# Patient Record
Sex: MALE | Race: OTHER | Employment: UNEMPLOYED | URBAN - METROPOLITAN AREA
[De-identification: names, ages, dates, MRNs, and addresses within clinical notes are randomized per-mention and may not be internally consistent; named-entity substitution may affect disease eponyms.]

---

## 2017-01-01 ENCOUNTER — HOME HEALTH ADMISSION (OUTPATIENT)
Dept: HOME HEALTH SERVICES | Facility: HOME HEALTH | Age: 42
End: 2017-01-01
Payer: COMMERCIAL

## 2017-01-01 ENCOUNTER — OFFICE VISIT (OUTPATIENT)
Dept: CARDIOLOGY CLINIC | Age: 42
End: 2017-01-01

## 2017-01-01 ENCOUNTER — HOME CARE VISIT (OUTPATIENT)
Dept: SCHEDULING | Facility: HOME HEALTH | Age: 42
End: 2017-01-01
Payer: COMMERCIAL

## 2017-01-01 ENCOUNTER — APPOINTMENT (OUTPATIENT)
Dept: GENERAL RADIOLOGY | Age: 42
End: 2017-01-01
Attending: EMERGENCY MEDICINE
Payer: COMMERCIAL

## 2017-01-01 ENCOUNTER — APPOINTMENT (OUTPATIENT)
Dept: GENERAL RADIOLOGY | Age: 42
End: 2017-01-01
Attending: INTERNAL MEDICINE
Payer: COMMERCIAL

## 2017-01-01 ENCOUNTER — TELEPHONE (OUTPATIENT)
Dept: INTERNAL MEDICINE CLINIC | Age: 42
End: 2017-01-01

## 2017-01-01 ENCOUNTER — APPOINTMENT (OUTPATIENT)
Dept: GENERAL RADIOLOGY | Age: 42
End: 2017-01-01
Attending: PHYSICIAN ASSISTANT
Payer: COMMERCIAL

## 2017-01-01 ENCOUNTER — HOSPITAL ENCOUNTER (EMERGENCY)
Age: 42
Discharge: HOME | End: 2017-10-29
Attending: EMERGENCY MEDICINE
Payer: COMMERCIAL

## 2017-01-01 ENCOUNTER — HOSPITAL ENCOUNTER (OUTPATIENT)
Dept: INFUSION THERAPY | Age: 42
Discharge: HOME | End: 2017-09-27

## 2017-01-01 ENCOUNTER — APPOINTMENT (OUTPATIENT)
Dept: NUCLEAR MEDICINE | Age: 42
DRG: 194 | End: 2017-01-01
Attending: INTERNAL MEDICINE
Payer: MEDICAID

## 2017-01-01 ENCOUNTER — HOME CARE VISIT (OUTPATIENT)
Dept: HOME HEALTH SERVICES | Facility: HOME HEALTH | Age: 42
End: 2017-01-01

## 2017-01-01 ENCOUNTER — HOSPITAL ENCOUNTER (OUTPATIENT)
Age: 42
Setting detail: OBSERVATION
Discharge: HOME | End: 2017-10-23
Attending: EMERGENCY MEDICINE | Admitting: INTERNAL MEDICINE
Payer: COMMERCIAL

## 2017-01-01 ENCOUNTER — HOSPITAL ENCOUNTER (OUTPATIENT)
Dept: INFUSION THERAPY | Age: 42
Discharge: HOME | End: 2017-10-11
Payer: COMMERCIAL

## 2017-01-01 ENCOUNTER — HOSPITAL ENCOUNTER (INPATIENT)
Age: 42
LOS: 2 days | Discharge: HOME | DRG: 194 | End: 2017-12-15
Attending: EMERGENCY MEDICINE | Admitting: INTERNAL MEDICINE
Payer: MEDICAID

## 2017-01-01 ENCOUNTER — APPOINTMENT (OUTPATIENT)
Dept: GENERAL RADIOLOGY | Age: 42
DRG: 194 | End: 2017-01-01
Attending: EMERGENCY MEDICINE
Payer: MEDICAID

## 2017-01-01 ENCOUNTER — HOSPITAL ENCOUNTER (INPATIENT)
Age: 42
LOS: 1 days | Discharge: HOME HEALTH | DRG: 292 | End: 2017-09-28
Attending: EMERGENCY MEDICINE | Admitting: FAMILY MEDICINE
Payer: COMMERCIAL

## 2017-01-01 ENCOUNTER — HOSPITAL ENCOUNTER (EMERGENCY)
Age: 42
Discharge: OTHER INSTITUTION | End: 2017-09-26
Attending: INTERNAL MEDICINE
Payer: COMMERCIAL

## 2017-01-01 ENCOUNTER — HOSPITAL ENCOUNTER (OUTPATIENT)
Dept: INFUSION THERAPY | Age: 42
Discharge: HOME | End: 2017-10-04
Payer: COMMERCIAL

## 2017-01-01 ENCOUNTER — HOSPITAL ENCOUNTER (EMERGENCY)
Age: 42
Discharge: HOME | End: 2017-09-13
Attending: EMERGENCY MEDICINE
Payer: COMMERCIAL

## 2017-01-01 ENCOUNTER — HOME CARE VISIT (OUTPATIENT)
Dept: HOME HEALTH SERVICES | Facility: HOME HEALTH | Age: 42
End: 2017-01-01
Payer: COMMERCIAL

## 2017-01-01 VITALS
BODY MASS INDEX: 40.43 KG/M2 | OXYGEN SATURATION: 90 % | SYSTOLIC BLOOD PRESSURE: 179 MMHG | RESPIRATION RATE: 16 BRPM | WEIGHT: 315 LBS | DIASTOLIC BLOOD PRESSURE: 76 MMHG | HEIGHT: 74 IN | TEMPERATURE: 98.5 F | HEART RATE: 85 BPM

## 2017-01-01 VITALS
OXYGEN SATURATION: 93 % | BODY MASS INDEX: 39.17 KG/M2 | WEIGHT: 315 LBS | SYSTOLIC BLOOD PRESSURE: 176 MMHG | HEART RATE: 79 BPM | DIASTOLIC BLOOD PRESSURE: 85 MMHG | HEIGHT: 75 IN

## 2017-01-01 VITALS
RESPIRATION RATE: 20 BRPM | SYSTOLIC BLOOD PRESSURE: 142 MMHG | HEART RATE: 77 BPM | OXYGEN SATURATION: 95 % | DIASTOLIC BLOOD PRESSURE: 77 MMHG | TEMPERATURE: 97 F

## 2017-01-01 VITALS
TEMPERATURE: 97.9 F | RESPIRATION RATE: 18 BRPM | DIASTOLIC BLOOD PRESSURE: 86 MMHG | HEART RATE: 66 BPM | OXYGEN SATURATION: 96 % | HEIGHT: 74 IN | BODY MASS INDEX: 40.37 KG/M2 | SYSTOLIC BLOOD PRESSURE: 170 MMHG | WEIGHT: 314.6 LBS

## 2017-01-01 VITALS
DIASTOLIC BLOOD PRESSURE: 90 MMHG | RESPIRATION RATE: 18 BRPM | SYSTOLIC BLOOD PRESSURE: 162 MMHG | HEART RATE: 68 BPM | OXYGEN SATURATION: 90 % | TEMPERATURE: 98.2 F

## 2017-01-01 VITALS
HEART RATE: 67 BPM | DIASTOLIC BLOOD PRESSURE: 79 MMHG | WEIGHT: 315 LBS | OXYGEN SATURATION: 98 % | TEMPERATURE: 98.1 F | RESPIRATION RATE: 24 BRPM | BODY MASS INDEX: 39.17 KG/M2 | HEIGHT: 75 IN | SYSTOLIC BLOOD PRESSURE: 166 MMHG

## 2017-01-01 VITALS
RESPIRATION RATE: 18 BRPM | BODY MASS INDEX: 41.98 KG/M2 | OXYGEN SATURATION: 98 % | HEART RATE: 69 BPM | SYSTOLIC BLOOD PRESSURE: 168 MMHG | WEIGHT: 315 LBS | TEMPERATURE: 97 F | DIASTOLIC BLOOD PRESSURE: 88 MMHG

## 2017-01-01 VITALS
RESPIRATION RATE: 18 BRPM | OXYGEN SATURATION: 98 % | HEART RATE: 78 BPM | DIASTOLIC BLOOD PRESSURE: 108 MMHG | SYSTOLIC BLOOD PRESSURE: 180 MMHG | TEMPERATURE: 99.6 F

## 2017-01-01 VITALS
RESPIRATION RATE: 18 BRPM | TEMPERATURE: 98.2 F | HEART RATE: 75 BPM | DIASTOLIC BLOOD PRESSURE: 90 MMHG | WEIGHT: 315 LBS | BODY MASS INDEX: 41.6 KG/M2 | OXYGEN SATURATION: 95 % | SYSTOLIC BLOOD PRESSURE: 162 MMHG

## 2017-01-01 VITALS
DIASTOLIC BLOOD PRESSURE: 82 MMHG | BODY MASS INDEX: 40.76 KG/M2 | RESPIRATION RATE: 18 BRPM | HEART RATE: 67 BPM | TEMPERATURE: 98.3 F | SYSTOLIC BLOOD PRESSURE: 157 MMHG | WEIGHT: 315 LBS | OXYGEN SATURATION: 93 %

## 2017-01-01 VITALS
RESPIRATION RATE: 18 BRPM | HEART RATE: 95 BPM | TEMPERATURE: 98.1 F | SYSTOLIC BLOOD PRESSURE: 144 MMHG | OXYGEN SATURATION: 88 % | DIASTOLIC BLOOD PRESSURE: 74 MMHG

## 2017-01-01 VITALS
HEART RATE: 72 BPM | SYSTOLIC BLOOD PRESSURE: 134 MMHG | HEIGHT: 74 IN | BODY MASS INDEX: 40.43 KG/M2 | DIASTOLIC BLOOD PRESSURE: 69 MMHG | WEIGHT: 315 LBS | OXYGEN SATURATION: 91 %

## 2017-01-01 VITALS
BODY MASS INDEX: 39.17 KG/M2 | HEIGHT: 75 IN | TEMPERATURE: 98.1 F | OXYGEN SATURATION: 97 % | SYSTOLIC BLOOD PRESSURE: 178 MMHG | HEART RATE: 81 BPM | RESPIRATION RATE: 18 BRPM | DIASTOLIC BLOOD PRESSURE: 79 MMHG | WEIGHT: 315 LBS

## 2017-01-01 VITALS
RESPIRATION RATE: 24 BRPM | DIASTOLIC BLOOD PRESSURE: 66 MMHG | SYSTOLIC BLOOD PRESSURE: 146 MMHG | TEMPERATURE: 98.2 F | HEART RATE: 77 BPM | OXYGEN SATURATION: 95 % | BODY MASS INDEX: 40.43 KG/M2 | WEIGHT: 315 LBS | HEIGHT: 74 IN

## 2017-01-01 DIAGNOSIS — I50.9 CONGESTIVE HEART FAILURE, UNSPECIFIED CONGESTIVE HEART FAILURE CHRONICITY, UNSPECIFIED CONGESTIVE HEART FAILURE TYPE: Primary | ICD-10-CM

## 2017-01-01 DIAGNOSIS — N18.9 ACUTE RENAL FAILURE SUPERIMPOSED ON CHRONIC KIDNEY DISEASE (HCC): Primary | ICD-10-CM

## 2017-01-01 DIAGNOSIS — J81.0 ACUTE PULMONARY EDEMA (HCC): ICD-10-CM

## 2017-01-01 DIAGNOSIS — I50.33 ACUTE ON CHRONIC DIASTOLIC CHF (CONGESTIVE HEART FAILURE) (HCC): ICD-10-CM

## 2017-01-01 DIAGNOSIS — J81.0 ACUTE PULMONARY EDEMA (HCC): Primary | ICD-10-CM

## 2017-01-01 DIAGNOSIS — N18.9 ANEMIA IN CHRONIC KIDNEY DISEASE, UNSPECIFIED CKD STAGE: ICD-10-CM

## 2017-01-01 DIAGNOSIS — D64.9 ANEMIA, UNSPECIFIED TYPE: ICD-10-CM

## 2017-01-01 DIAGNOSIS — N18.9 CHRONIC RENAL FAILURE, UNSPECIFIED STAGE: ICD-10-CM

## 2017-01-01 DIAGNOSIS — N18.4 CHRONIC RENAL FAILURE, STAGE 4 (SEVERE) (HCC): ICD-10-CM

## 2017-01-01 DIAGNOSIS — R06.00 DYSPNEA, UNSPECIFIED TYPE: ICD-10-CM

## 2017-01-01 DIAGNOSIS — I10 ESSENTIAL HYPERTENSION, BENIGN: ICD-10-CM

## 2017-01-01 DIAGNOSIS — I10 MALIGNANT HYPERTENSION: ICD-10-CM

## 2017-01-01 DIAGNOSIS — I50.31 ACUTE DIASTOLIC CHF (CONGESTIVE HEART FAILURE) (HCC): ICD-10-CM

## 2017-01-01 DIAGNOSIS — D64.9 ANEMIA, UNSPECIFIED TYPE: Primary | ICD-10-CM

## 2017-01-01 DIAGNOSIS — J45.41 MODERATE PERSISTENT ASTHMA WITH ACUTE EXACERBATION: ICD-10-CM

## 2017-01-01 DIAGNOSIS — D63.1 ANEMIA IN CHRONIC KIDNEY DISEASE, UNSPECIFIED CKD STAGE: ICD-10-CM

## 2017-01-01 DIAGNOSIS — N18.30 CKD (CHRONIC KIDNEY DISEASE) STAGE 3, GFR 30-59 ML/MIN (HCC): ICD-10-CM

## 2017-01-01 DIAGNOSIS — I48.91 ATRIAL FIBRILLATION AND FLUTTER (HCC): Primary | ICD-10-CM

## 2017-01-01 DIAGNOSIS — N17.9 ACUTE RENAL FAILURE SUPERIMPOSED ON CHRONIC KIDNEY DISEASE (HCC): Primary | ICD-10-CM

## 2017-01-01 DIAGNOSIS — J45.909 UNCOMPLICATED ASTHMA, UNSPECIFIED ASTHMA SEVERITY: ICD-10-CM

## 2017-01-01 DIAGNOSIS — N18.9 ANEMIA IN CHRONIC KIDNEY DISEASE, UNSPECIFIED CKD STAGE: Primary | ICD-10-CM

## 2017-01-01 DIAGNOSIS — I48.92 ATRIAL FIBRILLATION AND FLUTTER (HCC): Primary | ICD-10-CM

## 2017-01-01 DIAGNOSIS — D63.1 ANEMIA IN CHRONIC KIDNEY DISEASE, UNSPECIFIED CKD STAGE: Primary | ICD-10-CM

## 2017-01-01 DIAGNOSIS — Z89.519 HX OF BKA, UNSPECIFIED LATERALITY (HCC): ICD-10-CM

## 2017-01-01 DIAGNOSIS — N18.4 ANEMIA IN STAGE 4 CHRONIC KIDNEY DISEASE (HCC): ICD-10-CM

## 2017-01-01 DIAGNOSIS — D63.1 ANEMIA IN STAGE 4 CHRONIC KIDNEY DISEASE (HCC): ICD-10-CM

## 2017-01-01 DIAGNOSIS — I10 ESSENTIAL HYPERTENSION: Primary | ICD-10-CM

## 2017-01-01 DIAGNOSIS — J96.01 ACUTE RESPIRATORY FAILURE WITH HYPOXIA (HCC): ICD-10-CM

## 2017-01-01 LAB
ABO + RH BLD: NORMAL
ALBUMIN SERPL-MCNC: 3.2 G/DL (ref 3.5–5)
ALBUMIN SERPL-MCNC: 3.2 G/DL (ref 3.5–5)
ALBUMIN SERPL-MCNC: 3.3 G/DL (ref 3.5–5)
ALBUMIN SERPL-MCNC: 3.5 G/DL (ref 3.5–5)
ALBUMIN SERPL-MCNC: 3.6 G/DL (ref 3.5–5)
ALBUMIN SERPL-MCNC: 3.6 G/DL (ref 3.5–5)
ALBUMIN SERPL-MCNC: 3.7 G/DL (ref 3.5–5)
ALBUMIN SERPL-MCNC: 3.7 G/DL (ref 3.5–5)
ALBUMIN/GLOB SERPL: 0.9 {RATIO} (ref 1.1–2.2)
ALBUMIN/GLOB SERPL: 1 {RATIO} (ref 1.1–2.2)
ALP SERPL-CCNC: 100 U/L (ref 45–117)
ALP SERPL-CCNC: 102 U/L (ref 45–117)
ALP SERPL-CCNC: 108 U/L (ref 45–117)
ALP SERPL-CCNC: 111 U/L (ref 45–117)
ALP SERPL-CCNC: 97 U/L (ref 45–117)
ALP SERPL-CCNC: 99 U/L (ref 45–117)
ALT SERPL-CCNC: 16 U/L (ref 12–78)
ALT SERPL-CCNC: 17 U/L (ref 12–78)
ALT SERPL-CCNC: 17 U/L (ref 12–78)
ALT SERPL-CCNC: 18 U/L (ref 12–78)
ALT SERPL-CCNC: 21 U/L (ref 12–78)
ALT SERPL-CCNC: 24 U/L (ref 12–78)
AMPHET UR QL SCN: NEGATIVE
ANION GAP BLD CALC-SCNC: 16 MMOL/L (ref 5–15)
ANION GAP SERPL CALC-SCNC: 10 MMOL/L (ref 5–15)
ANION GAP SERPL CALC-SCNC: 10 MMOL/L (ref 5–15)
ANION GAP SERPL CALC-SCNC: 11 MMOL/L (ref 5–15)
ANION GAP SERPL CALC-SCNC: 13 MMOL/L (ref 5–15)
ANION GAP SERPL CALC-SCNC: 13 MMOL/L (ref 5–15)
ANION GAP SERPL CALC-SCNC: 7 MMOL/L (ref 5–15)
ANION GAP SERPL CALC-SCNC: 8 MMOL/L (ref 5–15)
ANION GAP SERPL CALC-SCNC: 9 MMOL/L (ref 5–15)
APPEARANCE UR: CLEAR
ARTERIAL PATENCY WRIST A: ABNORMAL
ARTERIAL PATENCY WRIST A: YES
AST SERPL-CCNC: 11 U/L (ref 15–37)
AST SERPL-CCNC: 12 U/L (ref 15–37)
AST SERPL-CCNC: 14 U/L (ref 15–37)
AST SERPL-CCNC: 16 U/L (ref 15–37)
AST SERPL-CCNC: 7 U/L (ref 15–37)
AST SERPL-CCNC: 9 U/L (ref 15–37)
ATRIAL RATE: 71 BPM
ATRIAL RATE: 75 BPM
ATRIAL RATE: 80 BPM
ATRIAL RATE: 82 BPM
ATRIAL RATE: 82 BPM
ATTENDING PHYSICIAN, CST07: NORMAL
BACTERIA URNS QL MICRO: ABNORMAL /HPF
BARBITURATES UR QL SCN: NEGATIVE
BASE DEFICIT BLDA-SCNC: 3.4 MMOL/L
BASE EXCESS BLDV CALC-SCNC: 0 MMOL/L
BASOPHILS # BLD: 0 K/UL (ref 0–0.1)
BASOPHILS NFR BLD: 0 % (ref 0–1)
BDY SITE: ABNORMAL
BDY SITE: ABNORMAL
BENZODIAZ UR QL: NEGATIVE
BILIRUB SERPL-MCNC: 0.5 MG/DL (ref 0.2–1)
BILIRUB SERPL-MCNC: 0.5 MG/DL (ref 0.2–1)
BILIRUB SERPL-MCNC: 0.7 MG/DL (ref 0.2–1)
BILIRUB SERPL-MCNC: 0.7 MG/DL (ref 0.2–1)
BILIRUB SERPL-MCNC: 0.8 MG/DL (ref 0.2–1)
BILIRUB SERPL-MCNC: 0.9 MG/DL (ref 0.2–1)
BILIRUB UR QL: NEGATIVE
BLD PROD TYP BPU: NORMAL
BLOOD GROUP ANTIBODIES SERPL: NORMAL
BNP SERPL-MCNC: 285 PG/ML (ref 0–125)
BNP SERPL-MCNC: 456 PG/ML (ref 0–125)
BNP SERPL-MCNC: 594 PG/ML (ref 0–125)
BNP SERPL-MCNC: 634 PG/ML (ref 0–125)
BNP SERPL-MCNC: 853 PG/ML (ref 0–125)
BPU ID: NORMAL
BUN BLD-MCNC: 56 MG/DL (ref 9–20)
BUN SERPL-MCNC: 52 MG/DL (ref 6–20)
BUN SERPL-MCNC: 59 MG/DL (ref 6–20)
BUN SERPL-MCNC: 60 MG/DL (ref 6–20)
BUN SERPL-MCNC: 61 MG/DL (ref 6–20)
BUN SERPL-MCNC: 62 MG/DL (ref 6–20)
BUN SERPL-MCNC: 65 MG/DL (ref 6–20)
BUN SERPL-MCNC: 65 MG/DL (ref 6–20)
BUN SERPL-MCNC: 67 MG/DL (ref 6–20)
BUN SERPL-MCNC: 67 MG/DL (ref 6–20)
BUN SERPL-MCNC: 72 MG/DL (ref 6–20)
BUN/CREAT SERPL: 14 (ref 12–20)
BUN/CREAT SERPL: 14 (ref 12–20)
BUN/CREAT SERPL: 15 (ref 12–20)
BUN/CREAT SERPL: 16 (ref 12–20)
CA-I BLD-MCNC: 1.14 MMOL/L (ref 1.12–1.32)
CALCIUM SERPL-MCNC: 8 MG/DL (ref 8.5–10.1)
CALCIUM SERPL-MCNC: 8.4 MG/DL (ref 8.5–10.1)
CALCIUM SERPL-MCNC: 8.4 MG/DL (ref 8.5–10.1)
CALCIUM SERPL-MCNC: 8.6 MG/DL (ref 8.5–10.1)
CALCIUM SERPL-MCNC: 8.6 MG/DL (ref 8.5–10.1)
CALCIUM SERPL-MCNC: 8.7 MG/DL (ref 8.5–10.1)
CALCIUM SERPL-MCNC: 8.8 MG/DL (ref 8.5–10.1)
CALCIUM SERPL-MCNC: 8.9 MG/DL (ref 8.5–10.1)
CALCIUM SERPL-MCNC: 9.3 MG/DL (ref 8.5–10.1)
CALCIUM SERPL-MCNC: 9.3 MG/DL (ref 8.5–10.1)
CALCULATED P AXIS, ECG09: 31 DEGREES
CALCULATED P AXIS, ECG09: 49 DEGREES
CALCULATED P AXIS, ECG09: 51 DEGREES
CALCULATED P AXIS, ECG09: 52 DEGREES
CALCULATED P AXIS, ECG09: 53 DEGREES
CALCULATED R AXIS, ECG10: 55 DEGREES
CALCULATED R AXIS, ECG10: 55 DEGREES
CALCULATED R AXIS, ECG10: 64 DEGREES
CALCULATED R AXIS, ECG10: 65 DEGREES
CALCULATED R AXIS, ECG10: 74 DEGREES
CALCULATED T AXIS, ECG11: 28 DEGREES
CALCULATED T AXIS, ECG11: 53 DEGREES
CALCULATED T AXIS, ECG11: 70 DEGREES
CALCULATED T AXIS, ECG11: 74 DEGREES
CALCULATED T AXIS, ECG11: 90 DEGREES
CANNABINOIDS UR QL SCN: NEGATIVE
CHLORIDE BLD-SCNC: 108 MMOL/L (ref 98–107)
CHLORIDE SERPL-SCNC: 105 MMOL/L (ref 97–108)
CHLORIDE SERPL-SCNC: 105 MMOL/L (ref 97–108)
CHLORIDE SERPL-SCNC: 106 MMOL/L (ref 97–108)
CHLORIDE SERPL-SCNC: 107 MMOL/L (ref 97–108)
CHLORIDE SERPL-SCNC: 108 MMOL/L (ref 97–108)
CHLORIDE SERPL-SCNC: 108 MMOL/L (ref 97–108)
CHLORIDE SERPL-SCNC: 109 MMOL/L (ref 97–108)
CHLORIDE SERPL-SCNC: 109 MMOL/L (ref 97–108)
CHLORIDE SERPL-SCNC: 111 MMOL/L (ref 97–108)
CHLORIDE SERPL-SCNC: 111 MMOL/L (ref 97–108)
CK MB CFR SERPL CALC: 0.5 % (ref 0–2.5)
CK MB CFR SERPL CALC: 0.6 % (ref 0–2.5)
CK MB CFR SERPL CALC: 0.6 % (ref 0–2.5)
CK MB SERPL-MCNC: 2 NG/ML (ref 5–25)
CK MB SERPL-MCNC: 2.3 NG/ML (ref 5–25)
CK MB SERPL-MCNC: 2.7 NG/ML (ref 5–25)
CK SERPL-CCNC: 378 U/L (ref 39–308)
CK SERPL-CCNC: 396 U/L (ref 39–308)
CK SERPL-CCNC: 427 U/L (ref 39–308)
CO2 BLD-SCNC: 24 MMOL/L (ref 21–32)
CO2 SERPL-SCNC: 20 MMOL/L (ref 21–32)
CO2 SERPL-SCNC: 21 MMOL/L (ref 21–32)
CO2 SERPL-SCNC: 23 MMOL/L (ref 21–32)
CO2 SERPL-SCNC: 24 MMOL/L (ref 21–32)
CO2 SERPL-SCNC: 25 MMOL/L (ref 21–32)
CO2 SERPL-SCNC: 26 MMOL/L (ref 21–32)
COCAINE UR QL SCN: NEGATIVE
COLOR UR: ABNORMAL
CREAT BLD-MCNC: 3.7 MG/DL (ref 0.6–1.3)
CREAT SERPL-MCNC: 3.74 MG/DL (ref 0.7–1.3)
CREAT SERPL-MCNC: 3.76 MG/DL (ref 0.7–1.3)
CREAT SERPL-MCNC: 3.96 MG/DL (ref 0.7–1.3)
CREAT SERPL-MCNC: 4.02 MG/DL (ref 0.7–1.3)
CREAT SERPL-MCNC: 4.07 MG/DL (ref 0.7–1.3)
CREAT SERPL-MCNC: 4.11 MG/DL (ref 0.7–1.3)
CREAT SERPL-MCNC: 4.15 MG/DL (ref 0.7–1.3)
CREAT SERPL-MCNC: 4.18 MG/DL (ref 0.7–1.3)
CREAT SERPL-MCNC: 4.2 MG/DL (ref 0.7–1.3)
CREAT SERPL-MCNC: 4.51 MG/DL (ref 0.7–1.3)
CREAT SERPL-MCNC: 4.52 MG/DL (ref 0.7–1.3)
CREAT SERPL-MCNC: 4.59 MG/DL (ref 0.7–1.3)
CROSSMATCH RESULT,%XM: NORMAL
D DIMER PPP FEU-MCNC: 1.35 MG/L FEU (ref 0–0.65)
DIAGNOSIS, 93000: NORMAL
DIFFERENTIAL METHOD BLD: ABNORMAL
DRUG SCRN COMMENT,DRGCM: NORMAL
DUKE TM SCORE RESULT, CST14: NORMAL
DUKE TREADMILL SCORE, CST13: NORMAL
ECG INTERP BEFORE EX, CST11: NORMAL
ECG INTERP DURING EX, CST12: NORMAL
EOSINOPHIL # BLD: 0.1 K/UL (ref 0–0.4)
EOSINOPHIL # BLD: 0.1 K/UL (ref 0–0.4)
EOSINOPHIL # BLD: 0.2 K/UL (ref 0–0.4)
EOSINOPHIL # BLD: 0.3 K/UL (ref 0–0.4)
EOSINOPHIL # BLD: 0.3 K/UL (ref 0–0.4)
EOSINOPHIL NFR BLD: 1 % (ref 0–7)
EOSINOPHIL NFR BLD: 1 % (ref 0–7)
EOSINOPHIL NFR BLD: 2 % (ref 0–7)
EOSINOPHIL NFR BLD: 3 % (ref 0–7)
EOSINOPHIL NFR BLD: 3 % (ref 0–7)
EPITH CASTS URNS QL MICRO: ABNORMAL /LPF
ERYTHROCYTE [DISTWIDTH] IN BLOOD BY AUTOMATED COUNT: 15.5 % (ref 11.5–14.5)
ERYTHROCYTE [DISTWIDTH] IN BLOOD BY AUTOMATED COUNT: 16.1 % (ref 11.5–14.5)
ERYTHROCYTE [DISTWIDTH] IN BLOOD BY AUTOMATED COUNT: 16.1 % (ref 11.5–14.5)
ERYTHROCYTE [DISTWIDTH] IN BLOOD BY AUTOMATED COUNT: 16.2 % (ref 11.5–14.5)
ERYTHROCYTE [DISTWIDTH] IN BLOOD BY AUTOMATED COUNT: 17.4 % (ref 11.5–14.5)
ERYTHROCYTE [DISTWIDTH] IN BLOOD BY AUTOMATED COUNT: 17.6 % (ref 11.5–14.5)
ERYTHROCYTE [DISTWIDTH] IN BLOOD BY AUTOMATED COUNT: 17.6 % (ref 11.5–14.5)
ERYTHROCYTE [DISTWIDTH] IN BLOOD BY AUTOMATED COUNT: 17.8 % (ref 11.5–14.5)
ERYTHROCYTE [DISTWIDTH] IN BLOOD BY AUTOMATED COUNT: 17.8 % (ref 11.5–14.5)
ERYTHROCYTE [DISTWIDTH] IN BLOOD BY AUTOMATED COUNT: 18.1 % (ref 11.5–14.5)
ERYTHROCYTE [DISTWIDTH] IN BLOOD BY AUTOMATED COUNT: 18.5 % (ref 11.5–14.5)
EST. AVERAGE GLUCOSE BLD GHB EST-MCNC: 166 MG/DL
EST. AVERAGE GLUCOSE BLD GHB EST-MCNC: 183 MG/DL
FUNCTIONAL CAPACITY, CST17: NORMAL
GAS FLOW.O2 O2 DELIVERY SYS: 2 L/MIN
GAS FLOW.O2 O2 DELIVERY SYS: ABNORMAL L/MIN
GAS FLOW.O2 SETTING OXYMISER: 2 L/M
GLOBULIN SER CALC-MCNC: 3.5 G/DL (ref 2–4)
GLOBULIN SER CALC-MCNC: 3.6 G/DL (ref 2–4)
GLOBULIN SER CALC-MCNC: 3.7 G/DL (ref 2–4)
GLOBULIN SER CALC-MCNC: 3.8 G/DL (ref 2–4)
GLOBULIN SER CALC-MCNC: 4 G/DL (ref 2–4)
GLOBULIN SER CALC-MCNC: 4 G/DL (ref 2–4)
GLUCOSE BLD STRIP.AUTO-MCNC: 133 MG/DL (ref 65–100)
GLUCOSE BLD STRIP.AUTO-MCNC: 149 MG/DL (ref 65–100)
GLUCOSE BLD STRIP.AUTO-MCNC: 172 MG/DL (ref 65–100)
GLUCOSE BLD STRIP.AUTO-MCNC: 175 MG/DL (ref 65–100)
GLUCOSE BLD STRIP.AUTO-MCNC: 176 MG/DL (ref 65–100)
GLUCOSE BLD STRIP.AUTO-MCNC: 180 MG/DL (ref 65–100)
GLUCOSE BLD STRIP.AUTO-MCNC: 184 MG/DL (ref 65–100)
GLUCOSE BLD STRIP.AUTO-MCNC: 208 MG/DL (ref 65–100)
GLUCOSE BLD STRIP.AUTO-MCNC: 208 MG/DL (ref 65–100)
GLUCOSE BLD STRIP.AUTO-MCNC: 219 MG/DL (ref 65–100)
GLUCOSE BLD STRIP.AUTO-MCNC: 228 MG/DL (ref 65–100)
GLUCOSE BLD STRIP.AUTO-MCNC: 231 MG/DL (ref 65–100)
GLUCOSE BLD STRIP.AUTO-MCNC: 271 MG/DL (ref 65–100)
GLUCOSE BLD STRIP.AUTO-MCNC: 279 MG/DL (ref 65–100)
GLUCOSE BLD STRIP.AUTO-MCNC: 279 MG/DL (ref 65–100)
GLUCOSE BLD STRIP.AUTO-MCNC: 280 MG/DL (ref 65–100)
GLUCOSE BLD STRIP.AUTO-MCNC: 299 MG/DL (ref 65–100)
GLUCOSE BLD STRIP.AUTO-MCNC: 313 MG/DL (ref 65–100)
GLUCOSE BLD STRIP.AUTO-MCNC: 346 MG/DL (ref 65–100)
GLUCOSE BLD STRIP.AUTO-MCNC: 97 MG/DL (ref 65–100)
GLUCOSE BLD STRIP.AUTO-MCNC: 98 MG/DL (ref 65–100)
GLUCOSE BLD-MCNC: 176 MG/DL (ref 65–100)
GLUCOSE SERPL-MCNC: 180 MG/DL (ref 65–100)
GLUCOSE SERPL-MCNC: 180 MG/DL (ref 65–100)
GLUCOSE SERPL-MCNC: 183 MG/DL (ref 65–100)
GLUCOSE SERPL-MCNC: 185 MG/DL (ref 65–100)
GLUCOSE SERPL-MCNC: 204 MG/DL (ref 65–100)
GLUCOSE SERPL-MCNC: 206 MG/DL (ref 65–100)
GLUCOSE SERPL-MCNC: 248 MG/DL (ref 65–100)
GLUCOSE SERPL-MCNC: 250 MG/DL (ref 65–100)
GLUCOSE SERPL-MCNC: 253 MG/DL (ref 65–100)
GLUCOSE SERPL-MCNC: 298 MG/DL (ref 65–100)
GLUCOSE SERPL-MCNC: 74 MG/DL (ref 65–100)
GLUCOSE SERPL-MCNC: 93 MG/DL (ref 65–100)
GLUCOSE UR STRIP.AUTO-MCNC: NEGATIVE MG/DL
HBA1C MFR BLD: 7.4 % (ref 4.2–6.3)
HBA1C MFR BLD: 8 % (ref 4.2–6.3)
HCO3 BLDA-SCNC: 22 MMOL/L (ref 22–26)
HCO3 BLDV-SCNC: 25.1 MMOL/L (ref 23–28)
HCT VFR BLD AUTO: 19.1 % (ref 36.6–50.3)
HCT VFR BLD AUTO: 22.6 % (ref 36.6–50.3)
HCT VFR BLD AUTO: 22.8 % (ref 36.6–50.3)
HCT VFR BLD AUTO: 23 % (ref 36.6–50.3)
HCT VFR BLD AUTO: 24.5 % (ref 36.6–50.3)
HCT VFR BLD AUTO: 25.8 % (ref 36.6–50.3)
HCT VFR BLD AUTO: 26.2 % (ref 36.6–50.3)
HCT VFR BLD AUTO: 26.2 % (ref 36.6–50.3)
HCT VFR BLD AUTO: 26.5 % (ref 36.6–50.3)
HCT VFR BLD AUTO: 27.3 % (ref 36.6–50.3)
HCT VFR BLD AUTO: 27.4 % (ref 36.6–50.3)
HCT VFR BLD CALC: 29 % (ref 36.6–50.3)
HGB BLD-MCNC: 5.8 G/DL (ref 12.1–17)
HGB BLD-MCNC: 6.8 G/DL (ref 12.1–17)
HGB BLD-MCNC: 6.8 G/DL (ref 12.1–17)
HGB BLD-MCNC: 7.3 G/DL (ref 12.1–17)
HGB BLD-MCNC: 7.5 G/DL (ref 12.1–17)
HGB BLD-MCNC: 7.8 G/DL (ref 12.1–17)
HGB BLD-MCNC: 8 G/DL (ref 12.1–17)
HGB BLD-MCNC: 8.1 G/DL (ref 12.1–17)
HGB BLD-MCNC: 8.2 G/DL (ref 12.1–17)
HGB BLD-MCNC: 8.3 G/DL (ref 12.1–17)
HGB BLD-MCNC: 8.5 G/DL (ref 12.1–17)
HGB BLD-MCNC: 9.9 GM/DL (ref 12.1–17)
HGB UR QL STRIP: NEGATIVE
IRON SATN MFR SERPL: 14 % (ref 20–50)
IRON SATN MFR SERPL: 22 % (ref 20–50)
IRON SATN MFR SERPL: 24 % (ref 20–50)
IRON SERPL-MCNC: 26 UG/DL (ref 35–150)
IRON SERPL-MCNC: 40 UG/DL (ref 35–150)
IRON SERPL-MCNC: 41 UG/DL (ref 35–150)
KETONES UR QL STRIP.AUTO: NEGATIVE MG/DL
KNOWN CARDIAC CONDITION, CST08: NORMAL
LEUKOCYTE ESTERASE UR QL STRIP.AUTO: NEGATIVE
LYMPHOCYTES # BLD: 0.4 K/UL (ref 0.8–3.5)
LYMPHOCYTES # BLD: 0.6 K/UL (ref 0.8–3.5)
LYMPHOCYTES # BLD: 0.8 K/UL (ref 0.8–3.5)
LYMPHOCYTES # BLD: 0.9 K/UL (ref 0.8–3.5)
LYMPHOCYTES # BLD: 0.9 K/UL (ref 0.8–3.5)
LYMPHOCYTES # BLD: 1 K/UL (ref 0.8–3.5)
LYMPHOCYTES # BLD: 1 K/UL (ref 0.8–3.5)
LYMPHOCYTES # BLD: 1.1 K/UL (ref 0.8–3.5)
LYMPHOCYTES # BLD: 1.2 K/UL (ref 0.8–3.5)
LYMPHOCYTES NFR BLD: 10 % (ref 12–49)
LYMPHOCYTES NFR BLD: 11 % (ref 12–49)
LYMPHOCYTES NFR BLD: 11 % (ref 12–49)
LYMPHOCYTES NFR BLD: 14 % (ref 12–49)
LYMPHOCYTES NFR BLD: 5 % (ref 12–49)
LYMPHOCYTES NFR BLD: 6 % (ref 12–49)
LYMPHOCYTES NFR BLD: 8 % (ref 12–49)
LYMPHOCYTES NFR BLD: 9 % (ref 12–49)
LYMPHOCYTES NFR BLD: 9 % (ref 12–49)
MAGNESIUM SERPL-MCNC: 1.6 MG/DL (ref 1.6–2.4)
MAGNESIUM SERPL-MCNC: 1.9 MG/DL (ref 1.6–2.4)
MAX. DIASTOLIC BP, CST04: 98 MMHG
MAX. HEART RATE, CST05: 109 BPM
MAX. SYSTOLIC BP, CST03: 225 MMHG
MCH RBC QN AUTO: 25.2 PG (ref 26–34)
MCH RBC QN AUTO: 25.2 PG (ref 26–34)
MCH RBC QN AUTO: 25.4 PG (ref 26–34)
MCH RBC QN AUTO: 25.8 PG (ref 26–34)
MCH RBC QN AUTO: 25.8 PG (ref 26–34)
MCH RBC QN AUTO: 26 PG (ref 26–34)
MCH RBC QN AUTO: 26 PG (ref 26–34)
MCH RBC QN AUTO: 26.1 PG (ref 26–34)
MCH RBC QN AUTO: 26.2 PG (ref 26–34)
MCH RBC QN AUTO: 26.3 PG (ref 26–34)
MCH RBC QN AUTO: 26.5 PG (ref 26–34)
MCHC RBC AUTO-ENTMCNC: 29.8 G/DL (ref 30–36.5)
MCHC RBC AUTO-ENTMCNC: 29.9 G/DL (ref 30–36.5)
MCHC RBC AUTO-ENTMCNC: 30.1 G/DL (ref 30–36.5)
MCHC RBC AUTO-ENTMCNC: 30.2 G/DL (ref 30–36.5)
MCHC RBC AUTO-ENTMCNC: 30.4 G/DL (ref 30–36.5)
MCHC RBC AUTO-ENTMCNC: 30.5 G/DL (ref 30–36.5)
MCHC RBC AUTO-ENTMCNC: 30.6 G/DL (ref 30–36.5)
MCHC RBC AUTO-ENTMCNC: 30.6 G/DL (ref 30–36.5)
MCHC RBC AUTO-ENTMCNC: 31.1 G/DL (ref 30–36.5)
MCHC RBC AUTO-ENTMCNC: 31.7 G/DL (ref 30–36.5)
MCHC RBC AUTO-ENTMCNC: 31.7 G/DL (ref 30–36.5)
MCV RBC AUTO: 81.4 FL (ref 80–99)
MCV RBC AUTO: 81.7 FL (ref 80–99)
MCV RBC AUTO: 82.6 FL (ref 80–99)
MCV RBC AUTO: 83.6 FL (ref 80–99)
MCV RBC AUTO: 83.7 FL (ref 80–99)
MCV RBC AUTO: 84.9 FL (ref 80–99)
MCV RBC AUTO: 85.3 FL (ref 80–99)
MCV RBC AUTO: 85.7 FL (ref 80–99)
MCV RBC AUTO: 86 FL (ref 80–99)
MCV RBC AUTO: 87 FL (ref 80–99)
MCV RBC AUTO: 87.8 FL (ref 80–99)
METHADONE UR QL: NEGATIVE
MONOCYTES # BLD: 0.4 K/UL (ref 0–1)
MONOCYTES # BLD: 0.4 K/UL (ref 0–1)
MONOCYTES # BLD: 0.5 K/UL (ref 0–1)
MONOCYTES # BLD: 0.6 K/UL (ref 0–1)
MONOCYTES # BLD: 0.7 K/UL (ref 0–1)
MONOCYTES NFR BLD: 4 % (ref 5–13)
MONOCYTES NFR BLD: 5 % (ref 5–13)
MONOCYTES NFR BLD: 6 % (ref 5–13)
MONOCYTES NFR BLD: 7 % (ref 5–13)
MONOCYTES NFR BLD: 8 % (ref 5–13)
NEUTS SEG # BLD: 5.6 K/UL (ref 1.8–8)
NEUTS SEG # BLD: 6.5 K/UL (ref 1.8–8)
NEUTS SEG # BLD: 7 K/UL (ref 1.8–8)
NEUTS SEG # BLD: 7.4 K/UL (ref 1.8–8)
NEUTS SEG # BLD: 7.5 K/UL (ref 1.8–8)
NEUTS SEG # BLD: 7.8 K/UL (ref 1.8–8)
NEUTS SEG # BLD: 8 K/UL (ref 1.8–8)
NEUTS SEG # BLD: 8.5 K/UL (ref 1.8–8)
NEUTS SEG # BLD: 8.7 K/UL (ref 1.8–8)
NEUTS SEG # BLD: 8.8 K/UL (ref 1.8–8)
NEUTS SEG # BLD: 9 K/UL (ref 1.8–8)
NEUTS SEG NFR BLD: 78 % (ref 32–75)
NEUTS SEG NFR BLD: 79 % (ref 32–75)
NEUTS SEG NFR BLD: 81 % (ref 32–75)
NEUTS SEG NFR BLD: 82 % (ref 32–75)
NEUTS SEG NFR BLD: 83 % (ref 32–75)
NEUTS SEG NFR BLD: 84 % (ref 32–75)
NEUTS SEG NFR BLD: 88 % (ref 32–75)
NEUTS SEG NFR BLD: 90 % (ref 32–75)
NITRITE UR QL STRIP.AUTO: NEGATIVE
OPIATES UR QL: NEGATIVE
OVERALL BP RESPONSE TO EXERCISE, CST16: NORMAL
OVERALL HR RESPONSE TO EXERCISE, CST15: NORMAL
P-R INTERVAL, ECG05: 144 MS
P-R INTERVAL, ECG05: 158 MS
P-R INTERVAL, ECG05: 162 MS
P-R INTERVAL, ECG05: 174 MS
P-R INTERVAL, ECG05: 182 MS
PATH REV BLD -IMP: NORMAL
PCO2 BLDA: 40 MMHG (ref 35–45)
PCO2 BLDV: 39.6 MMHG (ref 41–51)
PCP UR QL: NEGATIVE
PEAK EX METS, CST10: 1 METS
PH BLDA: 7.36 [PH] (ref 7.35–7.45)
PH BLDV: 7.41 [PH] (ref 7.32–7.42)
PH UR STRIP: 5.5 [PH] (ref 5–8)
PHOSPHATE SERPL-MCNC: 2.7 MG/DL (ref 2.6–4.7)
PHOSPHATE SERPL-MCNC: 2.8 MG/DL (ref 2.6–4.7)
PHOSPHATE SERPL-MCNC: 2.9 MG/DL (ref 2.6–4.7)
PHOSPHATE SERPL-MCNC: 2.9 MG/DL (ref 2.6–4.7)
PLATELET # BLD AUTO: 163 K/UL (ref 150–400)
PLATELET # BLD AUTO: 190 K/UL (ref 150–400)
PLATELET # BLD AUTO: 203 K/UL (ref 150–400)
PLATELET # BLD AUTO: 205 K/UL (ref 150–400)
PLATELET # BLD AUTO: 233 K/UL (ref 150–400)
PLATELET # BLD AUTO: 248 K/UL (ref 150–400)
PLATELET # BLD AUTO: 258 K/UL (ref 150–400)
PLATELET # BLD AUTO: 261 K/UL (ref 150–400)
PLATELET # BLD AUTO: 263 K/UL (ref 150–400)
PLATELET # BLD AUTO: 266 K/UL (ref 150–400)
PLATELET # BLD AUTO: 273 K/UL (ref 150–400)
PO2 BLDA: 56 MMHG (ref 80–100)
PO2 BLDV: 40 MMHG (ref 25–40)
POTASSIUM BLD-SCNC: 4.3 MMOL/L (ref 3.5–5.1)
POTASSIUM SERPL-SCNC: 4 MMOL/L (ref 3.5–5.1)
POTASSIUM SERPL-SCNC: 4 MMOL/L (ref 3.5–5.1)
POTASSIUM SERPL-SCNC: 4.1 MMOL/L (ref 3.5–5.1)
POTASSIUM SERPL-SCNC: 4.2 MMOL/L (ref 3.5–5.1)
POTASSIUM SERPL-SCNC: 4.3 MMOL/L (ref 3.5–5.1)
POTASSIUM SERPL-SCNC: 4.4 MMOL/L (ref 3.5–5.1)
PROT SERPL-MCNC: 6.9 G/DL (ref 6.4–8.2)
PROT SERPL-MCNC: 7.1 G/DL (ref 6.4–8.2)
PROT SERPL-MCNC: 7.3 G/DL (ref 6.4–8.2)
PROT SERPL-MCNC: 7.5 G/DL (ref 6.4–8.2)
PROT SERPL-MCNC: 7.5 G/DL (ref 6.4–8.2)
PROT SERPL-MCNC: 7.6 G/DL (ref 6.4–8.2)
PROT UR STRIP-MCNC: >300 MG/DL
PROTOCOL NAME, CST01: NORMAL
PTH-INTACT SERPL-MCNC: 260.8 PG/ML (ref 14–72)
PTH-INTACT SERPL-MCNC: 295.6 PG/ML (ref 14–72)
Q-T INTERVAL, ECG07: 378 MS
Q-T INTERVAL, ECG07: 382 MS
Q-T INTERVAL, ECG07: 390 MS
Q-T INTERVAL, ECG07: 392 MS
Q-T INTERVAL, ECG07: 394 MS
QRS DURATION, ECG06: 80 MS
QRS DURATION, ECG06: 90 MS
QRS DURATION, ECG06: 92 MS
QRS DURATION, ECG06: 92 MS
QRS DURATION, ECG06: 94 MS
QTC CALCULATION (BEZET), ECG08: 428 MS
QTC CALCULATION (BEZET), ECG08: 435 MS
QTC CALCULATION (BEZET), ECG08: 435 MS
QTC CALCULATION (BEZET), ECG08: 446 MS
QTC CALCULATION (BEZET), ECG08: 457 MS
RBC # BLD AUTO: 2.25 M/UL (ref 4.1–5.7)
RBC # BLD AUTO: 2.62 M/UL (ref 4.1–5.7)
RBC # BLD AUTO: 2.7 M/UL (ref 4.1–5.7)
RBC # BLD AUTO: 2.75 M/UL (ref 4.1–5.7)
RBC # BLD AUTO: 2.86 M/UL (ref 4.1–5.7)
RBC # BLD AUTO: 3 M/UL (ref 4.1–5.7)
RBC # BLD AUTO: 3.07 M/UL (ref 4.1–5.7)
RBC # BLD AUTO: 3.12 M/UL (ref 4.1–5.7)
RBC # BLD AUTO: 3.21 M/UL (ref 4.1–5.7)
RBC # BLD AUTO: 3.22 M/UL (ref 4.1–5.7)
RBC # BLD AUTO: 3.34 M/UL (ref 4.1–5.7)
RBC #/AREA URNS HPF: ABNORMAL /HPF (ref 0–5)
RBC MORPH BLD: ABNORMAL
SAO2 % BLD: 88 % (ref 92–97)
SAO2 % BLDV: 76 % (ref 65–88)
SAO2% DEVICE SAO2% SENSOR NAME: ABNORMAL
SERVICE CMNT-IMP: ABNORMAL
SERVICE CMNT-IMP: NORMAL
SERVICE CMNT-IMP: NORMAL
SODIUM BLD-SCNC: 143 MMOL/L (ref 136–145)
SODIUM SERPL-SCNC: 138 MMOL/L (ref 136–145)
SODIUM SERPL-SCNC: 139 MMOL/L (ref 136–145)
SODIUM SERPL-SCNC: 139 MMOL/L (ref 136–145)
SODIUM SERPL-SCNC: 140 MMOL/L (ref 136–145)
SODIUM SERPL-SCNC: 140 MMOL/L (ref 136–145)
SODIUM SERPL-SCNC: 141 MMOL/L (ref 136–145)
SODIUM SERPL-SCNC: 142 MMOL/L (ref 136–145)
SODIUM SERPL-SCNC: 142 MMOL/L (ref 136–145)
SODIUM SERPL-SCNC: 145 MMOL/L (ref 136–145)
SP GR UR REFRACTOMETRY: 1.01 (ref 1–1.03)
SPECIMEN EXP DATE BLD: NORMAL
SPECIMEN SITE: ABNORMAL
SPECIMEN TYPE: ABNORMAL
SPERM URNS QL MICRO: PRESENT
STATUS OF UNIT,%ST: NORMAL
TEST INDICATION, CST09: NORMAL
TIBC SERPL-MCNC: 172 UG/DL (ref 250–450)
TIBC SERPL-MCNC: 184 UG/DL (ref 250–450)
TIBC SERPL-MCNC: 192 UG/DL (ref 250–450)
TROPONIN I SERPL-MCNC: <0.04 NG/ML
TSH SERPL DL<=0.05 MIU/L-ACNC: 1.22 UIU/ML (ref 0.36–3.74)
UNIT DIVISION, %UDIV: 0
UROBILINOGEN UR QL STRIP.AUTO: 0.2 EU/DL (ref 0.2–1)
VENTRICULAR RATE, ECG03: 71 BPM
VENTRICULAR RATE, ECG03: 75 BPM
VENTRICULAR RATE, ECG03: 80 BPM
VENTRICULAR RATE, ECG03: 82 BPM
VENTRICULAR RATE, ECG03: 82 BPM
VIT B12 SERPL-MCNC: 507 PG/ML (ref 211–911)
WBC # BLD AUTO: 10 K/UL (ref 4.1–11.1)
WBC # BLD AUTO: 10.4 K/UL (ref 4.1–11.1)
WBC # BLD AUTO: 10.5 K/UL (ref 4.1–11.1)
WBC # BLD AUTO: 10.7 K/UL (ref 4.1–11.1)
WBC # BLD AUTO: 7.2 K/UL (ref 4.1–11.1)
WBC # BLD AUTO: 8.4 K/UL (ref 4.1–11.1)
WBC # BLD AUTO: 8.6 K/UL (ref 4.1–11.1)
WBC # BLD AUTO: 8.9 K/UL (ref 4.1–11.1)
WBC # BLD AUTO: 9.2 K/UL (ref 4.1–11.1)
WBC # BLD AUTO: 9.2 K/UL (ref 4.1–11.1)
WBC # BLD AUTO: 9.4 K/UL (ref 4.1–11.1)
WBC URNS QL MICRO: ABNORMAL /HPF (ref 0–4)

## 2017-01-01 PROCEDURE — 93005 ELECTROCARDIOGRAM TRACING: CPT

## 2017-01-01 PROCEDURE — 83735 ASSAY OF MAGNESIUM: CPT | Performed by: INTERNAL MEDICINE

## 2017-01-01 PROCEDURE — 74011000250 HC RX REV CODE- 250: Performed by: INTERNAL MEDICINE

## 2017-01-01 PROCEDURE — 65270000032 HC RM SEMIPRIVATE

## 2017-01-01 PROCEDURE — 77010033678 HC OXYGEN DAILY

## 2017-01-01 PROCEDURE — 80048 BASIC METABOLIC PNL TOTAL CA: CPT | Performed by: HOSPITALIST

## 2017-01-01 PROCEDURE — 99218 HC RM OBSERVATION: CPT

## 2017-01-01 PROCEDURE — 99285 EMERGENCY DEPT VISIT HI MDM: CPT

## 2017-01-01 PROCEDURE — 74011250636 HC RX REV CODE- 250/636: Performed by: INTERNAL MEDICINE

## 2017-01-01 PROCEDURE — 82962 GLUCOSE BLOOD TEST: CPT

## 2017-01-01 PROCEDURE — P9016 RBC LEUKOCYTES REDUCED: HCPCS | Performed by: EMERGENCY MEDICINE

## 2017-01-01 PROCEDURE — 74011250637 HC RX REV CODE- 250/637: Performed by: INTERNAL MEDICINE

## 2017-01-01 PROCEDURE — 80053 COMPREHEN METABOLIC PANEL: CPT | Performed by: INTERNAL MEDICINE

## 2017-01-01 PROCEDURE — 36415 COLL VENOUS BLD VENIPUNCTURE: CPT | Performed by: INTERNAL MEDICINE

## 2017-01-01 PROCEDURE — 83880 ASSAY OF NATRIURETIC PEPTIDE: CPT | Performed by: INTERNAL MEDICINE

## 2017-01-01 PROCEDURE — 86900 BLOOD TYPING SEROLOGIC ABO: CPT | Performed by: EMERGENCY MEDICINE

## 2017-01-01 PROCEDURE — 71020 XR CHEST PA LAT: CPT

## 2017-01-01 PROCEDURE — 74011250637 HC RX REV CODE- 250/637: Performed by: HOSPITALIST

## 2017-01-01 PROCEDURE — 74011250636 HC RX REV CODE- 250/636: Performed by: HOSPITALIST

## 2017-01-01 PROCEDURE — 74011636637 HC RX REV CODE- 636/637: Performed by: HOSPITALIST

## 2017-01-01 PROCEDURE — 85025 COMPLETE CBC W/AUTO DIFF WBC: CPT | Performed by: HOSPITALIST

## 2017-01-01 PROCEDURE — 86900 BLOOD TYPING SEROLOGIC ABO: CPT | Performed by: INTERNAL MEDICINE

## 2017-01-01 PROCEDURE — 74011636637 HC RX REV CODE- 636/637: Performed by: FAMILY MEDICINE

## 2017-01-01 PROCEDURE — 96374 THER/PROPH/DIAG INJ IV PUSH: CPT

## 2017-01-01 PROCEDURE — 85025 COMPLETE CBC W/AUTO DIFF WBC: CPT | Performed by: INTERNAL MEDICINE

## 2017-01-01 PROCEDURE — 84484 ASSAY OF TROPONIN QUANT: CPT | Performed by: EMERGENCY MEDICINE

## 2017-01-01 PROCEDURE — 86923 COMPATIBILITY TEST ELECTRIC: CPT | Performed by: EMERGENCY MEDICINE

## 2017-01-01 PROCEDURE — 93306 TTE W/DOPPLER COMPLETE: CPT

## 2017-01-01 PROCEDURE — 85025 COMPLETE CBC W/AUTO DIFF WBC: CPT | Performed by: FAMILY MEDICINE

## 2017-01-01 PROCEDURE — 74011250637 HC RX REV CODE- 250/637: Performed by: FAMILY MEDICINE

## 2017-01-01 PROCEDURE — 83880 ASSAY OF NATRIURETIC PEPTIDE: CPT | Performed by: EMERGENCY MEDICINE

## 2017-01-01 PROCEDURE — 74011636637 HC RX REV CODE- 636/637: Performed by: INTERNAL MEDICINE

## 2017-01-01 PROCEDURE — 80069 RENAL FUNCTION PANEL: CPT | Performed by: INTERNAL MEDICINE

## 2017-01-01 PROCEDURE — 80053 COMPREHEN METABOLIC PANEL: CPT | Performed by: EMERGENCY MEDICINE

## 2017-01-01 PROCEDURE — 86923 COMPATIBILITY TEST ELECTRIC: CPT | Performed by: INTERNAL MEDICINE

## 2017-01-01 PROCEDURE — 94761 N-INVAS EAR/PLS OXIMETRY MLT: CPT

## 2017-01-01 PROCEDURE — 83970 ASSAY OF PARATHORMONE: CPT | Performed by: INTERNAL MEDICINE

## 2017-01-01 PROCEDURE — 83036 HEMOGLOBIN GLYCOSYLATED A1C: CPT | Performed by: FAMILY MEDICINE

## 2017-01-01 PROCEDURE — 65660000000 HC RM CCU STEPDOWN

## 2017-01-01 PROCEDURE — 36430 TRANSFUSION BLD/BLD COMPNT: CPT

## 2017-01-01 PROCEDURE — 94762 N-INVAS EAR/PLS OXIMTRY CONT: CPT

## 2017-01-01 PROCEDURE — 36415 COLL VENOUS BLD VENIPUNCTURE: CPT | Performed by: HOSPITALIST

## 2017-01-01 PROCEDURE — 83540 ASSAY OF IRON: CPT | Performed by: INTERNAL MEDICINE

## 2017-01-01 PROCEDURE — 94640 AIRWAY INHALATION TREATMENT: CPT

## 2017-01-01 PROCEDURE — 84484 ASSAY OF TROPONIN QUANT: CPT | Performed by: INTERNAL MEDICINE

## 2017-01-01 PROCEDURE — 85379 FIBRIN DEGRADATION QUANT: CPT | Performed by: INTERNAL MEDICINE

## 2017-01-01 PROCEDURE — 74011250636 HC RX REV CODE- 250/636: Performed by: EMERGENCY MEDICINE

## 2017-01-01 PROCEDURE — 82553 CREATINE MB FRACTION: CPT | Performed by: INTERNAL MEDICINE

## 2017-01-01 PROCEDURE — 80307 DRUG TEST PRSMV CHEM ANLYZR: CPT | Performed by: INTERNAL MEDICINE

## 2017-01-01 PROCEDURE — 83036 HEMOGLOBIN GLYCOSYLATED A1C: CPT | Performed by: INTERNAL MEDICINE

## 2017-01-01 PROCEDURE — 36415 COLL VENOUS BLD VENIPUNCTURE: CPT | Performed by: EMERGENCY MEDICINE

## 2017-01-01 PROCEDURE — 77030013169 SET IV BLD ICUM -A

## 2017-01-01 PROCEDURE — 80048 BASIC METABOLIC PNL TOTAL CA: CPT | Performed by: INTERNAL MEDICINE

## 2017-01-01 PROCEDURE — 71010 XR CHEST SNGL V: CPT

## 2017-01-01 PROCEDURE — 84100 ASSAY OF PHOSPHORUS: CPT | Performed by: HOSPITALIST

## 2017-01-01 PROCEDURE — 77030029684 HC NEB SM VOL KT MONA -A

## 2017-01-01 PROCEDURE — 82550 ASSAY OF CK (CPK): CPT | Performed by: EMERGENCY MEDICINE

## 2017-01-01 PROCEDURE — 85025 COMPLETE CBC W/AUTO DIFF WBC: CPT | Performed by: PHYSICIAN ASSISTANT

## 2017-01-01 PROCEDURE — 30233N1 TRANSFUSION OF NONAUTOLOGOUS RED BLOOD CELLS INTO PERIPHERAL VEIN, PERCUTANEOUS APPROACH: ICD-10-PCS | Performed by: EMERGENCY MEDICINE

## 2017-01-01 PROCEDURE — 85025 COMPLETE CBC W/AUTO DIFF WBC: CPT | Performed by: EMERGENCY MEDICINE

## 2017-01-01 PROCEDURE — G0300 HHS/HOSPICE OF LPN EA 15 MIN: HCPCS

## 2017-01-01 PROCEDURE — G0299 HHS/HOSPICE OF RN EA 15 MIN: HCPCS

## 2017-01-01 PROCEDURE — 30233N1 TRANSFUSION OF NONAUTOLOGOUS RED BLOOD CELLS INTO PERIPHERAL VEIN, PERCUTANEOUS APPROACH: ICD-10-PCS | Performed by: FAMILY MEDICINE

## 2017-01-01 PROCEDURE — 74011000250 HC RX REV CODE- 250: Performed by: FAMILY MEDICINE

## 2017-01-01 PROCEDURE — G0155 HHCP-SVS OF CSW,EA 15 MIN: HCPCS

## 2017-01-01 PROCEDURE — 96376 TX/PRO/DX INJ SAME DRUG ADON: CPT

## 2017-01-01 PROCEDURE — 80053 COMPREHEN METABOLIC PANEL: CPT | Performed by: PHYSICIAN ASSISTANT

## 2017-01-01 PROCEDURE — 74011000250 HC RX REV CODE- 250: Performed by: PHYSICIAN ASSISTANT

## 2017-01-01 PROCEDURE — 82607 VITAMIN B-12: CPT | Performed by: INTERNAL MEDICINE

## 2017-01-01 PROCEDURE — 36415 COLL VENOUS BLD VENIPUNCTURE: CPT | Performed by: PHYSICIAN ASSISTANT

## 2017-01-01 PROCEDURE — 84484 ASSAY OF TROPONIN QUANT: CPT | Performed by: PHYSICIAN ASSISTANT

## 2017-01-01 PROCEDURE — 96361 HYDRATE IV INFUSION ADD-ON: CPT

## 2017-01-01 PROCEDURE — 83880 ASSAY OF NATRIURETIC PEPTIDE: CPT | Performed by: PHYSICIAN ASSISTANT

## 2017-01-01 PROCEDURE — 82550 ASSAY OF CK (CPK): CPT | Performed by: INTERNAL MEDICINE

## 2017-01-01 PROCEDURE — 80048 BASIC METABOLIC PNL TOTAL CA: CPT | Performed by: FAMILY MEDICINE

## 2017-01-01 PROCEDURE — 84100 ASSAY OF PHOSPHORUS: CPT | Performed by: INTERNAL MEDICINE

## 2017-01-01 PROCEDURE — 93017 CV STRESS TEST TRACING ONLY: CPT

## 2017-01-01 PROCEDURE — 82803 BLOOD GASES ANY COMBINATION: CPT

## 2017-01-01 PROCEDURE — 84443 ASSAY THYROID STIM HORMONE: CPT | Performed by: INTERNAL MEDICINE

## 2017-01-01 PROCEDURE — 71010 XR CHEST PORT: CPT

## 2017-01-01 PROCEDURE — 81001 URINALYSIS AUTO W/SCOPE: CPT | Performed by: INTERNAL MEDICINE

## 2017-01-01 PROCEDURE — 82803 BLOOD GASES ANY COMBINATION: CPT | Performed by: INTERNAL MEDICINE

## 2017-01-01 PROCEDURE — 36600 WITHDRAWAL OF ARTERIAL BLOOD: CPT | Performed by: INTERNAL MEDICINE

## 2017-01-01 PROCEDURE — 74011000250 HC RX REV CODE- 250

## 2017-01-01 PROCEDURE — 74011250636 HC RX REV CODE- 250/636: Performed by: PHYSICIAN ASSISTANT

## 2017-01-01 PROCEDURE — 80047 BASIC METABLC PNL IONIZED CA: CPT

## 2017-01-01 RX ORDER — OMEPRAZOLE 40 MG/1
40 CAPSULE, DELAYED RELEASE ORAL DAILY
Status: DISCONTINUED | OUTPATIENT
Start: 2017-01-01 | End: 2017-01-01 | Stop reason: CLARIF

## 2017-01-01 RX ORDER — ALBUTEROL SULFATE 0.83 MG/ML
2.5 SOLUTION RESPIRATORY (INHALATION)
Qty: 1 PACKAGE | Refills: 12 | Status: ON HOLD | OUTPATIENT
Start: 2017-01-01 | End: 2017-01-01

## 2017-01-01 RX ORDER — FLUTICASONE PROPIONATE AND SALMETEROL 250; 50 UG/1; UG/1
1 POWDER RESPIRATORY (INHALATION) DAILY
Status: ON HOLD | COMMUNITY
End: 2017-01-01

## 2017-01-01 RX ORDER — CLONIDINE HYDROCHLORIDE 0.1 MG/1
0.3 TABLET ORAL 2 TIMES DAILY
Status: DISCONTINUED | OUTPATIENT
Start: 2017-01-01 | End: 2017-01-01

## 2017-01-01 RX ORDER — ATORVASTATIN CALCIUM 40 MG/1
40 TABLET, FILM COATED ORAL
Status: ON HOLD | COMMUNITY
End: 2017-01-01

## 2017-01-01 RX ORDER — DOCUSATE SODIUM 100 MG/1
100 CAPSULE, LIQUID FILLED ORAL 2 TIMES DAILY
Status: DISCONTINUED | OUTPATIENT
Start: 2017-01-01 | End: 2017-01-01 | Stop reason: HOSPADM

## 2017-01-01 RX ORDER — IPRATROPIUM BROMIDE AND ALBUTEROL SULFATE 2.5; .5 MG/3ML; MG/3ML
3 SOLUTION RESPIRATORY (INHALATION)
Status: DISCONTINUED | OUTPATIENT
Start: 2017-01-01 | End: 2017-01-01 | Stop reason: HOSPADM

## 2017-01-01 RX ORDER — PANTOPRAZOLE SODIUM 40 MG/1
40 TABLET, DELAYED RELEASE ORAL
Status: DISCONTINUED | OUTPATIENT
Start: 2017-01-01 | End: 2017-01-01 | Stop reason: HOSPADM

## 2017-01-01 RX ORDER — ONDANSETRON 8 MG/1
8 TABLET, ORALLY DISINTEGRATING ORAL 3 TIMES DAILY
COMMUNITY
End: 2017-01-01 | Stop reason: SDUPTHER

## 2017-01-01 RX ORDER — ATORVASTATIN CALCIUM 40 MG/1
40 TABLET, FILM COATED ORAL
Status: DISCONTINUED | OUTPATIENT
Start: 2017-01-01 | End: 2017-01-01 | Stop reason: HOSPADM

## 2017-01-01 RX ORDER — AMITRIPTYLINE HYDROCHLORIDE 50 MG/1
50 TABLET, FILM COATED ORAL
Status: DISCONTINUED | OUTPATIENT
Start: 2017-01-01 | End: 2017-01-01 | Stop reason: HOSPADM

## 2017-01-01 RX ORDER — DOXERCALCIFEROL 1 UG/1
1 CAPSULE ORAL DAILY
Qty: 30 CAP | Refills: 0 | Status: ON HOLD | OUTPATIENT
Start: 2017-01-01 | End: 2018-01-01

## 2017-01-01 RX ORDER — DEXTROSE 50 % IN WATER (D50W) INTRAVENOUS SYRINGE
12.5-25 AS NEEDED
Status: DISCONTINUED | OUTPATIENT
Start: 2017-01-01 | End: 2017-01-01 | Stop reason: HOSPADM

## 2017-01-01 RX ORDER — FLUTICASONE PROPIONATE AND SALMETEROL 250; 50 UG/1; UG/1
1 POWDER RESPIRATORY (INHALATION) DAILY
Status: DISCONTINUED | OUTPATIENT
Start: 2017-01-01 | End: 2017-01-01 | Stop reason: CLARIF

## 2017-01-01 RX ORDER — AMLODIPINE BESYLATE 10 MG/1
10 TABLET ORAL
COMMUNITY
Start: 2017-04-07 | End: 2017-01-01 | Stop reason: SDUPTHER

## 2017-01-01 RX ORDER — INSULIN LISPRO 100 [IU]/ML
INJECTION, SOLUTION INTRAVENOUS; SUBCUTANEOUS
Status: DISCONTINUED | OUTPATIENT
Start: 2017-01-01 | End: 2017-01-01 | Stop reason: HOSPADM

## 2017-01-01 RX ORDER — ALBUTEROL SULFATE 0.83 MG/ML
2.5 SOLUTION RESPIRATORY (INHALATION)
Status: DISCONTINUED | OUTPATIENT
Start: 2017-01-01 | End: 2017-01-01 | Stop reason: HOSPADM

## 2017-01-01 RX ORDER — ALBUTEROL SULFATE 0.83 MG/ML
2.5 SOLUTION RESPIRATORY (INHALATION)
Qty: 120 PACKAGE | Refills: 12 | Status: ON HOLD | OUTPATIENT
Start: 2017-01-01 | End: 2018-01-01

## 2017-01-01 RX ORDER — DOXERCALCIFEROL 0.5 UG/1
1 CAPSULE ORAL DAILY
Status: DISCONTINUED | OUTPATIENT
Start: 2017-01-01 | End: 2017-01-01 | Stop reason: HOSPADM

## 2017-01-01 RX ORDER — SODIUM CHLORIDE 9 MG/ML
250 INJECTION, SOLUTION INTRAVENOUS AS NEEDED
Status: DISCONTINUED | OUTPATIENT
Start: 2017-01-01 | End: 2017-01-01 | Stop reason: HOSPADM

## 2017-01-01 RX ORDER — SODIUM BICARBONATE 650 MG/1
650 TABLET ORAL 2 TIMES DAILY
Status: DISCONTINUED | OUTPATIENT
Start: 2017-01-01 | End: 2017-01-01 | Stop reason: HOSPADM

## 2017-01-01 RX ORDER — BUDESONIDE 0.5 MG/2ML
500 INHALANT ORAL
Status: DISCONTINUED | OUTPATIENT
Start: 2017-01-01 | End: 2017-01-01 | Stop reason: HOSPADM

## 2017-01-01 RX ORDER — SODIUM CHLORIDE 0.9 % (FLUSH) 0.9 %
5-10 SYRINGE (ML) INJECTION AS NEEDED
Status: DISCONTINUED | OUTPATIENT
Start: 2017-01-01 | End: 2017-01-01 | Stop reason: HOSPADM

## 2017-01-01 RX ORDER — ARFORMOTEROL TARTRATE 15 UG/2ML
15 SOLUTION RESPIRATORY (INHALATION)
Status: DISCONTINUED | OUTPATIENT
Start: 2017-01-01 | End: 2017-01-01 | Stop reason: HOSPADM

## 2017-01-01 RX ORDER — LABETALOL 200 MG/1
TABLET, FILM COATED ORAL
Refills: 6 | COMMUNITY
Start: 2017-01-01 | End: 2017-01-01 | Stop reason: SDUPTHER

## 2017-01-01 RX ORDER — SODIUM BICARBONATE 650 MG/1
650 TABLET ORAL 2 TIMES DAILY
Status: ON HOLD | COMMUNITY
End: 2017-01-01

## 2017-01-01 RX ORDER — FUROSEMIDE 40 MG/1
TABLET ORAL
COMMUNITY
Start: 2016-08-14 | End: 2017-01-01 | Stop reason: SDUPTHER

## 2017-01-01 RX ORDER — PREGABALIN 75 MG/1
75 CAPSULE ORAL 3 TIMES DAILY
Status: DISCONTINUED | OUTPATIENT
Start: 2017-01-01 | End: 2017-01-01 | Stop reason: HOSPADM

## 2017-01-01 RX ORDER — IPRATROPIUM BROMIDE AND ALBUTEROL SULFATE 2.5; .5 MG/3ML; MG/3ML
3 SOLUTION RESPIRATORY (INHALATION)
Status: COMPLETED | OUTPATIENT
Start: 2017-01-01 | End: 2017-01-01

## 2017-01-01 RX ORDER — GUAIFENESIN 100 MG/5ML
81 LIQUID (ML) ORAL DAILY
Qty: 30 TAB | Refills: 0 | Status: SHIPPED | OUTPATIENT
Start: 2017-01-01

## 2017-01-01 RX ORDER — SODIUM CHLORIDE 0.9 % (FLUSH) 0.9 %
5-10 SYRINGE (ML) INJECTION EVERY 8 HOURS
Status: DISCONTINUED | OUTPATIENT
Start: 2017-01-01 | End: 2017-01-01 | Stop reason: HOSPADM

## 2017-01-01 RX ORDER — FUROSEMIDE 10 MG/ML
80 INJECTION INTRAMUSCULAR; INTRAVENOUS
Status: ACTIVE | OUTPATIENT
Start: 2017-01-01 | End: 2017-01-01

## 2017-01-01 RX ORDER — CLONIDINE HYDROCHLORIDE 0.3 MG/1
TABLET ORAL
COMMUNITY
Start: 2017-03-27 | End: 2017-01-01 | Stop reason: SDUPTHER

## 2017-01-01 RX ORDER — HYDRALAZINE HYDROCHLORIDE 50 MG/1
100 TABLET, FILM COATED ORAL EVERY 8 HOURS
Status: DISCONTINUED | OUTPATIENT
Start: 2017-01-01 | End: 2017-01-01 | Stop reason: HOSPADM

## 2017-01-01 RX ORDER — FLUTICASONE PROPIONATE AND SALMETEROL 250; 50 UG/1; UG/1
1 POWDER RESPIRATORY (INHALATION) 2 TIMES DAILY
Status: DISCONTINUED | OUTPATIENT
Start: 2017-01-01 | End: 2017-01-01 | Stop reason: CLARIF

## 2017-01-01 RX ORDER — IPRATROPIUM BROMIDE AND ALBUTEROL SULFATE 2.5; .5 MG/3ML; MG/3ML
SOLUTION RESPIRATORY (INHALATION)
Status: COMPLETED
Start: 2017-01-01 | End: 2017-01-01

## 2017-01-01 RX ORDER — GUAIFENESIN 100 MG/5ML
1 LIQUID (ML) ORAL
COMMUNITY
End: 2017-01-01 | Stop reason: SDUPTHER

## 2017-01-01 RX ORDER — FUROSEMIDE 10 MG/ML
60 INJECTION INTRAMUSCULAR; INTRAVENOUS EVERY 12 HOURS
Status: DISCONTINUED | OUTPATIENT
Start: 2017-01-01 | End: 2017-01-01

## 2017-01-01 RX ORDER — FUROSEMIDE 40 MG/1
40 TABLET ORAL 2 TIMES DAILY
Qty: 10 TAB | Refills: 0 | Status: SHIPPED | OUTPATIENT
Start: 2017-01-01 | End: 2017-01-01 | Stop reason: SDUPTHER

## 2017-01-01 RX ORDER — AMLODIPINE BESYLATE 5 MG/1
10 TABLET ORAL DAILY
Status: DISCONTINUED | OUTPATIENT
Start: 2017-01-01 | End: 2017-01-01 | Stop reason: HOSPADM

## 2017-01-01 RX ORDER — CLONIDINE HYDROCHLORIDE 0.3 MG/1
0.3 TABLET ORAL 3 TIMES DAILY
Qty: 90 TAB | Refills: 2 | Status: SHIPPED | OUTPATIENT
Start: 2017-01-01 | End: 2018-01-01 | Stop reason: SDUPTHER

## 2017-01-01 RX ORDER — OXYCODONE HYDROCHLORIDE 5 MG/1
15 TABLET ORAL
Status: DISCONTINUED | OUTPATIENT
Start: 2017-01-01 | End: 2017-01-01 | Stop reason: HOSPADM

## 2017-01-01 RX ORDER — SEVELAMER CARBONATE 800 MG/1
TABLET, FILM COATED ORAL
Qty: 90 TAB | Refills: 11 | Status: ON HOLD | OUTPATIENT
Start: 2017-01-01 | End: 2017-01-01

## 2017-01-01 RX ORDER — SEVELAMER CARBONATE 800 MG/1
800 TABLET, FILM COATED ORAL
Status: DISCONTINUED | OUTPATIENT
Start: 2017-01-01 | End: 2017-01-01 | Stop reason: HOSPADM

## 2017-01-01 RX ORDER — METHOCARBAMOL 750 MG/1
750 TABLET, FILM COATED ORAL 3 TIMES DAILY
Status: DISCONTINUED | OUTPATIENT
Start: 2017-01-01 | End: 2017-01-01 | Stop reason: HOSPADM

## 2017-01-01 RX ORDER — FLUTICASONE PROPIONATE AND SALMETEROL 250; 50 UG/1; UG/1
1 POWDER RESPIRATORY (INHALATION) DAILY
Qty: 1 EACH | Refills: 0 | Status: SHIPPED | OUTPATIENT
Start: 2017-01-01 | End: 2018-01-01 | Stop reason: SDUPTHER

## 2017-01-01 RX ORDER — CLONIDINE HYDROCHLORIDE 0.3 MG/1
0.3 TABLET ORAL 3 TIMES DAILY
Qty: 90 TAB | Refills: 2 | Status: ON HOLD | OUTPATIENT
Start: 2017-01-01 | End: 2017-01-01

## 2017-01-01 RX ORDER — ATORVASTATIN CALCIUM 40 MG/1
40 TABLET, FILM COATED ORAL
Qty: 30 TAB | Refills: 0 | Status: SHIPPED | OUTPATIENT
Start: 2017-01-01

## 2017-01-01 RX ORDER — FUROSEMIDE 10 MG/ML
40 INJECTION INTRAMUSCULAR; INTRAVENOUS EVERY 12 HOURS
Status: DISCONTINUED | OUTPATIENT
Start: 2017-01-01 | End: 2017-01-01

## 2017-01-01 RX ORDER — METHOCARBAMOL 750 MG/1
750 TABLET, FILM COATED ORAL 3 TIMES DAILY
COMMUNITY
Start: 2017-07-13 | End: 2018-01-01 | Stop reason: DRUGHIGH

## 2017-01-01 RX ORDER — SODIUM CHLORIDE 9 MG/ML
25 INJECTION, SOLUTION INTRAVENOUS CONTINUOUS
Status: DISPENSED | OUTPATIENT
Start: 2017-01-01 | End: 2017-01-01

## 2017-01-01 RX ORDER — FUROSEMIDE 10 MG/ML
40 INJECTION INTRAMUSCULAR; INTRAVENOUS
Status: COMPLETED | OUTPATIENT
Start: 2017-01-01 | End: 2017-01-01

## 2017-01-01 RX ORDER — FUROSEMIDE 10 MG/ML
60 INJECTION INTRAMUSCULAR; INTRAVENOUS ONCE
Status: COMPLETED | OUTPATIENT
Start: 2017-01-01 | End: 2017-01-01

## 2017-01-01 RX ORDER — SEVELAMER CARBONATE 800 MG/1
TABLET, FILM COATED ORAL
COMMUNITY
Start: 2016-08-14 | End: 2017-01-01 | Stop reason: SDUPTHER

## 2017-01-01 RX ORDER — LABETALOL 100 MG/1
400 TABLET, FILM COATED ORAL 3 TIMES DAILY
Status: DISCONTINUED | OUTPATIENT
Start: 2017-01-01 | End: 2017-01-01

## 2017-01-01 RX ORDER — FUROSEMIDE 40 MG/1
TABLET ORAL
Qty: 30 TAB | Refills: 11
Start: 2017-01-01 | End: 2017-01-01 | Stop reason: SDUPTHER

## 2017-01-01 RX ORDER — HYDRALAZINE HYDROCHLORIDE 100 MG/1
TABLET, FILM COATED ORAL
Qty: 90 TAB | Refills: 0 | Status: SHIPPED | OUTPATIENT
Start: 2017-01-01

## 2017-01-01 RX ORDER — SODIUM CHLORIDE 0.9 % (FLUSH) 0.9 %
20 SYRINGE (ML) INJECTION
Status: COMPLETED | OUTPATIENT
Start: 2017-01-01 | End: 2017-01-01

## 2017-01-01 RX ORDER — PREGABALIN 75 MG/1
75 CAPSULE ORAL 3 TIMES DAILY
COMMUNITY
End: 2017-01-01

## 2017-01-01 RX ORDER — ALBUTEROL SULFATE 90 UG/1
2 AEROSOL, METERED RESPIRATORY (INHALATION)
Status: DISCONTINUED | OUTPATIENT
Start: 2017-01-01 | End: 2017-01-01 | Stop reason: CLARIF

## 2017-01-01 RX ORDER — LABETALOL 100 MG/1
300 TABLET, FILM COATED ORAL 3 TIMES DAILY
Status: DISCONTINUED | OUTPATIENT
Start: 2017-01-01 | End: 2017-01-01

## 2017-01-01 RX ORDER — HYDRALAZINE HYDROCHLORIDE 100 MG/1
TABLET, FILM COATED ORAL
COMMUNITY
Start: 2016-08-14 | End: 2017-01-01 | Stop reason: SDUPTHER

## 2017-01-01 RX ORDER — ONDANSETRON 8 MG/1
8 TABLET, ORALLY DISINTEGRATING ORAL 3 TIMES DAILY
Qty: 15 TAB | Refills: 0 | Status: SHIPPED | OUTPATIENT
Start: 2017-01-01 | End: 2018-01-01 | Stop reason: SDUPTHER

## 2017-01-01 RX ORDER — HYDRALAZINE HYDROCHLORIDE 100 MG/1
TABLET, FILM COATED ORAL
Qty: 90 TAB | Refills: 11 | Status: ON HOLD | OUTPATIENT
Start: 2017-01-01 | End: 2017-01-01

## 2017-01-01 RX ORDER — FUROSEMIDE 10 MG/ML
80 INJECTION INTRAMUSCULAR; INTRAVENOUS
Status: COMPLETED | OUTPATIENT
Start: 2017-01-01 | End: 2017-01-01

## 2017-01-01 RX ORDER — FUROSEMIDE 40 MG/1
40 TABLET ORAL 2 TIMES DAILY
Status: DISCONTINUED | OUTPATIENT
Start: 2017-01-01 | End: 2017-01-01 | Stop reason: HOSPADM

## 2017-01-01 RX ORDER — AZITHROMYCIN 250 MG/1
TABLET, FILM COATED ORAL
Qty: 6 TAB | Refills: 0 | Status: SHIPPED | OUTPATIENT
Start: 2017-01-01 | End: 2017-01-06

## 2017-01-01 RX ORDER — GUAIFENESIN 100 MG/5ML
81 LIQUID (ML) ORAL DAILY
Status: DISCONTINUED | OUTPATIENT
Start: 2017-01-01 | End: 2017-01-01 | Stop reason: HOSPADM

## 2017-01-01 RX ORDER — FUROSEMIDE 40 MG/1
TABLET ORAL
Qty: 60 TAB | Refills: 11 | Status: ON HOLD | OUTPATIENT
Start: 2017-01-01 | End: 2017-01-01

## 2017-01-01 RX ORDER — AMITRIPTYLINE HYDROCHLORIDE 50 MG/1
TABLET, FILM COATED ORAL
Qty: 30 TAB | Refills: 0 | Status: SHIPPED | OUTPATIENT
Start: 2017-01-01

## 2017-01-01 RX ORDER — HEPARIN SODIUM 5000 [USP'U]/ML
5000 INJECTION, SOLUTION INTRAVENOUS; SUBCUTANEOUS EVERY 8 HOURS
Status: CANCELLED | OUTPATIENT
Start: 2017-01-01

## 2017-01-01 RX ORDER — ONDANSETRON 8 MG/1
8 TABLET, ORALLY DISINTEGRATING ORAL 3 TIMES DAILY
Qty: 30 TAB | Refills: 3 | Status: ON HOLD | OUTPATIENT
Start: 2017-01-01 | End: 2017-01-01

## 2017-01-01 RX ORDER — MAGNESIUM SULFATE 100 %
4 CRYSTALS MISCELLANEOUS AS NEEDED
Status: DISCONTINUED | OUTPATIENT
Start: 2017-01-01 | End: 2017-01-01 | Stop reason: HOSPADM

## 2017-01-01 RX ORDER — FUROSEMIDE 10 MG/ML
40 INJECTION INTRAMUSCULAR; INTRAVENOUS ONCE
Status: COMPLETED | OUTPATIENT
Start: 2017-01-01 | End: 2017-01-01

## 2017-01-01 RX ORDER — OXYCODONE HYDROCHLORIDE 15 MG/1
15 TABLET ORAL
COMMUNITY
End: 2017-01-01 | Stop reason: SDUPTHER

## 2017-01-01 RX ORDER — OMEPRAZOLE 40 MG/1
40 CAPSULE, DELAYED RELEASE ORAL
COMMUNITY
End: 2017-01-01 | Stop reason: SDUPTHER

## 2017-01-01 RX ORDER — SODIUM CHLORIDE 9 MG/ML
25 INJECTION, SOLUTION INTRAVENOUS AS NEEDED
Status: DISPENSED | OUTPATIENT
Start: 2017-01-01 | End: 2017-01-01

## 2017-01-01 RX ORDER — METHOCARBAMOL 500 MG/1
750 TABLET, FILM COATED ORAL
Status: DISCONTINUED | OUTPATIENT
Start: 2017-01-01 | End: 2017-01-01 | Stop reason: HOSPADM

## 2017-01-01 RX ORDER — ACETAMINOPHEN 325 MG/1
650 TABLET ORAL
Status: DISCONTINUED | OUTPATIENT
Start: 2017-01-01 | End: 2017-01-01 | Stop reason: HOSPADM

## 2017-01-01 RX ORDER — SODIUM CHLORIDE 0.9 % (FLUSH) 0.9 %
5-10 SYRINGE (ML) INJECTION AS NEEDED
Status: ACTIVE | OUTPATIENT
Start: 2017-01-01 | End: 2017-01-01

## 2017-01-01 RX ORDER — SODIUM BICARBONATE 650 MG/1
650 TABLET ORAL 2 TIMES DAILY
Qty: 60 TAB | Refills: 0 | Status: SHIPPED | OUTPATIENT
Start: 2017-01-01

## 2017-01-01 RX ORDER — PREGABALIN 75 MG/1
75 CAPSULE ORAL 3 TIMES DAILY
Status: DISCONTINUED | OUTPATIENT
Start: 2017-01-01 | End: 2017-01-01

## 2017-01-01 RX ORDER — LABETALOL 300 MG/1
300 TABLET, FILM COATED ORAL EVERY 8 HOURS
Qty: 90 TAB | Refills: 0 | Status: SHIPPED | OUTPATIENT
Start: 2017-01-01 | End: 2018-01-01 | Stop reason: SDUPTHER

## 2017-01-01 RX ORDER — OMEPRAZOLE 40 MG/1
40 CAPSULE, DELAYED RELEASE ORAL DAILY
Qty: 30 CAP | Refills: 3 | Status: SHIPPED | OUTPATIENT
Start: 2017-01-01 | End: 2017-01-01 | Stop reason: SDUPTHER

## 2017-01-01 RX ORDER — AMITRIPTYLINE HYDROCHLORIDE 50 MG/1
TABLET, FILM COATED ORAL
COMMUNITY
Start: 2016-06-24 | End: 2017-01-01 | Stop reason: SDUPTHER

## 2017-01-01 RX ORDER — NEBULIZER AND COMPRESSOR
1 EACH MISCELLANEOUS
Qty: 1 EACH | Refills: 0 | Status: SHIPPED | OUTPATIENT
Start: 2017-01-01 | End: 2017-01-01

## 2017-01-01 RX ORDER — ONDANSETRON 2 MG/ML
4 INJECTION INTRAMUSCULAR; INTRAVENOUS
Status: DISCONTINUED | OUTPATIENT
Start: 2017-01-01 | End: 2017-01-01 | Stop reason: HOSPADM

## 2017-01-01 RX ORDER — INSULIN GLARGINE 100 [IU]/ML
24 INJECTION, SOLUTION SUBCUTANEOUS
Status: DISCONTINUED | OUTPATIENT
Start: 2017-01-01 | End: 2017-01-01 | Stop reason: HOSPADM

## 2017-01-01 RX ORDER — ALBUTEROL SULFATE 90 UG/1
2 AEROSOL, METERED RESPIRATORY (INHALATION) DAILY
Status: ON HOLD | COMMUNITY
End: 2017-01-01

## 2017-01-01 RX ORDER — LABETALOL 300 MG/1
300 TABLET, FILM COATED ORAL EVERY 8 HOURS
Status: ON HOLD | COMMUNITY
End: 2017-01-01

## 2017-01-01 RX ORDER — FUROSEMIDE 40 MG/1
40 TABLET ORAL
Status: DISCONTINUED | OUTPATIENT
Start: 2017-01-01 | End: 2017-01-01 | Stop reason: HOSPADM

## 2017-01-01 RX ORDER — ALBUTEROL SULFATE 90 UG/1
2 AEROSOL, METERED RESPIRATORY (INHALATION) DAILY
Qty: 1 INHALER | Refills: 0 | Status: SHIPPED | OUTPATIENT
Start: 2017-01-01 | End: 2018-01-01 | Stop reason: SDUPTHER

## 2017-01-01 RX ORDER — SEVELAMER CARBONATE 800 MG/1
TABLET, FILM COATED ORAL
Qty: 90 TAB | Refills: 0 | Status: SHIPPED | OUTPATIENT
Start: 2017-01-01

## 2017-01-01 RX ORDER — OMEPRAZOLE 40 MG/1
40 CAPSULE, DELAYED RELEASE ORAL DAILY
Qty: 90 CAP | Refills: 3 | Status: SHIPPED | OUTPATIENT
Start: 2017-01-01

## 2017-01-01 RX ORDER — AMLODIPINE BESYLATE 10 MG/1
10 TABLET ORAL DAILY
Qty: 30 TAB | Refills: 0 | Status: SHIPPED | OUTPATIENT
Start: 2017-01-01 | End: 2018-01-01 | Stop reason: SDUPTHER

## 2017-01-01 RX ORDER — FUROSEMIDE 40 MG/1
60 TABLET ORAL 2 TIMES DAILY
Qty: 60 TAB | Refills: 0 | Status: SHIPPED | OUTPATIENT
Start: 2017-01-01 | End: 2018-01-01 | Stop reason: SDUPTHER

## 2017-01-01 RX ORDER — HEPARIN SODIUM 5000 [USP'U]/ML
5000 INJECTION, SOLUTION INTRAVENOUS; SUBCUTANEOUS EVERY 8 HOURS
Status: DISCONTINUED | OUTPATIENT
Start: 2017-01-01 | End: 2017-01-01 | Stop reason: HOSPADM

## 2017-01-01 RX ADMIN — CLONIDINE HYDROCHLORIDE 0.3 MG: 0.2 TABLET ORAL at 08:44

## 2017-01-01 RX ADMIN — BUDESONIDE 500 MCG: 0.5 INHALANT RESPIRATORY (INHALATION) at 08:55

## 2017-01-01 RX ADMIN — SEVELAMER CARBONATE 800 MG: 800 TABLET, FILM COATED ORAL at 17:14

## 2017-01-01 RX ADMIN — INSULIN LISPRO 3 UNITS: 100 INJECTION, SOLUTION INTRAVENOUS; SUBCUTANEOUS at 16:30

## 2017-01-01 RX ADMIN — HYDRALAZINE HYDROCHLORIDE 100 MG: 50 TABLET, FILM COATED ORAL at 16:23

## 2017-01-01 RX ADMIN — LABETALOL HYDROCHLORIDE 300 MG: 200 TABLET, FILM COATED ORAL at 16:23

## 2017-01-01 RX ADMIN — FUROSEMIDE 40 MG: 40 TABLET ORAL at 08:36

## 2017-01-01 RX ADMIN — PANTOPRAZOLE SODIUM 40 MG: 40 TABLET, DELAYED RELEASE ORAL at 06:35

## 2017-01-01 RX ADMIN — INSULIN LISPRO 3 UNITS: 100 INJECTION, SOLUTION INTRAVENOUS; SUBCUTANEOUS at 21:56

## 2017-01-01 RX ADMIN — HEPARIN SODIUM 5000 UNITS: 5000 INJECTION, SOLUTION INTRAVENOUS; SUBCUTANEOUS at 13:14

## 2017-01-01 RX ADMIN — INSULIN LISPRO 30 UNITS: 100 INJECTION, SUSPENSION SUBCUTANEOUS at 09:08

## 2017-01-01 RX ADMIN — MULTIPLE VITAMINS W/ MINERALS TAB 1 TABLET: TAB at 10:00

## 2017-01-01 RX ADMIN — CLONIDINE HYDROCHLORIDE 0.3 MG: 0.2 TABLET ORAL at 10:00

## 2017-01-01 RX ADMIN — HEPARIN SODIUM 5000 UNITS: 5000 INJECTION, SOLUTION INTRAVENOUS; SUBCUTANEOUS at 23:05

## 2017-01-01 RX ADMIN — HYDRALAZINE HYDROCHLORIDE 100 MG: 50 TABLET, FILM COATED ORAL at 05:28

## 2017-01-01 RX ADMIN — HYDRALAZINE HYDROCHLORIDE 100 MG: 50 TABLET ORAL at 14:07

## 2017-01-01 RX ADMIN — SODIUM CHLORIDE 25 ML/HR: 900 INJECTION, SOLUTION INTRAVENOUS at 13:08

## 2017-01-01 RX ADMIN — LABETALOL HYDROCHLORIDE 300 MG: 200 TABLET, FILM COATED ORAL at 12:05

## 2017-01-01 RX ADMIN — INSULIN LISPRO 2 UNITS: 100 INJECTION, SOLUTION INTRAVENOUS; SUBCUTANEOUS at 07:22

## 2017-01-01 RX ADMIN — BUDESONIDE 500 MCG: 0.5 INHALANT RESPIRATORY (INHALATION) at 11:03

## 2017-01-01 RX ADMIN — SEVELAMER CARBONATE 800 MG: 800 TABLET, FILM COATED ORAL at 17:54

## 2017-01-01 RX ADMIN — IPRATROPIUM BROMIDE AND ALBUTEROL SULFATE 3 ML: 2.5; .5 SOLUTION RESPIRATORY (INHALATION) at 12:43

## 2017-01-01 RX ADMIN — ATORVASTATIN CALCIUM 40 MG: 40 TABLET, FILM COATED ORAL at 01:24

## 2017-01-01 RX ADMIN — HEPARIN SODIUM 5000 UNITS: 5000 INJECTION, SOLUTION INTRAVENOUS; SUBCUTANEOUS at 07:31

## 2017-01-01 RX ADMIN — SEVELAMER CARBONATE 800 MG: 800 TABLET, FILM COATED ORAL at 11:00

## 2017-01-01 RX ADMIN — SEVELAMER CARBONATE 800 MG: 800 TABLET, FILM COATED ORAL at 18:08

## 2017-01-01 RX ADMIN — FUROSEMIDE 40 MG: 10 INJECTION, SOLUTION INTRAVENOUS at 04:41

## 2017-01-01 RX ADMIN — METHOCARBAMOL 750 MG: 750 TABLET ORAL at 16:22

## 2017-01-01 RX ADMIN — INSULIN LISPRO 10 UNITS: 100 INJECTION, SUSPENSION SUBCUTANEOUS at 16:22

## 2017-01-01 RX ADMIN — Medication 10 ML: at 01:23

## 2017-01-01 RX ADMIN — ATORVASTATIN CALCIUM 40 MG: 40 TABLET, FILM COATED ORAL at 21:57

## 2017-01-01 RX ADMIN — CLONIDINE HYDROCHLORIDE 0.3 MG: 0.2 TABLET ORAL at 23:11

## 2017-01-01 RX ADMIN — HYDRALAZINE HYDROCHLORIDE 100 MG: 50 TABLET ORAL at 13:39

## 2017-01-01 RX ADMIN — ASPIRIN 81 MG 81 MG: 81 TABLET ORAL at 09:56

## 2017-01-01 RX ADMIN — LABETALOL HYDROCHLORIDE 300 MG: 200 TABLET, FILM COATED ORAL at 07:16

## 2017-01-01 RX ADMIN — ASPIRIN 81 MG 81 MG: 81 TABLET ORAL at 09:17

## 2017-01-01 RX ADMIN — ASPIRIN 81 MG 81 MG: 81 TABLET ORAL at 08:45

## 2017-01-01 RX ADMIN — INSULIN LISPRO 5 UNITS: 100 INJECTION, SOLUTION INTRAVENOUS; SUBCUTANEOUS at 17:25

## 2017-01-01 RX ADMIN — HYDRALAZINE HYDROCHLORIDE 100 MG: 50 TABLET, FILM COATED ORAL at 03:37

## 2017-01-01 RX ADMIN — SODIUM BICARBONATE 650 MG: 650 TABLET ORAL at 08:45

## 2017-01-01 RX ADMIN — DOCUSATE SODIUM 100 MG: 100 CAPSULE, LIQUID FILLED ORAL at 09:57

## 2017-01-01 RX ADMIN — HYDRALAZINE HYDROCHLORIDE 100 MG: 50 TABLET ORAL at 21:57

## 2017-01-01 RX ADMIN — HYDRALAZINE HYDROCHLORIDE 100 MG: 50 TABLET, FILM COATED ORAL at 07:17

## 2017-01-01 RX ADMIN — AMLODIPINE BESYLATE 10 MG: 5 TABLET ORAL at 10:00

## 2017-01-01 RX ADMIN — ASPIRIN 81 MG 81 MG: 81 TABLET ORAL at 08:36

## 2017-01-01 RX ADMIN — CLONIDINE HYDROCHLORIDE 0.3 MG: 0.2 TABLET ORAL at 16:20

## 2017-01-01 RX ADMIN — AMITRIPTYLINE HYDROCHLORIDE 50 MG: 50 TABLET, FILM COATED ORAL at 22:55

## 2017-01-01 RX ADMIN — SODIUM BICARBONATE 650 MG: 650 TABLET ORAL at 09:19

## 2017-01-01 RX ADMIN — INSULIN LISPRO 4 UNITS: 100 INJECTION, SOLUTION INTRAVENOUS; SUBCUTANEOUS at 23:04

## 2017-01-01 RX ADMIN — HYDRALAZINE HYDROCHLORIDE 100 MG: 50 TABLET, FILM COATED ORAL at 03:24

## 2017-01-01 RX ADMIN — ASPIRIN 81 MG 81 MG: 81 TABLET ORAL at 09:26

## 2017-01-01 RX ADMIN — METHOCARBAMOL 750 MG: 750 TABLET ORAL at 22:01

## 2017-01-01 RX ADMIN — SODIUM BICARBONATE 650 MG: 650 TABLET ORAL at 09:56

## 2017-01-01 RX ADMIN — INSULIN LISPRO 5 UNITS: 100 INJECTION, SOLUTION INTRAVENOUS; SUBCUTANEOUS at 17:13

## 2017-01-01 RX ADMIN — LABETALOL HYDROCHLORIDE 400 MG: 100 TABLET, FILM COATED ORAL at 08:36

## 2017-01-01 RX ADMIN — HYDRALAZINE HYDROCHLORIDE 100 MG: 50 TABLET, FILM COATED ORAL at 23:05

## 2017-01-01 RX ADMIN — INSULIN LISPRO 10 UNITS: 100 INJECTION, SUSPENSION SUBCUTANEOUS at 22:00

## 2017-01-01 RX ADMIN — EPOETIN ALFA 20000 UNITS: 20000 SOLUTION INTRAVENOUS; SUBCUTANEOUS at 10:37

## 2017-01-01 RX ADMIN — SEVELAMER CARBONATE 800 MG: 800 TABLET, FILM COATED ORAL at 13:13

## 2017-01-01 RX ADMIN — HYDRALAZINE HYDROCHLORIDE 100 MG: 50 TABLET, FILM COATED ORAL at 12:04

## 2017-01-01 RX ADMIN — LABETALOL HYDROCHLORIDE 300 MG: 200 TABLET, FILM COATED ORAL at 14:07

## 2017-01-01 RX ADMIN — REGADENOSON 0.4 MG: 0.08 INJECTION, SOLUTION INTRAVENOUS at 11:13

## 2017-01-01 RX ADMIN — SEVELAMER CARBONATE 800 MG: 800 TABLET, FILM COATED ORAL at 09:37

## 2017-01-01 RX ADMIN — METHOCARBAMOL 750 MG: 750 TABLET ORAL at 15:31

## 2017-01-01 RX ADMIN — Medication 10 ML: at 14:00

## 2017-01-01 RX ADMIN — LABETALOL HYDROCHLORIDE 300 MG: 200 TABLET, FILM COATED ORAL at 21:57

## 2017-01-01 RX ADMIN — CLONIDINE HYDROCHLORIDE 0.3 MG: 0.2 TABLET ORAL at 16:23

## 2017-01-01 RX ADMIN — METHOCARBAMOL 750 MG: 750 TABLET ORAL at 09:26

## 2017-01-01 RX ADMIN — PANTOPRAZOLE SODIUM 40 MG: 40 TABLET, DELAYED RELEASE ORAL at 07:22

## 2017-01-01 RX ADMIN — MULTIPLE VITAMINS W/ MINERALS TAB 1 TABLET: TAB at 09:57

## 2017-01-01 RX ADMIN — CLONIDINE HYDROCHLORIDE 0.3 MG: 0.2 TABLET ORAL at 23:05

## 2017-01-01 RX ADMIN — SEVELAMER CARBONATE 800 MG: 800 TABLET, FILM COATED ORAL at 12:46

## 2017-01-01 RX ADMIN — SODIUM BICARBONATE 650 MG: 650 TABLET ORAL at 09:08

## 2017-01-01 RX ADMIN — FUROSEMIDE 60 MG: 40 TABLET ORAL at 07:29

## 2017-01-01 RX ADMIN — IPRATROPIUM BROMIDE AND ALBUTEROL SULFATE 3 ML: .5; 3 SOLUTION RESPIRATORY (INHALATION) at 16:33

## 2017-01-01 RX ADMIN — LABETALOL HYDROCHLORIDE 300 MG: 200 TABLET, FILM COATED ORAL at 07:29

## 2017-01-01 RX ADMIN — INSULIN LISPRO 3 UNITS: 100 INJECTION, SOLUTION INTRAVENOUS; SUBCUTANEOUS at 12:16

## 2017-01-01 RX ADMIN — INSULIN LISPRO 4 UNITS: 100 INJECTION, SOLUTION INTRAVENOUS; SUBCUTANEOUS at 09:08

## 2017-01-01 RX ADMIN — ARFORMOTEROL TARTRATE 15 MCG: 15 SOLUTION RESPIRATORY (INHALATION) at 08:55

## 2017-01-01 RX ADMIN — ATORVASTATIN CALCIUM 40 MG: 40 TABLET, FILM COATED ORAL at 22:00

## 2017-01-01 RX ADMIN — SODIUM BICARBONATE 650 MG: 650 TABLET ORAL at 17:54

## 2017-01-01 RX ADMIN — METHOCARBAMOL 750 MG: 750 TABLET ORAL at 09:56

## 2017-01-01 RX ADMIN — CLONIDINE HYDROCHLORIDE 0.3 MG: 0.2 TABLET ORAL at 09:57

## 2017-01-01 RX ADMIN — CLONIDINE HYDROCHLORIDE 0.3 MG: 0.2 TABLET ORAL at 21:57

## 2017-01-01 RX ADMIN — ATORVASTATIN CALCIUM 40 MG: 40 TABLET, FILM COATED ORAL at 05:27

## 2017-01-01 RX ADMIN — SODIUM BICARBONATE 650 MG: 650 TABLET ORAL at 17:14

## 2017-01-01 RX ADMIN — LABETALOL HYDROCHLORIDE 300 MG: 200 TABLET, FILM COATED ORAL at 03:23

## 2017-01-01 RX ADMIN — INSULIN LISPRO 7 UNITS: 100 INJECTION, SOLUTION INTRAVENOUS; SUBCUTANEOUS at 12:05

## 2017-01-01 RX ADMIN — INSULIN LISPRO 30 UNITS: 100 INJECTION, SUSPENSION SUBCUTANEOUS at 09:01

## 2017-01-01 RX ADMIN — HEPARIN SODIUM 5000 UNITS: 5000 INJECTION, SOLUTION INTRAVENOUS; SUBCUTANEOUS at 05:28

## 2017-01-01 RX ADMIN — AMITRIPTYLINE HYDROCHLORIDE 50 MG: 50 TABLET, FILM COATED ORAL at 23:05

## 2017-01-01 RX ADMIN — AMLODIPINE BESYLATE 10 MG: 5 TABLET ORAL at 09:17

## 2017-01-01 RX ADMIN — CLONIDINE HYDROCHLORIDE 0.3 MG: 0.2 TABLET ORAL at 15:49

## 2017-01-01 RX ADMIN — FUROSEMIDE 60 MG: 10 INJECTION, SOLUTION INTRAMUSCULAR; INTRAVENOUS at 23:05

## 2017-01-01 RX ADMIN — IPRATROPIUM BROMIDE AND ALBUTEROL SULFATE 3 ML: .5; 3 SOLUTION RESPIRATORY (INHALATION) at 12:43

## 2017-01-01 RX ADMIN — AMITRIPTYLINE HYDROCHLORIDE 50 MG: 50 TABLET, FILM COATED ORAL at 01:24

## 2017-01-01 RX ADMIN — CLONIDINE HYDROCHLORIDE 0.3 MG: 0.2 TABLET ORAL at 09:08

## 2017-01-01 RX ADMIN — FUROSEMIDE 80 MG: 10 INJECTION, SOLUTION INTRAMUSCULAR; INTRAVENOUS at 23:11

## 2017-01-01 RX ADMIN — METHOCARBAMOL 750 MG: 750 TABLET ORAL at 23:05

## 2017-01-01 RX ADMIN — ATORVASTATIN CALCIUM 40 MG: 40 TABLET, FILM COATED ORAL at 23:05

## 2017-01-01 RX ADMIN — FUROSEMIDE 80 MG: 10 INJECTION, SOLUTION INTRAVENOUS at 22:21

## 2017-01-01 RX ADMIN — LABETALOL HYDROCHLORIDE 300 MG: 200 TABLET, FILM COATED ORAL at 03:37

## 2017-01-01 RX ADMIN — AMITRIPTYLINE HYDROCHLORIDE 50 MG: 50 TABLET, FILM COATED ORAL at 22:00

## 2017-01-01 RX ADMIN — Medication 10 ML: at 06:36

## 2017-01-01 RX ADMIN — INSULIN LISPRO 3 UNITS: 100 INJECTION, SOLUTION INTRAVENOUS; SUBCUTANEOUS at 07:30

## 2017-01-01 RX ADMIN — FUROSEMIDE 60 MG: 40 TABLET ORAL at 17:15

## 2017-01-01 RX ADMIN — SEVELAMER CARBONATE 800 MG: 800 TABLET, FILM COATED ORAL at 08:30

## 2017-01-01 RX ADMIN — ARFORMOTEROL TARTRATE 15 MCG: 15 SOLUTION RESPIRATORY (INHALATION) at 11:03

## 2017-01-01 RX ADMIN — HYDRALAZINE HYDROCHLORIDE 100 MG: 50 TABLET, FILM COATED ORAL at 01:24

## 2017-01-01 RX ADMIN — Medication 10 ML: at 07:17

## 2017-01-01 RX ADMIN — AMLODIPINE BESYLATE 10 MG: 5 TABLET ORAL at 08:45

## 2017-01-01 RX ADMIN — FUROSEMIDE 40 MG: 40 TABLET ORAL at 15:49

## 2017-01-01 RX ADMIN — FUROSEMIDE 40 MG: 10 INJECTION, SOLUTION INTRAMUSCULAR; INTRAVENOUS at 13:37

## 2017-01-01 RX ADMIN — FUROSEMIDE 40 MG: 40 TABLET ORAL at 08:30

## 2017-01-01 RX ADMIN — FERRIC CARBOXYMALTOSE INJECTION 750 MG: 50 INJECTION, SOLUTION INTRAVENOUS at 13:42

## 2017-01-01 RX ADMIN — FUROSEMIDE 40 MG: 10 INJECTION, SOLUTION INTRAMUSCULAR; INTRAVENOUS at 10:58

## 2017-01-01 RX ADMIN — FUROSEMIDE 60 MG: 10 INJECTION, SOLUTION INTRAMUSCULAR; INTRAVENOUS at 09:25

## 2017-01-01 RX ADMIN — INSULIN LISPRO 30 UNITS: 100 INJECTION, SUSPENSION SUBCUTANEOUS at 08:30

## 2017-01-01 RX ADMIN — HYDRALAZINE HYDROCHLORIDE 100 MG: 50 TABLET ORAL at 06:35

## 2017-01-01 RX ADMIN — PREGABALIN 75 MG: 75 CAPSULE ORAL at 22:55

## 2017-01-01 RX ADMIN — INSULIN LISPRO 3 UNITS: 100 INJECTION, SOLUTION INTRAVENOUS; SUBCUTANEOUS at 07:29

## 2017-01-01 RX ADMIN — INSULIN GLARGINE 24 UNITS: 100 INJECTION, SOLUTION SUBCUTANEOUS at 21:56

## 2017-01-01 RX ADMIN — SEVELAMER CARBONATE 800 MG: 800 TABLET, FILM COATED ORAL at 11:49

## 2017-01-01 RX ADMIN — Medication 20 ML: at 11:13

## 2017-01-01 RX ADMIN — LABETALOL HYDROCHLORIDE 300 MG: 200 TABLET, FILM COATED ORAL at 16:22

## 2017-01-01 RX ADMIN — SEVELAMER CARBONATE 800 MG: 800 TABLET, FILM COATED ORAL at 07:16

## 2017-01-01 RX ADMIN — SODIUM BICARBONATE 650 MG: 650 TABLET ORAL at 09:26

## 2017-01-01 RX ADMIN — CLONIDINE HYDROCHLORIDE 0.3 MG: 0.2 TABLET ORAL at 15:31

## 2017-01-01 RX ADMIN — AMLODIPINE BESYLATE 10 MG: 5 TABLET ORAL at 09:56

## 2017-01-01 RX ADMIN — CLONIDINE HYDROCHLORIDE 0.3 MG: 0.1 TABLET ORAL at 08:36

## 2017-01-01 RX ADMIN — LABETALOL HYDROCHLORIDE 300 MG: 200 TABLET, FILM COATED ORAL at 22:01

## 2017-01-01 RX ADMIN — AMLODIPINE BESYLATE 10 MG: 5 TABLET ORAL at 08:36

## 2017-01-01 RX ADMIN — BUDESONIDE 500 MCG: 0.5 INHALANT RESPIRATORY (INHALATION) at 07:32

## 2017-01-01 RX ADMIN — LABETALOL HYDROCHLORIDE 300 MG: 200 TABLET, FILM COATED ORAL at 13:39

## 2017-01-01 RX ADMIN — DOXERCALCIFEROL 1 MCG: 0.5 CAPSULE, LIQUID FILLED ORAL at 10:10

## 2017-01-01 RX ADMIN — LABETALOL HYDROCHLORIDE 300 MG: 200 TABLET, FILM COATED ORAL at 01:23

## 2017-01-01 RX ADMIN — LABETALOL HYDROCHLORIDE 300 MG: 200 TABLET, FILM COATED ORAL at 14:41

## 2017-01-01 RX ADMIN — ARFORMOTEROL TARTRATE 15 MCG: 15 SOLUTION RESPIRATORY (INHALATION) at 07:32

## 2017-01-01 RX ADMIN — SODIUM BICARBONATE 650 MG: 650 TABLET ORAL at 18:08

## 2017-01-01 RX ADMIN — PANTOPRAZOLE SODIUM 40 MG: 40 TABLET, DELAYED RELEASE ORAL at 07:16

## 2017-01-01 RX ADMIN — FUROSEMIDE 60 MG: 10 INJECTION, SOLUTION INTRAVENOUS at 19:54

## 2017-01-01 RX ADMIN — ATORVASTATIN CALCIUM 40 MG: 40 TABLET, FILM COATED ORAL at 22:55

## 2017-01-01 RX ADMIN — LABETALOL HYDROCHLORIDE 300 MG: 200 TABLET, FILM COATED ORAL at 13:51

## 2017-01-01 RX ADMIN — SEVELAMER CARBONATE 800 MG: 800 TABLET, FILM COATED ORAL at 10:04

## 2017-01-01 RX ADMIN — HEPARIN SODIUM 5000 UNITS: 5000 INJECTION, SOLUTION INTRAVENOUS; SUBCUTANEOUS at 21:55

## 2017-01-01 RX ADMIN — ASPIRIN 81 MG 81 MG: 81 TABLET ORAL at 09:08

## 2017-01-01 RX ADMIN — ARFORMOTEROL TARTRATE 15 MCG: 15 SOLUTION RESPIRATORY (INHALATION) at 23:08

## 2017-01-01 RX ADMIN — ASPIRIN 81 MG 81 MG: 81 TABLET ORAL at 10:00

## 2017-01-01 RX ADMIN — HYDRALAZINE HYDROCHLORIDE 100 MG: 50 TABLET, FILM COATED ORAL at 14:41

## 2017-01-01 RX ADMIN — SEVELAMER CARBONATE 800 MG: 800 TABLET, FILM COATED ORAL at 16:22

## 2017-01-01 RX ADMIN — INSULIN LISPRO 4 UNITS: 100 INJECTION, SOLUTION INTRAVENOUS; SUBCUTANEOUS at 22:28

## 2017-01-01 RX ADMIN — Medication 10 ML: at 08:31

## 2017-01-01 RX ADMIN — FUROSEMIDE 40 MG: 40 TABLET ORAL at 06:35

## 2017-01-01 RX ADMIN — HYDRALAZINE HYDROCHLORIDE 100 MG: 50 TABLET, FILM COATED ORAL at 13:13

## 2017-01-01 RX ADMIN — SODIUM BICARBONATE 650 MG: 650 TABLET ORAL at 10:00

## 2017-01-01 RX ADMIN — FUROSEMIDE 40 MG: 10 INJECTION, SOLUTION INTRAMUSCULAR; INTRAVENOUS at 09:16

## 2017-01-01 RX ADMIN — SODIUM BICARBONATE 650 MG: 650 TABLET ORAL at 18:06

## 2017-01-01 RX ADMIN — SEVELAMER CARBONATE 800 MG: 800 TABLET, FILM COATED ORAL at 09:16

## 2017-01-01 RX ADMIN — CLONIDINE HYDROCHLORIDE 0.3 MG: 0.2 TABLET ORAL at 09:16

## 2017-01-01 RX ADMIN — SEVELAMER CARBONATE 800 MG: 800 TABLET, FILM COATED ORAL at 12:04

## 2017-01-01 RX ADMIN — FERRIC CARBOXYMALTOSE INJECTION 750 MG: 50 INJECTION, SOLUTION INTRAVENOUS at 13:19

## 2017-01-01 RX ADMIN — ARFORMOTEROL TARTRATE 15 MCG: 15 SOLUTION RESPIRATORY (INHALATION) at 07:58

## 2017-01-01 RX ADMIN — BUDESONIDE 500 MCG: 0.5 INHALANT RESPIRATORY (INHALATION) at 07:58

## 2017-01-01 RX ADMIN — LABETALOL HYDROCHLORIDE 300 MG: 200 TABLET, FILM COATED ORAL at 05:28

## 2017-01-01 RX ADMIN — BUDESONIDE 500 MCG: 0.5 INHALANT RESPIRATORY (INHALATION) at 20:26

## 2017-01-01 RX ADMIN — BUDESONIDE 500 MCG: 0.5 INHALANT RESPIRATORY (INHALATION) at 23:08

## 2017-01-01 RX ADMIN — PANTOPRAZOLE SODIUM 40 MG: 40 TABLET, DELAYED RELEASE ORAL at 07:29

## 2017-01-01 RX ADMIN — Medication 10 ML: at 12:57

## 2017-01-01 RX ADMIN — MULTIPLE VITAMINS W/ MINERALS TAB 1 TABLET: TAB at 08:45

## 2017-01-01 RX ADMIN — INSULIN LISPRO 3 UNITS: 100 INJECTION, SOLUTION INTRAVENOUS; SUBCUTANEOUS at 11:54

## 2017-01-01 RX ADMIN — SEVELAMER CARBONATE 800 MG: 800 TABLET, FILM COATED ORAL at 09:08

## 2017-01-01 RX ADMIN — METHOCARBAMOL 750 MG: 750 TABLET ORAL at 09:16

## 2017-01-01 RX ADMIN — CLONIDINE HYDROCHLORIDE 0.3 MG: 0.2 TABLET ORAL at 09:26

## 2017-01-01 RX ADMIN — Medication 10 ML: at 22:54

## 2017-01-01 RX ADMIN — PANTOPRAZOLE SODIUM 40 MG: 40 TABLET, DELAYED RELEASE ORAL at 07:32

## 2017-01-01 RX ADMIN — AMITRIPTYLINE HYDROCHLORIDE 50 MG: 50 TABLET, FILM COATED ORAL at 21:58

## 2017-01-01 RX ADMIN — FUROSEMIDE 40 MG: 40 TABLET ORAL at 07:16

## 2017-01-01 RX ADMIN — CLONIDINE HYDROCHLORIDE 0.3 MG: 0.2 TABLET ORAL at 22:00

## 2017-01-01 RX ADMIN — ARFORMOTEROL TARTRATE 15 MCG: 15 SOLUTION RESPIRATORY (INHALATION) at 20:26

## 2017-01-01 RX ADMIN — HYDRALAZINE HYDROCHLORIDE 100 MG: 50 TABLET, FILM COATED ORAL at 05:41

## 2017-01-01 RX ADMIN — FUROSEMIDE 40 MG: 40 TABLET ORAL at 01:24

## 2017-01-01 RX ADMIN — INSULIN LISPRO 5 UNITS: 100 INJECTION, SOLUTION INTRAVENOUS; SUBCUTANEOUS at 13:18

## 2017-01-01 RX ADMIN — PANTOPRAZOLE SODIUM 40 MG: 40 TABLET, DELAYED RELEASE ORAL at 08:30

## 2017-01-01 RX ADMIN — HEPARIN SODIUM 5000 UNITS: 5000 INJECTION, SOLUTION INTRAVENOUS; SUBCUTANEOUS at 14:41

## 2017-01-01 RX ADMIN — HYDRALAZINE HYDROCHLORIDE 100 MG: 50 TABLET, FILM COATED ORAL at 13:55

## 2017-01-01 RX ADMIN — LABETALOL HYDROCHLORIDE 300 MG: 200 TABLET, FILM COATED ORAL at 23:05

## 2017-01-01 RX ADMIN — AMLODIPINE BESYLATE 10 MG: 5 TABLET ORAL at 09:08

## 2017-01-01 RX ADMIN — FUROSEMIDE 40 MG: 40 TABLET ORAL at 23:11

## 2017-01-01 RX ADMIN — LABETALOL HYDROCHLORIDE 300 MG: 200 TABLET, FILM COATED ORAL at 06:35

## 2017-01-01 RX ADMIN — HEPARIN SODIUM 5000 UNITS: 5000 INJECTION, SOLUTION INTRAVENOUS; SUBCUTANEOUS at 05:41

## 2017-01-01 RX ADMIN — Medication 10 ML: at 21:56

## 2017-01-01 RX ADMIN — AMLODIPINE BESYLATE 10 MG: 5 TABLET ORAL at 09:26

## 2017-01-01 RX ADMIN — SEVELAMER CARBONATE 800 MG: 800 TABLET, FILM COATED ORAL at 12:55

## 2017-01-01 RX ADMIN — SODIUM CHLORIDE 25 ML/HR: 900 INJECTION, SOLUTION INTRAVENOUS at 12:57

## 2017-01-01 RX ADMIN — HYDRALAZINE HYDROCHLORIDE 100 MG: 50 TABLET, FILM COATED ORAL at 22:01

## 2017-01-01 RX ADMIN — FUROSEMIDE 80 MG: 10 INJECTION, SOLUTION INTRAVENOUS at 15:21

## 2017-01-06 ENCOUNTER — NURSE NAVIGATOR (OUTPATIENT)
Dept: INTERNAL MEDICINE CLINIC | Age: 42
End: 2017-01-06

## 2017-01-06 NOTE — PROGRESS NOTES
This writer has contacted pt as a follow up to previous conversation. Pt's name addr and  verified as pt identifiers. Mr. Cee Tamez tells this writer that he is doing well. His blood glucose is 108 and his daily weight is 324. Per pt he is diuresing well and eating healthier foods (baked chicken). Mr. Cee Tamez denies falls, chest pain, shortness of breath, cough, productive cough. He is still using [employer] for oxygen needs but reports not needing it as much. Reports saturation of 96% while walking and 100% will sitting. This writer ask Mr Cee Tamez about his fluid restriction as per Dr. Cassondra Brunner note on  the pt says [facility] is allowing him to have a 2200 ml daily limit vs the 1500 ml recommendation. This writer tells Mr. Cee Tamez that a call has been placed to Dr. Norberto Rodney office and they say his last visit was May 2016 (office notes have been requested and will be scanned into chart). Mr Cee Tamez then tells this writer he say Dr. Beryl Chávez on . A call by this writer to that office reveals they did not see pt's the last two weeks of Dec and Dr Beryl Chávez was on vacation that day. Mr. Cee Tamez confirms that his next appointment in that office is 17 @ 1 pm. This writer has asked that he request all visit notes be faxed to both Dr. Alexei Ruby and Dr. Cassondra Brunner attention; he verbalizes understanding. Mr. Cee Tamez will be in this office on  as he c/o swelling to his thigh with hardness. He denies decrease in sensation and change in color. This writer has asked Mr Cee Tamez on more than one occasion to please stay our of the hospital via doing as instructed by physicians; he verbalizes understanding.

## 2017-01-11 ENCOUNTER — OFFICE VISIT (OUTPATIENT)
Dept: INTERNAL MEDICINE CLINIC | Age: 42
End: 2017-01-11

## 2017-01-11 VITALS
WEIGHT: 315 LBS | HEIGHT: 74 IN | DIASTOLIC BLOOD PRESSURE: 93 MMHG | RESPIRATION RATE: 16 BRPM | SYSTOLIC BLOOD PRESSURE: 180 MMHG | HEART RATE: 96 BPM | OXYGEN SATURATION: 98 % | BODY MASS INDEX: 40.43 KG/M2 | TEMPERATURE: 98.2 F

## 2017-01-11 DIAGNOSIS — I89.0 LYMPHEDEMA OF RIGHT LOWER EXTREMITY: Primary | ICD-10-CM

## 2017-01-11 NOTE — PROGRESS NOTES
Edmar Weber is a 42 y.o. male and presents with Leg Swelling (Upper Thigh Swelling (R) Leg)  . Pt c/o swellingin R stump x several weeks. Its worse if he falls asleep in a chair. No pain or redness. Prosthetic leg does not bother it. Review of Systems  Constitutional: negative for fevers, chills, anorexia and weight loss  Eyes:   negative for visual disturbance and irritation  ENT:   negative for tinnitus,sore throat,nasal congestion,ear pains. hoarseness  Respiratory:  negative for cough, hemoptysis, dyspnea,wheezing  CV:   negative for chest pain, palpitations,  GI:   negative for nausea, vomiting, diarrhea, abdominal pain,melena  Endo:               negative for polyuria,polydipsia,polyphagia,heat intolerance  Genitourinary: negative for frequency, dysuria and hematuria  Integument:  negative for rash and pruritus  Hematologic:  negative for easy bruising and gum/nose bleeding  Musculoskel: negative for myalgias, arthralgias, back pain, muscle weakness, joint pain  Neurological:  negative for headaches, dizziness, vertigo, memory problems and gait   Behavl/Psych: negative for feelings of anxiety, depression, mood changes    Past Medical History   Diagnosis Date    Arrhythmia      afib    Asthma      3/2013 last attack    CKD (chronic kidney disease), stage III 2/12/2013     Nephrology: Lisa Pinto MD    Diabetes [facility])     Gout     Heart failure ([facility] [address]     Hyperlipidemia     Hypertension     Ill-defined condition     Positive PPD, treated 2001     treated 9 months     Past Surgical History   Procedure Laterality Date    Hx heent       tonsilectomy    Hx tonsillectomy      Hx hernia repair      Hx below knee amputation Left      due to osteomyelitis     Social History     Social History    Marital status:      Spouse name: N/A    Number of children: N/A    Years of education: N/A     Social History Main Topics    Smoking status: Never Smoker    Smokeless tobacco: Never Used  Alcohol use No    Drug use: No    Sexual activity: Not Currently     Partners: Female     Other Topics Concern    None     Social History Narrative    ** Merged History Encounter **         NOK Mother Adeola Dinero     Current Outpatient Prescriptions   Medication Sig Dispense Refill    labetalol (NORMODYNE) 300 mg tablet Take 1 Tab by mouth three (3) times daily. 270 Tab 3    INSULIN REGULAR, HUMAN (HUMULIN R INJECTION) by Injection route two (2) times a day. 22 UNITS BID      amLODIPine (NORVASC) 10 mg tablet       gabapentin (NEURONTIN) 100 mg capsule       HUMULIN N 100 unit/mL injection       omeprazole (PRILOSEC) 40 mg capsule       labetalol (NORMODYNE) 300 mg tablet Take 1 Tab by mouth two (2) times a day. 60 Tab 11    albuterol (PROAIR HFA) 90 mcg/actuation inhaler Take 2 Puffs by inhalation every four (4) hours as needed for Wheezing. 1 Inhaler 11    glucose blood VI test strips (TRUETEST TEST STRIPS) strip by Does Not Apply route See Admin Instructions. 30 Strip 0    ergocalciferol (ERGOCALCIFEROL) 50,000 unit capsule Take 50,000 Units by mouth every twenty-eight (28) days.  aspirin 81 mg chewable tablet Take 81 mg by mouth daily.       ondansetron (ZOFRAN ODT) 8 mg disintegrating tablet DISSOLVE ONE TABLET BY MOUTH EVERY 8 HOURS AS NEEDED FOR NAUSEA 24 Tab 2    insulin lispro protamine/insulin lispro (HUMALOG MIX 75-25) 100 unit/mL (75-25) injection Take 20U qam and 10U qpm 6 Vial 3    cloNIDine HCl (CATAPRES) 0.3 mg tablet TAKE ONE TABLET BY MOUTH TWICE DAILY 60 Tab 5    RENVELA 800 mg tab tab TAKE ONE TABLET BY MOUTH THREE TIMES DAILY WITH MEALS 90 Tab 11    furosemide (LASIX) 40 mg tablet TAKE ONE TABLET BY MOUTH TWICE DAILY FOR leg swelling 60 Tab 11    hydrALAZINE (APRESOLINE) 100 mg tablet TAKE ONE TABLET BY MOUTH THREE TIMES DAILY 90 Tab 11    oxyCODONE IR (OXY-IR) 30 mg immediate release tablet Take 30 mg by mouth two (2) times a day.  0    amitriptyline (ELAVIL) 50 mg tablet TAKE ONE TABLET BY MOUTH EVERY EVENING 30 Tab 11    calcitRIOL (ROCALTROL) 0.25 mcg capsule Take 1 Cap by mouth daily. 30 Cap 0     Allergies   Allergen Reactions    Ace Inhibitors Cough       Objective:  Visit Vitals    BP (!) 180/93 (BP 1 Location: Left arm, BP Patient Position: Sitting)    Pulse 96    Temp 98.2 °F (36.8 °C) (Oral)    Resp 16    Ht 6' 2\" (1.88 m)    Wt 332 lb (150.6 kg)    SpO2 98%    BMI 42.63 kg/m2     Physical Exam:   General appearance - alert, well appearing, and in no distress  Mental status - alert, oriented to person, place, and time  Chest - clear to auscultation, no wheezes, rales or rhonchi, symmetric air entry   Heart - normal rate, regular rhythm, normal S1, S2, no murmurs, rubs, clicks or gallops   Abdomen - soft, nontender, nondistended, no masses or organomegaly  Ext-on R stump is pendulous hard heavy mass underneath distal stump, no erythema or TTP  Skin-Warm and dry. no hyperpigmentation, vitiligo, or suspicious lesions  Neuro -alert, oriented, normal speech, no focal findings or movement disorder noted      Results for orders placed or performed in visit on 12/27/16   AMB POC GLUCOSE BLOOD, BY GLUCOSE MONITORING DEVICE   Result Value Ref Range    Glucose POC 91 mg/dL       Assessment/Plan:    ICD-10-CM ICD-9-CM    1.  Lymphedema of right lower extremity I89.0 457.1 REFERRAL TO LYMPHEDEMA CLINIC     Orders Placed This Encounter    REFERRAL TO LYMPHEDEMA CLINIC     Referral Priority:   Routine     Referral Type:   Consultation     Referral Reason:   Specialty Services Required     Lymphedema- referral to lymphedema clinic    Follow-up Disposition: Not on File

## 2017-01-11 NOTE — MR AVS SNAPSHOT
Visit Information Date & Time Provider Department Dept. Phone Encounter #  
 1/11/2017  1:15 PM Anila Ferguson, [address] [id number] Your Appointments 2/8/2017  1:00 PM  
ESTABLISHED PATIENT with Azam Palafox MD  
[facility]) Appt Note: 1 MO. F/U; 1 MO. F/U  
 [address]  
[phone] [address]  
  
    
 3/27/2017  2:30 PM  
ROUTINE CARE with Anila Ferguson MD  
[address] [facility]) Appt Note: 4 month follow up; 3 month follow up [place] [address] [address]  
[phone]  
  
   
 [place] [address] Upcoming Health Maintenance Date Due DTaP/Tdap/Td series (1 - Tdap) 2/20/1995 MICROALBUMIN Q1 8/28/2016 Pneumococcal 19-64 Highest Risk (2 of 3 - PCV13) 10/14/2016 EYE EXAM RETINAL OR DILATED Q1 4/13/2017 HEMOGLOBIN A1C Q6M 5/9/2017 LIPID PANEL Q1 11/9/2017 FOOT EXAM Q1 12/6/2017 Allergies as of 1/11/2017  Review Complete On: 1/11/2017 By: Genna Rossi LPN Severity Noted Reaction Type Reactions Ace Inhibitors  03/02/2016    Cough Current Immunizations  Reviewed on 12/19/2016 Name Date Influenza Vaccine 11/6/2014  4:23 PM  
 Influenza Vaccine (Quad) PF 12/19/2016 11:33 AM  
 Influenza Vaccine Intradermal PF 10/14/2015 Influenza Vaccine PF 2/17/2013  1:11 PM  
 Pneumococcal Polysaccharide (PPSV-23) 10/14/2015,  Deferred (Patient Refused) Not reviewed this visit You Were Diagnosed With   
  
 Codes Comments Lymphedema of right lower extremity    -  Primary ICD-10-CM: I89.0 ICD-9-CM: 631.7 Vitals BP Pulse Temp Resp Height(growth percentile) Weight(growth percentile)  (!) 180/93 (BP 1 Location: Left arm, BP Patient Position: Sitting) 96 98.2 °F (36.8 °C) (Oral) 16 6' 2\" (1.88 m) 332 lb (150.6 kg) SpO2 BMI Smoking Status 98% 42.63 kg/m2 Never Smoker Vitals History BMI and BSA Data Body Mass Index Body Surface Area  
 42.63 kg/m 2 2.8 m 2 Preferred Pharmacy Pharmacy Name Phone Lavelle Cota [address] [facility] [address] [facility] [phone] Your Updated Medication List  
  
   
This list is accurate as of: 1/11/17  2:09 PM.  Always use your most recent med list.  
  
  
  
  
 albuterol 90 mcg/actuation inhaler Commonly known as:  PROAIR HFA Take 2 Puffs by inhalation every four (4) hours as needed for Wheezing. amitriptyline 50 mg tablet Commonly known as:  ELAVIL TAKE ONE TABLET BY MOUTH EVERY EVENING  
  
 amLODIPine 10 mg tablet Commonly known as:  NORVASC  
  
 aspirin 81 mg chewable tablet Take 81 mg by mouth daily. calcitRIOL 0.25 mcg capsule Commonly known as:  ROCALTROL Take 1 Cap by mouth daily. cloNIDine HCl 0.3 mg tablet Commonly known as:  CATAPRES  
TAKE ONE TABLET BY MOUTH TWICE DAILY  
  
 ergocalciferol 50,000 unit capsule Commonly known as:  ERGOCALCIFEROL Take 50,000 Units by mouth every twenty-eight (28) days. furosemide 40 mg tablet Commonly known as:  LASIX TAKE ONE TABLET BY MOUTH TWICE DAILY FOR leg swelling  
  
 gabapentin 100 mg capsule Commonly known as:  NEURONTIN  
  
 glucose blood VI test strips strip Commonly known as:  TRUETEST TEST STRIPS  
by Does Not Apply route See Admin Instructions. HumuLIN N 100 unit/mL injection Generic drug:  insulin NPH HUMULIN R INJECTION  
by Injection route two (2) times a day. 22 UNITS BID  
  
 hydrALAZINE 100 mg tablet Commonly known as:  APRESOLINE  
TAKE ONE TABLET BY MOUTH THREE TIMES DAILY  
  
 insulin lispro protamine/insulin lispro 100 unit/mL (75-25) injection Commonly known as:  HumaLOG Mix 75-25 Take 20U qam and 10U qpm  
  
 * labetalol 300 mg tablet Commonly known as:  Danna Du Take 1 Tab by mouth two (2) times a day. * labetalol 300 mg tablet Commonly known as:  Danna Du Take 1 Tab by mouth three (3) times daily. omeprazole 40 mg capsule Commonly known as:  PRILOSEC  
  
 ondansetron 8 mg disintegrating tablet Commonly known as:  ZOFRAN ODT  
DISSOLVE ONE TABLET BY MOUTH EVERY 8 HOURS AS NEEDED FOR NAUSEA  
  
 oxyCODONE IR 30 mg immediate release tablet Commonly known as:  OXY-IR Take 30 mg by mouth two (2) times a day. RENVELA 800 mg Tab tab Generic drug:  sevelamer carbonate TAKE ONE TABLET BY MOUTH THREE TIMES DAILY WITH MEALS * Notice: This list has 2 medication(s) that are the same as other medications prescribed for you. Read the directions carefully, and ask your doctor or other care provider to review them with you. We Performed the Following REFERRAL TO LYMPHEDEMA CLINIC [OTY825 Custom] Comments:  
 Please evaluate patient for lymphedema Eveline Alvarado Referral Information Referral ID Referred By Referred To  
  
 [id number] Shannen Winslow Not Available Visits Status Start Date End Date 1 New Request 1/11/17 1/11/18 If your referral has a status of pending review or denied, additional information will be sent to support the outcome of this decision. Introducing [facility] & [facility] SERVICES! Michelle Conroy introduces [employer] patient portal. Now you can access parts of your medical record, email your doctor's office, and request medication refills online. 1. In your internet browser, go to [email or web address] 2. Click on the First Time User? Click Here link in the Sign In box. You will see the New Member Sign Up page. 3. Enter your [employer] Access Code exactly as it appears below. You will not need to use this code after youve completed the sign-up process. If you do not sign up before the expiration date, you must request a new code. · [employer] Access Code: [email or web address] Expires: 3/20/2017  4:06 PM 
 
4. Enter the last four digits of your Social Security Number (xxxx) and Date of Birth (mm/dd/yyyy) as indicated and click Submit. You will be taken to the next sign-up page. 5. Create a [employer] ID. This will be your [employer] login ID and cannot be changed, so think of one that is secure and easy to remember. 6. Create a [employer] password. You can change your password at any time. 7. Enter your Password Reset Question and Answer. This can be used at a later time if you forget your password. 8. Enter your e-mail address. You will receive e-mail notification when new information is available in [address] 9. Click Sign Up. You can now view and download portions of your medical record. 10. Click the Download Summary menu link to download a portable copy of your medical information. If you have questions, please visit the Frequently Asked Questions section of the [employer] website. Remember, [employer] is NOT to be used for urgent needs. For medical emergencies, dial 911. Now available from your iPhone and Android! Please provide this summary of care documentation to your next provider. Your primary care clinician is listed as Rin Hui. If you have any questions after today's visit, please call [phone].

## 2017-02-08 ENCOUNTER — OFFICE VISIT (OUTPATIENT)
Dept: CARDIOLOGY CLINIC | Age: 42
End: 2017-02-08

## 2017-02-08 DIAGNOSIS — I10 ESSENTIAL HYPERTENSION: ICD-10-CM

## 2017-02-08 DIAGNOSIS — I48.92 ATRIAL FIBRILLATION AND FLUTTER (HCC): Primary | ICD-10-CM

## 2017-02-08 DIAGNOSIS — R06.02 SOB (SHORTNESS OF BREATH): ICD-10-CM

## 2017-02-08 DIAGNOSIS — I48.91 ATRIAL FIBRILLATION AND FLUTTER (HCC): Primary | ICD-10-CM

## 2017-02-08 NOTE — MR AVS SNAPSHOT
Visit Information Date & Time Provider Department Dept. Phone Encounter #  
 2/8/2017  1:00 PM Lizabeth Cogan, MD [facility] [place] [address] [id number] Your Appointments 2/10/2017 10:20 AM  
ESTABLISHED PATIENT with Lizabeth Cogan, MD [facility]) Appt Note: 1 MO. F/U  
 [address]  
[phone] [address]  
  
    
 3/27/2017  2:30 PM  
ROUTINE CARE with Salma Vargas MD  
[address] [facility]) Appt Note: 4 month follow up; 3 month follow up [place] [address] [address]  
[phone]  
  
   
 [place] [address] Upcoming Health Maintenance Date Due DTaP/Tdap/Td series (1 - Tdap) 2/20/1995 MICROALBUMIN Q1 8/28/2016 Pneumococcal 19-64 Highest Risk (2 of 3 - PCV13) 10/14/2016 EYE EXAM RETINAL OR DILATED Q1 4/13/2017 HEMOGLOBIN A1C Q6M 5/9/2017 LIPID PANEL Q1 11/9/2017 FOOT EXAM Q1 12/6/2017 Allergies as of 2/8/2017  Review Complete On: 1/11/2017 By: Lita Avina LPN Severity Noted Reaction Type Reactions Ace Inhibitors  03/02/2016    Cough Current Immunizations  Reviewed on 12/19/2016 Name Date Influenza Vaccine 11/6/2014  4:23 PM  
 Influenza Vaccine (Quad) PF 12/19/2016 11:33 AM  
 Influenza Vaccine Intradermal PF 10/14/2015 Influenza Vaccine PF 2/17/2013  1:11 PM  
 Pneumococcal Polysaccharide (PPSV-23) 10/14/2015,  Deferred (Patient Refused) Not reviewed this visit You Were Diagnosed With   
  
 Codes Comments Atrial fibrillation and flutter ([facility] [address]    -  Primary ICD-10-CM: I48.91, I48.92 
ICD-9-CM: 427.31, 427.32 Essential hypertension     ICD-10-CM: I10 
ICD-9-CM: 401.9  SOB (shortness of breath)     ICD-10-CM: R06.02 
 ICD-9-CM: 786.05 Vitals Smoking Status Never Smoker Preferred Pharmacy Pharmacy Name Phone Estefani Soto [address] [facility] [address] [facility] [phone] Your Updated Medication List  
  
   
This list is accurate as of: 2/8/17 11:59 PM.  Always use your most recent med list.  
  
  
  
  
 albuterol 90 mcg/actuation inhaler Commonly known as:  PROAIR HFA Take 2 Puffs by inhalation every four (4) hours as needed for Wheezing. amitriptyline 50 mg tablet Commonly known as:  ELAVIL TAKE ONE TABLET BY MOUTH EVERY EVENING  
  
 amLODIPine 10 mg tablet Commonly known as:  NORVASC  
  
 aspirin 81 mg chewable tablet Take 81 mg by mouth daily. azithromycin 250 mg tablet Commonly known as:  ZITHROMAX  
  
 calcitRIOL 0.25 mcg capsule Commonly known as:  ROCALTROL Take 1 Cap by mouth daily. cloNIDine HCl 0.3 mg tablet Commonly known as:  CATAPRES  
TAKE ONE TABLET BY MOUTH TWICE DAILY  
  
 ELIQUIS 2.5 mg tablet Generic drug:  apixaban  
  
 ergocalciferol 50,000 unit capsule Commonly known as:  ERGOCALCIFEROL Take 50,000 Units by mouth every twenty-eight (28) days. furosemide 40 mg tablet Commonly known as:  LASIX TAKE ONE TABLET BY MOUTH TWICE DAILY FOR leg swelling  
  
 gabapentin 100 mg capsule Commonly known as:  NEURONTIN  
  
 glucose blood VI test strips strip Commonly known as:  TRUETEST TEST STRIPS  
by Does Not Apply route See Admin Instructions. HumuLIN N 100 unit/mL injection Generic drug:  insulin NPH HUMULIN R INJECTION  
by Injection route two (2) times a day. 22 UNITS BID  
  
 hydrALAZINE 100 mg tablet Commonly known as:  APRESOLINE  
TAKE ONE TABLET BY MOUTH THREE TIMES DAILY  
  
 insulin lispro protamine/insulin lispro 100 unit/mL (75-25) injection Commonly known as:  HumaLOG Mix 75-25 Take 20U qam and 10U qpm  
  
 labetalol 300 mg tablet Commonly known as:  Henry [place]ille Take 1 Tab by mouth three (3) times daily. oxyCODONE IR 30 mg immediate release tablet Commonly known as:  OXY-IR Take 30 mg by mouth two (2) times a day. pantoprazole 40 mg tablet Commonly known as:  PROTONIX  
  
 RENVELA 800 mg Tab tab Generic drug:  sevelamer carbonate TAKE ONE TABLET BY MOUTH THREE TIMES DAILY WITH MEALS Patient Instructions You need to let us know if you are not coming in for an office visit Introducing hospitals & [facility] SERVICES! Romayne Duster introduces [employer] patient portal. Now you can access parts of your medical record, email your doctor's office, and request medication refills online. 1. In your internet browser, go to [email or web address] 2. Click on the First Time User? Click Here link in the Sign In box. You will see the New Member Sign Up page. 3. Enter your [employer] Access Code exactly as it appears below. You will not need to use this code after youve completed the sign-up process. If you do not sign up before the expiration date, you must request a new code. · [employer] Access Code: [email or web address] Expires: 3/20/2017  4:06 PM 
 
4. Enter the last four digits of your Social Security Number (xxxx) and Date of Birth (mm/dd/yyyy) as indicated and click Submit. You will be taken to the next sign-up page. 5. Create a [employer] ID. This will be your [employer] login ID and cannot be changed, so think of one that is secure and easy to remember. 6. Create a [employer] password. You can change your password at any time. 7. Enter your Password Reset Question and Answer. This can be used at a later time if you forget your password. 8. Enter your e-mail address. You will receive e-mail notification when new information is available in [address] 9. Click Sign Up. You can now view and download portions of your medical record.  
10. Click the Download Summary menu link to download a portable copy of your medical information. If you have questions, please visit the Frequently Asked Questions section of the [email or web address] website. Remember, [email or web address] is NOT to be used for urgent needs. For medical emergencies, dial 911. Now available from your iPhone and Android! Please provide this summary of care documentation to your next provider. Your primary care clinician is listed as [address]. If you have any questions after today's visit, please call [phone].

## 2017-02-09 RX ORDER — LABETALOL 200 MG/1
TABLET, FILM COATED ORAL
COMMUNITY
Start: 2017-01-31 | End: 2017-02-09 | Stop reason: DRUGHIGH

## 2017-02-09 RX ORDER — PANTOPRAZOLE SODIUM 40 MG/1
TABLET, DELAYED RELEASE ORAL
COMMUNITY
Start: 2016-11-10 | End: 2017-02-10 | Stop reason: ALTCHOICE

## 2017-02-09 RX ORDER — CARVEDILOL 25 MG/1
TABLET ORAL
COMMUNITY
Start: 2017-02-01 | End: 2017-02-09 | Stop reason: DRUGHIGH

## 2017-02-09 RX ORDER — APIXABAN 2.5 MG/1
TABLET, FILM COATED ORAL
COMMUNITY
Start: 2016-11-16 | End: 2017-03-25

## 2017-02-09 RX ORDER — AZITHROMYCIN 250 MG/1
TABLET, FILM COATED ORAL
COMMUNITY
Start: 2017-01-02 | End: 2017-02-10 | Stop reason: ALTCHOICE

## 2017-02-10 ENCOUNTER — OFFICE VISIT (OUTPATIENT)
Dept: CARDIOLOGY CLINIC | Age: 42
End: 2017-02-10

## 2017-02-10 VITALS
HEIGHT: 74 IN | DIASTOLIC BLOOD PRESSURE: 75 MMHG | WEIGHT: 315 LBS | HEART RATE: 73 BPM | SYSTOLIC BLOOD PRESSURE: 151 MMHG | BODY MASS INDEX: 40.43 KG/M2 | OXYGEN SATURATION: 96 %

## 2017-02-10 DIAGNOSIS — I50.30 ACCELERATED HYPERTENSION WITH DIASTOLIC CONGESTIVE HEART FAILURE, NYHA CLASS 3 (HCC): Primary | ICD-10-CM

## 2017-02-10 DIAGNOSIS — N18.30 CKD (CHRONIC KIDNEY DISEASE) STAGE 3, GFR 30-59 ML/MIN (HCC): ICD-10-CM

## 2017-02-10 DIAGNOSIS — R06.02 SOB (SHORTNESS OF BREATH) ON EXERTION: ICD-10-CM

## 2017-02-10 DIAGNOSIS — I11.0 ACCELERATED HYPERTENSION WITH DIASTOLIC CONGESTIVE HEART FAILURE, NYHA CLASS 3 (HCC): Primary | ICD-10-CM

## 2017-02-10 DIAGNOSIS — Z87.01 H/O: PNEUMONIA: ICD-10-CM

## 2017-02-10 RX ORDER — OMEPRAZOLE 20 MG/1
20 CAPSULE, DELAYED RELEASE ORAL DAILY
Status: ON HOLD | COMMUNITY
End: 2017-03-26 | Stop reason: DRUGHIGH

## 2017-02-10 NOTE — PATIENT INSTRUCTIONS
-- Please get labs drawn, we will call you with any positive results  -- Please make 3 month follow up appointment  -- Continue your current medications  -- Call with any questions         Heart-Healthy Diet: Care Instructions  Your Care Instructions    A heart-healthy diet has lots of vegetables, fruits, nuts, beans, and whole grains, and is low in salt. It limits foods that are high in saturated fat, such as meats, cheeses, and fried foods. It may be hard to change your diet, but even small changes can lower your risk of heart attack and heart disease. Follow-up care is a key part of your treatment and safety. Be sure to make and go to all appointments, and call your doctor if you are having problems. It's also a good idea to know your test results and keep a list of the medicines you take. How can you care for yourself at home? Watch your portions  · Learn what a serving is. A \"serving\" and a \"portion\" are not always the same thing. Make sure that you are not eating larger portions than are recommended. For example, a serving of pasta is ½ cup. A serving size of meat is 2 to 3 ounces. A 3-ounce serving is about the size of a deck of cards. Measure serving sizes until you are good at Edwards" them. Keep in mind that restaurants often serve portions that are 2 or 3 times the size of one serving. · To keep your energy level up and keep you from feeling hungry, eat often but in smaller portions. · Eat only the number of calories you need to stay at a healthy weight. If you need to lose weight, eat fewer calories than your body burns (through exercise and other physical activity). Eat more fruits and vegetables  · Eat a variety of fruit and vegetables every day. Dark green, deep orange, red, or yellow fruits and vegetables are especially good for you. Examples include spinach, carrots, peaches, and berries. · Keep carrots, celery, and other veggies handy for snacks.  Buy fruit that is in season and store it where you can see it so that you will be tempted to eat it. · Cook dishes that have a lot of veggies in them, such as stir-fries and soups. Limit saturated and trans fat  · Read food labels, and try to avoid saturated and trans fats. They increase your risk of heart disease. Trans fat is found in many processed foods such as cookies and crackers. · Use olive or canola oil when you cook. Try cholesterol-lowering spreads, such as Benecol or Take Control. · Bake, broil, grill, or steam foods instead of frying them. · Choose lean meats instead of high-fat meats such as hot dogs and sausages. Cut off all visible fat when you prepare meat. · Eat fish, skinless poultry, and meat alternatives such as soy products instead of high-fat meats. Soy products, such as tofu, may be especially good for your heart. · Choose low-fat or fat-free milk and dairy products. Eat fish  · Eat at least two servings of fish a week. Certain fish, such as salmon and tuna, contain omega-3 fatty acids, which may help reduce your risk of heart attack. Eat foods high in fiber  · Eat a variety of grain products every day. Include whole-grain foods that have lots of fiber and nutrients. Examples of whole-grain foods include oats, whole wheat bread, and brown rice. · Buy whole-grain breads and cereals, instead of white bread or pastries. Limit salt and sodium  · Limit how much salt and sodium you eat to help lower your blood pressure. · Taste food before you salt it. Add only a little salt when you think you need it. With time, your taste buds will adjust to less salt. · Eat fewer snack items, fast foods, and other high-salt, processed foods. Check food labels for the amount of sodium in packaged foods. · Choose low-sodium versions of canned goods (such as soups, vegetables, and beans). Limit sugar  · Limit drinks and foods with added sugar. These include candy, desserts, and soda pop.   Limit alcohol  · Limit alcohol to no more than 2 drinks a day for men and 1 drink a day for women. Too much alcohol can cause health problems. When should you call for help? Watch closely for changes in your health, and be sure to contact your doctor if:  · You would like help planning heart-healthy meals. Where can you learn more? Go to http://amparo-ramon.info/. Enter V137 in the search box to learn more about \"Heart-Healthy Diet: Care Instructions. \"  Current as of: January 27, 2016  Content Version: 11.1  © 0365-7101 [employer]. Care instructions adapted under license by [employer] (which disclaims liability or warranty for this information). If you have questions about a medical condition or this instruction, always ask your healthcare professional. [address] any warranty or liability for your use of this information.

## 2017-02-10 NOTE — PROGRESS NOTES
[facility]   [address] [facility], [address] [place] [address]                                          NEW PATIENT HPI/FOLLOW-UP      NAME:  Edmar Weber   :   1975   MRN:   [id number]   PCP:  Anila Ferguson MD           Subjective: The patient is a 42y.o. year old male  who returns for a routine follow-up. Since the last visit, patient reports no new symptoms. Reports he is doing very well. States his PCP told him to return \"as needed: and remarks happily that no doctor has ever told him that. Denies change in exercise tolerance, chest pain, edema, medication intolerance, palpitations, shortness of breath, PND/orthopnea, wheezing, sputum, syncope, dizziness or light headedness. Doing satisfactorily. Review of Systems  General: Pt denies excessive weight gain or loss. Pt is able to conduct ADL's. Respiratory: Denies shortness of breath, TAMEZ, wheezing or stridor.   Cardiovascular: Denies precordial pain, palpitations, edema or PND  Gastrointestinal: Denies poor appetite, indigestion, abdominal pain or blood in stool  Peripheral vascular: Denies claudication, leg cramps  Neuropsychiatric: Denies paresthesias,tingling,numbness,anxiety,depression,fatigue  Musculoskeletal: Denies pain,tenderness, soreness,swelling      Past Medical History   Diagnosis Date    Arrhythmia      afib    Asthma      3/2013 last attack    CKD (chronic kidney disease), stage III 2013     Nephrology: Lisa Pinto MD    Diabetes [facility])     Gout     Heart failure ([facility]     Hyperlipidemia     Hypertension     Ill-defined condition     Positive PPD, treated 2001     treated 9 months     Patient Active Problem List    Diagnosis Date Noted    Long term current use of anticoagulant 2016    CKD (chronic kidney disease) stage 3, GFR 30-59 ml/min 2016    Accelerated hypertension with diastolic congestive heart failure, NYHA class 3 ([facility] [facility] 2016    Pulmonary edema 11/07/2016    Heart failure ([facility] [address] 11/07/2016    Diabetic foot ulcer ([address] 03/15/2016    Septic arthritis of foot ([address] 03/05/2016    Osteomyelitis ([facility] [address] [facility] 03/04/2016    Acute diastolic CHF (congestive heart failure) ([facility] [address] [facility] 03/02/2016    Pneumonia 03/02/2016    Cellulitis and abscess of leg 03/02/2016    Atrial fibrillation ([address] 03/02/2016    HTN (hypertension) 03/02/2016    Diabetic foot ulcer with osteomyelitis ([address] 02/20/2016    Symptomatic anemia 01/09/2016    Elevated serum alkaline phosphatase level 11/08/2015    Hyponatremia 11/08/2015    Acute renal failure (ARF) ([address] 11/08/2015    Type 2 diabetes mellitus with right diabetic foot ulcer ([address] 11/08/2015    Iron deficiency anemia 11/08/2015    Diabetic foot ulcer ([facility] [address] [facility] 07/19/2015    Acute diastolic heart failure ([facility] [address] [facility] 06/25/2015    Atrial fibrillation and flutter ([address] 06/24/2015    Hx of BKA ([facility] [address] [facility] 10/23/2014    Streptococcal infection(041.00) 10/22/2014    Proteus infection 10/22/2014    SIRS with acute organ dysfunction due to infectious process ([facility] [address] 10/21/2014     Class: Present on Admission    Open wound of toe 10/21/2014     Class: Present on Admission    Aortic regurgitation 10/21/2014     Class: Chronic    Renal failure, acute ([facility] [address] 10/21/2014     Class: Present on Admission    Gram-positive cocci bacteremia 10/21/2014    Cellulitis and abscess of leg 10/21/2014    History of positive PPD 02/17/2013    Acute hypoxemic respiratory failure ([address] 02/16/2013    Malignant hypertension 02/16/2013    Uncontrolled type II diabetes mellitus ([address] 02/16/2013    HTN (hypertension) 01/19/2012    Morbid obesity with BMI of 40.0-44.9, adult ([address] 01/19/2012     Class: Present on Admission      Past Surgical History   Procedure Laterality Date    Hx heent       tonsilectomy    Hx tonsillectomy      Hx hernia repair      Hx below knee amputation Left      due to osteomyelitis     Allergies   Allergen Reactions    Ace Inhibitors Cough      Family History   Problem Relation Age of Onset    Diabetes Mother     Hypertension Mother     Hypertension Brother     Diabetes Brother     Diabetes Maternal Grandmother     Hypertension Maternal Grandmother     Diabetes Other     Hypertension Other     Heart Disease Other       Social History     Social History    Marital status:      Spouse name: N/A    Number of children: N/A    Years of education: N/A     Occupational History    Not on file. Social History Main Topics    Smoking status: Never Smoker    Smokeless tobacco: Never Used    Alcohol use No    Drug use: No    Sexual activity: Not Currently     Partners: Female     Other Topics Concern    Not on file     Social History Narrative    ** Merged History Encounter **         NOK Mother Adeola Witt      Current Outpatient Prescriptions   Medication Sig    omeprazole (PRILOSEC) 20 mg capsule Take 20 mg by mouth daily.  ELIQUIS 2.5 mg tablet     labetalol (NORMODYNE) 300 mg tablet Take 1 Tab by mouth three (3) times daily. (Patient taking differently: Take 400 mg by mouth two (2) times a day.)    amLODIPine (NORVASC) 10 mg tablet     HUMULIN N 100 unit/mL injection     albuterol (PROAIR HFA) 90 mcg/actuation inhaler Take 2 Puffs by inhalation every four (4) hours as needed for Wheezing.  glucose blood VI test strips (TRUETEST TEST STRIPS) strip by Does Not Apply route See Admin Instructions.  ergocalciferol (ERGOCALCIFEROL) 50,000 unit capsule Take 50,000 Units by mouth every twenty-eight (28) days.  aspirin 81 mg chewable tablet Take 81 mg by mouth daily.     insulin lispro protamine/insulin lispro (HUMALOG MIX 75-25) 100 unit/mL (75-25) injection Take 20U qam and 10U qpm    cloNIDine HCl (CATAPRES) 0.3 mg tablet TAKE ONE TABLET BY MOUTH TWICE DAILY    RENVELA 800 mg tab tab TAKE ONE TABLET BY MOUTH THREE TIMES DAILY WITH MEALS    furosemide (LASIX) 40 mg tablet TAKE ONE TABLET BY MOUTH TWICE DAILY FOR leg swelling    hydrALAZINE (APRESOLINE) 100 mg tablet TAKE ONE TABLET BY MOUTH THREE TIMES DAILY    oxyCODONE IR (OXY-IR) 30 mg immediate release tablet Take 30 mg by mouth two (2) times a day.  amitriptyline (ELAVIL) 50 mg tablet TAKE ONE TABLET BY MOUTH EVERY EVENING    calcitRIOL (ROCALTROL) 0.25 mcg capsule Take 1 Cap by mouth daily. No current facility-administered medications for this visit. I have reviewed the MAs notes, vitals, problem list, allergy list, medical history, family medical, social history and medications. Objective:     Physical Exam:     Vitals:    02/10/17 1107   BP: 151/75   Pulse: 73   SpO2: 96%   Weight: 337 lb (152.9 kg)   Height: 6' 2\" (1.88 m)    Body mass index is 43.27 kg/(m^2). General: Well developed, in no acute distress. HEENT: No carotid bruits, no JVD, trach is midline. Heart:  Normal S1/S2 negative S3 or S4. Regular, no murmur, gallop or rub.   Respiratory: Clear bilaterally, no wheezing or rales  Abdomen:   Soft, non-tender, bowel sounds are active.   Extremities:  No edema, normal cap refill, no cyanosis. Neuro: A&Ox3, speech clear, gait stable. Skin: Skin color is normal. No rashes or lesions. No diaphoresis.   Vascular: 2+ pulses symmetric in all extremities        Data Review:       Cardiology Labs:    Results for orders placed or performed during the hospital encounter of 12/18/16   EKG, 12 LEAD, INITIAL   Result Value Ref Range    Ventricular Rate 88 BPM    Atrial Rate 88 BPM    P-R Interval 154 ms    QRS Duration 94 ms    Q-T Interval 374 ms    QTC Calculation (Bezet) 452 ms    Calculated P Axis 53 degrees    Calculated R Axis 80 degrees    Calculated T Axis 49 degrees    Diagnosis       Normal sinus rhythm  Left atrial enlargement  RSR' or QR pattern in V1 suggests right ventricular conduction delay  When compared with ECG of 29-NOV-2016 21:09,  No significant change was found  Confirmed by Mio Bradley ([id number]) on 12/19/2016 1:30:15 PM         Lab Results   Component Value Date/Time    Cholesterol, total 218 11/09/2016 04:48 AM    HDL Cholesterol 39 11/09/2016 04:48 AM    LDL, calculated 150.4 11/09/2016 04:48 AM    Triglyceride 143 11/09/2016 04:48 AM    CHOL/HDL Ratio 5.6 11/09/2016 04:48 AM       Lab Results   Component Value Date/Time    Sodium 139 12/20/2016 03:35 AM    Potassium 3.9 12/20/2016 03:35 AM    Chloride 105 12/20/2016 03:35 AM    CO2 26 12/20/2016 03:35 AM    Anion gap 8 12/20/2016 03:35 AM    Glucose 280 12/20/2016 03:35 AM    BUN 42 12/20/2016 03:35 AM    Creatinine 3.11 12/20/2016 03:35 AM    BUN/Creatinine ratio 14 12/20/2016 03:35 AM    GFR est AA 27 12/20/2016 03:35 AM    GFR est non-AA 22 12/20/2016 03:35 AM    Calcium 8.6 12/20/2016 03:35 AM    Bilirubin, total 0.3 12/18/2016 09:15 PM    AST (SGOT) 20 12/18/2016 09:15 PM    Alk. phosphatase 100 12/18/2016 09:15 PM    Protein, total 7.2 12/18/2016 09:15 PM    Albumin 3.1 12/18/2016 09:15 PM    Globulin 4.1 12/18/2016 09:15 PM    A-G Ratio 0.8 12/18/2016 09:15 PM    ALT (SGPT) 16 12/18/2016 09:15 PM          Assessment:       ICD-10-CM ICD-9-CM    1. Accelerated hypertension with diastolic congestive heart failure, NYHA class 3 (HCC) I11.0 402.01 MYCOPLASMA AB, IGG/IGM    I50.30 428.30 COXSACKIE A AB      COXSACKIE GROUP B VIRUS AB   2. H/O: pneumonia Z87.01 V12.61 MYCOPLASMA AB, IGG/IGM      COXSACKIE A AB      COXSACKIE GROUP B VIRUS AB   3. SOB (shortness of breath) on exertion R06.02 786.05 MYCOPLASMA AB, IGG/IGM      COXSACKIE A AB      COXSACKIE GROUP B VIRUS AB   4. CKD (chronic kidney disease) stage 3, GFR 30-59 ml/min N18.3 585.3 MYCOPLASMA AB, IGG/IGM      COXSACKIE A AB      COXSACKIE GROUP B VIRUS AB         Discussion: Patient presents at this time stable from a cardiac perspective. Compliant on medications. Pleased with present status. Plan: 1. Continue same meds. 2.Ordered coxsackie A and coxsackie B IGG/IGM   -- as well as M. Pneumonia IGG/IGM titres    3. Follow up: 3 months    I have discussed the diagnosis with the patient and the intended plan as seen in the above orders. The patient has received an after-visit summary and questions were answered concerning future plans. I have discussed any concerning medication side effects and warnings with the patient as well.     Anyi Gotti PA-C  2/10/2017

## 2017-02-10 NOTE — MR AVS SNAPSHOT
Visit Information Date & Time Provider Department Dept. Phone Encounter #  
 2/10/2017 10:20 AM Lizabeth Cogan, MD [facility] [phone] [id number] Your Appointments 3/27/2017  2:30 PM  
ROUTINE CARE with Salma Vargas MD  
[address]) Appt Note: 4 month follow up; 3 month follow up [place] [address] [place] [id number]  
[phone]  
  
   
 [place] [address] Upcoming Health Maintenance Date Due DTaP/Tdap/Td series (1 - Tdap) 2/20/1995 MICROALBUMIN Q1 8/28/2016 Pneumococcal 19-64 Highest Risk (2 of 3 - PCV13) 10/14/2016 EYE EXAM RETINAL OR DILATED Q1 4/13/2017 HEMOGLOBIN A1C Q6M 5/9/2017 LIPID PANEL Q1 11/9/2017 FOOT EXAM Q1 12/6/2017 Allergies as of 2/10/2017  Review Complete On: 2/10/2017 By: Justo Adkins PA-C Severity Noted Reaction Type Reactions Ace Inhibitors  03/02/2016    Cough Current Immunizations  Reviewed on 12/19/2016 Name Date Influenza Vaccine 11/6/2014  4:23 PM  
 Influenza Vaccine (Quad) PF 12/19/2016 11:33 AM  
 Influenza Vaccine Intradermal PF 10/14/2015 Influenza Vaccine PF 2/17/2013  1:11 PM  
 Pneumococcal Polysaccharide (PPSV-23) 10/14/2015,  Deferred (Patient Refused) Not reviewed this visit You Were Diagnosed With   
  
 Codes Comments Accelerated hypertension with diastolic congestive heart failure, NYHA class 3 ([facility] [place] [address]    -  Primary ICD-10-CM: I11.0, I50.30 ICD-9-CM: 402.01, 428.30 H/O: pneumonia     ICD-10-CM: Z87.01 
ICD-9-CM: V12.61   
 SOB (shortness of breath) on exertion     ICD-10-CM: R06.02 
ICD-9-CM: 786.05   
 CKD (chronic kidney disease) stage 3, GFR 30-59 ml/min     ICD-10-CM: N18.3 ICD-9-CM: 849. 3 Vitals BP Pulse Height(growth percentile) Weight(growth percentile) SpO2 BMI 151/75 73 6' 2\" (1.88 m) 337 lb (152.9 kg) 96% 43.27 kg/m2 Smoking Status Never Smoker Vitals History BMI and BSA Data Body Mass Index Body Surface Area  
 43.27 kg/m 2 2.83 m 2 Preferred Pharmacy Pharmacy Name Phone [id number] [address] [place] [address] [phone] Your Updated Medication List  
  
   
This list is accurate as of: 2/10/17 12:42 PM.  Always use your most recent med list.  
  
  
  
  
 albuterol 90 mcg/actuation inhaler Commonly known as:  PROAIR HFA Take 2 Puffs by inhalation every four (4) hours as needed for Wheezing. amitriptyline 50 mg tablet Commonly known as:  ELAVIL TAKE ONE TABLET BY MOUTH EVERY EVENING  
  
 amLODIPine 10 mg tablet Commonly known as:  NORVASC  
  
 aspirin 81 mg chewable tablet Take 81 mg by mouth daily. calcitRIOL 0.25 mcg capsule Commonly known as:  ROCALTROL Take 1 Cap by mouth daily. cloNIDine HCl 0.3 mg tablet Commonly known as:  CATAPRES  
TAKE ONE TABLET BY MOUTH TWICE DAILY  
  
 ELIQUIS 2.5 mg tablet Generic drug:  apixaban  
  
 ergocalciferol 50,000 unit capsule Commonly known as:  ERGOCALCIFEROL Take 50,000 Units by mouth every twenty-eight (28) days. furosemide 40 mg tablet Commonly known as:  LASIX TAKE ONE TABLET BY MOUTH TWICE DAILY FOR leg swelling  
  
 glucose blood VI test strips strip Commonly known as:  TRUETEST TEST STRIPS  
by Does Not Apply route See Admin Instructions. HumuLIN N 100 unit/mL injection Generic drug:  insulin NPH  
  
 hydrALAZINE 100 mg tablet Commonly known as:  APRESOLINE  
TAKE ONE TABLET BY MOUTH THREE TIMES DAILY  
  
 insulin lispro protamine/insulin lispro 100 unit/mL (75-25) injection Commonly known as:  HumaLOG Mix 75-25 Take 20U qam and 10U qpm  
  
 labetalol 300 mg tablet Commonly known as:  Chyrel Pressley Take 1 Tab by mouth three (3) times daily. oxyCODONE IR 30 mg immediate release tablet Commonly known as:  OXY-IR Take 30 mg by mouth two (2) times a day. PriLOSEC 20 mg capsule Generic drug:  omeprazole Take 20 mg by mouth daily. RENVELA 800 mg Tab tab Generic drug:  sevelamer carbonate TAKE ONE TABLET BY MOUTH THREE TIMES DAILY WITH MEALS We Performed the Following [facility] [id number] CPT(R)] Comments: We would like both IGG and IGG, I believe Rey Dominguez test #[id number] [facility] VIRUS AB [[id number] Custom] Comments: We would like IGG and IGM, [facility] test #[id number] MYCOPLASMA AB, IGG/IGM [id number] CPT(R)] Patient Instructions -- Please get labs drawn, we will call you with any positive results -- Please make 3 month follow up appointment 
-- Continue your current medications -- Call with any questions Heart-Healthy Diet: Care Instructions Your Care Instructions A heart-healthy diet has lots of vegetables, fruits, nuts, beans, and whole grains, and is low in salt. It limits foods that are high in saturated fat, such as meats, cheeses, and fried foods. It may be hard to change your diet, but even small changes can lower your risk of heart attack and heart disease. Follow-up care is a key part of your treatment and safety. Be sure to make and go to all appointments, and call your doctor if you are having problems. It's also a good idea to know your test results and keep a list of the medicines you take. How can you care for yourself at home? Watch your portions · Learn what a serving is. A \"serving\" and a \"portion\" are not always the same thing. Make sure that you are not eating larger portions than are recommended. For example, a serving of pasta is ½ cup. A serving size of meat is 2 to 3 ounces. A 3-ounce serving is about the size of a deck of cards. Measure serving sizes until you are good at [place]" them.  Keep in mind that restaurants often serve portions that are 2 or 3 times the size of one serving. · To keep your energy level up and keep you from feeling hungry, eat often but in smaller portions. · Eat only the number of calories you need to stay at a healthy weight. If you need to lose weight, eat fewer calories than your body burns (through exercise and other physical activity). Eat more fruits and vegetables · Eat a variety of fruit and vegetables every day. Dark green, deep orange, red, or yellow fruits and vegetables are especially good for you. Examples include spinach, carrots, peaches, and berries. · Keep carrots, celery, and other veggies handy for snacks. Buy fruit that is in season and store it where you can see it so that you will be tempted to eat it. · Cook dishes that have a lot of veggies in them, such as stir-fries and soups. Limit saturated and trans fat · Read food labels, and try to avoid saturated and trans fats. They increase your risk of heart disease. Trans fat is found in many processed foods such as cookies and crackers. · Use olive or canola oil when you cook. Try cholesterol-lowering spreads, such as Benecol or Take Control. · Bake, broil, grill, or steam foods instead of frying them. · Choose lean meats instead of high-fat meats such as hot dogs and sausages. Cut off all visible fat when you prepare meat. · Eat fish, skinless poultry, and meat alternatives such as soy products instead of high-fat meats. Soy products, such as tofu, may be especially good for your heart. · Choose low-fat or fat-free milk and dairy products. Eat fish · Eat at least two servings of fish a week. Certain fish, such as salmon and tuna, contain omega-3 fatty acids, which may help reduce your risk of heart attack. Eat foods high in fiber · Eat a variety of grain products every day. Include whole-grain foods that have lots of fiber and nutrients.  Examples of whole-grain foods include oats, whole wheat bread, and brown rice. · Buy whole-grain breads and cereals, instead of white bread or pastries. Limit salt and sodium · Limit how much salt and sodium you eat to help lower your blood pressure. · Taste food before you salt it. Add only a little salt when you think you need it. With time, your taste buds will adjust to less salt. · Eat fewer snack items, fast foods, and other high-salt, processed foods. Check food labels for the amount of sodium in packaged foods. · Choose low-sodium versions of canned goods (such as soups, vegetables, and beans). Limit sugar · Limit drinks and foods with added sugar. These include candy, desserts, and soda pop. Limit alcohol · Limit alcohol to no more than 2 drinks a day for men and 1 drink a day for women. Too much alcohol can cause health problems. When should you call for help? Watch closely for changes in your health, and be sure to contact your doctor if: 
· You would like help planning heart-healthy meals. Where can you learn more? Go to http://amparo-ramon.info/. Enter V137 in the search box to learn more about \"Heart-Healthy Diet: Care Instructions. \" Current as of: January 27, 2016 Content Version: 11.1 © 6617-8132 [employer]. Care instructions adapted under license by [employer] (which disclaims liability or warranty for this information). If you have questions about a medical condition or this instruction, always ask your healthcare professional. [place] [address] any warranty or liability for your use of this information. Introducing [facility]! Elizabeth Cruz introduces [facility] patient portal. Now you can access parts of your medical record, email your doctor's office, and request medication refills online. 1. In your internet browser, go to [email or web address] 2. Click on the First Time User? Click Here link in the Sign In box.  You will see the New Member Sign Up page. 3. Enter your [employer] Access Code exactly as it appears below. You will not need to use this code after youve completed the sign-up process. If you do not sign up before the expiration date, you must request a new code. · [employer] Access Code: [email or web address] Expires: 3/20/2017  4:06 PM 
 
4. Enter the last four digits of your Social Security Number (xxxx) and Date of Birth (mm/dd/yyyy) as indicated and click Submit. You will be taken to the next sign-up page. 5. Create a [employer] ID. This will be your [employer] login ID and cannot be changed, so think of one that is secure and easy to remember. 6. Create a [employer] password. You can change your password at any time. 7. Enter your Password Reset Question and Answer. This can be used at a later time if you forget your password. 8. Enter your e-mail address. You will receive e-mail notification when new information is available in [address] 9. Click Sign Up. You can now view and download portions of your medical record. 10. Click the Download Summary menu link to download a portable copy of your medical information. If you have questions, please visit the Frequently Asked Questions section of the [employer] website. Remember, [employer] is NOT to be used for urgent needs. For medical emergencies, dial 911. Now available from your iPhone and Android! Please provide this summary of care documentation to your next provider. Your primary care clinician is listed as Fina Parker. If you have any questions after today's visit, please call [phone].

## 2017-02-23 ENCOUNTER — TELEPHONE (OUTPATIENT)
Dept: INTERNAL MEDICINE CLINIC | Age: 42
End: 2017-02-23

## 2017-02-23 NOTE — TELEPHONE ENCOUNTER
Pt states  The lymphedema clinic @ [facility] needs an order so he can go there. He did not give any more information and stated you have the fax #.  Pt # [phone]

## 2017-02-27 NOTE — TELEPHONE ENCOUNTER
[facility] with confirmation of receipt.

## 2017-02-28 ENCOUNTER — NURSE NAVIGATOR (OUTPATIENT)
Dept: INTERNAL MEDICINE CLINIC | Age: 42
End: 2017-02-28
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
## 2017-03-17 ENCOUNTER — TELEPHONE (OUTPATIENT)
Dept: INTERNAL MEDICINE CLINIC | Age: 42
End: 2017-03-17

## 2017-03-17 NOTE — TELEPHONE ENCOUNTER
[facility] called stating pt needs a rx sent to them for  Humalog. They do not have one on file.  Pharmacy # [id number]

## 2017-03-23 DIAGNOSIS — I10 ESSENTIAL HYPERTENSION, BENIGN: ICD-10-CM

## 2017-03-25 ENCOUNTER — HOSPITAL ENCOUNTER (OUTPATIENT)
Age: 42
Setting detail: OBSERVATION
Discharge: HOME | DRG: 304 | End: 2017-03-27
Attending: EMERGENCY MEDICINE | Admitting: STUDENT IN AN ORGANIZED HEALTH CARE EDUCATION/TRAINING PROGRAM
Payer: COMMERCIAL

## 2017-03-25 ENCOUNTER — APPOINTMENT (OUTPATIENT)
Dept: GENERAL RADIOLOGY | Age: 42
DRG: 304 | End: 2017-03-25
Attending: EMERGENCY MEDICINE
Payer: COMMERCIAL

## 2017-03-25 DIAGNOSIS — N28.9 RENAL INSUFFICIENCY: ICD-10-CM

## 2017-03-25 DIAGNOSIS — I50.20 SYSTOLIC CONGESTIVE HEART FAILURE, UNSPECIFIED CONGESTIVE HEART FAILURE CHRONICITY: Primary | ICD-10-CM

## 2017-03-25 PROCEDURE — 84484 ASSAY OF TROPONIN QUANT: CPT | Performed by: EMERGENCY MEDICINE

## 2017-03-25 PROCEDURE — 83880 ASSAY OF NATRIURETIC PEPTIDE: CPT | Performed by: EMERGENCY MEDICINE

## 2017-03-25 PROCEDURE — 93005 ELECTROCARDIOGRAM TRACING: CPT

## 2017-03-25 PROCEDURE — 99285 EMERGENCY DEPT VISIT HI MDM: CPT

## 2017-03-25 PROCEDURE — 71020 XR CHEST PA LAT: CPT

## 2017-03-25 PROCEDURE — 82550 ASSAY OF CK (CPK): CPT | Performed by: EMERGENCY MEDICINE

## 2017-03-25 PROCEDURE — 80048 BASIC METABOLIC PNL TOTAL CA: CPT | Performed by: EMERGENCY MEDICINE

## 2017-03-25 PROCEDURE — 85025 COMPLETE CBC W/AUTO DIFF WBC: CPT | Performed by: EMERGENCY MEDICINE

## 2017-03-25 NOTE — IP AVS SNAPSHOT
Summary of Care Report The Summary of Care report has been created to help improve care coordination. Users with access to [employer] or [address] (Web-based application) may access additional patient information including the Discharge Summary. If you are not currently a [address] user and need more information, please call the number listed below in the [place] [address] section and ask to be connected with Medical Records. Facility Information Name Address Phone [facility] [address] [place] [phone] Patient Information Patient Name Sex  Francesca Ivy ([id number]) Male 1975 Discharge Information Admitting Provider Service Area Unit Jocy Stock MD / [address] / [phone] Discharge Provider Discharge Date/Time Discharge Disposition Destination (none) 3/27/2017 (Pending) AHR (none) Patient Language Language ENGLISH [13] Hospital Problems as of 3/27/2017  Reviewed: 2016 11:15 AM by Sae Bailey PA-C Class Noted - Resolved Last Modified POA Active Problems Uncontrolled type II diabetes mellitus ([address]  2013 - Present 3/26/2017 by Jocy Stock MD Yes Entered by Nikhil Choi MD  
  Acute diastolic CHF (congestive heart failure) ([address]  3/2/2016 - Present 3/26/2017 by Jocy Stock MD Yes Entered by Juan Manuel Espana MD  
  Atrial fibrillation ([address]  3/2/2016 - Present 3/26/2017 by Jocy Stock MD Yes Entered by Juan Manuel Espana MD  
  Accelerated hypertension with diastolic congestive heart failure, NYHA class 3 ([address] (Chronic)  2016 - Present 3/26/2017 by Jocy Stock MD Yes Entered by Annita Roe MD  
  * (Principal)Hypertensive urgency  3/26/2017 - Present 3/26/2017 by Jocy Stock MD Unknown Entered by Thayne Rinne, MD  
  
Non-Hospital Problems as of 3/27/2017  Reviewed: 12/30/2016 11:15 AM by FRANCE Gunderson-JAMES Class Noted - Resolved Last Modified Active Problems HTN (hypertension)  1/19/2012 - Present 12/19/2016 by Concepcion Leung MD  
  Entered by Renetta Morales MD  
  Morbid obesity with BMI of 40.0-44.9, adult [facility]) Present on Admission 1/19/2012 - Present 12/19/2016 by Jimenez Pratt MD  
  Entered by Renetta Morales MD  
  Overview Addendum 10/21/2014 11:29 AM by Aisha Douglas MD  
   Body mass index is 40.04 kg/(m^2). Acute hypoxemic respiratory failure ([address]  2/16/2013 - Present 12/19/2016 by Concepcion Leung MD  
  Entered by Yazan Hoyt MD  
  Malignant hypertension  2/16/2013 - Present 11/29/2016 by Nish Vargas MD  
  Entered by Yazan Hoyt MD  
  History of positive PPD  2/17/2013 - Present 7/19/2015 by Jimenez Pratt MD  
  Entered by Aura Ziegler MD  
  SIRS with acute organ dysfunction due to infectious process [facility]) Present on Admission 10/21/2014 - Present 10/23/2014 Entered by Aisha Douglas MD  
  Open wound of toe Present on Admission 10/21/2014 - Present 7/19/2015 by Jimenez Pratt MD  
  Entered by Aisha Douglas MD  
  Aortic regurgitation Chronic 10/21/2014 - Present 7/19/2015 by Jimenez Pratt MD  
  Entered by Aisha Douglas MD  
  Overview Signed 10/21/2014 11:30 AM by Aisha Douglas MD  
   Echo 2013 Renal failure, acute [facility]) Present on Admission 10/21/2014 - Present 7/19/2015 by Jimenez Pratt MD  
  Entered by Aisha Douglas MD  
  Gram-positive cocci bacteremia  10/21/2014 - Present 10/23/2014 Entered by Lauren Ames Cellulitis and abscess of leg  10/21/2014 - Present 7/19/2015 by Jimenez Pratt MD  
  Entered by Lauren Ames Streptococcal infection(041.00)  10/22/2014 - Present 10/23/2014 Entered by Lauren Ames Proteus infection  10/22/2014 - Present 10/23/2014 Entered by Natalya Ch Hx of BKA ([facility]  10/23/2014 - Present 12/19/2016 by Kendra Mahmood MD  
  Entered by Reese Adan MD  
  Atrial fibrillation and flutter ([facility]  6/24/2015 - Present 12/19/2016 by Kendra Mahmood MD  
  Entered by Vicki Rapp MD  
  Acute diastolic heart failure ([facility]  6/25/2015 - Present 12/19/2016 by Kendra Mahmood MD  
  Entered by Manjit Bentley RN  
  Diabetic foot ulcer ([facility]  7/19/2015 - Present 7/19/2015 by Alden Taveras MD  
  Entered by Alden Taveras MD  
  Elevated serum alkaline phosphatase level  11/8/2015 - Present 11/8/2015 by Billnash Erps, NP Entered by [facility] Erps, NP Hyponatremia  11/8/2015 - Present 11/8/2015 by [facility] Erps, NP Entered by [facility] Erps, NP Acute renal failure (ARF) ([facility]  11/8/2015 - Present 11/8/2015 by Bill Erps, NP Entered by [facility] Erps, NP Type 2 diabetes mellitus with right diabetic foot ulcer ([facility]  11/8/2015 - Present 11/18/2015 by Dino Schmitz, NP Entered by Billnash Erps, NP Iron deficiency anemia  11/8/2015 - Present 11/18/2015 by Dino Schmitz, NP Entered by Billnash Erps, NP Symptomatic anemia  1/9/2016 - Present 1/9/2016 by Elizabeth Glass MD  
  Entered by Elizabeth Glass MD  
  Diabetic foot ulcer with osteomyelitis ([facility]  2/20/2016 - Present 2/26/2016 by Dino Schmitz, NP   Entered by Matthew Holcomb DPM  
  Pneumonia  3/2/2016 - Present 3/3/2016 by Alden Taveras MD  
  Entered by Alden Taveras MD  
  Cellulitis and abscess of leg  3/2/2016 - Present 3/2/2016 by Alden Taveras MD  
  Entered by Alden Taveras MD  
  HTN (hypertension)  3/2/2016 - Present 3/2/2016 by Alden Taveras MD  
  Entered by Alden Taveras MD  
  Osteomyelitis [facility])  3/4/2016 - Present 3/4/2016 by Henry Sarah MD  
  Entered by Henry Sarah MD  
 Septic arthritis of foot ([facility]  3/5/2016 - Present 3/16/2016 by Jake Riley MD  
  Entered by Jake Riley MD  
  Diabetic foot ulcer ([facility]  3/15/2016 - Present 3/15/2016 by Henry Sarah MD  
  Entered by Henry Sarah MD  
  Pulmonary edema  11/7/2016 - Present 11/29/2016 by Evelin Lockhart MD  
  Entered by Darian Paula MD  
  Heart failure ([facility]  11/7/2016 - Present 12/19/2016 by Kendra Mahmood MD  
  Entered by Darian Paula MD  
  CKD (chronic kidney disease) stage 3, GFR 30-59 ml/min  12/18/2016 - Present 12/19/2016 by Kendra Mahmood MD  
  Entered by Alden Taveras MD  
  Long term current use of anticoagulant  12/19/2016 - Present 12/19/2016 by Kendra Mahmood MD  
  Entered by Kendra Mahmood MD  
  CHF (congestive heart failure) ([facility]  3/26/2017 - Present 3/26/2017 by Henry Sarah MD  
  Entered by Darian Paula MD  
  
You are allergic to the following Allergen Reactions Ace Inhibitors Cough Current Discharge Medication List  
  
CONTINUE these medications which have CHANGED Dose & Instructions Dispensing Information Comments  
 insulin lispro protamine/insulin lispro 100 unit/mL (75-25) injection Commonly known as:  HumaLOG Mix 75-25 What changed:  additional instructions Take 20U qam and 10U qpm  
 Quantity:  6 Vial  
Refills:  3  
   
 labetalol 200 mg tablet Commonly known as:  Lacy Paddy What changed:  Another medication with the same name was removed. Continue taking this medication, and follow the directions you see here. Dose:  400 mg Take 400 mg by mouth three (3) times daily. Refills:  0  
   
 omeprazole 40 mg capsule Commonly known as:  PRILOSEC What changed:  Another medication with the same name was removed. Continue taking this medication, and follow the directions you see here. Dose:  40 mg Take 40 mg by mouth daily. Refills:  0 CONTINUE these medications which have NOT CHANGED Dose & Instructions Dispensing Information Comments  
 albuterol 90 mcg/actuation inhaler Commonly known as:  PROAIR HFA Dose:  2 Puff Take 2 Puffs by inhalation every four (4) hours as needed for Wheezing. Quantity:  1 Inhaler Refills:  11  
   
 amitriptyline 50 mg tablet Commonly known as:  ELAVIL TAKE ONE TABLET BY MOUTH EVERY EVENING Quantity:  30 Tab Refills:  11 amLODIPine 10 mg tablet Commonly known as:  Benjiman Floro Dose:  10 mg Take 10 mg by mouth daily. Refills:  0  
   
 aspirin 81 mg chewable tablet Dose:  81 mg Take 81 mg by mouth daily. Refills:  0  
   
 cloNIDine HCl 0.3 mg tablet Commonly known as:  CATAPRES  
 TAKE ONE TABLET BY MOUTH TWICE DAILY Quantity:  60 Tab Refills:  5  
   
 furosemide 40 mg tablet Commonly known as:  LASIX TAKE ONE TABLET BY MOUTH TWICE DAILY FOR leg swelling Quantity:  60 Tab Refills:  11  
   
 glucose blood VI test strips strip Commonly known as:  TRUETEST TEST STRIPS  
 by Does Not Apply route See Admin Instructions. Quantity:  30 Strip Refills:  0 HumuLIN N 100 unit/mL injection Generic drug:  insulin NPH  
 22 units ac breakfast and dinner Refills:  0  
   
 hydrALAZINE 100 mg tablet Commonly known as:  APRESOLINE  
 TAKE ONE TABLET BY MOUTH THREE TIMES DAILY Quantity:  90 Tab Refills:  11  
   
 oxyCODONE IR 30 mg immediate release tablet Commonly known as:  OXY-IR Dose:  30 mg Take 30 mg by mouth two (2) times a day. Refills:  0  
   
 RENVELA 800 mg Tab tab Generic drug:  sevelamer carbonate TAKE ONE TABLET BY MOUTH THREE TIMES DAILY WITH MEALS Quantity:  90 Tab Refills:  11  
   
  
  
  
Current Immunizations Name Date Influenza Vaccine 11/6/2014 Influenza Vaccine (Quad) PF 12/19/2016 Influenza Vaccine Intradermal PF 10/14/2015 Influenza Vaccine PF 2/17/2013 Pneumococcal Polysaccharide (PPSV-23) 10/14/2015,  Deferred (Patient Refused) Follow-up Information Follow up With Details Comments Contact Info Sadie Lyons MD Go on 4/7/2017 Your appointment is scheduled for 4/7/17 at 9:30am. [facility] [address] [facility] [address] 
[phone] Dmoenico Beasley MD   [address] [place] [address] 
[phone] Alexi Soriano MD  Please follow-up as needed. [address] [facility] [address] [place] [address] 
[phone] [employer]  Please contact regarding your home oxygen as needed. [address] [facility] [address] [facility] [address] 
[phone] Discharge Instructions Heart Failure: Care Instructions Your Care Instructions Heart failure occurs when your heart does not pump as much blood as the body needs. Failure does not mean that the heart has stopped pumping but rather that it is not pumping as well as it should. Over time, this causes fluid buildup in your lungs and other parts of your body. Fluid buildup can cause shortness of breath, fatigue, swollen ankles, and other problems. By taking medicines regularly, reducing sodium (salt) in your diet, checking your weight every day, and making lifestyle changes, you can feel better and live longer. Follow-up care is a key part of your treatment and safety. Be sure to make and go to all appointments, and call your doctor if you are having problems. It's also a good idea to know your test results and keep a list of the medicines you take. How can you care for yourself at home? Medicines · Be safe with medicines. Take your medicines exactly as prescribed. Call your doctor if you think you are having a problem with your medicine. · Do not take any vitamins, over-the-counter medicine, or herbal products without talking to your doctor first. Radha Ramk not take ibuprofen (Advil or Motrin) and naproxen (Aleve) without talking to your doctor first. They could make your heart failure worse. · You may be taking some of the following medicine. ¨ Beta-blockers can slow heart rate, decrease blood pressure, and improve your condition. Taking a beta-blocker may lower your chance of needing to be hospitalized. ¨ Angiotensin-converting enzyme inhibitors (ACEIs) reduce the heart's workload, lower blood pressure, and reduce swelling. Taking an ACEI may lower your chance of needing to be hospitalized again. ¨ Angiotensin II receptor blockers (ARBs) work like ACEIs. Your doctor may prescribe them instead of ACEIs. ¨ Diuretics, also called water pills, reduce swelling. ¨ Potassium supplements replace this important mineral, which is sometimes lost with diuretics. ¨ Aspirin and other blood thinners prevent blood clots, which can cause a stroke or heart attack. You will get more details on the specific medicines your doctor prescribes. Diet · Your doctor may suggest that you limit sodium to 2,000 milligrams (mg) a day or less. That is less than 1 teaspoon of salt a day, including all the salt you eat in cooking or in packaged foods. People get most of their sodium from processed foods. Fast food and restaurant meals also tend to be very high in sodium. · Ask your doctor how much liquid you can drink each day. You may have to limit liquids. Weight · Weigh yourself without clothing at the same time each day. Record your weight. Call your doctor if you gain more than 3 pounds in 2 to 3 days. A sudden weight gain may mean that your heart failure is getting worse. Activity level · Start light exercise (if your doctor says it is okay). Even if you can only do a small amount, exercise will help you get stronger, have more energy, and manage your weight and your stress. Walking is an easy way to get exercise. Start out by walking a little more than you did before. Bit by bit, increase the amount you walk. · When you exercise, watch for signs that your heart is working too hard. You are pushing yourself too hard if you cannot talk while you are exercising. If you become short of breath or dizzy or have chest pain, stop, sit down, and rest. 
· If you feel \"wiped out\" the day after you exercise, walk slower or for a shorter distance until you can work up to a better pace. · Get enough rest at night. Sleeping with 1 or 2 pillows under your upper body and head may help you breathe easier. Lifestyle changes · Do not smoke. Smoking can make a heart condition worse. If you need help quitting, talk to your doctor about stop-smoking programs and medicines. These can increase your chances of quitting for good. Quitting smoking may be the most important step you can take to protect your heart. · Limit alcohol to 2 drinks a day for men and 1 drink a day for women. Too much alcohol can cause health problems. · Avoid getting sick from colds and the flu. Get a pneumococcal vaccine shot. If you have had one before, ask your doctor whether you need another dose. Get a flu shot each year. If you must be around people with colds or the flu, wash your hands often. When should you call for help? Call 911 if you have symptoms of sudden heart failure such as: 
· You have severe trouble breathing. · You cough up pink, foamy mucus. · You have a new irregular or rapid heartbeat. Call your doctor now or seek immediate medical care if: 
· You have new or increased shortness of breath. · You are dizzy or lightheaded, or you feel like you may faint. · You have sudden weight gain, such as 3 pounds or more in 2 to 3 days. · You have increased swelling in your legs, ankles, or feet. · You are suddenly so tired or weak that you cannot do your usual activities. Watch closely for changes in your health, and be sure to contact your doctor if: 
· You develop new symptoms. Where can you learn more? Go to http://amparo-ramon.info/. Enter A951 in the search box to learn more about \"Heart Failure: Care Instructions. \" Current as of: January 27, 2016 Content Version: 11.1 © 8379-8791 [employer]. Care instructions adapted under license by [employer] (which disclaims liability or warranty for this information). If you have questions about a medical condition or this instruction, always ask your healthcare professional. [address] any warranty or liability for your use of this information. DISCHARGE SUMMARY from Nurse The following personal items are in your possession at time of discharge: 
 
Dental Appliances: At bedside Visual Aid: Glasses Home Medications: None Jewelry: None Clothing: Pants Other Valuables: None PATIENT INSTRUCTIONS: 
 
 
F-face looks uneven A-arms unable to move or move unevenly S-speech slurred or non-existent T-time-call 911 as soon as signs and symptoms begin-DO NOT go Back to bed or wait to see if you get better-TIME IS BRAIN. Warning Signs of HEART ATTACK Call 911 if you have these symptoms: 
? Chest discomfort. Most heart attacks involve discomfort in the center of the chest that lasts more than a few minutes, or that goes away and comes back. It can feel like uncomfortable pressure, squeezing, fullness, or pain. ? Discomfort in other areas of the upper body. Symptoms can include pain or discomfort in one or both arms, the back, neck, jaw, or stomach. ? Shortness of breath with or without chest discomfort. ? Other signs may include breaking out in a cold sweat, nausea, or lightheadedness. Don't wait more than five minutes to call [address]! Fast action can save your life.  Calling 911 is almost always the fastest way to get lifesaving treatment. Emergency Medical Services staff can begin treatment when they arrive  up to an hour sooner than if someone gets to the hospital by car. The discharge information has been reviewed with the patient. The patient verbalized understanding. Discharge medications reviewed with the patient and appropriate educational materials and side effects teaching were provided. Chart Review Routing History Recipient Method Report Sent By Shila Lyons MD  
Phone: [phone] In Basket IP Auto Routed Notes Cherelle Shaw MD [[id number]] 3/3/2016 12:44 AM 03/03/2016 Sadie Lyons MD  
Phone: [phone] In Basket IP Auto Routed Notes Trini Cedeno MD [[id number]] 3/4/2016 12:05 AM 03/04/2016 Sadie Lyons MD  
Phone: [phone] In Basket IP Auto Routed Notes Trini Cedeno MD [[id number]] 3/4/2016 11:02 AM 03/04/2016 Sadie Lyons MD  
Phone: [phone] In Basket IP Auto Routed Notes Trini Cedeno MD [[id number]] 3/5/2016  9:37 AM 03/05/2016 Sadie Lyons MD  
Phone: [phone] In Basket IP Auto Routed Notes Trini Cedeno MD [[id number]] 3/5/2016  9:45 AM 03/05/2016 Sadie Lyons MD  
Phone: [phone] In Basket IP Auto Routed Notes Trini Cedeno MD [[id number]] 3/15/2016  8:39 PM 03/15/2016 Sadie Lyons MD  
Phone: [phone] In Basket IP Auto Routed Notes Trini Cedeno MD [[id number]] 3/21/2016  1:16 PM 03/21/2016 Sadie Lyons MD  
Phone: [phone] In [employer] Routed [employer] [[id number]] 8/11/2013  9:07 PM 08/11/2013 Sadie Lyons MD  
Phone: [phone] In Basket IP Auto Routed Notes Chirag Jiménez MD [[id number]] 10/23/2014 11:58 PM 10/23/2014 Sadie Lyons MD  
Phone: [phone] In [employer] Routed Garth Lockett MD [[id number]] 10/24/2014 12:46 PM 10/24/2014 Sadie Lyons MD  
Phone: [phone]  In Basket IP Auto Routed Notes Leonel Batista MD [[id number]] 10/27/2014 8:59 AM 10/27/2014 Deni Culver MD  
Phone: [phone] In [employer] IP Auto Routed Bob Weaver MD [[id number]] 10/30/2014  5:39 AM 10/30/2014 Maria Isabel Rush MD  
Fax: [phone] Phone: [phone] Fax IP Auto Routed Jef Sung MD [[id number]] 10/30/2014  5:39 AM 10/30/2014 Waylon Gaffney MD  
Phone: [phone] In [employer] IP Auto Routed Bob Weaver MD [[id number]] 10/30/2014  5:39 AM 10/30/2014 Sebas Boykin MD  
Phone: [phone] In [employer] IP Auto Routed Bob Weaver MD [[id number]] 10/30/2014  5:39 AM 10/30/2014 Sy Becerra MD  
Phone: [phone] In Basket IP Auto Routed Notes Lucila Armijo [[id number]] 11/6/2014 12:16 PM 11/06/2014 Sy Becerra MD  
Phone: [phone] In Basket IP Auto Routed Notes Carlitos Diamond MD [[id number]] 6/24/2015  8:25 PM 06/24/2015 Sy Becerra MD  
Phone: [phone] In [employer] IP Auto Routed Suzi Jackson MD [[id number]] 6/25/2015  9:36 PM 06/25/2015 Sy Becerra MD  
Phone: [phone] In [employer] Routed Notes Jesus Alberto Benton [[id number]] 6/26/2015 12:43 PM 06/26/2015 Sy Becerra MD  
Phone: [phone] In Basket IP Auto Routed Notes Emiliano Mooney MD [[id number]] 7/19/2015  5:37 AM 07/19/2015 Sy Becerra MD  
Phone: [phone] In Basket IP Auto Routed Notes Louis Hernandez MD [[id number]] 7/19/2015  9:43 PM 07/19/2015 Sy Becerra MD  
Phone: [phone] In Basket IP Auto Routed Notes Louis Hernandez MD [[id number]] 7/20/2015  4:30 PM 07/20/2015 Sy Becerra MD  
Phone: [phone] In Basket IP Auto Routed Notes Kodak Glover MD [[id number]] 11/5/2015  2:49 AM 11/05/2015 Sy Becerra MD  
Phone: [phone] In Basket IP Auto Routed Notes Kodak Glover MD [[id number]] 11/5/2015  2:54 AM 11/05/2015 Sy Becerra MD  
Phone: [phone]  In Basket IP Auto Routed Notes Geraldine Alvarado MD [[id number]] 11/17/2015  8:13 AM 11/17/2015 Conchita Howell MD  
Phone: [phone] In [employer] IP Auto Routed Gale Henderson MD [[id number]] 11/19/2015  7:21 AM 11/19/2015 Meaghan Nicholson [place]  
Phone: [phone] In [employer] IP Auto Routed Gale Henderson MD [[id number]] 11/19/2015  7:21 AM 11/19/2015 Germania Perkins MD  
Phone: [phone] In [employer] IP Auto Routed Gale Henderson MD [[id number]] 11/19/2015  7:21 AM 11/19/2015 Yamini Lyn MD  
Fax: [phone] Phone: [phone] Fax IP Auto Routed Randi Mohr MD [[id number]] 11/19/2015  7:21 AM 11/19/2015 Vesta Fang DPM  
Fax: [phone] Phone: [phone] Fax IP Auto Routed Randi Mohr MD [[id number]] 11/19/2015  7:21 AM 11/19/2015 Conchita Howell MD  
Phone: [phone] In Basket IP Auto Routed Notes See Rivera MD [facility] 11/20/2015  7:13 AM 11/20/2015 Conchita Howell MD  
Phone: [phone] In Basket IP Auto Routed Notes Evelia Randhawa MD [[id number]] 1/9/2016  1:53 AM 01/09/2016 Conchita Howell MD  
Phone: [phone] In [employer] IP Auto Routed Leatha Zamora MD [[id number]] 1/9/2016  9:03 PM 01/09/2016 Evelia Randhawa MD  
Phone: [phone] In [employer] IP Auto Routed Leatha Zamora MD [[id number]] 1/9/2016  9:03 PM 01/09/2016 Conchita Howell MD  
Phone: [phone] In [employer] IP Auto Routed Leatha Zamora MD [[id number]] 1/9/2016  9:04 PM 01/09/2016 Evelia Randhawa MD  
Phone: [phone] In [employer] IP Auto Routed Leatha Zamora MD [[id number]] 1/9/2016  9:04 PM 01/09/2016 Conchita Howell MD  
Phone: [phone] In [employer] Routed Notes Breanne Carmen [place] [[id number]] 1/10/2016  6:22 PM 01/10/2016 Conchita Howell MD  
Phone: [phone] In Basket IP Auto Routed Notes Evelia Randhawa MD [[id number]] 2/14/2016  3:42 AM 02/14/2016 Conchita Howell MD  
Phone: [phone] In Basket IP Auto Routed Notes Gayla Alanis MD [[id number]] 2/14/2016  7:25 AM 02/14/2016 Ruchi Benites MD  
Phone: [phone] In [employer] IP Auto Routed Mei Haile MD [[id number]] 2/14/2016  4:44 PM 02/14/2016 Gayla Alanis MD  
Phone: [phone] In [employer] IP Auto Routed Mei Haile MD [[id number]] 2/14/2016  4:44 PM 02/14/2016 Oliverio Navarro MD  
Phone: [phone] In Basket IP Auto Routed Trans MD Neha Givens 2/16/2016 12:57 PM 02/16/2016 Gayla Alanis MD  
Phone: [phone] In Basket IP Auto Routed Marlene Ramirez MD [[id number]] 2/16/2016 10:23 PM 02/16/2016 Mary Ellen Barboza DPM  
Fax: [phone] Phone: [phone] Fax IP Auto Routed Marlene Ramirez MD [[id number]] 2/16/2016 10:23 PM 02/16/2016 Ruchi Benites MD  
Phone: [phone] In Basket IP Auto Routed Notes Winston Daniels MD [[id number]] 2/26/2016  4:01 PM 02/26/2016 Ruchi Benites MD  
Phone: [phone] In Basket IP Auto Routed Notes Winston Daniels MD [[id number]] 3/5/2016  4:09 PM 03/05/2016 Ruchi Benites MD  
Phone: [phone] In Basket IP Auto Routed Polly Cervantes MD [[id number]] 3/6/2016  8:15 AM 03/06/2016 Winston Daniels MD  
Phone: [phone] In Basket IP Auto Routed Polly Cervantes MD [[id number]] 3/6/2016  8:15 AM 03/06/2016 Mary Ellen Barboza DPM  
Fax: [phone] Phone: [phone] Fax IP Auto Routed Polly Cervantes MD [[id number]] 3/6/2016  8:15 AM 03/06/2016 Marla Emerson MD  
Phone: [phone] In Basket IP Auto Routed Polly Cervantes MD [[id number]] 3/6/2016  8:15 AM 03/06/2016 Marisa Jeong MD  
Phone: [phone] In [employer] IP Auto Routed Polly Cervantes MD [[id number]] 3/7/2016  7:26 AM 03/07/2016 Ruchi Benites MD  
Phone: [phone]  In [employer] IP Auto Routed Polly Cervantes MD [[id number]] 3/7/2016  7:26 AM 03/07/2016 NanoVERONICA CardenasANGEL  
Fax: [phone] Phone: [phone] Fax IP Auto Routed Salena Dang MD [[id number]] 3/7/2016  7:26 AM 03/07/2016 Asif Persaud MD  
Phone: [phone] In [place] IP Auto Routed Salena Dang MD [[id number]] 3/14/2016  7:12 AM 03/14/2016 Elijah Charles DO Fax: [phone] Phone: [phone] Fax IP Auto Routed Salena Dang MD [[id number]] 3/14/2016  7:12 AM 03/14/2016 Staci Phan MD  
Phone: [phone] In Basket IP Auto Routed Trans Eliecer Jordan MD [[id number]] 3/16/2016 10:09 AM 03/16/2016 Vilma Voss MD  
Phone: [phone] In Basket IP Auto Routed Marlene Ramirez MD [[id number]] 3/17/2016  1:48 PM 03/17/2016 Asif Persaud MD  
Phone: [phone] In Basket IP Auto Routed Notes Vilma Voss MD [[id number]] 3/17/2016  3:29 PM 03/17/2016 Asif Persaud MD  
Phone: [phone] In Basket IP Auto Routed Notes Ashley Kramer MD [[id number]] 4/1/2016 11:53 AM 04/01/2016 Asif Persaud MD  
Phone: [phone] In Basket IP Auto Routed Notes Suzi Howard MD [[id number]] 11/8/2016 12:40 AM 11/08/2016 Asif Persaud MD  
Phone: [phone] In Basket IP Auto Routed Notes Leslie Love MD [[id number]] 11/8/2016  9:59 PM 11/08/2016 Leslie Love MD  
Fax: [phone] Phone: [phone] Fax IP Auto Routed Trans Staci Phan MD [[id number]] 11/11/2016  6:49 AM 11/11/2016 Asif Persaud MD  
Phone: [phone] In Basket IP Auto Routed Notes Aniya Rasmussen MD [[id number]] 11/13/2016  5:21 PM 11/13/2016 Asif Persaud MD  
Phone: [phone] In Basket IP Auto Routed Notes Ted Cox MD [[id number]] 11/30/2016  2:57 AM 11/30/2016 Asif Persaud MD  
Phone: [phone] In [employer] Routed Notes Nevaeh Zamudio MD [[id number]] 11/30/2016  9:11 AM 11/30/2016 Asif Persaud MD  
Phone: [phone] In [employer] Routed Notes Shelley Mathews MD [[id number]] 11/30/2016  9:12 AM 11/30/2016 Ej Almeida MD  
Phone: [phone] In [employer] Routed Notes Shelley Mathews MD [[id number]] 12/5/2016  9:32 AM 12/05/2016 Ej Almeida MD  
Phone: [phone] In [employer] Routed Notes Shelley Mathews MD [[id number]] 12/5/2016  9:32 AM 12/05/2016 Ej Almeida MD  
Phone: [phone] In Basket IP Auto Routed Notes Surjit Mark MD [[id number]] 12/19/2016  2:22 AM 12/19/2016 Ej Almeida MD  
Phone: [phone] In Basket IP Auto Routed Notes Toshia Oliver MD [[id number]] 12/19/2016 11:50 AM 12/19/2016 Ej Almeida MD  
Phone: [phone] In Basket IP Auto Routed Notes Toshia Oliver MD [[id number]] 12/19/2016 11:52 AM 12/19/2016 Ej Almeida MD  
Phone: [phone] In Basket IP Auto Routed Notes Toshia Oliver MD [[id number]] 12/20/2016  4:31 PM 12/20/2016 Ej Almeida MD  
Phone: [phone] In Basket IP Auto Routed Notes Jorge L Zafar MD [[id number]] 3/26/2017  3:34 AM 03/26/2017 Ej Almeida MD  
Phone: [phone] In Basket IP Auto Routed Notes Rik Vang MD [[id number]] 3/26/2017 11:22 AM 03/26/2017 Ej Almeida MD  
Phone: [phone] In Basket IP Auto Routed Notes Rik Vang MD [[id number]] 3/27/2017 11:10 AM 03/27/2017 Ej Almeida MD  
Phone: [phone] In Basket IP Auto Routed Notes Rik Vang MD [[id number]] 3/27/2017 11:11 AM 03/27/2017

## 2017-03-25 NOTE — IP AVS SNAPSHOT
Jody Adenike 
 
 
 [address] Patient: Rohan Dsouza MRN: [id number] CND:1/86/8516 You are allergic to the following Allergen Reactions Ace Inhibitors Cough Recent Documentation Height Weight BMI Smoking Status 1.321 m 151.3 kg 86.73 kg/m2 Never Smoker Emergency Contacts Name Discharge Info Relation Home Work Mobile Sanna Cuba CAREGIVER [3] Other Relative [6] [phone] About your hospitalization You were admitted on:  March 26, 2017 You last received care in the:  [address] You were discharged on:  March 27, 2017 Unit phone number:  [phone] Why you were hospitalized Your primary diagnosis was:  Hypertensive Urgency Your diagnoses also included:  Accelerated Hypertension With Diastolic Congestive Heart Failure, Nyha Class 3 (Hcc), Acute Diastolic Chf (Congestive Heart Failure) (Hcc), Atrial Fibrillation (Hcc), Uncontrolled Type Ii Diabetes Mellitus (Hcc) Providers Seen During Your Hospitalizations Provider Role Specialty Primary office phone Rosanne Umana MD Attending Provider Emergency Medicine [phone] Norm Milner MD Attending Provider Hospitalist [phone] Gertrudis Leal MD Attending Provider Internal Medicine [phone] Your Primary Care Physician (PCP) Primary Care Physician Office Phone Office Fax Tj Garcia [phone] Follow-up Information Follow up With Details Comments Contact Info Jass Costello MD Go on 4/7/2017 Your appointment is scheduled for 4/7/17 at 9:30am. [facility] [address] [employer] [id number] [phone] Talha Haines MD   [address] [place] [address] 
[phone] Colin Zelaya MD  Please follow-up as needed. [address] [facility] [address] [place] [address] [address] [phone] [employer]  Please contact regarding your home oxygen as needed. [address] [facility] [facility] [address] [place] [address] 
[phone] Your Appointments Monday March 27, 2017  2:30 PM EDT ROUTINE CARE with Leila Berrios MD  
[address] [facility] [address] [place] [address] [address]  
[phone] Wednesday May 10, 2017  1:00 PM EDT  
ESTABLISHED PATIENT with Claudette Saucer, MD  
[facility]) [address] [place] [address]  
[phone] Current Discharge Medication List  
  
CONTINUE these medications which have CHANGED Dose & Instructions Dispensing Information Comments Morning Noon Evening Bedtime  
 insulin lispro protamine/insulin lispro 100 unit/mL (75-25) injection Commonly known as:  HumaLOG Mix 75-25 What changed:  additional instructions Your last dose was: Your next dose is: Take 20U qam and 10U qpm  
 Quantity:  6 Vial  
Refills:  3  
     
   
   
   
  
 labetalol 200 mg tablet Commonly known as:  Grants Glow What changed:  Another medication with the same name was removed. Continue taking this medication, and follow the directions you see here. Your last dose was: Your next dose is:    
   
   
 Dose:  400 mg Take 400 mg by mouth three (3) times daily. Refills:  0  
     
   
   
   
  
 omeprazole 40 mg capsule Commonly known as:  PRILOSEC What changed:  Another medication with the same name was removed. Continue taking this medication, and follow the directions you see here. Your last dose was: Your next dose is:    
   
   
 Dose:  40 mg Take 40 mg by mouth daily. Refills:  0 CONTINUE these medications which have NOT CHANGED Dose & Instructions Dispensing Information Comments Morning Noon Evening Bedtime  
 albuterol 90 mcg/actuation inhaler Commonly known as:  PROAIR HFA Your last dose was: Your next dose is:    
   
   
 Dose:  2 Puff Take 2 Puffs by inhalation every four (4) hours as needed for Wheezing. Quantity:  1 Inhaler Refills:  11  
     
   
   
   
  
 amitriptyline 50 mg tablet Commonly known as:  ELAVIL Your last dose was: Your next dose is: TAKE ONE TABLET BY MOUTH EVERY EVENING Quantity:  30 Tab Refills:  11 amLODIPine 10 mg tablet Commonly known as:  Nishant Buttse Your last dose was: Your next dose is:    
   
   
 Dose:  10 mg Take 10 mg by mouth daily. Refills:  0  
     
   
   
   
  
 aspirin 81 mg chewable tablet Your last dose was: Your next dose is:    
   
   
 Dose:  81 mg Take 81 mg by mouth daily. Refills:  0  
     
   
   
   
  
 cloNIDine HCl 0.3 mg tablet Commonly known as:  CATAPRES Your last dose was: Your next dose is: TAKE ONE TABLET BY MOUTH TWICE DAILY Quantity:  60 Tab Refills:  5  
     
   
   
   
  
 furosemide 40 mg tablet Commonly known as:  LASIX Your last dose was: Your next dose is: TAKE ONE TABLET BY MOUTH TWICE DAILY FOR leg swelling Quantity:  60 Tab Refills:  11  
     
   
   
   
  
 glucose blood VI test strips strip Commonly known as:  TRUETEST TEST STRIPS Your last dose was: Your next dose is:    
   
   
 by Does Not Apply route See Admin Instructions. Quantity:  30 Strip Refills:  0 HumuLIN N 100 unit/mL injection Generic drug:  insulin NPH Your last dose was: Your next dose is:    
   
   
 22 units ac breakfast and dinner Refills:  0  
     
   
   
   
  
 hydrALAZINE 100 mg tablet Commonly known as:  APRESOLINE Your last dose was: Your next dose is: TAKE ONE TABLET BY MOUTH THREE TIMES DAILY Quantity:  90 Tab Refills:  11  
     
   
   
   
  
 oxyCODONE IR 30 mg immediate release tablet Commonly known as:  OXY-IR Your last dose was: Your next dose is:    
   
   
 Dose:  30 mg Take 30 mg by mouth two (2) times a day. Refills:  0  
     
   
   
   
  
 RENVELA 800 mg Tab tab Generic drug:  sevelamer carbonate Your last dose was: Your next dose is: TAKE ONE TABLET BY MOUTH THREE TIMES DAILY WITH MEALS Quantity:  90 Tab Refills:  11 Discharge Instructions Heart Failure: Care Instructions Your Care Instructions Heart failure occurs when your heart does not pump as much blood as the body needs. Failure does not mean that the heart has stopped pumping but rather that it is not pumping as well as it should. Over time, this causes fluid buildup in your lungs and other parts of your body. Fluid buildup can cause shortness of breath, fatigue, swollen ankles, and other problems. By taking medicines regularly, reducing sodium (salt) in your diet, checking your weight every day, and making lifestyle changes, you can feel better and live longer. Follow-up care is a key part of your treatment and safety. Be sure to make and go to all appointments, and call your doctor if you are having problems. It's also a good idea to know your test results and keep a list of the medicines you take. How can you care for yourself at home? Medicines · Be safe with medicines. Take your medicines exactly as prescribed. Call your doctor if you think you are having a problem with your medicine. · Do not take any vitamins, over-the-counter medicine, or herbal products without talking to your doctor first. Fozia Goldy not take ibuprofen (Advil or Motrin) and naproxen (Aleve) without talking to your doctor first. They could make your heart failure worse. · You may be taking some of the following medicine. ¨ Beta-blockers can slow heart rate, decrease blood pressure, and improve your condition. Taking a beta-blocker may lower your chance of needing to be hospitalized. ¨ Angiotensin-converting enzyme inhibitors (ACEIs) reduce the heart's workload, lower blood pressure, and reduce swelling. Taking an ACEI may lower your chance of needing to be hospitalized again. ¨ Angiotensin II receptor blockers (ARBs) work like ACEIs. Your doctor may prescribe them instead of ACEIs. ¨ Diuretics, also called water pills, reduce swelling. ¨ Potassium supplements replace this important mineral, which is sometimes lost with diuretics. ¨ Aspirin and other blood thinners prevent blood clots, which can cause a stroke or heart attack. You will get more details on the specific medicines your doctor prescribes. Diet · Your doctor may suggest that you limit sodium to 2,000 milligrams (mg) a day or less. That is less than 1 teaspoon of salt a day, including all the salt you eat in cooking or in packaged foods. People get most of their sodium from processed foods. Fast food and restaurant meals also tend to be very high in sodium. · Ask your doctor how much liquid you can drink each day. You may have to limit liquids. Weight · Weigh yourself without clothing at the same time each day. Record your weight. Call your doctor if you gain more than 3 pounds in 2 to 3 days. A sudden weight gain may mean that your heart failure is getting worse. Activity level · Start light exercise (if your doctor says it is okay). Even if you can only do a small amount, exercise will help you get stronger, have more energy, and manage your weight and your stress. Walking is an easy way to get exercise. Start out by walking a little more than you did before. Bit by bit, increase the amount you walk. · When you exercise, watch for signs that your heart is working too hard. You are pushing yourself too hard if you cannot talk while you are exercising. If you become short of breath or dizzy or have chest pain, stop, sit down, and rest. 
· If you feel \"wiped out\" the day after you exercise, walk slower or for a shorter distance until you can work up to a better pace. · Get enough rest at night. Sleeping with 1 or 2 pillows under your upper body and head may help you breathe easier. Lifestyle changes · Do not smoke. Smoking can make a heart condition worse. If you need help quitting, talk to your doctor about stop-smoking programs and medicines. These can increase your chances of quitting for good. Quitting smoking may be the most important step you can take to protect your heart. · Limit alcohol to 2 drinks a day for men and 1 drink a day for women. Too much alcohol can cause health problems. · Avoid getting sick from colds and the flu. Get a pneumococcal vaccine shot. If you have had one before, ask your doctor whether you need another dose. Get a flu shot each year. If you must be around people with colds or the flu, wash your hands often. When should you call for help? Call 911 if you have symptoms of sudden heart failure such as: 
· You have severe trouble breathing. · You cough up pink, foamy mucus. · You have a new irregular or rapid heartbeat. Call your doctor now or seek immediate medical care if: 
· You have new or increased shortness of breath. · You are dizzy or lightheaded, or you feel like you may faint. · You have sudden weight gain, such as 3 pounds or more in 2 to 3 days. · You have increased swelling in your legs, ankles, or feet. · You are suddenly so tired or weak that you cannot do your usual activities. Watch closely for changes in your health, and be sure to contact your doctor if: 
· You develop new symptoms. Where can you learn more? Go to http://amparo-ramon.info/. Enter B492 in the search box to learn more about \"Heart Failure: Care Instructions. \" Current as of: January 27, 2016 Content Version: 11.1 © 3686-5766 [employer]. Care instructions adapted under license by [employer] (which disclaims liability or warranty for this information). If you have questions about a medical condition or this instruction, always ask your healthcare professional. [address] any warranty or liability for your use of this information. DISCHARGE SUMMARY from Nurse The following personal items are in your possession at time of discharge: 
 
Dental Appliances: At bedside Visual Aid: Glasses Home Medications: None Jewelry: None Clothing: Pants Other Valuables: None PATIENT INSTRUCTIONS: 
 
 
F-face looks uneven A-arms unable to move or move unevenly S-speech slurred or non-existent T-time-call 911 as soon as signs and symptoms begin-DO NOT go Back to bed or wait to see if you get better-TIME IS BRAIN. Warning Signs of HEART ATTACK Call 911 if you have these symptoms: 
? Chest discomfort. Most heart attacks involve discomfort in the center of the chest that lasts more than a few minutes, or that goes away and comes back. It can feel like uncomfortable pressure, squeezing, fullness, or pain. ? Discomfort in other areas of the upper body. Symptoms can include pain or discomfort in one or both arms, the back, neck, jaw, or stomach. ? Shortness of breath with or without chest discomfort. ? Other signs may include breaking out in a cold sweat, nausea, or lightheadedness. Don't wait more than five minutes to call [address]! Fast action can save your life.  Calling 911 is almost always the fastest way to get lifesaving treatment. Emergency Medical Services staff can begin treatment when they arrive  up to an hour sooner than if someone gets to the hospital by car. The discharge information has been reviewed with the patient. The patient verbalized understanding. Discharge medications reviewed with the patient and appropriate educational materials and side effects teaching were provided. Discharge Instructions Attachments/References HEART FAILURE (ENGLISH) Discharge Orders None [employer] Announcement We are excited to announce that we are making your provider's discharge notes available to you in [employer]. You will see these notes when they are completed and signed by the physician that discharged you from your recent hospital stay. If you have any questions or concerns about any information you see in [employer], please call the Health Information Department where you were seen or reach out to your Primary Care Provider for more information about your plan of care. Introducing [facility] SERVICES! Pankaj Part introduces [employer] patient portal. Now you can access parts of your medical record, email your doctor's office, and request medication refills online. 1. In your internet browser, go to [email or web address] 2. Click on the First Time User? Click Here link in the Sign In box. You will see the New Member Sign Up page. 3. Enter your [employer] Access Code exactly as it appears below. You will not need to use this code after youve completed the sign-up process. If you do not sign up before the expiration date, you must request a new code. · [employer] Access Code: [email or web address] Expires: 6/23/2017 10:52 PM 
 
4. Enter the last four digits of your Social Security Number (xxxx) and Date of Birth (mm/dd/yyyy) as indicated and click Submit. You will be taken to the next sign-up page. 5. Create a [employer] ID. This will be your [employer] login ID and cannot be changed, so think of one that is secure and easy to remember. 6. Create a [employer] password. You can change your password at any time. 7. Enter your Password Reset Question and Answer. This can be used at a later time if you forget your password. 8. Enter your e-mail address. You will receive e-mail notification when new information is available in [address] 9. Click Sign Up. You can now view and download portions of your medical record. 10. Click the Download Summary menu link to download a portable copy of your medical information. If you have questions, please visit the Frequently Asked Questions section of the [employer] website. Remember, [employer] is NOT to be used for urgent needs. For medical emergencies, dial 911. Now available from your iPhone and Android! General Information Please provide this summary of care documentation to your next provider. Patient Signature:  ____________________________________________________________ Date:  ____________________________________________________________  
  
Bj Macedo Provider Signature:  ____________________________________________________________ Date:  ____________________________________________________________ More Information Heart Failure: Care Instructions Your Care Instructions Heart failure occurs when your heart does not pump as much blood as the body needs. Failure does not mean that the heart has stopped pumping but rather that it is not pumping as well as it should. Over time, this causes fluid buildup in your lungs and other parts of your body. Fluid buildup can cause shortness of breath, fatigue, swollen ankles, and other problems. By taking medicines regularly, reducing sodium (salt) in your diet, checking your weight every day, and making lifestyle changes, you can feel better and live longer. Follow-up care is a key part of your treatment and safety. Be sure to make and go to all appointments, and call your doctor if you are having problems. It's also a good idea to know your test results and keep a list of the medicines you take. How can you care for yourself at home? Medicines · Be safe with medicines. Take your medicines exactly as prescribed. Call your doctor if you think you are having a problem with your medicine. · Do not take any vitamins, over-the-counter medicine, or herbal products without talking to your doctor first. [place] not take ibuprofen (Advil or Motrin) and naproxen (Aleve) without talking to your doctor first. They could make your heart failure worse. · You may be taking some of the following medicine. ¨ Beta-blockers can slow heart rate, decrease blood pressure, and improve your condition. Taking a beta-blocker may lower your chance of needing to be hospitalized. ¨ Angiotensin-converting enzyme inhibitors (ACEIs) reduce the heart's workload, lower blood pressure, and reduce swelling. Taking an ACEI may lower your chance of needing to be hospitalized again. ¨ Angiotensin II receptor blockers (ARBs) work like ACEIs. Your doctor may prescribe them instead of ACEIs. ¨ Diuretics, also called water pills, reduce swelling. ¨ Potassium supplements replace this important mineral, which is sometimes lost with diuretics. ¨ Aspirin and other blood thinners prevent blood clots, which can cause a stroke or heart attack. You will get more details on the specific medicines your doctor prescribes. Diet · Your doctor may suggest that you limit sodium to 2,000 milligrams (mg) a day or less. That is less than 1 teaspoon of salt a day, including all the salt you eat in cooking or in packaged foods. People get most of their sodium from processed foods. Fast food and restaurant meals also tend to be very high in sodium. · Ask your doctor how much liquid you can drink each day.  You may have to limit liquids. Weight · Weigh yourself without clothing at the same time each day. Record your weight. Call your doctor if you gain more than 3 pounds in 2 to 3 days. A sudden weight gain may mean that your heart failure is getting worse. Activity level · Start light exercise (if your doctor says it is okay). Even if you can only do a small amount, exercise will help you get stronger, have more energy, and manage your weight and your stress. Walking is an easy way to get exercise. Start out by walking a little more than you did before. Bit by bit, increase the amount you walk. · When you exercise, watch for signs that your heart is working too hard. You are pushing yourself too hard if you cannot talk while you are exercising. If you become short of breath or dizzy or have chest pain, stop, sit down, and rest. 
· If you feel \"wiped out\" the day after you exercise, walk slower or for a shorter distance until you can work up to a better pace. · Get enough rest at night. Sleeping with 1 or 2 pillows under your upper body and head may help you breathe easier. Lifestyle changes · Do not smoke. Smoking can make a heart condition worse. If you need help quitting, talk to your doctor about stop-smoking programs and medicines. These can increase your chances of quitting for good. Quitting smoking may be the most important step you can take to protect your heart. · Limit alcohol to 2 drinks a day for men and 1 drink a day for women. Too much alcohol can cause health problems. · Avoid getting sick from colds and the flu. Get a pneumococcal vaccine shot. If you have had one before, ask your doctor whether you need another dose. Get a flu shot each year. If you must be around people with colds or the flu, wash your hands often. When should you call for help? Call 911 if you have symptoms of sudden heart failure such as: 
· You have severe trouble breathing. · You cough up pink, foamy mucus. · You have a new irregular or rapid heartbeat. Call your doctor now or seek immediate medical care if: 
· You have new or increased shortness of breath. · You are dizzy or lightheaded, or you feel like you may faint. · You have sudden weight gain, such as 3 pounds or more in 2 to 3 days. · You have increased swelling in your legs, ankles, or feet. · You are suddenly so tired or weak that you cannot do your usual activities. Watch closely for changes in your health, and be sure to contact your doctor if: 
· You develop new symptoms. Where can you learn more? Go to http://amparo-ramon.info/. Enter S775 in the search box to learn more about \"Heart Failure: Care Instructions. \" Current as of: January 27, 2016 Content Version: 11.1 © 3828-5430 [employer]. Care instructions adapted under license by [employer] (which disclaims liability or warranty for this information). If you have questions about a medical condition or this instruction, always ask your healthcare professional. [address] any warranty or liability for your use of this information.

## 2017-03-26 PROBLEM — I16.0 HYPERTENSIVE URGENCY: Status: ACTIVE | Noted: 2017-03-26

## 2017-03-26 PROBLEM — I50.9 CHF (CONGESTIVE HEART FAILURE) (HCC): Status: ACTIVE | Noted: 2017-03-26

## 2017-03-26 LAB
ANION GAP BLD CALC-SCNC: 10 MMOL/L (ref 5–15)
ANION GAP BLD CALC-SCNC: 9 MMOL/L (ref 5–15)
BASOPHILS # BLD AUTO: 0 K/UL (ref 0–0.1)
BASOPHILS # BLD: 0 % (ref 0–1)
BNP SERPL-MCNC: 341 PG/ML (ref 0–125)
BUN SERPL-MCNC: 41 MG/DL (ref 6–20)
BUN SERPL-MCNC: 42 MG/DL (ref 6–20)
BUN/CREAT SERPL: 14 (ref 12–20)
BUN/CREAT SERPL: 14 (ref 12–20)
CALCIUM SERPL-MCNC: 7.9 MG/DL (ref 8.5–10.1)
CALCIUM SERPL-MCNC: 8.3 MG/DL (ref 8.5–10.1)
CHLORIDE SERPL-SCNC: 107 MMOL/L (ref 97–108)
CHLORIDE SERPL-SCNC: 109 MMOL/L (ref 97–108)
CK MB CFR SERPL CALC: 0.3 % (ref 0–2.5)
CK MB CFR SERPL CALC: 0.4 % (ref 0–2.5)
CK MB SERPL-MCNC: 2.4 NG/ML (ref 5–25)
CK MB SERPL-MCNC: 3.8 NG/ML (ref 5–25)
CK SERPL-CCNC: 753 U/L (ref 39–308)
CK SERPL-CCNC: 900 U/L (ref 39–308)
CO2 SERPL-SCNC: 26 MMOL/L (ref 21–32)
CO2 SERPL-SCNC: 27 MMOL/L (ref 21–32)
CREAT SERPL-MCNC: 2.85 MG/DL (ref 0.7–1.3)
CREAT SERPL-MCNC: 3 MG/DL (ref 0.7–1.3)
DIFFERENTIAL METHOD BLD: ABNORMAL
EOSINOPHIL # BLD: 0.3 K/UL (ref 0–0.4)
EOSINOPHIL NFR BLD: 4 % (ref 0–7)
ERYTHROCYTE [DISTWIDTH] IN BLOOD BY AUTOMATED COUNT: 16.1 % (ref 11.5–14.5)
ERYTHROCYTE [DISTWIDTH] IN BLOOD BY AUTOMATED COUNT: 16.2 % (ref 11.5–14.5)
EST. AVERAGE GLUCOSE BLD GHB EST-MCNC: 309 MG/DL
GLUCOSE BLD STRIP.AUTO-MCNC: 111 MG/DL (ref 65–100)
GLUCOSE BLD STRIP.AUTO-MCNC: 153 MG/DL (ref 65–100)
GLUCOSE BLD STRIP.AUTO-MCNC: 280 MG/DL (ref 65–100)
GLUCOSE BLD STRIP.AUTO-MCNC: 319 MG/DL (ref 65–100)
GLUCOSE BLD STRIP.AUTO-MCNC: 376 MG/DL (ref 65–100)
GLUCOSE SERPL-MCNC: 241 MG/DL (ref 65–100)
GLUCOSE SERPL-MCNC: 327 MG/DL (ref 65–100)
HBA1C MFR BLD: 12.4 % (ref 4.2–6.3)
HCT VFR BLD AUTO: 31.7 % (ref 36.6–50.3)
HCT VFR BLD AUTO: 31.8 % (ref 36.6–50.3)
HGB BLD-MCNC: 10 G/DL (ref 12.1–17)
HGB BLD-MCNC: 9.9 G/DL (ref 12.1–17)
LYMPHOCYTES # BLD AUTO: 19 % (ref 12–49)
LYMPHOCYTES # BLD: 1.5 K/UL (ref 0.8–3.5)
MAGNESIUM SERPL-MCNC: 1.4 MG/DL (ref 1.6–2.4)
MCH RBC QN AUTO: 25.7 PG (ref 26–34)
MCH RBC QN AUTO: 26 PG (ref 26–34)
MCHC RBC AUTO-ENTMCNC: 31.1 G/DL (ref 30–36.5)
MCHC RBC AUTO-ENTMCNC: 31.5 G/DL (ref 30–36.5)
MCV RBC AUTO: 82.3 FL (ref 80–99)
MCV RBC AUTO: 82.6 FL (ref 80–99)
MONOCYTES # BLD: 0.5 K/UL (ref 0–1)
MONOCYTES NFR BLD AUTO: 7 % (ref 5–13)
NEUTS SEG # BLD: 5.5 K/UL (ref 1.8–8)
NEUTS SEG NFR BLD AUTO: 70 % (ref 32–75)
PLATELET # BLD AUTO: 197 K/UL (ref 150–400)
PLATELET # BLD AUTO: 205 K/UL (ref 150–400)
POTASSIUM SERPL-SCNC: 3.7 MMOL/L (ref 3.5–5.1)
POTASSIUM SERPL-SCNC: 4 MMOL/L (ref 3.5–5.1)
RBC # BLD AUTO: 3.85 M/UL (ref 4.1–5.7)
RBC # BLD AUTO: 3.85 M/UL (ref 4.1–5.7)
RBC MORPH BLD: ABNORMAL
SERVICE CMNT-IMP: ABNORMAL
SODIUM SERPL-SCNC: 143 MMOL/L (ref 136–145)
SODIUM SERPL-SCNC: 145 MMOL/L (ref 136–145)
TROPONIN I SERPL-MCNC: <0.04 NG/ML
TROPONIN I SERPL-MCNC: <0.04 NG/ML
WBC # BLD AUTO: 7.8 K/UL (ref 4.1–11.1)
WBC # BLD AUTO: 9.2 K/UL (ref 4.1–11.1)

## 2017-03-26 PROCEDURE — 83036 HEMOGLOBIN GLYCOSYLATED A1C: CPT | Performed by: STUDENT IN AN ORGANIZED HEALTH CARE EDUCATION/TRAINING PROGRAM

## 2017-03-26 PROCEDURE — 99218 HC RM OBSERVATION: CPT

## 2017-03-26 PROCEDURE — 74011000258 HC RX REV CODE- 258: Performed by: HOSPITALIST

## 2017-03-26 PROCEDURE — 74011000250 HC RX REV CODE- 250: Performed by: STUDENT IN AN ORGANIZED HEALTH CARE EDUCATION/TRAINING PROGRAM

## 2017-03-26 PROCEDURE — 74011250637 HC RX REV CODE- 250/637: Performed by: HOSPITALIST

## 2017-03-26 PROCEDURE — 74011250636 HC RX REV CODE- 250/636: Performed by: EMERGENCY MEDICINE

## 2017-03-26 PROCEDURE — 74011250636 HC RX REV CODE- 250/636: Performed by: HOSPITALIST

## 2017-03-26 PROCEDURE — 94640 AIRWAY INHALATION TREATMENT: CPT

## 2017-03-26 PROCEDURE — 82962 GLUCOSE BLOOD TEST: CPT

## 2017-03-26 PROCEDURE — 85027 COMPLETE CBC AUTOMATED: CPT | Performed by: STUDENT IN AN ORGANIZED HEALTH CARE EDUCATION/TRAINING PROGRAM

## 2017-03-26 PROCEDURE — 74011000250 HC RX REV CODE- 250: Performed by: HOSPITALIST

## 2017-03-26 PROCEDURE — 36415 COLL VENOUS BLD VENIPUNCTURE: CPT | Performed by: STUDENT IN AN ORGANIZED HEALTH CARE EDUCATION/TRAINING PROGRAM

## 2017-03-26 PROCEDURE — 80048 BASIC METABOLIC PNL TOTAL CA: CPT | Performed by: STUDENT IN AN ORGANIZED HEALTH CARE EDUCATION/TRAINING PROGRAM

## 2017-03-26 PROCEDURE — 96372 THER/PROPH/DIAG INJ SC/IM: CPT

## 2017-03-26 PROCEDURE — 96375 TX/PRO/DX INJ NEW DRUG ADDON: CPT

## 2017-03-26 PROCEDURE — 96376 TX/PRO/DX INJ SAME DRUG ADON: CPT

## 2017-03-26 PROCEDURE — 74011000258 HC RX REV CODE- 258: Performed by: STUDENT IN AN ORGANIZED HEALTH CARE EDUCATION/TRAINING PROGRAM

## 2017-03-26 PROCEDURE — 96366 THER/PROPH/DIAG IV INF ADDON: CPT

## 2017-03-26 PROCEDURE — 74011250637 HC RX REV CODE- 250/637: Performed by: STUDENT IN AN ORGANIZED HEALTH CARE EDUCATION/TRAINING PROGRAM

## 2017-03-26 PROCEDURE — 83735 ASSAY OF MAGNESIUM: CPT | Performed by: STUDENT IN AN ORGANIZED HEALTH CARE EDUCATION/TRAINING PROGRAM

## 2017-03-26 PROCEDURE — 96365 THER/PROPH/DIAG IV INF INIT: CPT

## 2017-03-26 PROCEDURE — 65660000001 HC RM ICU INTERMED STEPDOWN

## 2017-03-26 PROCEDURE — 74011250636 HC RX REV CODE- 250/636: Performed by: STUDENT IN AN ORGANIZED HEALTH CARE EDUCATION/TRAINING PROGRAM

## 2017-03-26 RX ORDER — FUROSEMIDE 10 MG/ML
80 INJECTION INTRAMUSCULAR; INTRAVENOUS
Status: COMPLETED | OUTPATIENT
Start: 2017-03-26 | End: 2017-03-26

## 2017-03-26 RX ORDER — GUAIFENESIN 100 MG/5ML
81 LIQUID (ML) ORAL DAILY
Status: DISCONTINUED | OUTPATIENT
Start: 2017-03-26 | End: 2017-03-27 | Stop reason: HOSPADM

## 2017-03-26 RX ORDER — ACETAMINOPHEN 325 MG/1
650 TABLET ORAL
Status: DISCONTINUED | OUTPATIENT
Start: 2017-03-26 | End: 2017-03-27 | Stop reason: HOSPADM

## 2017-03-26 RX ORDER — FUROSEMIDE 10 MG/ML
40 INJECTION INTRAMUSCULAR; INTRAVENOUS 2 TIMES DAILY
Status: DISCONTINUED | OUTPATIENT
Start: 2017-03-26 | End: 2017-03-27 | Stop reason: HOSPADM

## 2017-03-26 RX ORDER — HYDRALAZINE HYDROCHLORIDE 50 MG/1
100 TABLET, FILM COATED ORAL EVERY 8 HOURS
Status: DISCONTINUED | OUTPATIENT
Start: 2017-03-26 | End: 2017-03-27 | Stop reason: HOSPADM

## 2017-03-26 RX ORDER — DEXTROSE 50 % IN WATER (D50W) INTRAVENOUS SYRINGE
12.5-25 AS NEEDED
Status: DISCONTINUED | OUTPATIENT
Start: 2017-03-26 | End: 2017-03-27 | Stop reason: HOSPADM

## 2017-03-26 RX ORDER — INSULIN GLARGINE 100 [IU]/ML
15 INJECTION, SOLUTION SUBCUTANEOUS DAILY
Status: DISCONTINUED | OUTPATIENT
Start: 2017-03-26 | End: 2017-03-27 | Stop reason: HOSPADM

## 2017-03-26 RX ORDER — IPRATROPIUM BROMIDE AND ALBUTEROL SULFATE 2.5; .5 MG/3ML; MG/3ML
3 SOLUTION RESPIRATORY (INHALATION)
Status: DISCONTINUED | OUTPATIENT
Start: 2017-03-26 | End: 2017-03-27 | Stop reason: HOSPADM

## 2017-03-26 RX ORDER — OXYCODONE HYDROCHLORIDE 5 MG/1
30 TABLET ORAL 2 TIMES DAILY
Status: DISCONTINUED | OUTPATIENT
Start: 2017-03-26 | End: 2017-03-27 | Stop reason: HOSPADM

## 2017-03-26 RX ORDER — CLONIDINE HYDROCHLORIDE 0.1 MG/1
0.3 TABLET ORAL 2 TIMES DAILY
Status: DISCONTINUED | OUTPATIENT
Start: 2017-03-26 | End: 2017-03-27 | Stop reason: HOSPADM

## 2017-03-26 RX ORDER — HEPARIN SODIUM 5000 [USP'U]/ML
5000 INJECTION, SOLUTION INTRAVENOUS; SUBCUTANEOUS EVERY 8 HOURS
Status: DISCONTINUED | OUTPATIENT
Start: 2017-03-26 | End: 2017-03-27 | Stop reason: HOSPADM

## 2017-03-26 RX ORDER — SODIUM CHLORIDE 0.9 % (FLUSH) 0.9 %
5 SYRINGE (ML) INJECTION EVERY 8 HOURS
Status: DISCONTINUED | OUTPATIENT
Start: 2017-03-26 | End: 2017-03-27 | Stop reason: HOSPADM

## 2017-03-26 RX ORDER — LABETALOL 200 MG/1
400 TABLET, FILM COATED ORAL 3 TIMES DAILY
COMMUNITY
End: 2017-01-01

## 2017-03-26 RX ORDER — SODIUM CHLORIDE 0.9 % (FLUSH) 0.9 %
5 SYRINGE (ML) INJECTION AS NEEDED
Status: DISCONTINUED | OUTPATIENT
Start: 2017-03-26 | End: 2017-03-27 | Stop reason: HOSPADM

## 2017-03-26 RX ORDER — AMLODIPINE BESYLATE 5 MG/1
10 TABLET ORAL DAILY
Status: DISCONTINUED | OUTPATIENT
Start: 2017-03-26 | End: 2017-03-26 | Stop reason: ALTCHOICE

## 2017-03-26 RX ORDER — MAGNESIUM SULFATE 100 %
4 CRYSTALS MISCELLANEOUS AS NEEDED
Status: DISCONTINUED | OUTPATIENT
Start: 2017-03-26 | End: 2017-03-27 | Stop reason: HOSPADM

## 2017-03-26 RX ORDER — OMEPRAZOLE 40 MG/1
40 CAPSULE, DELAYED RELEASE ORAL DAILY
COMMUNITY
End: 2017-01-01 | Stop reason: SDUPTHER

## 2017-03-26 RX ADMIN — ASPIRIN 81 MG 81 MG: 81 TABLET ORAL at 08:06

## 2017-03-26 RX ADMIN — HYDRALAZINE HYDROCHLORIDE 100 MG: 50 TABLET ORAL at 22:39

## 2017-03-26 RX ADMIN — HYDRALAZINE HYDROCHLORIDE 100 MG: 50 TABLET ORAL at 14:51

## 2017-03-26 RX ADMIN — Medication 5 ML: at 22:40

## 2017-03-26 RX ADMIN — AMLODIPINE BESYLATE 10 MG: 5 TABLET ORAL at 08:06

## 2017-03-26 RX ADMIN — FUROSEMIDE 80 MG: 10 INJECTION, SOLUTION INTRAVENOUS at 01:52

## 2017-03-26 RX ADMIN — IPRATROPIUM BROMIDE AND ALBUTEROL SULFATE 3 ML: .5; 3 SOLUTION RESPIRATORY (INHALATION) at 07:58

## 2017-03-26 RX ADMIN — INSULIN HUMAN 15 UNITS: 100 INJECTION, SOLUTION PARENTERAL at 17:11

## 2017-03-26 RX ADMIN — HYDRALAZINE HYDROCHLORIDE 100 MG: 50 TABLET ORAL at 06:00

## 2017-03-26 RX ADMIN — IPRATROPIUM BROMIDE AND ALBUTEROL SULFATE 3 ML: .5; 3 SOLUTION RESPIRATORY (INHALATION) at 14:39

## 2017-03-26 RX ADMIN — LABETALOL HYDROCHLORIDE 300 MG: 200 TABLET, FILM COATED ORAL at 21:15

## 2017-03-26 RX ADMIN — INSULIN GLARGINE 15 UNITS: 100 INJECTION, SOLUTION SUBCUTANEOUS at 10:16

## 2017-03-26 RX ADMIN — IPRATROPIUM BROMIDE AND ALBUTEROL SULFATE 3 ML: .5; 3 SOLUTION RESPIRATORY (INHALATION) at 20:19

## 2017-03-26 RX ADMIN — LABETALOL HYDROCHLORIDE 300 MG: 200 TABLET, FILM COATED ORAL at 08:06

## 2017-03-26 RX ADMIN — HEPARIN SODIUM 5000 UNITS: 5000 INJECTION INTRAVENOUS; SUBCUTANEOUS at 12:43

## 2017-03-26 RX ADMIN — OXYCODONE HYDROCHLORIDE 30 MG: 5 TABLET ORAL at 21:16

## 2017-03-26 RX ADMIN — SODIUM CHLORIDE 1 MG/HR: 900 INJECTION, SOLUTION INTRAVENOUS at 13:02

## 2017-03-26 RX ADMIN — LABETALOL HYDROCHLORIDE 300 MG: 200 TABLET, FILM COATED ORAL at 12:43

## 2017-03-26 RX ADMIN — PROMETHAZINE HYDROCHLORIDE 25 MG: 25 INJECTION INTRAMUSCULAR; INTRAVENOUS at 23:59

## 2017-03-26 RX ADMIN — CLONIDINE HYDROCHLORIDE 0.3 MG: 0.1 TABLET ORAL at 08:06

## 2017-03-26 RX ADMIN — HUMAN INSULIN 5 UNITS: 100 INJECTION, SOLUTION SUBCUTANEOUS at 08:06

## 2017-03-26 RX ADMIN — FUROSEMIDE 40 MG: 10 INJECTION, SOLUTION INTRAVENOUS at 17:18

## 2017-03-26 RX ADMIN — HEPARIN SODIUM 5000 UNITS: 5000 INJECTION INTRAVENOUS; SUBCUTANEOUS at 19:48

## 2017-03-26 RX ADMIN — CLONIDINE HYDROCHLORIDE 0.3 MG: 0.1 TABLET ORAL at 18:13

## 2017-03-26 RX ADMIN — FUROSEMIDE 40 MG: 10 INJECTION, SOLUTION INTRAVENOUS at 08:07

## 2017-03-26 RX ADMIN — HUMAN INSULIN 7 UNITS: 100 INJECTION, SOLUTION SUBCUTANEOUS at 12:44

## 2017-03-26 NOTE — ED TRIAGE NOTES
Emergency Department Nursing Plan of Care       The Nursing Plan of Care is developed from the Nursing assessment and Emergency Department Attending provider initial evaluation. The plan of care may be reviewed in the ED Provider note.     The Plan of Care was developed with the following considerations:   Patient / Family readiness to learn indicated by:verbalized understanding  Persons(s) to be included in education: patient  Barriers to Learning/Limitations:No    Signed     Kaylene Longoria RN    3/25/2017   10:58 PM

## 2017-03-26 NOTE — BH NOTES
Stacie Whitney TRANSFER - OUT REPORT:    Verbal report given to Ned RN(name) on Kimberlee Montoya  being transferred to PCU (unit) for change in patient condition(elevated BP)       Report consisted of patients Situation, Background, Assessment and   Recommendations(SBAR). Information from the following report(s) SBAR was reviewed with the receiving nurse. Lines:   Peripheral IV 03/26/17 Left Forearm (Active)   Site Assessment Clean, dry, & intact 3/26/2017  9:00 AM   Phlebitis Assessment 0 3/26/2017  9:00 AM   Infiltration Assessment 0 3/26/2017  9:00 AM   Dressing Status Clean, dry, & intact 3/26/2017  9:00 AM   Dressing Type Transparent 3/26/2017  9:00 AM   Hub Color/Line Status Capped;Flushed 3/26/2017  9:00 AM   Alcohol Cap Used Yes 3/26/2017  9:00 AM        Opportunity for questions and clarification was provided.       Patient transported with:   Registered Nurse

## 2017-03-26 NOTE — PROGRESS NOTES
TRANSFER - IN REPORT:    Verbal report received from [facility] on [address]  being received from [facility] for routine progression of care      Report consisted of patients Situation, Background, Assessment and   Recommendations(SBAR). Information from the following report(s) SBAR, Kardex, Procedure Summary, Intake/Output, MAR, Med Rec Status and Cardiac Rhythm Sinus tach was reviewed with the receiving nurse. Opportunity for questions and clarification was provided. Assessment completed upon patients arrival to unit and care assumed. Pt remains on the contact isolation. Pt received awake alert lying in the bed. Pt breaths well on 2L nc. abd obese but soft. See doc flow sheet for detail assements. 1200 /78, spoke with , Nicardipine on hold for now, will keep checking his pressure q1 hrs and SBP > 160 will be reported to .     1302  New / 78 reported to , nicardipine  drip started low dose, since pt is on po meds for his bp control. 1430 pt awake, denies any discomfort. 1500  at the bed side. He wanted pt to be off of nicardipine if possible. He cancelled the echocardiogram.    1600 pt awake alert denies discomfort. bp 137/98, Nicardipine off at this time. 1630 Bs of 376 reported to , order for 15 units IVP carried on the chart and recheck bs after 2 hours. 1835 Pt requesting pain meds at this time. Spoke with Maria Victoria, [facility] confirmed home dose of pain meds. This was communicated to , he will review chart and enter orders. 1836 , pt awake and alert, Denies any discomfort. 1900 Bedside and Verbal shift change report given to Gianna (oncoming nurse) by flaco (offgoing nurse). Report given with SBAR, Kardex, ED Summary, OR Summary, Procedure Summary, Intake/Output, MAR, Accordion and Recent Results.

## 2017-03-26 NOTE — PROGRESS NOTES
Problem: Nutrition Deficit  Goal: *Adequate nutrition  Initial Nutrition Assessment:      INTERVENTIONS/RECOMMENDATIONS:   ·  2200 kcal consistent carbohydrate diet  ·  Glucerna on dinner trays  · Supplements: Commercial supplement  ·  **Nurses measured pt's ht without protheses: 5'2\"      ASSESSMENT:   Pt known to me from previous admissions. Admitted again for SOB after not taking meds, seeing sad movie and being upset about the death of his mother. A little more pleasant today, still states \"Honey, I know what to do, really,\"  When assistance is offered with diet and glycemic control. a1c this admission is 12.4, was 8.1 last admission. Was moved to PCU bed 2' elevated BP but has not required labetalol drip since transfer. Kitchen has been advised to only send foods allowed on his diet order, and to page me should problems arise. Diet Order: Consistent carb  % Eaten:  No data found. Pertinent Medications: [X]Reviewed [ ]Other  Pertinent Labs: [X]Reviewed [ Krystin Rodríguez Allergies: [X]None [ ]Other   Last BM:           [X]Active     [ ]Hyperactive  [ ]Hypoactive       [ ] Absent BS  Skin:    [X] Intact          [ ] Incision        [ ] Breakdown             [ ] Other:     Anthropometrics:   Height: 6' 2\" (188 cm) (with prostheses, pt is bilat BKA)      Weight: 151.5 kg (334 lb)        IBW (%IBW): 64.4 kg (142 lb) (body wt for bilat BKA) (235.21 %)   UBW (%UBW):   (  %)   Last Weight Metrics:  Weight Loss Metrics 3/26/2017 2/10/2017 1/11/2017 12/30/2016 12/27/2016 12/20/2016 12/14/2016   Today's Wt 334 lb 337 lb 332 lb 325 lb 322 lb 8 oz 343 lb 4.8 oz 332 lb 14.4 oz   BMI 42.88 kg/m2 43.27 kg/m2 42.63 kg/m2 41.73 kg/m2 41.41 kg/m2 44.08 kg/m2 42.74 kg/m2        BMI: Body mass index is 42.88 kg/(m^2).         This BMI is indicative of:   [ Brittany Lunger    [ ]Normal    [ Aurora Piggs    [ ] Obesity   [X] Extreme Obesity (BMI>40)      Estimated Nutrition Needs (Based on):   2409 Kcals/day (2320 + 1.1 AF) , 90.3 g (. 6/kg) Protein  Carbohydrate: At Least 130 g/day  Fluids: 1680 mL/day (1ml/kcal)     Pt expected to meet estimated nutrient needs: [X]Yes [ ]No      NUTRITION DIAGNOSES:   Problem:  Limited adherence to nutrition-related recommendations      Etiology: related to poor diet, poor compliance, poor glycemic control     Signs/Symptoms: as evidenced by BMI, A1c, bilat BKAs,        NUTRITION INTERVENTIONS:      Supplements: Commercial supplement              GOAL:   po intake > 75%, optimize glycemic control      LEARNING NEEDS (Diet, Food/Nutrient-Drug Interaction):    [ ] None Identified   [ ] Identified and Education Provided/Documented   [X] Identified and Pt declined/was not appropriate     Cultureal, Zoroastrian, OR Ethnic Dietary Needs:    [X] None Identified   [ ] Identified and Addressed      [X] Interdisciplinary Care Plan Reviewed/Documented    [X] Discharge Planning:         MONITORING /EVALUATION:   Behavioral-Environmental Outcomes: Behavior, Readiness to change  Food/Nutrient Intake Outcomes:  Total energy intake, Carbohydrate intake  Physical Signs/Symptoms Outcomes: Lean body mass, fat free mass, Electrolyte and renal profile, Glucose profile, Acid-base balance, CV-pulmonary      NUTRITION RISK:    [ ] High              [X] Moderate           [ ]  Low             [ ]  Minimal/Uncompromised      PT SEEN FOR:    [ ]  MD Consult:          [ Inocente Koch                                       [ ]Diabetic Diet Education                                                                     [ ]Diet Education                                      [ ]Electrolyte Management                                      [X]General Nutrition Management and Supplements                                      [ ]Management of Tube Feeding                                      [ ]TPN Recommendations    [ ]  RN Referral:          [ ]MST score >=2                                      [ ]Enteral/Parenteral Nutrition PTA [ ]Pregnant: Gestational DM or Multigestation                                      [ ]Pressure Ulcer/Wound Care needs                                         [ ]  Low BMI  [ ]  DTR Referral         Debbie Berkowitz, PhD, [address], [address] [place] [address]   Pager [phone]

## 2017-03-26 NOTE — PROGRESS NOTES
Pharmacy Medication Reconciliation for Prior to Admission Med List    Recommendations/Findings:   1) With this patient's history of medication non-compliance, PTA list below should be regarded with caution as to when and how often doses were actually taken. 2) Med list below includes all medications filled with [facility] all in the last month. 3) ADJUSTMENTS: Patient stated that although his prescription for one of his insulins, HUMALOG MIX 75/25, instructs to inject 20 units every morning and 10 units every evening, he injects 30 units every morning and 10 units every evening.    -Clarified PTA med list with RX Query, patient interview. PTA medication list was corrected to the following:     Prior to Admission Medications   Prescriptions Last Dose Last filled  Taking? HUMULIN N 100 unit/mL injection 3/24/2017 at Unknown time 3/17/17  Yes   Si units ac breakfast and dinner   RENVELA 800 mg tab tab 3/24/2017 at Unknown time 3/17/17  Yes   Sig: TAKE ONE TABLET BY MOUTH THREE TIMES DAILY WITH MEALS   albuterol (PROAIR HFA) 90 mcg/actuation inhaler 3/24/2017 at Unknown time 3/17/17  Yes   Sig: Take 2 Puffs by inhalation every four (4) hours as needed for Wheezing. amLODIPine (NORVASC) 10 mg tablet 3/24/2017 at Unknown time 3/3/17  Yes   Sig: Take 10 mg by mouth daily. amitriptyline (ELAVIL) 50 mg tablet 3/24/2017 at Unknown time 3/17/17  Yes   Sig: TAKE ONE TABLET BY MOUTH EVERY EVENING   aspirin 81 mg chewable tablet 3/24/2017 at Unknown time 3/3/17  Yes   Sig: Take 81 mg by mouth daily.              cloNIDine HCl (CATAPRES) 0.3 mg tablet 3/24/2017 at Unknown time 3/17/17  Yes   Sig: TAKE ONE TABLET BY MOUTH TWICE DAILY             furosemide (LASIX) 40 mg tablet 3/24/2017 at Unknown time 3/22/17  Yes   Sig: TAKE ONE TABLET BY MOUTH TWICE DAILY FOR leg swelling   glucose blood VI test strips (TRUETEST TEST STRIPS) strip 3/25/2017 at Unknown time   Yes   Sig: by Does Not Apply route See Admin Instructions. hydrALAZINE (APRESOLINE) 100 mg tablet 3/24/2017 at Unknown time 3/17/17  Yes   Sig: TAKE ONE TABLET BY MOUTH THREE TIMES DAILY   insulin lispro protamine/insulin lispro (HUMALOG MIX 75-25) 100 unit/mL (75-25) injection 3/25/2017 at Unknown time 3/25/17  Yes   Sig: Take 20U qam and 10U qpm  Patient takes differently: 30 units qam and 10 units qpm   labetalol (NORMODYNE) 200 mg tablet 3/24/17 at Unknown time 3/17/17  Yes   Sig: Take 400 mg by mouth three (3) times daily. omeprazole (PRILOSEC) 40 mg capsule 3/24/17 at Unknown time 3/17/17  Yes   Sig: Take 40 mg by mouth daily. oxyCODONE IR (OXY-IR) 30 mg immediate release tablet 3/24/2017 at Unknown time 3/23/17  Yes   Sig: Take 30 mg by mouth two (2) times a day.            Thank you,  Wong Vilchis, [facility]

## 2017-03-26 NOTE — PROGRESS NOTES
Verbal Bedside shift change report given to me (oncoming nurse) by Ramses Robbins RN (offgoing nurse). Report included the following information SBAR, Kardex, ED Summary, Intake/Output, MAR, Recent Results, Med Rec Status and Cardiac Rhythm sinus rhythm. pt received dangle in bed working on his lab top no respiratory distress noted. He state that he is feeling better. Continue to monitor closely. 2338 pt vomited about 150 ml of clear liquid Dr Joanna Fields notified via tiger text new order received see MAR.  0700 Verbal  Bedside shift change report given to [address] (oncoming nurse) by me (offgoing nurse). Report included the following information SBAR, Kardex, ED Summary, Intake/Output, MAR, Recent Results, Med Rec Status and Cardiac Rhythm sinus rhythm. no further episode of N/V.    Visit Vitals    /77    Pulse 65    Temp 98.2 °F (36.8 °C)    Resp 13    Ht 4' 4\" (1.321 m)  Comment: head to right leg amputation    Wt 151.3 kg (333 lb 8.9 oz)    SpO2 96%    BMI 86.73 kg/m2

## 2017-03-26 NOTE — PROGRESS NOTES
Care manager reviewed chart. RRAT: 13.    The patient is a 36year old male who presented to ED with SOB on exertion. History of CHF, HTN, DM, chronic kidney failure. He is on home oxygen (provider unknown). He has [employer] and is seen by Dr. Juan Laboy for primary care. Per chart review the patient is active with his PCP and is also connected to nurse navigator. Palliative consult has been placed by hospitalist. Care management will follow for discharge planning. A candidate for [facility] depending on discharge disposition. Care Management Interventions  PCP Verified by CM:  Yes ([facility])  Transition of Care Consult (CM Consult): Discharge [facility]  [phone]

## 2017-03-26 NOTE — CONSULTS
Cardiology consultation:    I was asked by Dr. Heidi Cruz and then by Dr. Hayder Saavedra to assess this 36year old male who is well known to our service from prior admissions. He is a diabetic with early onset PAD and prior bilateral leg amputations. He has significant renal insufficiency with a tendency toward fluid/salt overload. He is also very inconsistent about compliance with his multidrug hypertensive regimen. He came to the ED with unaccustomed exertional dyspnea and was found to have hypertensive urgency. BP is partially controlled at present on IV nicardipine with restart of his oral regimen. He is anxious to get out of the hospital for his mother's  which is tomorrow. He states he feels much better than when he was admitted last night. He denies chest pain and he is comfortable supine at present. On exam, he is sleeping supine with no JVD. Both prostheses are in place and he is mostly dressed in bed. BP is 171/69. Lungs are clear with no audible wheeze. Cardiac auscultation is unremarkable. Labs show elevated CK without MB, NT pro BNP is marginal at 341, Hgb A1C is 12. 4! Troponins are normal. Hgb is 9.9 with moderate hypochromia. WBC is normal. Last urine analysis was in 2016, showing >300 mg% protein with positive urine myoglobin. At that time CK was around 600 every reading, suggesting chronic rhabdomyolysis. 12 lead EKG is entirely normal. His last echo showed concentric LVH beyond what is indicated on EKG. He has LVH with increased myocardial mass and endocardial fibrosis, doubtless from his numerous hypertensive urgencies in the past. He has significant diastolic impairment which probably leads to fixed cardiac output syndrome  When he is fluid overloaded.      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

## 2017-03-26 NOTE — ED NOTES
Pt presents ambulatory to ED complaining of SOB with extertion. Pt reports walking at the movie theater and feeling short of breath. Pt reports he did not have his portable oxygen because he left it in the car. Pt reports on arrival that he feel better, pt placed on NC 2L. Pt is alert and oriented x 4, RR even and unlabored, skin is warm and dry. Assesment completed and pt updated on plan of care.

## 2017-03-26 NOTE — PROGRESS NOTES
Problem: Falls - Risk of  Goal: *Absence of falls  In Progress. Goal: *Knowledge of fall prevention  In Progress. Problem: Patient Education: Go to Patient Education Activity  Goal: Patient/Family Education  In Progress. Problem: Diabetes Self-Management  Goal: *Disease process and treatment process  Define diabetes and identify own type of diabetes; list 3 options for treating diabetes. In Progress. Goal: *Incorporating nutritional management into lifestyle  Describe effect of type, amount and timing of food on blood glucose; list 3 methods for planning meals. In Progress. Goal: *Incorporating physical activity into lifestyle  In Progress. Goal: *Developing strategies to promote health/change behavior  In Progress. Goal: *Using medications safely  State effect of diabetes medications on diabetes; name diabetes medication taking, action and side effects. In Progress. Goal: *Monitoring blood glucose, interpreting and using results  Identify recommended blood glucose targets and personal targets. In Progress. Goal: *Prevention, detection, treatment of acute complications  List symptoms of hyper- and hypoglycemia; describe how to treat low blood sugar and actions for lowering high blood glucose level. In Progress. Goal: *Prevention, detection and treatment of chronic complications  Define the natural course of diabetes and describe the relationship of blood glucose levels to long term complications of diabetes. In Progress. Goal: *Developing strategies to address psychosocial issues  Describe feelings about living with diabetes; identify support needed and support network   In Progress. Goal: *Insulin pump training  In Progress. Goal: *Patient Specific Goal (EDIT GOAL, INSERT TEXT)  In Progress. Problem: Patient Education: Go to Patient Education Activity  Goal: Patient/Family Education  In Progress.     Problem: Patient Education: Go to Patient Education Activity  Goal: Patient/Family Education  In Progress. Problem: Heart Failure: Day 1  Goal: Activity/Safety  In Progress. Goal: Consults, if ordered  In Progress. Goal: Diagnostic Test/Procedures  In Progress. Goal: Nutrition/Diet  In Progress. Goal: Discharge Planning  In Progress. Goal: Medications  In Progress. Goal: Respiratory  In Progress. Goal: Treatments/Interventions/Procedures  In Progress. Goal: Psychosocial  In Progress. Goal: *Oxygen saturation within defined limits  In Progress. Goal: *Hemodynamically stable  Pt on nicardipine drip  Goal: *Optimal pain control at patients stated goal  In Progress. Goal: *Anxiety reduced or absent  In Progress.

## 2017-03-26 NOTE — ED NOTES
TRANSFER - OUT REPORT:    Verbal report given to May Mendez RN(name) on Brian Organ  being transferred to Telemetry(unit) for routine progression of care       Report consisted of patients Situation, Background, Assessment and   Recommendations(SBAR). Information from the following report(s) SBAR, ED Summary, Intake/Output, MAR, Recent Results and Cardiac Rhythm NSR was reviewed with the receiving nurse. Lines:   Peripheral IV 03/26/17 Left Forearm (Active)   Site Assessment Clean, dry, & intact 3/26/2017  1:48 AM   Phlebitis Assessment 0 3/26/2017  1:48 AM   Infiltration Assessment 0 3/26/2017  1:48 AM   Dressing Status Clean, dry, & intact 3/26/2017  1:48 AM   Dressing Type Transparent 3/26/2017  1:48 AM   Hub Color/Line Status Flushed 3/26/2017  1:48 AM        Opportunity for questions and clarification was provided.       Patient transported with:   Monitor

## 2017-03-26 NOTE — H&P
Pt Name  Cole Loja   Date of Birth 1975   Medical Record Number  [id number]      Age  36 y.o. PCP Kiran Hernandez MD   Admit date:  3/25/2017    Room Number  PCU3/01  @ [facility]   Date of Service  3/27/2017    Chart reviewed   Pt seen and examined       Admission Diagnoses:  Hypertensive urgency     We are admitting Cole Loja 36 y.o. male with a principle diagnosis of Hypertensive urgency; this patient also suffers from other comorbidities listed below. I have a high level of concern for  leading to life threatening complications      Assessment and plan:    HTN  Urgency  -presented with SOB, SBP above 200, no improvement with oral meds  -required transfer to the PCU for initiation of labetalol drip, to keep - 160    Acute on Chronic Diastolic CHF sec to   -Consult Cardiology/ ECHO/ Lasix diuresis/ Strict I/O/ Daily weights  -BB, (no ace- i/arb b/c CKD)    PAF: Prev on Elequis, will defer this to his PCP  Prev On Home O2: seems no longer needed  [facility] DM Type 2: SSI/accuchecks/ Aic/ required Lantus for persistently elevated BG  S/p Bilat BKA  Morbid Obesity  Psychosocial: Mother just passed away, Hx of Oppositional Behaviour , Non compliance      ED: [facility] 8/ Hb 10/   / / Cr 3.0  CXR Poss Pulm edema    Principal Problem:    Hypertensive urgency (3/26/2017)    Active Problems:    Uncontrolled type II diabetes mellitus ([address] (2/16/2013)      Acute diastolic CHF (congestive heart failure) ([facility] [address] (3/2/2016)      Atrial fibrillation ([facility] [address] (3/2/2016)      Accelerated hypertension with diastolic congestive heart failure, NYHA class 3 ([facility] [address] (11/29/2016)        Body mass index is 86.73 kg/(m^2). Functional Status  poor   CODE STATUS  Full   Surrogate decision maker: Leora Jackson.     Prophylaxis  Hep SQ   Discharge Plan: Home w/Family,    Contact Payor: BLUE CROSS / Plan: Sara Mathews / Product Type: HMO /    Social issues  Date Comment           Prognosis PATIENT ADMITTED OVERNIGHT BY THE TELEHOSPITALIST    PC:\"The same thing I had trouble breathing\"    History of Present Illness :  Pt is a known diabetic hypertensive, diastolic CHF . Mr. Yaniv Rubalcava said he has been under a lot of psychological stress as his mother  this week. Yesterday  he'd been fine but one hour after seeing a sad moviehe developed SOB lasting approximately 10 minutes and decided to come to hospital because of this. Denies CP, minimal frothy sputum. He was placed on home oxygen in November of last year but by December he had been saturated 95 to 97% on room air at his PCPs office and HOT was stopped. No respiratory issues since. He does claim medication and diet compliance and BP was 140/70 just Friday at his PCPs office. Has been on Eliquis on and off. This was stopped at [facility] doctors at one point, resumed and was then stopped in 2016 by is PCP because of anemia. He does see Dr Charla Barthel, and in the past has gotten EPO shots, but it seems that it has been some time since he last received it. In the ED, he was noted to have SBPs 200 and was admitted for further management, because this was still not controlled on the sanchez, he was moved to the PCU and started on Nitroprusside drip    He would like his new Medical  power of  to be his Brother Michael Atkins.       Past Medical History:   Diagnosis Date    Arrhythmia     afib    Asthma     3/2013 last attack    CKD (chronic kidney disease), stage III 2013    Nephrology: Laurence Elias MD    Diabetes [facility])     Gout     Heart failure [facility] [address]     Hyperlipidemia     Hypertension     Ill-defined condition     Positive PPD, treated     treated 9 months       Past Surgical History:   Procedure Laterality Date    HX BELOW KNEE AMPUTATION Left     due to osteomyelitis    HX HEENT      tonsilectomy    HX HERNIA REPAIR      HX TONSILLECTOMY        Allergies   Allergen Reactions    Ace Inhibitors Cough Social History     Social History    Marital status:      Spouse name: N/A    Number of children: N/A    Years of education: N/A     Occupational History    Not on file. Social History Main Topics    Smoking status: Never Smoker    Smokeless tobacco: Never Used    Alcohol use No    Drug use: No    Sexual activity: Not Currently     Partners: Female     Other Topics Concern    Not on file     Social History Narrative         Per conversation 03/26/17    He would like his new Medical  power of  to be his Brother Michael Atkins. (Previously was his Mother Luz Maria Fuller)        Never smoker     Social History   Substance Use Topics    Smoking status: Never Smoker    Smokeless tobacco: Never Used    Alcohol use No       Family History   Problem Relation Age of Onset    Diabetes Mother     Hypertension Mother     Hypertension Brother     Diabetes Brother     Diabetes Maternal Grandmother     Hypertension Maternal Grandmother     Diabetes Other     Hypertension Other     Heart Disease Other        Review of Systems:  (negative unless bold)    Gen:  Weight gain, weight loss, fever, chills, fatigue  Eyes:  Visual changes, pain, conjunctivitis  ENT:  Sore throat, rhinorrhea, decreased hearing  CVS:  Palpitations, chest pain, dizziness, syncope, edema, PND  Pulm:  Per HPI  GI:  Abdominal pain, nausea, emesis, diarrhea, constipation, GERD, melena  :  Hematuria, incontinence, nocturia, dysuria, discharge  MS:  Pain, weakness, swelling, arthritis  Skin:  Rash, erythema, abscess, wound, moles  Psych:   Insomnia, depression, anxiety, crying, suicidal ideation  Endo:  Heat intolerance, cold intolerance, weight gain, polyuria  Hem:  Enlarged nodes, bruising, bleeding, night sweats  Renal:  Edema, change in urine, flank pain  Neuro:  Numbness, tingling, weakness, seizure, headache, tremors          Physical Exam:    Gen:Morbidly Obese, on O2, sad disposition  HEENT:  Pink conjunctivae, PERRL, hearing intact to voice, moist mucous membranes  Neck:  Supple, without masses, thyroid non-tender  Resp:  No accessory muscle use, clear breath sounds without wheezes rales or rhonchi  Card:  No murmurs, normal S1, S2 without thrills, bruits or peripheral edema  Abd:  Soft, non-tender, non-distended, normoactive bowel sounds are present, no palpable organomegaly  Lymph:  No cervical adenopathy  Musc:  No cyanosis or clubbing  Skin:  No rashes or ulcers, skin turgor is good  Neuro:  Cranial nerves 3-12 are grossly intact,  strength is 5/5 bilaterally, dorsi / plantarflexion strength is 5/5 bilaterally, follows commands appropriately  Psych:  Alert with good insight. Oriented to person, place, and time      Home Meds     Prior to Admission medications    Medication Sig Start Date End Date Taking? Authorizing Provider   omeprazole (PRILOSEC) 40 mg capsule Take 40 mg by mouth daily. Yes Historical Provider   labetalol (NORMODYNE) 200 mg tablet Take 400 mg by mouth three (3) times daily. Yes Historical Provider   insulin lispro protamine/insulin lispro (HUMALOG MIX 75-25) 100 unit/mL (75-25) injection Take 20U qam and 10U qpm  Patient taking differently: Take 30 units qam and 10 units qpm 3/17/17  Yes Radha Rincon MD   amLODIPine (NORVASC) 10 mg tablet Take 10 mg by mouth daily. 12/26/16  Yes Historical Provider   HUMULIN N 100 unit/mL injection 22 units ac breakfast and dinner 12/26/16  Yes Historical Provider   albuterol (PROAIR HFA) 90 mcg/actuation inhaler Take 2 Puffs by inhalation every four (4) hours as needed for Wheezing. 12/21/16  Yes Radha Rincon MD   glucose blood VI test strips (TRUETEST TEST STRIPS) strip by Does Not Apply route See Admin Instructions. 12/20/16  Yes Brea Saravia MD   aspirin 81 mg chewable tablet Take 81 mg by mouth daily.    Yes Historical Provider   cloNIDine HCl (CATAPRES) 0.3 mg tablet TAKE ONE TABLET BY MOUTH TWICE DAILY 9/9/16  Yes MD RICHARDSON Iyer mg tab tab TAKE ONE TABLET BY MOUTH THREE TIMES DAILY WITH MEALS 8/14/16  Yes Simran Trujillo MD   furosemide (LASIX) 40 mg tablet TAKE ONE TABLET BY MOUTH TWICE DAILY FOR leg swelling 8/14/16  Yes Simran Trujillo MD   hydrALAZINE (APRESOLINE) 100 mg tablet TAKE ONE TABLET BY MOUTH THREE TIMES DAILY 8/14/16  Yes Simran Trujillo MD   oxyCODONE IR (OXY-IR) 30 mg immediate release tablet Take 30 mg by mouth two (2) times a day. 6/3/16  Yes Historical Provider   amitriptyline (ELAVIL) 50 mg tablet TAKE ONE TABLET BY MOUTH EVERY EVENING 6/24/16  Yes Simran Trujillo MD       Relevant other informations: Other medical conditions listed in this following active hospital problem list section; all of these and other pertinent data were taken into consideration when treatment plan is developed and customized to this patient's unique overall circumstances and needs.    Patient Active Problem List    Diagnosis Date Noted    CHF (congestive heart failure) ([facility] [address] 03/26/2017    Hypertensive urgency 03/26/2017    Long term current use of anticoagulant 12/19/2016    CKD (chronic kidney disease) stage 3, GFR 30-59 ml/min 12/18/2016    Accelerated hypertension with diastolic congestive heart failure, NYHA class 3 ([address] 11/29/2016    Pulmonary edema 11/07/2016    Heart failure ([address] 11/07/2016    Diabetic foot ulcer ([address] 03/15/2016    Septic arthritis of foot ([address] 03/05/2016    Osteomyelitis ([address] 03/04/2016    Acute diastolic CHF (congestive heart failure) ([address] 03/02/2016    Pneumonia 03/02/2016    Cellulitis and abscess of leg 03/02/2016    Atrial fibrillation ([address] 03/02/2016    HTN (hypertension) 03/02/2016    Diabetic foot ulcer with osteomyelitis ([address] 02/20/2016    Symptomatic anemia 01/09/2016    Elevated serum alkaline phosphatase level 11/08/2015    Hyponatremia 11/08/2015    Acute renal failure (ARF) ([address] 11/08/2015    Type 2 diabetes mellitus with right diabetic foot ulcer ([address] 11/08/2015    Iron deficiency anemia 11/08/2015    Diabetic foot ulcer ([address] 07/19/2015    Acute diastolic heart failure ([address] 06/25/2015    Atrial fibrillation and flutter ([address] 06/24/2015    Hx of BKA (HCC) 10/23/2014    Streptococcal infection(041.00) 10/22/2014    Proteus infection 10/22/2014    SIRS with acute organ dysfunction due to infectious process ([address] 10/21/2014     Class: Present on Admission    Open wound of toe 10/21/2014     Class: Present on Admission    Aortic regurgitation 10/21/2014     Class: Chronic    Renal failure, acute ([address] 10/21/2014     Class: Present on Admission    Gram-positive cocci bacteremia 10/21/2014    Cellulitis and abscess of leg 10/21/2014    History of positive PPD 02/17/2013    Acute hypoxemic respiratory failure ([address] 02/16/2013    Malignant hypertension 02/16/2013    Uncontrolled type II diabetes mellitus ([address] 02/16/2013    HTN (hypertension) 01/19/2012    Morbid obesity with BMI of 40.0-44.9, adult ([address] 01/19/2012     Class: Present on Admission        Data Review:   Recent Days:  Recent Labs      03/26/17   1023  03/25/17   2351   WBC  9.2  7.8   HGB  9.9*  10.0*   HCT  31.8*  31.7*   PLT  197  205     Recent Labs      03/27/17   0435  03/26/17   1023  03/25/17   2351   NA  142  145  143   K  3.8  4.0  3.7   CL  106  109*  107   CO2  27  26  27   GLU  229*  327*  241*   BUN  42*  41*  42*   CREA  2.88*  2.85*  3.00*   CA  8.6  7.9*  8.3*   MG   --   1.4*   --      Lab Results   Component Value Date/Time    TSH 1.82 03/08/2016 02:43 AM     ______________________________________________________________________________________________________    Medications reviewed     Current Facility-Administered Medications   Medication Dose Route Frequency    magnesium sulfate 2 g/50 ml IVPB (premix or compounded)  2 g IntraVENous ONCE    acetaminophen (TYLENOL) tablet 650 mg  650 mg Oral Q4H PRN    furosemide (LASIX) injection 40 mg  40 mg IntraVENous BID    aspirin chewable tablet 81 mg  81 mg Oral DAILY    cloNIDine HCl (CATAPRES) tablet 0.3 mg  0.3 mg Oral BID    hydrALAZINE (APRESOLINE) tablet 100 mg  100 mg Oral Q8H    insulin regular (NOVOLIN R, HUMULIN R) injection   SubCUTAneous AC&HS    glucose chewable tablet 16 g  4 Tab Oral PRN    dextrose (D50W) injection syrg 12.5-25 g  12.5-25 g IntraVENous PRN    glucagon (GLUCAGEN) injection 1 mg  1 mg IntraMUSCular PRN    albuterol-ipratropium (DUO-NEB) 2.5 MG-0.5 MG/3 ML  3 mL Nebulization Q6H RT    insulin glargine (LANTUS) injection 15 Units  15 Units SubCUTAneous DAILY    labetalol (NORMODYNE) tablet 300 mg  300 mg Oral Q12H    heparin (porcine) injection 5,000 Units  5,000 Units SubCUTAneous Q8H    niCARdipine (CARDENE) 25 mg in 0.9% sodium chloride 250 mL infusion  0-15 mg/hr IntraVENous TITRATE    oxyCODONE IR (ROXICODONE) tablet 30 mg  30 mg Oral BID    SALINE PERIPHERAL FLUSH Q8H soln 5 mL  5 mL InterCATHeter Q8H    saline peripheral flush soln 5 mL  5 mL InterCATHeter PRN    promethazine (PHENERGAN) 25 mg in 0.9% sodium chloride 50 mL IVPB  25 mg IntraVENous Q6H PRN       _________________________________________________________________________________  Care Plan discussed with: Patient/Family  ______________________________________________________________________________________________________    High complexity decision making was performed for this patient who is at high risk for decompensation with multiple organ involvement. Today total floor/unit time was 65 minutes while caring for this patient and greater than 50% of that time was spent with patient (and/or family) discussing patients clinical issues.     ________________________________________________________________________  Cristian Shankar MD                            3/27/2017 8:07 AM   ________________________________________________________________________

## 2017-03-26 NOTE — ED PROVIDER NOTES
HPI Comments: Kimberlee Montoya, 36 y.o. Male with PMHx of HTN, DM, CKD, asthma and CHF presents ambulatory to [facility] ED with cc of SOB PTA. He also reports cough productive of clear phlegm and left stump pain. Pt was walking to his car without his portable O2 when he became very SOB. SOB is exacerbated with exertion and lessened with sitting. He takes Lasix BID but does not know the dose. He has not used tobacco in the past. He has not taken his HTN medication today. He denies any fevers, chills, nausea, vomiting, diarrhea, or CP. PCP: Shonda Fernandes MD    Social history significant for: - Tobacco, - EtOH, - Illicit Drug Use  PSHx: tonsillectomy, L BKA    There are no other complaints, changes, or physical findings at this time. Written by Ysabel Peters ED Scribdacia, as dictated by Radha Dalal MD.      The history is provided by the patient. No  was used. Past Medical History:   Diagnosis Date    Arrhythmia     afib    Asthma     3/2013 last attack    CKD (chronic kidney disease), stage III 2/12/2013    Nephrology: Pierre Cleveland MD    Diabetes [facility])     Gout     Heart failure ([facility] [address]     Hyperlipidemia     Hypertension     Ill-defined condition     Positive PPD, treated 2001    treated 9 months       Past Surgical History:   Procedure Laterality Date    HX BELOW KNEE AMPUTATION Left     due to osteomyelitis    HX HEENT      tonsilectomy    HX HERNIA REPAIR      HX TONSILLECTOMY           Family History:   Problem Relation Age of Onset    Diabetes Mother     Hypertension Mother     Hypertension Brother     Diabetes Brother     Diabetes Maternal Grandmother     Hypertension Maternal Grandmother     Diabetes Other     Hypertension Other     Heart Disease Other        Social History     Social History    Marital status:      Spouse name: N/A    Number of children: N/A    Years of education: N/A     Occupational History    Not on file.      Social History Main Topics    Smoking status: Never Smoker    Smokeless tobacco: Never Used    Alcohol use No    Drug use: No    Sexual activity: Not Currently     Partners: Female     Other Topics Concern    Not on file     Social History Narrative    ** Merged History Encounter **         NOK Mother Adeola Galeas         ALLERGIES: Ace inhibitors    Review of Systems   Constitutional: Negative for appetite change, chills, diaphoresis, fatigue and fever. HENT: Negative for sore throat and trouble swallowing. Respiratory: Positive for cough and shortness of breath. Cardiovascular: Negative for chest pain. Gastrointestinal: Negative for abdominal pain, diarrhea, nausea and vomiting. Genitourinary: Negative for difficulty urinating, dysuria and frequency. Musculoskeletal: Positive for myalgias (LLE). Negative for arthralgias and back pain. Skin: Negative for color change and rash. Neurological: Negative for weakness and numbness. Hematological: Does not bruise/bleed easily. All other systems reviewed and are negative. Patient Vitals for the past 12 hrs:   Temp Pulse Resp BP SpO2   03/26/17 0045 - - - 187/81 96 %   03/26/17 0030 - - - 178/77 98 %   03/26/17 0015 - - - (!) 202/94 98 %   03/25/17 2254 98.3 °F (36.8 °C) (!) 103 18 (!) 238/107 96 %       Physical Exam   Constitutional: He is oriented to person, place, and time. He appears well-developed and well-nourished. No distress. HENT:   Head: Normocephalic and atraumatic. Mouth/Throat: Oropharynx is clear and moist. No oropharyngeal exudate or posterior oropharyngeal erythema. Neck: Normal range of motion and full passive range of motion without pain. Neck supple. Cardiovascular: Normal rate, regular rhythm, normal heart sounds, intact distal pulses and normal pulses. Exam reveals no gallop and no friction rub. No murmur heard. Pulmonary/Chest: Effort normal. No accessory muscle usage. He has no decreased breath sounds.  He has no rhonchi. Mild respiratory difficulty  End expiratory wheezes  Bibasilar rales   Abdominal: Soft. Bowel sounds are normal. He exhibits no distension. There is no tenderness. There is no rebound, no guarding and no CVA tenderness. Musculoskeletal: Normal range of motion. He exhibits no edema or tenderness. Thoracic back: He exhibits no tenderness and no bony tenderness. Lumbar back: He exhibits no tenderness and no bony tenderness. Lymphadenopathy:     He has no cervical adenopathy. Neurological: He is alert and oriented to person, place, and time. He has normal strength. He is not disoriented. No cranial nerve deficit or sensory deficit. No focal deficits; 5/5 muscle strength in all extremities   Skin: Skin is warm. No lesion and no rash noted. Rash is not nodular. He is not diaphoretic. Nursing note and vitals reviewed. MDM  Number of Diagnoses or Management Options  Diagnosis management comments: DDx: CHF, COPD, viral illness, viral bronchitis        Amount and/or Complexity of Data Reviewed  Clinical lab tests: ordered and reviewed  Tests in the radiology section of CPT®: ordered and reviewed  Tests in the medicine section of CPT®: ordered and reviewed  Review and summarize past medical records: yes  Discuss the patient with other providers: yes (Cardiology, hospitalist)  Independent visualization of images, tracings, or specimens: yes    Patient Progress  Patient progress: stable    ED Course       Procedures    EKG interpretation: (Preliminary) 23:11  Rhythm: normal sinus rhythm; and regular . Rate (approx.): 94; Axis: normal; TX interval: normal; QRS interval: normal ; ST/T wave: non-specific T wave abnormlity; Other findings: abnormal ekg.   Written by Ysabel Peters ED Scribe, as dictated by Radha Dalal MD.      CONSULT NOTE:   1:07 AM  Radha Dalal MD spoke with Dr. Georgiana Torres,   Specialty: cardiology  Discussed pt's hx, disposition, and available diagnostic and imaging results. Reviewed care plans. Consultant agrees with plans as outlined. Dr. Adolph Goodell recommended hospitalist admission. Written by LIU Mares, as dictated by Kesha Marroquin MD.    CONSULT NOTE:   1:20 AM  Kesha Marroquin MD spoke with Dr. Donavon Spatz,   Specialty: Hospitalist  Discussed pt's hx, disposition, and available diagnostic and imaging results. Reviewed care plans. Consultant will evaluate pt for admission. Written by LIU Mares, as dictated by Kesha Marroquin MD.    LABORATORY TESTS:  Recent Results (from the past 12 hour(s))   EKG, 12 LEAD, INITIAL    Collection Time: 03/25/17 11:11 PM   Result Value Ref Range    Ventricular Rate 94 BPM    Atrial Rate 94 BPM    P-R Interval 180 ms    QRS Duration 68 ms    Q-T Interval 338 ms    QTC Calculation (Bezet) 422 ms    Calculated P Axis 55 degrees    Calculated R Axis 65 degrees    Calculated T Axis 70 degrees    Diagnosis       Normal sinus rhythm  Nonspecific T wave abnormality  Abnormal ECG  When compared with ECG of 18-DEC-2016 21:25,  No significant change was found     CBC WITH AUTOMATED DIFF    Collection Time: 03/25/17 11:51 PM   Result Value Ref Range    WBC 7.8 4.1 - 11.1 K/uL    RBC 3.85 (L) 4.10 - 5.70 M/uL    HGB 10.0 (L) 12.1 - 17.0 g/dL    HCT 31.7 (L) 36.6 - 50.3 %    MCV 82.3 80.0 - 99.0 FL    MCH 26.0 26.0 - 34.0 PG    MCHC 31.5 30.0 - 36.5 g/dL    RDW 16.2 (H) 11.5 - 14.5 %    PLATELET 377 869 - 164 K/uL    NEUTROPHILS 70 32 - 75 %    LYMPHOCYTES 19 12 - 49 %    MONOCYTES 7 5 - 13 %    EOSINOPHILS 4 0 - 7 %    BASOPHILS 0 0 - 1 %    ABS. NEUTROPHILS 5.5 1.8 - 8.0 K/UL    ABS. LYMPHOCYTES 1.5 0.8 - 3.5 K/UL    ABS. MONOCYTES 0.5 0.0 - 1.0 K/UL    ABS. EOSINOPHILS 0.3 0.0 - 0.4 K/UL    ABS.  BASOPHILS 0.0 0.0 - 0.1 K/UL    DF SMEAR SCANNED      RBC COMMENTS NORMOCYTIC, NORMOCHROMIC     METABOLIC PANEL, BASIC    Collection Time: 03/25/17 11:51 PM   Result Value Ref Range    Sodium 143 136 - 145 mmol/L    Potassium 3.7 3.5 - 5.1 mmol/L    Chloride 107 97 - 108 mmol/L    CO2 27 21 - 32 mmol/L    Anion gap 9 5 - 15 mmol/L    Glucose 241 (H) 65 - 100 mg/dL    BUN 42 (H) 6 - 20 MG/DL    Creatinine 3.00 (H) 0.70 - 1.30 MG/DL    BUN/Creatinine ratio 14 12 - 20      GFR est AA 28 (L) >60 ml/min/1.73m2    GFR est non-AA 23 (L) >60 ml/min/1.73m2    Calcium 8.3 (L) 8.5 - 10.1 MG/DL   PRO-BNP    Collection Time: 03/25/17 11:51 PM   Result Value Ref Range    NT pro- (H) 0 - 125 PG/ML   TROPONIN I    Collection Time: 03/25/17 11:51 PM   Result Value Ref Range    Troponin-I, Qt. <0.04 <0.05 ng/mL   CK W/ CKMB & INDEX    Collection Time: 03/25/17 11:51 PM   Result Value Ref Range     (H) 39 - 308 U/L    CK - MB 3.8 (H) <3.6 NG/ML    CK-MB Index 0.4 0.0 - 2.5         IMAGING RESULTS:  XR CHEST PA LAT   Final Result   History: Chest pain.     Frontal and lateral views of the chest show clear lungs. There is increased  markings in the lungs bilaterally. There are no effusions The bony thorax is  unremarkable.     IMPRESSION  IMPRESSION:  Mild edema.          MEDICATIONS GIVEN:  Medications   furosemide (LASIX) injection 80 mg (not administered)   acetaminophen (TYLENOL) tablet 650 mg (not administered)   furosemide (LASIX) injection 40 mg (not administered)       IMPRESSION:  1. Systolic congestive heart failure, unspecified congestive heart failure chronicity ([facility] [address]    2. Renal insufficiency        PLAN:  1. Admit    Admit Note:  1:39 AM  Pt is being admitted by Dr. Carl Connell. The results of their tests and reason(s) for their admission have been discussed with pt and/or available family. They convey agreement and understanding for the need to be admitted and for admission diagnosis. This note is prepared by Hiro Andrade, acting as a Scribe for Mary Estrella MD.    Mary Estrella MD: The scribe's documentation has been prepared under my direction and personally reviewed by me in its entirety.  I confirm that the notes above accurately reflects all work, treatment, procedures, and medical decision making performed by me.

## 2017-03-26 NOTE — H&P
Hospitalist Admission Note    NAME: Lana Solorio   :  1975   MRN:  [id number]     Date/Time:  3/26/2017 3:29 AM    Patient PCP: Oswaldo Guzman MD  ________________________________________________________________________   Assessment :    Plan:  1. Congestive Heart Failure- Combined Systolic and Diastolic likely secondary to #2  2. Accelerated HTN  3. Indeterminant Troponin  4. Medical Non compliance secondary to social issues    Active Problems:    CHF (congestive heart failure) ([facility]) (3/26/2017)     -inpt, med/tele, complicated case, prior admissions prolonged  -IV Lasix BID  -trend troponins  -echo  -cards consult  -restart home amlodopine, hydralazine, clonidine, labetelol                       Subjective:   CHIEF COMPLAINT:   Chief Complaint   Patient presents with    Shortness of Breath     after walking without his O2       HISTORY OF PRESENT ILLNESS:     This is a 45yo male with a pertinent history of HTN, DM, CKD, Asthma with oxygen dependence and \"CHF\" who presents to the ED with SOB. The patient was exerting himself, and was not wearing his home oxygen and felt sob. He has a slight non productive cough, no fevers or chills, no exposures to sick contacts. No chest pain, no TAMEZ, no peripheral edema, orthopnea or PND. In the ED he was evaluated with a slightly elevated ckmb and bnp. Dr. Angelica Melgar was consulted in ED and recommendations for IV diuresis and inpatient stay.        Past Medical History:   Diagnosis Date    Arrhythmia     afib    Asthma     3/2013 last attack    CKD (chronic kidney disease), stage III 2013    Nephrology: Merced Orona MD    Diabetes [facility])     Gout     Heart failure ([facility] [address]     Hyperlipidemia     Hypertension     Ill-defined condition     Positive PPD, treated     treated 9 months      Past Surgical History:   Procedure Laterality Date    HX BELOW KNEE AMPUTATION Left     due to osteomyelitis    HX HEENT tonsilectomy    HX HERNIA REPAIR      HX TONSILLECTOMY       Social History   Substance Use Topics    Smoking status: Never Smoker    Smokeless tobacco: Never Used    Alcohol use No      Family History   Problem Relation Age of Onset    Diabetes Mother     Hypertension Mother     Hypertension Brother     Diabetes Brother     Diabetes Maternal Grandmother     Hypertension Maternal Grandmother     Diabetes Other     Hypertension Other     Heart Disease Other       Allergies   Allergen Reactions    Ace Inhibitors Cough          Prior to Admission medications    Medication Sig Start Date End Date Taking? Authorizing Provider   insulin lispro protamine/insulin lispro (HUMALOG MIX 75-25) 100 unit/mL (75-25) injection Take 20U qam and 10U qpm 3/17/17  Yes Ciaran Ritchie MD   omeprazole (PRILOSEC) 20 mg capsule Take 20 mg by mouth daily. Yes Historical Provider   labetalol (NORMODYNE) 300 mg tablet Take 1 Tab by mouth three (3) times daily. Patient taking differently: Take 400 mg by mouth two (2) times a day. 1/7/17  Yes Ciaran Ritchie MD   amLODIPine (NORVASC) 10 mg tablet  12/26/16  Yes Historical Provider   HUMULIN N 100 unit/mL injection  12/26/16  Yes Historical Provider   albuterol (PROAIR HFA) 90 mcg/actuation inhaler Take 2 Puffs by inhalation every four (4) hours as needed for Wheezing. 12/21/16  Yes Ciaran Ritchie MD   glucose blood VI test strips (TRUETEST TEST STRIPS) strip by Does Not Apply route See Admin Instructions. 12/20/16  Yes Abi Phillips MD   aspirin 81 mg chewable tablet Take 81 mg by mouth daily.    Yes Historical Provider   cloNIDine HCl (CATAPRES) 0.3 mg tablet TAKE ONE TABLET BY MOUTH TWICE DAILY 9/9/16  Yes Ciaran Ritchie MD   RENVELA 800 mg tab tab TAKE ONE TABLET BY MOUTH THREE TIMES DAILY WITH MEALS 8/14/16  Yes Ciaran Ritchie MD   furosemide (LASIX) 40 mg tablet TAKE ONE TABLET BY MOUTH TWICE DAILY FOR leg swelling 8/14/16  Yes Ciaran Ritchie MD   hydrALAZINE (APRESOLINE) 100 mg tablet TAKE ONE TABLET BY MOUTH THREE TIMES DAILY 8/14/16  Yes Mirta Moraes MD   oxyCODONE IR (OXY-IR) 30 mg immediate release tablet Take 30 mg by mouth two (2) times a day. 6/3/16  Yes Historical Provider   amitriptyline (ELAVIL) 50 mg tablet TAKE ONE TABLET BY MOUTH EVERY EVENING 6/24/16  Yes Mirta Moraes MD   ergocalciferol (ERGOCALCIFEROL) 50,000 unit capsule Take 50,000 Units by mouth every twenty-eight (28) days. Historical Provider   calcitRIOL (ROCALTROL) 0.25 mcg capsule Take 1 Cap by mouth daily.  2/26/16   Pati Paris NP       Review of Systems:     General: negative for fever, chills, sweats, weakness  Respiratory:  See HPI  Cardiology:  negative for chest pain, palpitations, orthopnea, PND, edema, syncope   Gastrointestinal: negative for abdominal pain, nausea, vomiting, diarrhea, constipation, hematemesis, melena or hematochezia  Genitourinary: negative for frequency, urgency, dysuria, hematuria   Neurological: negative for focal weakness, paresthesia      Objective:   VITALS:    Visit Vitals    BP (!) 206/110 (BP 1 Location: Right arm, BP Patient Position: At rest)    Pulse 89    Temp 98.4 °F (36.9 °C)    Resp 18    Ht 6' 2\" (1.88 m)    Wt 154.4 kg (340 lb 8 oz)    SpO2 98%    BMI 43.72 kg/m2         Physical Exam:     Gen:  Well-developed, well-nourished, in no acute distress, lying semi-supine in hospital stretcher  HEENT:  Anicteric sclera, PER, hearing intact to voice  Resp:  No accessory muscle use, breath sounds clear; no wheezes no rales no rhonchi  Card:  No murmur, normal S1, S2   Abd:  Soft per RN exam, no TTP, non-distended, normoactive bowel sounds are present  Musc:  Normal strength and movement of the major muscle groups without obvious deformity, BBKA  Psych:  Good insight, oriented to person, place and time, not anxious, not agitated      LAB DATA REVIEWED:    Recent Results (from the past 24 hour(s))   EKG, 12 LEAD, INITIAL    Collection Time: 03/25/17 11:11 PM   Result Value Ref Range    Ventricular Rate 94 BPM    Atrial Rate 94 BPM    P-R Interval 180 ms    QRS Duration 68 ms    Q-T Interval 338 ms    QTC Calculation (Bezet) 422 ms    Calculated P Axis 55 degrees    Calculated R Axis 65 degrees    Calculated T Axis 70 degrees    Diagnosis       Normal sinus rhythm  Nonspecific T wave abnormality  Abnormal ECG  When compared with ECG of 18-DEC-2016 21:25,  No significant change was found     CBC WITH AUTOMATED DIFF    Collection Time: 03/25/17 11:51 PM   Result Value Ref Range    WBC 7.8 4.1 - 11.1 K/uL    RBC 3.85 (L) 4.10 - 5.70 M/uL    HGB 10.0 (L) 12.1 - 17.0 g/dL    HCT 31.7 (L) 36.6 - 50.3 %    MCV 82.3 80.0 - 99.0 FL    MCH 26.0 26.0 - 34.0 PG    MCHC 31.5 30.0 - 36.5 g/dL    RDW 16.2 (H) 11.5 - 14.5 %    PLATELET 743 282 - 552 K/uL    NEUTROPHILS 70 32 - 75 %    LYMPHOCYTES 19 12 - 49 %    MONOCYTES 7 5 - 13 %    EOSINOPHILS 4 0 - 7 %    BASOPHILS 0 0 - 1 %    ABS. NEUTROPHILS 5.5 1.8 - 8.0 K/UL    ABS. LYMPHOCYTES 1.5 0.8 - 3.5 K/UL    ABS. MONOCYTES 0.5 0.0 - 1.0 K/UL    ABS. EOSINOPHILS 0.3 0.0 - 0.4 K/UL    ABS.  BASOPHILS 0.0 0.0 - 0.1 K/UL    DF SMEAR SCANNED      RBC COMMENTS NORMOCYTIC, NORMOCHROMIC     METABOLIC PANEL, BASIC    Collection Time: 03/25/17 11:51 PM   Result Value Ref Range    Sodium 143 136 - 145 mmol/L    Potassium 3.7 3.5 - 5.1 mmol/L    Chloride 107 97 - 108 mmol/L    CO2 27 21 - 32 mmol/L    Anion gap 9 5 - 15 mmol/L    Glucose 241 (H) 65 - 100 mg/dL    BUN 42 (H) 6 - 20 MG/DL    Creatinine 3.00 (H) 0.70 - 1.30 MG/DL    BUN/Creatinine ratio 14 12 - 20      GFR est AA 28 (L) >60 ml/min/1.73m2    GFR est non-AA 23 (L) >60 ml/min/1.73m2    Calcium 8.3 (L) 8.5 - 10.1 MG/DL   PRO-BNP    Collection Time: 03/25/17 11:51 PM   Result Value Ref Range    NT pro- (H) 0 - 125 PG/ML   TROPONIN I    Collection Time: 03/25/17 11:51 PM   Result Value Ref Range    Troponin-I, Qt. <0.04 <0.05 ng/mL   CK W/ CKMB & INDEX Collection Time: 03/25/17 11:51 PM   Result Value Ref Range     (H) 39 - 308 U/L    CK - MB 3.8 (H) <3.6 NG/ML    CK-MB Index 0.4 0.0 - 2.5         Vickie Cboos MD

## 2017-03-27 VITALS
BODY MASS INDEX: 61.84 KG/M2 | HEART RATE: 55 BPM | WEIGHT: 315 LBS | OXYGEN SATURATION: 95 % | HEIGHT: 60 IN | DIASTOLIC BLOOD PRESSURE: 77 MMHG | TEMPERATURE: 97.6 F | SYSTOLIC BLOOD PRESSURE: 155 MMHG | RESPIRATION RATE: 14 BRPM

## 2017-03-27 LAB
ANION GAP BLD CALC-SCNC: 9 MMOL/L (ref 5–15)
BUN SERPL-MCNC: 42 MG/DL (ref 6–20)
BUN/CREAT SERPL: 15 (ref 12–20)
CALCIUM SERPL-MCNC: 8.6 MG/DL (ref 8.5–10.1)
CHLORIDE SERPL-SCNC: 106 MMOL/L (ref 97–108)
CO2 SERPL-SCNC: 27 MMOL/L (ref 21–32)
CREAT SERPL-MCNC: 2.88 MG/DL (ref 0.7–1.3)
GLUCOSE BLD STRIP.AUTO-MCNC: 262 MG/DL (ref 65–100)
GLUCOSE BLD STRIP.AUTO-MCNC: 331 MG/DL (ref 65–100)
GLUCOSE SERPL-MCNC: 229 MG/DL (ref 65–100)
POTASSIUM SERPL-SCNC: 3.8 MMOL/L (ref 3.5–5.1)
SERVICE CMNT-IMP: ABNORMAL
SERVICE CMNT-IMP: ABNORMAL
SODIUM SERPL-SCNC: 142 MMOL/L (ref 136–145)

## 2017-03-27 PROCEDURE — 74011000250 HC RX REV CODE- 250: Performed by: HOSPITALIST

## 2017-03-27 PROCEDURE — 74011250636 HC RX REV CODE- 250/636: Performed by: HOSPITALIST

## 2017-03-27 PROCEDURE — 36415 COLL VENOUS BLD VENIPUNCTURE: CPT | Performed by: STUDENT IN AN ORGANIZED HEALTH CARE EDUCATION/TRAINING PROGRAM

## 2017-03-27 PROCEDURE — 96372 THER/PROPH/DIAG INJ SC/IM: CPT

## 2017-03-27 PROCEDURE — 77010033678 HC OXYGEN DAILY

## 2017-03-27 PROCEDURE — 74011250636 HC RX REV CODE- 250/636: Performed by: STUDENT IN AN ORGANIZED HEALTH CARE EDUCATION/TRAINING PROGRAM

## 2017-03-27 PROCEDURE — 80048 BASIC METABOLIC PNL TOTAL CA: CPT | Performed by: STUDENT IN AN ORGANIZED HEALTH CARE EDUCATION/TRAINING PROGRAM

## 2017-03-27 PROCEDURE — 99218 HC RM OBSERVATION: CPT

## 2017-03-27 PROCEDURE — 74011250637 HC RX REV CODE- 250/637: Performed by: STUDENT IN AN ORGANIZED HEALTH CARE EDUCATION/TRAINING PROGRAM

## 2017-03-27 PROCEDURE — 96376 TX/PRO/DX INJ SAME DRUG ADON: CPT

## 2017-03-27 PROCEDURE — 94761 N-INVAS EAR/PLS OXIMETRY MLT: CPT

## 2017-03-27 PROCEDURE — 76450000000

## 2017-03-27 PROCEDURE — 82962 GLUCOSE BLOOD TEST: CPT

## 2017-03-27 PROCEDURE — 74011250637 HC RX REV CODE- 250/637: Performed by: HOSPITALIST

## 2017-03-27 PROCEDURE — 94640 AIRWAY INHALATION TREATMENT: CPT

## 2017-03-27 PROCEDURE — 96367 TX/PROPH/DG ADDL SEQ IV INF: CPT

## 2017-03-27 RX ORDER — CLONIDINE HYDROCHLORIDE 0.3 MG/1
TABLET ORAL
Qty: 60 TAB | Refills: 5 | Status: ON HOLD | OUTPATIENT
Start: 2017-03-27 | End: 2017-01-01

## 2017-03-27 RX ORDER — MAGNESIUM SULFATE HEPTAHYDRATE 40 MG/ML
2 INJECTION, SOLUTION INTRAVENOUS ONCE
Status: COMPLETED | OUTPATIENT
Start: 2017-03-27 | End: 2017-03-27

## 2017-03-27 RX ADMIN — OXYCODONE HYDROCHLORIDE 30 MG: 5 TABLET ORAL at 08:37

## 2017-03-27 RX ADMIN — HUMAN INSULIN 7 UNITS: 100 INJECTION, SOLUTION SUBCUTANEOUS at 11:30

## 2017-03-27 RX ADMIN — HYDRALAZINE HYDROCHLORIDE 100 MG: 50 TABLET ORAL at 06:16

## 2017-03-27 RX ADMIN — HUMAN INSULIN 5 UNITS: 100 INJECTION, SOLUTION SUBCUTANEOUS at 08:37

## 2017-03-27 RX ADMIN — IPRATROPIUM BROMIDE AND ALBUTEROL SULFATE 3 ML: .5; 3 SOLUTION RESPIRATORY (INHALATION) at 02:29

## 2017-03-27 RX ADMIN — Medication 5 ML: at 06:17

## 2017-03-27 RX ADMIN — IPRATROPIUM BROMIDE AND ALBUTEROL SULFATE 3 ML: .5; 3 SOLUTION RESPIRATORY (INHALATION) at 07:29

## 2017-03-27 RX ADMIN — INSULIN GLARGINE 15 UNITS: 100 INJECTION, SOLUTION SUBCUTANEOUS at 08:37

## 2017-03-27 RX ADMIN — HEPARIN SODIUM 5000 UNITS: 5000 INJECTION INTRAVENOUS; SUBCUTANEOUS at 04:22

## 2017-03-27 RX ADMIN — LABETALOL HYDROCHLORIDE 300 MG: 200 TABLET, FILM COATED ORAL at 09:37

## 2017-03-27 RX ADMIN — MAGNESIUM SULFATE HEPTAHYDRATE 2 G: 40 INJECTION, SOLUTION INTRAVENOUS at 10:43

## 2017-03-27 RX ADMIN — FUROSEMIDE 40 MG: 10 INJECTION, SOLUTION INTRAVENOUS at 08:37

## 2017-03-27 RX ADMIN — ASPIRIN 81 MG 81 MG: 81 TABLET ORAL at 08:37

## 2017-03-27 RX ADMIN — CLONIDINE HYDROCHLORIDE 0.3 MG: 0.1 TABLET ORAL at 08:37

## 2017-03-27 NOTE — DISCHARGE SUMMARY
Physician Discharge Summary     Pt Name  Katy Alcantar date:  3/25/2017   Discharge date and time:   3/27/2017   Room Number  [address] Record Number  [id number] @ [facility]   Age  36 y.o. Date of Birth 1975   PCP Jimy Tucker MD     Admission Diagnoses:                        Hypertensive urgency     Hospital Problems  Date Reviewed: 2016          Codes Class Noted POA    * (Principal)Hypertensive urgency ICD-10-CM: I16.0  ICD-9-CM: 401.9  3/26/2017 Unknown        Accelerated hypertension with diastolic congestive heart failure, NYHA class 3 (HCC) (Chronic) ICD-10-CM: I11.0, I50.30  ICD-9-CM: 402.01, 428.30  2016 Yes        Acute diastolic CHF (congestive heart failure) (HCC) ICD-10-CM: I50.31  ICD-9-CM: 428.31, 428.0  3/2/2016 Yes        Atrial fibrillation ([facility] ICD-10-CM: I48.91  ICD-9-CM: 427.31  3/2/2016 Yes        Uncontrolled type II diabetes mellitus ([facility] ICD-10-CM: E11.65  ICD-9-CM: 250.02  2013 Yes               Allergies   Allergen Reactions    Ace Inhibitors Cough        Excerpt from HPI : \"The same thing I had trouble breathing\"    Pt is a known diabetic hypertensive, diastolic CHF . Mr. Bibi Ivory said he has been under a lot of psychological stress as his mother  this week. Yesterday he'd been fine but one hour after seeing a sad moviehe developed SOB lasting approximately 10 minutes and decided to come to hospital because of this. Denies CP, minimal frothy sputum.     He was placed on home oxygen in November of last year but by December he had been saturated 95 to 97% on room air at his PCPs office and HOT was stopped. No respiratory issues since.     He does claim medication and diet compliance and BP was 140/70 just Friday at his PCPs office.     Has been on Eliquis on and off.   This was stopped at [facility] doctors at one point, resumed and was then stopped in 2016 by is PCP because of anemia.     He does see  Helen Harris, and in the past has gotten EPO shots, but it seems that it has been some time since he last received it. In the ED, he was noted to have SBPs 200 and was admitted for further management, because this was still not controlled on the sanchez, he was moved to the PCU and started on Nitroprusside drip     He would like his new Medical  power of  to be his Brother Michael Atkins. Hospital Course: This pt was admitted with  Hypertensive urgency on 3/25/2017. HTN Urgency  -presented with SOB, SBP above 200, slow improvement with oral meds, so he was started on Nicardipine drip, which was used for a few hours, and along with an increased dose of labetalol po, his BP settled and he was discharged the next day  -suspect that this episode was triggered by a combination of Psychological stress, and there could likely be medication non compliance also  -will continue home medications on discharge     Acute on Chronic Diastolic CHF sec to   -Consult Cardiology/ ECHO/ Lasix diuresis/ Strict I/O/ Daily weights  -BB, (no ace- i/arb b/c CKD)     [facility] DM Type 2: SSI/accuchecks/ Aic 12, from 8 in Nov. Likely represents non compliance  PAF: Prev on Elequis, will defer this to his PCP  Prev On Home O2: seems no longer needed  S/p Bilat BKA, Foul smell: Pt adamantly refuses to have his prosthesis to have stump examined  Morbid Obesity  Chronically elevated CK levels: no further w/u  Psychosocial: Mother just passed away, can be allowed home today to attend   Hx of Oppositional Behaviour , Non compliance, his approach to his care is an obstacle to care        ED: WCC 8/ Hb 10/   / / Cr 3.0  CXR Poss Pulm edema     Condition at the time of discharge improved and stable .      Query:   Treatment team[de-identified] Treatment Team: Attending Provider: Ilana Phan MD; Consulting Provider: Olga Hearn MD; Consulting Provider: Alyssia Daniels MD       Other Pertinent data:   TODAY's CLINICAL FINDINGS:   Visit Vitals    /68    Pulse 81    Temp 97.6 °F (36.4 °C)    Resp 17    Ht 4' 4\" (1.321 m)    Wt 151.3 kg (333 lb 8.9 oz)    SpO2 98%    BMI 86.73 kg/m2      Wt Readings from Last 10 Encounters:   17 151.3 kg (333 lb 8.9 oz)   02/10/17 152.9 kg (337 lb)   17 150.6 kg (332 lb)   16 147.4 kg (325 lb)   16 146.3 kg (322 lb 8 oz)   16 155.7 kg (343 lb 4.8 oz)   16 151 kg (332 lb 14.4 oz)   16 144.9 kg (319 lb 7.1 oz)   16 154.7 kg (341 lb)   16 151.5 kg (334 lb)       Physical Exam:    Gen:  Morbidly Obese, in better spirits than yesterday, planning on attending  later  Unpleasant smell  HEENT:  Pink conjunctivae, PERRL, hearing intact to voice, moist mucous membranes  Neck:  Supple, without masses, thyroid non-tender  Resp:  No accessory muscle use, clear breath sounds without wheezes rales or rhonchi  Card:  No murmurs, normal S1, S2 without thrills, bruits or peripheral edema  Abd:  Soft, non-tender, non-distended, normoactive bowel sounds are present, no palpable organomegaly  Lymph:  No cervical adenopathy  Musc:  Bilateral BKA  Skin:  No rashes or ulcers, skin turgor is good  Neuro:  Cranial nerves 3-12 are grossly intact,  strength is 5/5 bilaterally, dorsi / plantarflexion strength is 5/5 bilaterally, follows commands appropriately  Psych:  Alert with good insight. Oriented to person, place, and time       Disposition:Home. Diet: Cardiac Diet and Diabetic Diet   Care Plan discussed with: Patient/Family   Follow up   Follow-up Information     Follow up With Details Comments [address]  [address] [facility],[address] [place]      Christopher Holloway MD   [address]      Judy Peacock MD  Please follow-up as needed. [facility]      [employer]  Please contact regarding your home oxygen as needed. [facility]  [phone]         Current Discharge Medication List      CONTINUE these medications which have NOT CHANGED    Details   omeprazole (PRILOSEC) 40 mg capsule Take 40 mg by mouth daily. labetalol (NORMODYNE) 200 mg tablet Take 400 mg by mouth three (3) times daily. insulin lispro protamine/insulin lispro (HUMALOG MIX 75-25) 100 unit/mL (75-25) injection Take 20U qam and 10U qpm  Qty: 6 Vial, Refills: 3      amLODIPine (NORVASC) 10 mg tablet Take 10 mg by mouth daily. HUMULIN N 100 unit/mL injection 22 units ac breakfast and dinner      albuterol (PROAIR HFA) 90 mcg/actuation inhaler Take 2 Puffs by inhalation every four (4) hours as needed for Wheezing. Qty: 1 Inhaler, Refills: 11      glucose blood VI test strips (TRUETEST TEST STRIPS) strip by Does Not Apply route See Admin Instructions. Qty: 30 Strip, Refills: 0      aspirin 81 mg chewable tablet Take 81 mg by mouth daily. cloNIDine HCl (CATAPRES) 0.3 mg tablet TAKE ONE TABLET BY MOUTH TWICE DAILY  Qty: 60 Tab, Refills: 5    Associated Diagnoses: Essential hypertension, benign      RENVELA 800 mg tab tab TAKE ONE TABLET BY MOUTH THREE TIMES DAILY WITH MEALS  Qty: 90 Tab, Refills: 11      furosemide (LASIX) 40 mg tablet TAKE ONE TABLET BY MOUTH TWICE DAILY FOR leg swelling  Qty: 60 Tab, Refills: 11      hydrALAZINE (APRESOLINE) 100 mg tablet TAKE ONE TABLET BY MOUTH THREE TIMES DAILY  Qty: 90 Tab, Refills: 11      oxyCODONE IR (OXY-IR) 30 mg immediate release tablet Take 30 mg by mouth two (2) times a day.   Refills: 0      amitriptyline (ELAVIL) 50 mg tablet TAKE ONE TABLET BY MOUTH EVERY EVENING  Qty: 30 Tab, Refills: 11                Significant Diagnostic Studies:   Recent Labs      03/26/17   1023  03/25/17   2351   WBC  9.2  7.8   HGB  9.9*  10.0*   HCT  31.8*  31.7*   PLT  197  205     Recent Labs      03/27/17   0435  03/26/17   1023  03/25/17   2351   NA  142  145  143   K  3.8  4.0 3.7   CL  106  109*  107   CO2  27  26  27   BUN  42*  41*  42*   CREA  2.88*  2.85*  3.00*   GLU  229*  327*  241*   CA  8.6  7.9*  8.3*   MG   --   1.4*   --      No results for input(s): SGOT, GPT, ALT, AP, TBIL, TBILI, TP, ALB, GLOB, GGT, AML, LPSE in the last 72 hours. No lab exists for component: AMYP, HLPSE  No results for input(s): INR, PTP, APTT in the last 72 hours. No lab exists for component: INREXT, INREXT   No results for input(s): FE, TIBC, PSAT, FERR in the last 72 hours. No results for input(s): PH, PCO2, PO2 in the last 72 hours. Recent Labs      03/26/17   1023  03/25/17   2351   CPK  753*  900*   CKMB  2.4  3.8*     Lab Results   Component Value Date/Time    Glucose (POC) 262 03/27/2017 07:31 AM    Glucose (POC) 153 03/26/2017 09:07 PM    Glucose (POC) 111 03/26/2017 06:33 PM    Glucose (POC) 376 03/26/2017 04:10 PM    Glucose (POC) 319 03/26/2017 12:04 PM           ________________________________________________________________________    Valentine Olszewski Time for face to face meeting with the pt and examination including care coordination with staff and chart review (>50% of total time listed here )  approx.  40 min]    ________________________________________________________________________    Leslie Love MD  3/27/2017

## 2017-03-27 NOTE — PROGRESS NOTES
RRAT Score: 13  Initial Assessment: CM reviewed chart and met with patient for discharge planning. CM verified patients address and contact number as correct on the facesheet. Pt presented to ED with CHF and hypertensive urgency. Patient is currently living alone. He reported that he would benefit from having an aid come into his home due to him needed assistance with completing his ADLs. CM will review patient's insurance to determine if he is eligible. Patient is unemployed. Patient consented for CM to make appointment arrangements. Patients PCP is Dr. Chris Francois. Patient's appointment is scheduled for 17 at 9:30am. He was previously scheduled to see his PCP today; however, CM rescheduled. Patient will not need assistance with obtaining medications. Patient voiced that he does not have medications at home. Medications refills will likely be needed on discharge. Patient will discharge this morning so that he is able to attend his mother's  this afternoon. Patient does not have a portable tank with him in preparation for discharge. Patient reported that he lives \"one minute\" away from the hospital. He reported that he does not have time to wait for a [employer] company to bring him a portable tank and reported that there isn't anyone that could bring him a portable tank to the hospital. CM educated patient on the importance of compliance with his oxygen. Patient demonstrated understanding but reported that he would be able to make it home without the oxygen. Emergency Contact:   Quinton Rivas (brother) [phone]  Pertinent Medical Hx: see H&P     Transition Plan: Home with outpatient services. Involve patient/caregiver in assessment, planning, education and implement of intervention. Yes. CM will continue to follow case for discharge planning. NAINA daily patient care huddles/interdisciplinary rounds. Rounded with IDT. CM will handoff to [address] [place] [address] or PCP practice.   NAINA evaluated for [facility] or [address] care coordination of resources. CM will further assess if needed. Care Management Interventions  PCP Verified by CM: Yes  Palliative Care Consult (Criteria: CHF and RRAT>21): No  Mode of Transport at Discharge:  Other (see comment)  Transition of Care Consult (CM Consult): Discharge Planning  Discharge Durable Medical Equipment: No  Physical Therapy Consult: No  Occupational Therapy Consult: No  Speech Therapy Consult: No  Current Support Network: Lives Alone  Confirm Follow Up Transport: Self  Plan discussed with Pt/Family/Caregiver: Yes  Discharge Location  Discharge Placement: Home with outpatient services    [address] ([place])

## 2017-03-27 NOTE — DIABETES MGMT
DTC Progress Note    Recommendations/ Comments:  Patient states that he does take both the NPH insulin and 75/25 insulin bid daily. He claims he has been on 40 units of the 75/25 bid and 22 of NPH bid but had low BG at lunchtime and doctor decreased the 75/25 insulin to 30 and 10 and then 20 and 10. Review of his chart shows the current insulin doses note by Dr. Gloria Najera on 1/11/2017. Concerns with foul smelling stump could be attributing to the A1c elevation. He states that he does need to watch his food intake more carefully. \"I know what to do. \"      Patient's BG controlled now on the Lantus 15 units and the correction lisopro. Chart reviewed on [address]. Patient is a 36 y.o. male with history Type 2 Diabetes on insulin injections: NPH: 22 units B andD, Humalog 75/25 20 units ac breakfast and 10 units ac dinner at home. A1c:   Lab Results   Component Value Date/Time    Hemoglobin A1c 12.4 03/26/2017 10:23 AM    Hemoglobin A1c 8.1 11/09/2016 04:48 AM       Recent Glucose Results: Lab Results   Component Value Date/Time     (H) 03/27/2017 04:35 AM    GLUCPOC 262 (H) 03/27/2017 07:31 AM    GLUCPOC 153 (H) 03/26/2017 09:07 PM    GLUCPOC 111 (H) 03/26/2017 06:33 PM        Lab Results   Component Value Date/Time    Creatinine 2.88 03/27/2017 04:35 AM       Active Orders   Diet    DIET CARDIAC Regular; Consistent Carb 2200kcal        PO intake: Patient Vitals for the past 72 hrs:   % Diet Eaten   03/27/17 0830 60 %   03/26/17 1230 80 %       Current hospital DM medication: 15 untis Lantus and Lispro Correctional insulin with normal sensitivity    Will continue to follow as needed.     Thank you  Esteban Dela Cruz RD,CDE   [address]

## 2017-03-27 NOTE — DISCHARGE INSTRUCTIONS
Heart Failure: Care Instructions  Your Care Instructions    Heart failure occurs when your heart does not pump as much blood as the body needs. Failure does not mean that the heart has stopped pumping but rather that it is not pumping as well as it should. Over time, this causes fluid buildup in your lungs and other parts of your body. Fluid buildup can cause shortness of breath, fatigue, swollen ankles, and other problems. By taking medicines regularly, reducing sodium (salt) in your diet, checking your weight every day, and making lifestyle changes, you can feel better and live longer. Follow-up care is a key part of your treatment and safety. Be sure to make and go to all appointments, and call your doctor if you are having problems. It's also a good idea to know your test results and keep a list of the medicines you take. How can you care for yourself at home? Medicines  · Be safe with medicines. Take your medicines exactly as prescribed. Call your doctor if you think you are having a problem with your medicine. · Do not take any vitamins, over-the-counter medicine, or herbal products without talking to your doctor first. Verlon Snuffer not take ibuprofen (Advil or Motrin) and naproxen (Aleve) without talking to your doctor first. They could make your heart failure worse. · You may be taking some of the following medicine. ¨ Beta-blockers can slow heart rate, decrease blood pressure, and improve your condition. Taking a beta-blocker may lower your chance of needing to be hospitalized. ¨ Angiotensin-converting enzyme inhibitors (ACEIs) reduce the heart's workload, lower blood pressure, and reduce swelling. Taking an ACEI may lower your chance of needing to be hospitalized again. ¨ Angiotensin II receptor blockers (ARBs) work like ACEIs. Your doctor may prescribe them instead of ACEIs. ¨ Diuretics, also called water pills, reduce swelling.   ¨ Potassium supplements replace this important mineral, which is sometimes lost with diuretics. ¨ Aspirin and other blood thinners prevent blood clots, which can cause a stroke or heart attack. You will get more details on the specific medicines your doctor prescribes. Diet  · Your doctor may suggest that you limit sodium to 2,000 milligrams (mg) a day or less. That is less than 1 teaspoon of salt a day, including all the salt you eat in cooking or in packaged foods. People get most of their sodium from processed foods. Fast food and restaurant meals also tend to be very high in sodium. · Ask your doctor how much liquid you can drink each day. You may have to limit liquids. Weight  · Weigh yourself without clothing at the same time each day. Record your weight. Call your doctor if you gain more than 3 pounds in 2 to 3 days. A sudden weight gain may mean that your heart failure is getting worse. Activity level  · Start light exercise (if your doctor says it is okay). Even if you can only do a small amount, exercise will help you get stronger, have more energy, and manage your weight and your stress. Walking is an easy way to get exercise. Start out by walking a little more than you did before. Bit by bit, increase the amount you walk. · When you exercise, watch for signs that your heart is working too hard. You are pushing yourself too hard if you cannot talk while you are exercising. If you become short of breath or dizzy or have chest pain, stop, sit down, and rest.  · If you feel \"wiped out\" the day after you exercise, walk slower or for a shorter distance until you can work up to a better pace. · Get enough rest at night. Sleeping with 1 or 2 pillows under your upper body and head may help you breathe easier. Lifestyle changes  · Do not smoke. Smoking can make a heart condition worse. If you need help quitting, talk to your doctor about stop-smoking programs and medicines. These can increase your chances of quitting for good.  Quitting smoking may be the most important step you can take to protect your heart. · Limit alcohol to 2 drinks a day for men and 1 drink a day for women. Too much alcohol can cause health problems. · Avoid getting sick from colds and the flu. Get a pneumococcal vaccine shot. If you have had one before, ask your doctor whether you need another dose. Get a flu shot each year. If you must be around people with colds or the flu, wash your hands often. When should you call for help? Call 911 if you have symptoms of sudden heart failure such as:  · You have severe trouble breathing. · You cough up pink, foamy mucus. · You have a new irregular or rapid heartbeat. Call your doctor now or seek immediate medical care if:  · You have new or increased shortness of breath. · You are dizzy or lightheaded, or you feel like you may faint. · You have sudden weight gain, such as 3 pounds or more in 2 to 3 days. · You have increased swelling in your legs, ankles, or feet. · You are suddenly so tired or weak that you cannot do your usual activities. Watch closely for changes in your health, and be sure to contact your doctor if:  · You develop new symptoms. Where can you learn more? Go to http://amparo-ramon.info/. Enter F902 in the search box to learn more about \"Heart Failure: Care Instructions. \"  Current as of: January 27, 2016  Content Version: 11.1  © 8344-9599 [employer]. Care instructions adapted under license by [employer] (which disclaims liability or warranty for this information). If you have questions about a medical condition or this instruction, always ask your healthcare professional. [place] [address] any warranty or liability for your use of this information. DISCHARGE SUMMARY from Nurse    The following personal items are in your possession at time of discharge:    Dental Appliances:  At bedside  Visual Aid: Glasses     Home Medications: None  Jewelry: None  Clothing: Pants  Other Valuables: None             PATIENT INSTRUCTIONS:    After general anesthesia or intravenous sedation, for 24 hours or while taking prescription Narcotics:  · Limit your activities  · Do not drive and operate hazardous machinery  · Do not make important personal or business decisions  · Do  not drink alcoholic beverages  · If you have not urinated within 8 hours after discharge, please contact your surgeon on call. Report the following to your surgeon:  · Excessive pain, swelling, redness or odor of or around the surgical area  · Temperature over 100.5  · Nausea and vomiting lasting longer than 4 hours or if unable to take medications  · Any signs of decreased circulation or nerve impairment to extremity: change in color, persistent  numbness, tingling, coldness or increase pain  · Any questions        What to do at Home:  Recommended activity: Activity as tolerated. *  Please give a list of your current medications to your Primary Care Provider. *  Please update this list whenever your medications are discontinued, doses are      changed, or new medications (including over-the-counter products) are added. *  Please carry medication information at all times in case of emergency situations. These are general instructions for a healthy lifestyle:    No smoking/ No tobacco products/ Avoid exposure to second hand smoke    Surgeon General's Warning:  Quitting smoking now greatly reduces serious risk to your health. Obesity, smoking, and sedentary lifestyle greatly increases your risk for illness    A healthy diet, regular physical exercise & weight monitoring are important for maintaining a healthy lifestyle    You may be retaining fluid if you have a history of heart failure or if you experience any of the following symptoms:  Weight gain of 3 pounds or more overnight or 5 pounds in a week, increased swelling in our hands or feet or shortness of breath while lying flat in bed.   Please call your doctor as soon as you notice any of these symptoms; do not wait until your next office visit. Recognize signs and symptoms of STROKE:    F-face looks uneven    A-arms unable to move or move unevenly    S-speech slurred or non-existent    T-time-call 911 as soon as signs and symptoms begin-DO NOT go       Back to bed or wait to see if you get better-TIME IS BRAIN. Warning Signs of HEART ATTACK     Call 911 if you have these symptoms:   Chest discomfort. Most heart attacks involve discomfort in the center of the chest that lasts more than a few minutes, or that goes away and comes back. It can feel like uncomfortable pressure, squeezing, fullness, or pain.  Discomfort in other areas of the upper body. Symptoms can include pain or discomfort in one or both arms, the back, neck, jaw, or stomach.  Shortness of breath with or without chest discomfort.  Other signs may include breaking out in a cold sweat, nausea, or lightheadedness. Don't wait more than five minutes to call 911 - MINUTES MATTER! Fast action can save your life. Calling 911 is almost always the fastest way to get lifesaving treatment. Emergency Medical Services staff can begin treatment when they arrive -- up to an hour sooner than if someone gets to the hospital by car. The discharge information has been reviewed with the patient. The patient verbalized understanding. Discharge medications reviewed with the patient and appropriate educational materials and side effects teaching were provided.

## 2017-03-27 NOTE — PROGRESS NOTES
Bedside and Verbal shift change report received from Bella Sifuentes. Report received  with SBAR, Kardex, ED Summary, OR Summary, Procedure Summary, Intake/Output, MAR, Accordion and Recent Results. Pt received awake alert lying in the bed. Pt breaths well on room air. abd obese but soft. Pt refused to remove his prosthesis and assess stump for nursing. He refused to take his pants off to assess his skin. LFA 20G patent. See doc flow sheet for detail assessments. pt stated that today is his mother's  and he would like to go home. 0830 pt having his breakfast at this time. 0900  at the bed side. 1030 Participated in the IDRs. 1115 pt had discharge instruction, verbalized understanding of it. Pt had all his belongings with him, including his multiple electronic equipments but not limited to his lap top, cell phone and tablets. 1130 pt would like to eat his lunch before discharge. 1230 pt having his lunch. Pt's ride at the bed side. 1330 pt d/c to the d/c lobby. Pt had all his belongings with him. Female visitor at the bed side.

## 2017-03-27 NOTE — PROGRESS NOTES
Cardiology:    Blood pressure is well controlled on present medications. He should do well as long as he complies consistently. This is unfortunately unpredictable, and in his way of thinking, nothing that goes wrong is ever his fault or responsibility, including running out of medications in the presence of insurance coverage and available refills. He is upset because he didn't expect his mother to die, despite being informed that she had stage IV hepatic cancer. He will want to be discharged today to attend her . There does not seem to be any barrier to this. On exam, his lungs are clear. He is in a better mood than usual, but he has been hurling accusations at the nurses about aspects of diet and timing of meals. Readmission due to poor treatment and dietary compliance seems inevitable based on his complex behavioral problems. He is secretive about treatment for this, including even treatment venue. If he is in treatment at all, it is outside the [facility] system.  Prognosis is poor based on comorbid behavioral disorder    Arben Akers MD

## 2017-03-27 NOTE — PROGRESS NOTES
Palliative aware of referral made for pt to be seen today.   Unable to see pt before he was discharged for his mother's .    Teddy Velazquez, GEORGEW, LSW, CCM I    [facility] [employer] ® ACP Facilitator   Palliative Medicine I [facility]  [phone]

## 2017-03-27 NOTE — PROGRESS NOTES
Problem: Falls - Risk of  Goal: *Absence of falls  In progress. Goal: *Knowledge of fall prevention  In progress. Problem: Patient Education: Go to Patient Education Activity  Goal: Patient/Family Education  In progress. Problem: Diabetes Self-Management  Goal: *Disease process and treatment process  Define diabetes and identify own type of diabetes; list 3 options for treating diabetes. In progress. Goal: *Incorporating nutritional management into lifestyle  Describe effect of type, amount and timing of food on blood glucose; list 3 methods for planning meals. In progress. Goal: *Incorporating physical activity into lifestyle  In progress. Goal: *Developing strategies to promote health/change behavior  In progress. Goal: *Using medications safely  State effect of diabetes medications on diabetes; name diabetes medication taking, action and side effects. In progress. Goal: *Monitoring blood glucose, interpreting and using results  Identify recommended blood glucose targets and personal targets. In progress. Goal: *Prevention, detection, treatment of acute complications  List symptoms of hyper- and hypoglycemia; describe how to treat low blood sugar and actions for lowering high blood glucose level. In progress. Goal: *Prevention, detection and treatment of chronic complications  Define the natural course of diabetes and describe the relationship of blood glucose levels to long term complications of diabetes. In progress. Goal: *Developing strategies to address psychosocial issues  Describe feelings about living with diabetes; identify support needed and support network   In progress. Goal: *Insulin pump training  n/a  Goal: *Patient Specific Goal (EDIT GOAL, INSERT TEXT)  In progress. Problem: Patient Education: Go to Patient Education Activity  Goal: Patient/Family Education  In progress. Problem: Nutrition Deficit  Goal: *Adequate nutrition  In progress.     Problem: Heart Failure: Day 2  Goal: Activity/Safety  In progress. Goal: Consults, if ordered  In progress. Goal: Diagnostic Test/Procedures  In progress. Goal: Nutrition/Diet  In progress. Goal: Discharge Planning  In progress. Goal: Medications  In progress. Goal: Respiratory  In progress. Goal: Treatments/Interventions/Procedures  In progress. Goal: Psychosocial  In progress. Goal: *Oxygen saturation within defined limits  In progress. Goal: *Hemodynamically stable  In progress. Goal: *Optimal pain control at patients stated goal  In progress. Goal: *Anxiety reduced or absent  In progress. Goal: *Demonstrates progressive activity  In progress. Problem: Breathing Pattern - Ineffective  Goal: *Absence of hypoxia  In progress. Goal: *Use of effective breathing techniques  In progress. Goal: *PALLIATIVE CARE: Alleviation of Dyspnea  In progress.

## 2017-03-28 NOTE — PROGRESS NOTES
Care Management Interventions  PCP Verified by CM: Yes  Palliative Care Consult (Criteria: CHF and RRAT>21): No  Mode of Transport at Discharge: Other (see comment)  Transition of Care Consult (CM Consult): Discharge Planning  Discharge Durable Medical Equipment: No  Physical Therapy Consult: No  Occupational Therapy Consult: No  Speech Therapy Consult: No  Current Support Network: Lives Alone  Confirm Follow Up Transport: Self  Plan discussed with Pt/Family/Caregiver: Yes  Discharge Location  Discharge Placement: Home with outpatient services    Patient will follow-up with Dr. Riccardo Vaca on 4/7/17 at 9:30am. Patient receives home oxygen from [facility].     Jill ([place]), [address]

## 2017-04-03 LAB
ATRIAL RATE: 94 BPM
CALCULATED P AXIS, ECG09: 55 DEGREES
CALCULATED R AXIS, ECG10: 65 DEGREES
CALCULATED T AXIS, ECG11: 70 DEGREES
DIAGNOSIS, 93000: NORMAL
P-R INTERVAL, ECG05: 180 MS
Q-T INTERVAL, ECG07: 338 MS
QRS DURATION, ECG06: 68 MS
QTC CALCULATION (BEZET), ECG08: 422 MS
VENTRICULAR RATE, ECG03: 94 BPM

## 2017-04-07 ENCOUNTER — OFFICE VISIT (OUTPATIENT)
Dept: INTERNAL MEDICINE CLINIC | Age: 42
End: 2017-04-07

## 2017-04-07 VITALS
BODY MASS INDEX: 40.43 KG/M2 | SYSTOLIC BLOOD PRESSURE: 175 MMHG | TEMPERATURE: 98.2 F | HEIGHT: 74 IN | RESPIRATION RATE: 18 BRPM | HEART RATE: 70 BPM | DIASTOLIC BLOOD PRESSURE: 93 MMHG | WEIGHT: 315 LBS | OXYGEN SATURATION: 98 %

## 2017-04-07 DIAGNOSIS — I89.0 LYMPHEDEMA: ICD-10-CM

## 2017-04-07 DIAGNOSIS — I50.30 ACCELERATED HYPERTENSION WITH DIASTOLIC CONGESTIVE HEART FAILURE, NYHA CLASS 3 (HCC): Chronic | ICD-10-CM

## 2017-04-07 DIAGNOSIS — E66.01 MORBID OBESITY WITH BMI OF 40.0-44.9, ADULT (HCC): ICD-10-CM

## 2017-04-07 DIAGNOSIS — I11.0 ACCELERATED HYPERTENSION WITH DIASTOLIC CONGESTIVE HEART FAILURE, NYHA CLASS 3 (HCC): Chronic | ICD-10-CM

## 2017-04-07 DIAGNOSIS — E11.00 UNCONTROLLED TYPE 2 DIABETES MELLITUS WITH HYPEROSMOLARITY WITHOUT COMA, WITH LONG-TERM CURRENT USE OF INSULIN (HCC): Primary | ICD-10-CM

## 2017-04-07 DIAGNOSIS — I50.31 ACUTE DIASTOLIC HEART FAILURE (HCC): ICD-10-CM

## 2017-04-07 DIAGNOSIS — Z79.4 UNCONTROLLED TYPE 2 DIABETES MELLITUS WITH HYPEROSMOLARITY WITHOUT COMA, WITH LONG-TERM CURRENT USE OF INSULIN (HCC): Primary | ICD-10-CM

## 2017-04-07 DIAGNOSIS — I48.91 ATRIAL FIBRILLATION AND FLUTTER (HCC): ICD-10-CM

## 2017-04-07 DIAGNOSIS — I48.92 ATRIAL FIBRILLATION AND FLUTTER (HCC): ICD-10-CM

## 2017-04-07 LAB — GLUCOSE POC: 75 MG/DL

## 2017-04-07 RX ORDER — AMLODIPINE BESYLATE 10 MG/1
10 TABLET ORAL DAILY
Qty: 30 TAB | Refills: 11 | Status: ON HOLD | OUTPATIENT
Start: 2017-04-07 | End: 2017-01-01

## 2017-04-07 NOTE — MR AVS SNAPSHOT
Visit Information Date & Time Provider Department Dept. Phone Encounter #  
 4/7/2017  9:30 AM Ruchi Benites, [address] [id number] Follow-up Instructions Return in about 3 months (around 7/7/2017) for HTN, CHF, DM. Your Appointments 5/10/2017  1:00 PM  
ESTABLISHED PATIENT with Alfreda Leung MD  
[facility]) Appt Note: 3 MO. F/U  
 [address]  
[phone] [address] Upcoming Health Maintenance Date Due DTaP/Tdap/Td series (1 - Tdap) 2/20/1995 MICROALBUMIN Q1 8/28/2016 Pneumococcal 19-64 Highest Risk (2 of 3 - PCV13) 10/14/2016 EYE EXAM RETINAL OR DILATED Q1 4/13/2017 HEMOGLOBIN A1C Q6M 9/26/2017 LIPID PANEL Q1 11/9/2017 FOOT EXAM Q1 12/6/2017 Allergies as of 4/7/2017  Review Complete On: 4/7/2017 By: Ruchi Benites MD  
  
 Severity Noted Reaction Type Reactions Ace Inhibitors  03/02/2016    Cough Current Immunizations  Reviewed on 12/19/2016 Name Date Influenza Vaccine 11/6/2014  4:23 PM  
 Influenza Vaccine (Quad) PF 12/19/2016 11:33 AM  
 Influenza Vaccine Intradermal PF 10/14/2015 Influenza Vaccine PF 2/17/2013  1:11 PM  
 Pneumococcal Polysaccharide (PPSV-23) 10/14/2015,  Deferred (Patient Refused) Not reviewed this visit You Were Diagnosed With   
  
 Codes Comments Uncontrolled type 2 diabetes mellitus with hyperosmolarity without coma, with long-term current use of insulin ([facility] [[email or web address]] 75.)    -  Primary ICD-10-CM: E11.00, Z79.4 ICD-9-CM: 250.22, V58.67 Lymphedema     ICD-10-CM: I89.0 ICD-9-CM: 430.6 Accelerated hypertension with diastolic congestive heart failure, NYHA class 3 (HCC)     ICD-10-CM: I11.0, I50.30 ICD-9-CM: 402.01, 428.30  Morbid obesity with BMI of 40.0-44.9, adult ([facility]     ICD-10-CM: E66.01, Z68.41 
 ICD-9-CM: 278.01, V85.41 Atrial fibrillation and flutter (HCC)     ICD-10-CM: I48.91, I48.92 
ICD-9-CM: 427.31, 427.32 Acute diastolic heart failure (HCC)     ICD-10-CM: I50.31 ICD-9-CM: 428.31 Vitals BP Pulse Temp Resp Height(growth percentile) Weight(growth percentile) (!) 175/93 (BP 1 Location: Left arm, BP Patient Position: Sitting) 70 98.2 °F (36.8 °C) (Oral) 18 6' 2\" (1.88 m) 334 lb (151.5 kg) SpO2 BMI Smoking Status 98% 42.88 kg/m2 Never Smoker Vitals History BMI and BSA Data Body Mass Index Body Surface Area  
 42.88 kg/m 2 2.81 m 2 Preferred Pharmacy Pharmacy Name Phone [facility], [address] [place] [address] [address]. [phone] Your Updated Medication List  
  
   
This list is accurate as of: 4/7/17 10:29 AM.  Always use your most recent med list.  
  
  
  
  
 albuterol 90 mcg/actuation inhaler Commonly known as:  PROAIR HFA Take 2 Puffs by inhalation every four (4) hours as needed for Wheezing. amitriptyline 50 mg tablet Commonly known as:  ELAVIL TAKE ONE TABLET BY MOUTH EVERY EVENING  
  
 amLODIPine 10 mg tablet Commonly known as:  Ever Montrell Take 1 Tab by mouth daily. aspirin 81 mg chewable tablet Take 81 mg by mouth daily. cloNIDine HCl 0.3 mg tablet Commonly known as:  CATAPRES  
TAKE ONE TABLET BY MOUTH TWICE DAILY  
  
 furosemide 40 mg tablet Commonly known as:  LASIX TAKE ONE TABLET BY MOUTH TWICE DAILY FOR leg swelling  
  
 glucose blood VI test strips strip Commonly known as:  TRUETEST TEST STRIPS Use to check sugars 3 times/day HumuLIN N 100 unit/mL injection Generic drug:  insulin NPH  
22 units ac breakfast and dinner  
  
 hydrALAZINE 100 mg tablet Commonly known as:  APRESOLINE  
TAKE ONE TABLET BY MOUTH THREE TIMES DAILY  
  
 insulin lispro protamine/insulin lispro 100 unit/mL (75-25) injection Commonly known as:  HumaLOG Mix 75-25 Take 20U qam and 10U qpm  
  
 labetalol 200 mg tablet Commonly known as:  Nima Dimmer Take 400 mg by mouth three (3) times daily. omeprazole 40 mg capsule Commonly known as:  PRILOSEC Take 40 mg by mouth daily. oxyCODONE IR 30 mg immediate release tablet Commonly known as:  OXY-IR Take 30 mg by mouth two (2) times a day. RENVELA 800 mg Tab tab Generic drug:  sevelamer carbonate TAKE ONE TABLET BY MOUTH THREE TIMES DAILY WITH MEALS Prescriptions Sent to Pharmacy Refills  
 glucose blood VI test strips (TRUETEST TEST STRIPS) strip 11 Sig: Use to check sugars 3 times/day Class: Normal  
 Pharmacy: 1 [place], [place] - [address]. Ph #: [phone]  
 amLODIPine (NORVASC) 10 mg tablet 11 Sig: Take 1 Tab by mouth daily. Class: Normal  
 Pharmacy: 1 [place], [place] - [address]. Ph #: [phone] Route: Oral  
  
We Performed the Following AMB POC GLUCOSE BLOOD, BY GLUCOSE MONITORING DEVICE [88391 CPT(R)] REFERRAL TO LYMPHEDEMA CLINIC [YNY711 Custom] Comments:  
 Please evaluate patient forlymphedema Follow-up Instructions Return in about 3 months (around 7/7/2017) for HTN, CHF, DM. Referral Information Referral ID Referred By Referred To  
  
 [id number] Nikki Toledo Not Available Visits Status Start Date End Date 1 New Request 4/7/17 4/7/18 If your referral has a status of pending review or denied, additional information will be sent to support the outcome of this decision. Introducing \[facility] [place] [facility]\"" & HEALTH SERVICES! [employer] introduces Panelfly patient portal. Now you can access parts of your medical record, email your doctor's office, and request medication refills online. 1. In your internet browser, go to [email or web address]. [email or web address] renal 2. Click on the First Time User? Click Here link in the Sign In box. You will see the New Member Sign Up page. 3. Enter your Hydrobee Access Code exactly as it appears below. You will not need to use this code after youve completed the sign-up process. If you do not sign up before the expiration date, you must request a new code. · [employer]bee Access Code: [email or web address] Expires: 6/23/2017 10:52 PM 
 
4. Enter the last four digits of your Social Security Number (xxxx) and Date of Birth (mm/dd/yyyy) as indicated and click Submit. You will be taken to the next sign-up page. 5. Create a Hydrobee ID. This will be your Hydrobee login ID and cannot be changed, so think of one that is secure and easy to remember. 6. Create a Hydrobee password. You can change your password at any time. 7. Enter your Password Reset Question and Answer. This can be used at a later time if you forget your password. 8. Enter your e-mail address. You will receive e-mail notification when new information is available in [address]. 9. Click Sign Up. You can now view and download portions of your medical record. 10. Click the Download Summary menu link to download a portable copy of your medical information. If you have questions, please visit the Frequently Asked Questions section of the Hydrobee website. Remember, Hydrobee is NOT to be used for urgent needs. For medical emergencies, dial 911. Now available from your iPhone and Android! Please provide this summary of care documentation to your next provider. Your primary care clinician is listed as [address]. If you have any questions after today's visit, please call [phone].

## 2017-04-07 NOTE — PROGRESS NOTES
Nixon Wade is a 36 y.o. male and presents with Hospital Follow Up (3/25/17 [facility])  . His mom  2 wks ago from liver CA. P is grieving heavily and is struggling to maintain his diet and medication regimens. Pt went into hospital a day or so before before his mom's . He went in for elevated BP & CHF exacerb. He has tried to be compliant with diet and meds. No CP, SOB, or edema. The hopsptal was concerned about foul odors coming from his stumps. Pt denies any issues with the stumps and says he was just sweaty. Saw Nila Vargas (ortho) at amputee clinic yesterday and says his stumps are fine. Not interested in having me look at them. Saw [place] (Dr. Prem Garcia) last week. Lymphedema in R thigh is unchanged. BS have been running high but today it looks good. Sees psych (Marja Meigs) twice/month with last OV being 4 days ago. Dr Karley Jackson (Cardiology)- f/u unkn to pt, reminded him it was 5/10/17  Dr Violeta Chavez (renal)- has lab f/u  [place]      Review of Systems  Constitutional: negative for fevers, chills, anorexia and weight loss  Eyes:   negative for visual disturbance and irritation  ENT:   negative for tinnitus,sore throat,nasal congestion,ear pains. hoarseness  Respiratory:  negative for cough, hemoptysis, dyspnea,wheezing  CV:   negative for chest pain, palpitations  GI:   negative for nausea, vomiting, diarrhea, abdominal pain,melena  Endo:               negative for polyuria,polydipsia,polyphagia,heat intolerance  Genitourinary: negative for frequency, dysuria and hematuria  Integument:  negative for rash and pruritus  Hematologic:  negative for easy bruising and gum/nose bleeding  Musculoskel: negative for myalgias, arthralgias, back pain, muscle weakness, joint pain  Neurological:  negative for headaches, dizziness, vertigo, memory problems and gait   Behavl/Psych: positive for feelings of anxiety, depression, mood changes    Past Medical History: Diagnosis Date    Arrhythmia     afib    Asthma     3/2013 last attack    CKD (chronic kidney disease), stage III 2/12/2013    Nephrology: Merced Orona MD    Diabetes [facility])     Gout     Heart failure ([facility]     Hyperlipidemia     Hypertension     Ill-defined condition     Positive PPD, treated 2001    treated 9 months     Past Surgical History:   Procedure Laterality Date    HX BELOW KNEE AMPUTATION Left     due to osteomyelitis    HX HEENT      tonsilectomy    HX HERNIA REPAIR      HX TONSILLECTOMY       Social History     Social History    Marital status:      Spouse name: N/A    Number of children: N/A    Years of education: N/A     Social History Main Topics    Smoking status: Never Smoker    Smokeless tobacco: Never Used    Alcohol use No    Drug use: No    Sexual activity: Not Currently     Partners: Female     Other Topics Concern    None     Social History Narrative         Per conversation 03/26/17    He would like his new Medical  power of  to be his Brother Michael Atkins. (Previously was his Mother Anita Soliz)        Never smoker     Current Outpatient Prescriptions   Medication Sig Dispense Refill    glucose blood VI test strips (TRUETEST TEST STRIPS) strip Use to check sugars 3 times/day 100 Strip 11    amLODIPine (NORVASC) 10 mg tablet Take 1 Tab by mouth daily. 30 Tab 11    cloNIDine HCl (CATAPRES) 0.3 mg tablet TAKE ONE TABLET BY MOUTH TWICE DAILY 60 Tab 5    omeprazole (PRILOSEC) 40 mg capsule Take 40 mg by mouth daily.  labetalol (NORMODYNE) 200 mg tablet Take 400 mg by mouth three (3) times daily.       insulin lispro protamine/insulin lispro (HUMALOG MIX 75-25) 100 unit/mL (75-25) injection Take 20U qam and 10U qpm (Patient taking differently: Take 30 units qam and 10 units qpm) 6 Vial 3    HUMULIN N 100 unit/mL injection 22 units ac breakfast and dinner      albuterol (PROAIR HFA) 90 mcg/actuation inhaler Take 2 Puffs by inhalation every four (4) hours as needed for Wheezing. 1 Inhaler 11    aspirin 81 mg chewable tablet Take 81 mg by mouth daily.  RENVELA 800 mg tab tab TAKE ONE TABLET BY MOUTH THREE TIMES DAILY WITH MEALS 90 Tab 11    furosemide (LASIX) 40 mg tablet TAKE ONE TABLET BY MOUTH TWICE DAILY FOR leg swelling 60 Tab 11    hydrALAZINE (APRESOLINE) 100 mg tablet TAKE ONE TABLET BY MOUTH THREE TIMES DAILY 90 Tab 11    oxyCODONE IR (OXY-IR) 30 mg immediate release tablet Take 30 mg by mouth two (2) times a day.  0    amitriptyline (ELAVIL) 50 mg tablet TAKE ONE TABLET BY MOUTH EVERY EVENING 30 Tab 11     Allergies   Allergen Reactions    Ace Inhibitors Cough       Objective:  Visit Vitals    BP (!) 175/93 (BP 1 Location: Left arm, BP Patient Position: Sitting)    Pulse 70    Temp 98.2 °F (36.8 °C) (Oral)    Resp 18    Ht 6' 2\" (1.88 m)    Wt 334 lb (151.5 kg)    SpO2 98%    BMI 42.88 kg/m2     Physical Exam:   General appearance - alert, well appearing, and in no distress  Mental status - alert, oriented to person, place, and time  Chest - clear to auscultation, no wheezes, rales or rhonchi, symmetric air entry   Heart - normal rate, regular rhythm, normal S1, S2, no murmurs, rubs, clicks or gallops   Abdomen - soft, nontender, nondistended, no masses or organomegaly  Lymph- no adenopathy palpable  Ext-peripheral pulses normal, no pedal edema, no clubbing or cyanosis  Skin-Warm and dry. no hyperpigmentation, vitiligo, or suspicious lesions  Neuro -alert, oriented, normal speech, no focal findings or movement disorder noted      Results for orders placed or performed in visit on 04/07/17   AMB POC GLUCOSE BLOOD, BY GLUCOSE MONITORING DEVICE   Result Value Ref Range    Glucose POC 75 mg/dL       Assessment/Plan:    ICD-10-CM ICD-9-CM    1.  Uncontrolled type 2 diabetes mellitus with hyperosmolarity without coma, with long-term current use of insulin ([facility]) E11.00 250.22 AMB POC GLUCOSE BLOOD, BY GLUCOSE MONITORING DEVICE    Z79.4 V58.67    2. Lymphedema I89.0 457.1 REFERRAL TO LYMPHEDEMA CLINIC   3. Accelerated hypertension with diastolic congestive heart failure, NYHA class 3 (HCC) I11.0 402.01     I50.30 428.30    4. Morbid obesity with BMI of 40.0-44.9, adult ([facility] E66.01 278.01     Z68.41 V85.41    5. Atrial fibrillation and flutter (HCC) I48.91 427.31     I48.92 427.32    6. Acute diastolic heart failure (HCC) I50.31 428.31      Orders Placed This Encounter    REFERRAL TO LYMPHEDEMA CLINIC     Referral Priority:   Routine     Referral Type:   Consultation     Referral Reason:   Specialty Services Required    AMB POC GLUCOSE BLOOD, BY GLUCOSE MONITORING DEVICE    glucose blood VI test strips (TRUETEST TEST STRIPS) strip     Sig: Use to check sugars 3 times/day     Dispense:  100 Strip     Refill:  11    amLODIPine (NORVASC) 10 mg tablet     Sig: Take 1 Tab by mouth daily. Dispense:  30 Tab     Refill:  11     S/p HTN urgency & CHF exacerbation- Reviewed and summarized hosp records. Sx (SOB) resolved. Compliance is questionable. Active grief and personality issues which make compliance unlikely for pt  DM- BS today looks good but A1C is elevated  A. Fibrillation- pt should remain off Eliquis due to past problems with bleeding. BKA- pt refuses to show me stumps today and says one of his other docs checked them yesterday. CKD- Dr. Myranda Skinner is following      Follow-up Disposition:  Return in about 3 months (around 7/7/2017) for HTN, CHF, DM.

## 2017-04-07 NOTE — PROGRESS NOTES
1. Have you been to the ER, urgent care clinic since your last visit? Hospitalized since your last visit? Yes When: 3/25/17 [facility] hypertensive/CHF    2. Have you seen or consulted any other health care providers outside of the [address] [place] [address] since your last visit? Include any pap smears or colon screening. No.  Blood glucose verbal order DR. SAUCEOD/TIARA Matamoros. Had pain med last night.

## 2017-04-21 ENCOUNTER — DOCUMENTATION ONLY (OUTPATIENT)
Dept: CARDIOLOGY CLINIC | Age: 42
End: 2017-04-21

## 2017-04-21 NOTE — PROGRESS NOTES
Medical records request from [address] [place] [address], was faxed to [facility].    See request in

## 2017-05-02 ENCOUNTER — TELEPHONE (OUTPATIENT)
Dept: CARDIOLOGY CLINIC | Age: 42
End: 2017-05-02

## 2017-05-02 NOTE — TELEPHONE ENCOUNTER
Received a call from patient regarding medical records request from [address] [place] [address]. Patient stated \"he received a letter from [facility] stating Gwen Newellccasin was non longer a patient\". Patient stated Gwen Krueger meant to request that records come from 's office instead. \" Patient was informed that the first request from  was faxed to [facility] on 4/21/17 and will be faxed again to [facility] requesting that they fax cardiac records only from Dr. Ashok Gibson. Patient voiced understanding.

## 2017-07-03 ENCOUNTER — HOSPITAL ENCOUNTER (OUTPATIENT)
Age: 42
Setting detail: OBSERVATION
Discharge: HOME | End: 2017-07-05
Attending: EMERGENCY MEDICINE | Admitting: EMERGENCY MEDICINE
Payer: COMMERCIAL

## 2017-07-03 ENCOUNTER — APPOINTMENT (OUTPATIENT)
Dept: GENERAL RADIOLOGY | Age: 42
End: 2017-07-03
Attending: EMERGENCY MEDICINE
Payer: COMMERCIAL

## 2017-07-03 DIAGNOSIS — I50.20 SYSTOLIC CONGESTIVE HEART FAILURE, UNSPECIFIED CONGESTIVE HEART FAILURE CHRONICITY: Primary | ICD-10-CM

## 2017-07-03 LAB
ERYTHROCYTE [DISTWIDTH] IN BLOOD BY AUTOMATED COUNT: 15.8 % (ref 11.5–14.5)
HCT VFR BLD AUTO: 30.1 % (ref 36.6–50.3)
HGB BLD-MCNC: 9.2 G/DL (ref 12.1–17)
MCH RBC QN AUTO: 25.9 PG (ref 26–34)
MCHC RBC AUTO-ENTMCNC: 30.6 G/DL (ref 30–36.5)
MCV RBC AUTO: 84.8 FL (ref 80–99)
PLATELET # BLD AUTO: 171 K/UL (ref 150–400)
RBC # BLD AUTO: 3.55 M/UL (ref 4.1–5.7)
WBC # BLD AUTO: 8.8 K/UL (ref 4.1–11.1)

## 2017-07-03 PROCEDURE — 87040 BLOOD CULTURE FOR BACTERIA: CPT | Performed by: EMERGENCY MEDICINE

## 2017-07-03 PROCEDURE — 82550 ASSAY OF CK (CPK): CPT | Performed by: EMERGENCY MEDICINE

## 2017-07-03 PROCEDURE — 85027 COMPLETE CBC AUTOMATED: CPT | Performed by: EMERGENCY MEDICINE

## 2017-07-03 PROCEDURE — 71020 XR CHEST PA LAT: CPT

## 2017-07-03 PROCEDURE — 99285 EMERGENCY DEPT VISIT HI MDM: CPT

## 2017-07-03 PROCEDURE — 83880 ASSAY OF NATRIURETIC PEPTIDE: CPT | Performed by: EMERGENCY MEDICINE

## 2017-07-03 PROCEDURE — 93005 ELECTROCARDIOGRAM TRACING: CPT

## 2017-07-03 PROCEDURE — 36415 COLL VENOUS BLD VENIPUNCTURE: CPT | Performed by: EMERGENCY MEDICINE

## 2017-07-03 PROCEDURE — 80048 BASIC METABOLIC PNL TOTAL CA: CPT | Performed by: EMERGENCY MEDICINE

## 2017-07-03 PROCEDURE — 84484 ASSAY OF TROPONIN QUANT: CPT | Performed by: EMERGENCY MEDICINE

## 2017-07-03 PROCEDURE — 83036 HEMOGLOBIN GLYCOSYLATED A1C: CPT | Performed by: HOSPITALIST

## 2017-07-03 NOTE — IP AVS SNAPSHOT
[address] [facility] 
 
 
 [address] Patient: Zechariah Garza MRN: [id number] ECC:5/25/[birth year]015 You are allergic to the following Allergen Reactions Ace Inhibitors Cough Recent Documentation Height Weight BMI Smoking Status 1.88 m 150.7 kg 42.66 kg/m2 Never Smoker Unresulted Labs Order Current Status CULTURE, BLOOD Preliminary result CULTURE, BLOOD Preliminary result Emergency Contacts Name Discharge Info Relation Home Work Mobile BereketJigarbessie GREEN CAREGIVER [3] Other Relative [6] [phone] About your hospitalization You were admitted on:  July 4, 2017 You last received care in the:  [address] CARE You were discharged on:  July 5, 2017 Unit phone number:  [phone] Why you were hospitalized Your primary diagnosis was:  Acute Hypoxemic Respiratory Failure (Hcc) Your diagnoses also included:  Chf (Congestive Heart Failure) (Hcc), Chronic Renal Failure, Stage 4 (Severe) (Hcc) Providers Seen During Your Hospitalizations Provider Role Specialty Primary office phone Michelle Polanco MD Attending Provider Emergency Medicine [phone] Karolyn Hernandez MD Attending Provider Emergency Medicine [phone] Violetta Villagomez MD Attending Provider Internal Medicine [phone] Your Primary Care Physician (PCP) Primary Care Physician Office Phone Office Fax [address] [phone] Follow-up Information Follow up With Details Comments Contact Info Blanca Trent MD Go on 7/7/2017 Your appointment is scheduled for 7/7/17 at 10:30am. [address] [facility] [address] [facility] [address] 
[phone] Ragini Gomez MD Go on 7/5/2017 Your appointment is scheduled for 7/5/17 at 2:45pm. [address] [facility] [facility] [address] [place] [address] Avenue 
[phone] Clive Covarrubias MD Go on 8/1/2017 Your appointment is scheduled for 8/1/17 at [address] [facility] [address] 
[phone] Your Appointments Wednesday July 05, 2017  2:40 PM EDT  
ESTABLISHED PATIENT with Tho Yepez MD  
[facility]) [address]  
[phone] Friday July 07, 2017 10:30 AM EDT ROUTINE CARE with Kyrie Gibson MD  
[facility] [address] [facility] ([facility]) [address] [facility] [place] [id number]  
[phone] Current Discharge Medication List  
  
CONTINUE these medications which have CHANGED Dose & Instructions Dispensing Information Comments Morning Noon Evening Bedtime  
 insulin lispro protamine/insulin lispro 100 unit/mL (75-25) injection Commonly known as:  HumaLOG Mix 75-25 What changed:  additional instructions Your last dose was: Your next dose is: Take 20U qam and 10U qpm  
 Quantity:  6 Vial  
Refills:  3  
     
   
   
   
  
 oxyCODONE IR 15 mg immediate release tablet Commonly known as:  OXY-IR What changed:  Another medication with the same name was removed. Continue taking this medication, and follow the directions you see here. Your last dose was: Your next dose is:    
   
   
 Dose:  15 mg Take 15 mg by mouth every eight (8) hours as needed. Refills:  0 CONTINUE these medications which have NOT CHANGED Dose & Instructions Dispensing Information Comments Morning Noon Evening Bedtime  
 albuterol 90 mcg/actuation inhaler Commonly known as:  PROAIR HFA Your last dose was: Your next dose is:    
   
   
 Dose:  2 Puff Take 2 Puffs by inhalation every four (4) hours as needed for Wheezing. Quantity:  1 Inhaler Refills:  11  
     
   
   
   
  
 amitriptyline 50 mg tablet Commonly known as:  ELAVIL Your last dose was: Your next dose is: TAKE ONE TABLET BY MOUTH EVERY EVENING Quantity:  30 Tab Refills:  11 amLODIPine 10 mg tablet Commonly known as:  Kenji Whiting Your last dose was: Your next dose is:    
   
   
 Dose:  10 mg Take 1 Tab by mouth daily. Quantity:  30 Tab Refills:  11  
     
   
   
   
  
 aspirin 81 mg chewable tablet Your last dose was: Your next dose is:    
   
   
 Dose:  81 mg Take 81 mg by mouth daily. Refills:  0  
     
   
   
   
  
 cloNIDine HCl 0.3 mg tablet Commonly known as:  CATAPRES Your last dose was: Your next dose is: TAKE ONE TABLET BY MOUTH TWICE DAILY Quantity:  60 Tab Refills:  5  
     
   
   
   
  
 furosemide 40 mg tablet Commonly known as:  LASIX Your last dose was: Your next dose is: TAKE ONE TABLET BY MOUTH TWICE DAILY FOR leg swelling Quantity:  60 Tab Refills:  11  
     
   
   
   
  
 glucose blood VI test strips strip Commonly known as:  TRUETEST TEST STRIPS Your last dose was: Your next dose is:    
   
   
 Use to check sugars 3 times/day Quantity:  100 Strip Refills:  11  
     
   
   
   
  
 hydrALAZINE 100 mg tablet Commonly known as:  APRESOLINE Your last dose was: Your next dose is: TAKE ONE TABLET BY MOUTH THREE TIMES DAILY Quantity:  90 Tab Refills:  11  
     
   
   
   
  
 labetalol 200 mg tablet Commonly known as:  Stefany Landry Your last dose was: Your next dose is:    
   
   
 Dose:  400 mg Take 400 mg by mouth three (3) times daily. Refills:  0  
     
   
   
   
  
 omeprazole 40 mg capsule Commonly known as:  PRILOSEC Your last dose was: Your next dose is:    
   
   
 Dose:  40 mg Take 40 mg by mouth daily. Refills:  0 RENVELA 800 mg Tab tab Generic drug:  sevelamer carbonate Your last dose was: Your next dose is: TAKE ONE TABLET BY MOUTH THREE TIMES DAILY WITH MEALS Quantity:  90 Tab Refills:  11 Discharge Instructions None Discharge Instructions Attachments/References HEART FAILURE (ENGLISH) Discharge Orders None [employer] [employer] [place] Announcement We are excited to announce that we are making your provider's discharge notes available to you in InsideMaps. You will see these notes when they are completed and signed by the physician that discharged you from your recent hospital stay. If you have any questions or concerns about any information you see in InsideMaps, please call the Health Information Department where you were seen or reach out to your Primary Care Provider for more information about your plan of care. Introducing [facility]! [employer] introduces InsideMaps patient portal. Now you can access parts of your medical record, email your doctor's office, and request medication refills online. 1. In your internet browser, go to [email or web address] 2. Click on the First Time User? Click Here link in the Sign In box. You will see the New Member Sign Up page. 3. Enter your InsideMaps Access Code exactly as it appears below. You will not need to use this code after youve completed the sign-up process. If you do not sign up before the expiration date, you must request a new code. · InsideMaps Access Code: [email or web address] Expires: 10/3/2017  5:55 AM 
 
4. Enter the last four digits of your Social Security Number (xxxx) and Date of Birth (mm/dd/yyyy) as indicated and click Submit. You will be taken to the next sign-up page. 5. Create a InsideMaps ID. This will be your InsideMaps login ID and cannot be changed, so think of one that is secure and easy to remember. 6. Create a Orderlord password. You can change your password at any time. 7. Enter your Password Reset Question and Answer. This can be used at a later time if you forget your password. 8. Enter your e-mail address. You will receive e-mail notification when new information is available in [address] 9. Click Sign Up. You can now view and download portions of your medical record. 10. Click the Download Summary menu link to download a portable copy of your medical information. If you have questions, please visit the Frequently Asked Questions section of the Orderlord website. Remember, Orderlord is NOT to be used for urgent needs. For medical emergencies, dial 911. Now available from your iPhone and Android! General Information Please provide this summary of care documentation to your next provider. Patient Signature:  ____________________________________________________________ Date:  ____________________________________________________________  
  
Alejandro Hammond Provider Signature:  ____________________________________________________________ Date:  ____________________________________________________________ More Information Heart Failure: Care Instructions Your Care Instructions Heart failure occurs when your heart does not pump as much blood as the body needs. Failure does not mean that the heart has stopped pumping but rather that it is not pumping as well as it should. Over time, this causes fluid buildup in your lungs and other parts of your body. Fluid buildup can cause shortness of breath, fatigue, swollen ankles, and other problems. By taking medicines regularly, reducing sodium (salt) in your diet, checking your weight every day, and making lifestyle changes, you can feel better and live longer. Follow-up care is a key part of your treatment and safety.  Be sure to make and go to all appointments, and call your doctor if you are having problems. It's also a good idea to know your test results and keep a list of the medicines you take. How can you care for yourself at home? Medicines · Be safe with medicines. Take your medicines exactly as prescribed. Call your doctor if you think you are having a problem with your medicine. · Do not take any vitamins, over-the-counter medicine, or herbal products without talking to your doctor first. Adline Rim not take ibuprofen (Advil or Motrin) and naproxen (Aleve) without talking to your doctor first. They could make your heart failure worse. · You may be taking some of the following medicine. ¨ Beta-blockers can slow heart rate, decrease blood pressure, and improve your condition. Taking a beta-blocker may lower your chance of needing to be hospitalized. ¨ Angiotensin-converting enzyme inhibitors (ACEIs) reduce the heart's workload, lower blood pressure, and reduce swelling. Taking an ACEI may lower your chance of needing to be hospitalized again. ¨ Angiotensin II receptor blockers (ARBs) work like ACEIs. Your doctor may prescribe them instead of ACEIs. ¨ Diuretics, also called water pills, reduce swelling. ¨ Potassium supplements replace this important mineral, which is sometimes lost with diuretics. ¨ Aspirin and other blood thinners prevent blood clots, which can cause a stroke or heart attack. You will get more details on the specific medicines your doctor prescribes. Diet · Your doctor may suggest that you limit sodium to 2,000 milligrams (mg) a day or less. That is less than 1 teaspoon of salt a day, including all the salt you eat in cooking or in packaged foods. People get most of their sodium from processed foods. Fast food and restaurant meals also tend to be very high in sodium. · Ask your doctor how much liquid you can drink each day. You may have to limit liquids. Weight · Weigh yourself without clothing at the same time each day. Record your weight. Call your doctor if you have a sudden weight gain, such as more than 2 to 3 pounds in a day or 5 pounds in a week. (Your doctor may suggest a different range of weight gain.) A sudden weight gain may mean that your heart failure is getting worse. Activity level · Start light exercise (if your doctor says it is okay). Even if you can only do a small amount, exercise will help you get stronger, have more energy, and manage your weight and your stress. Walking is an easy way to get exercise. Start out by walking a little more than you did before. Bit by bit, increase the amount you walk. · When you exercise, watch for signs that your heart is working too hard. You are pushing yourself too hard if you cannot talk while you are exercising. If you become short of breath or dizzy or have chest pain, stop, sit down, and rest. 
· If you feel \"wiped out\" the day after you exercise, walk slower or for a shorter distance until you can work up to a better pace. · Get enough rest at night. Sleeping with 1 or 2 pillows under your upper body and head may help you breathe easier. Lifestyle changes · Do not smoke. Smoking can make a heart condition worse. If you need help quitting, talk to your doctor about stop-smoking programs and medicines. These can increase your chances of quitting for good. Quitting smoking may be the most important step you can take to protect your heart. · Limit alcohol to 2 drinks a day for men and 1 drink a day for women. Too much alcohol can cause health problems. · Avoid getting sick from colds and the flu. Get a pneumococcal vaccine shot. If you have had one before, ask your doctor whether you need another dose. Get a flu shot each year. If you must be around people with colds or the flu, wash your hands often. When should you call for help? Call 911 if you have symptoms of sudden heart failure such as: · You have severe trouble breathing. · You cough up pink, foamy mucus. · You have a new irregular or rapid heartbeat. Call your doctor now or seek immediate medical care if: 
· You have new or increased shortness of breath. · You are dizzy or lightheaded, or you feel like you may faint. · You have sudden weight gain, such as more than 2 to 3 pounds in a day or 5 pounds in a week. (Your doctor may suggest a different range of weight gain.) · You have increased swelling in your legs, ankles, or feet. · You are suddenly so tired or weak that you cannot do your usual activities. Watch closely for changes in your health, and be sure to contact your doctor if: 
· You develop new symptoms. Where can you learn more? Go to http://amparo-ramon.info/. Enter V704 in the search box to learn more about \"Heart Failure: Care Instructions. \" Current as of: April 3, 2017 Content Version: 11.3 © 7484-6956 [employer]. Care instructions adapted under license by [employer] (which disclaims liability or warranty for this information). If you have questions about a medical condition or this instruction, always ask your healthcare professional. [place] [address] any warranty or liability for your use of this information.

## 2017-07-04 PROBLEM — N18.4 CHRONIC RENAL FAILURE, STAGE 4 (SEVERE) (HCC): Status: ACTIVE | Noted: 2017-07-04

## 2017-07-04 LAB
ANION GAP BLD CALC-SCNC: 10 MMOL/L (ref 5–15)
BNP SERPL-MCNC: 283 PG/ML (ref 0–125)
BUN SERPL-MCNC: 62 MG/DL (ref 6–20)
BUN/CREAT SERPL: 16 (ref 12–20)
CALCIUM SERPL-MCNC: 8.2 MG/DL (ref 8.5–10.1)
CHLORIDE SERPL-SCNC: 108 MMOL/L (ref 97–108)
CK MB CFR SERPL CALC: 0.4 % (ref 0–2.5)
CK MB SERPL-MCNC: 3.6 NG/ML (ref 5–25)
CK SERPL-CCNC: 805 U/L (ref 39–308)
CO2 SERPL-SCNC: 25 MMOL/L (ref 21–32)
CREAT SERPL-MCNC: 3.9 MG/DL (ref 0.7–1.3)
EST. AVERAGE GLUCOSE BLD GHB EST-MCNC: 243 MG/DL
GLUCOSE BLD STRIP.AUTO-MCNC: 137 MG/DL (ref 65–100)
GLUCOSE BLD STRIP.AUTO-MCNC: 178 MG/DL (ref 65–100)
GLUCOSE BLD STRIP.AUTO-MCNC: 178 MG/DL (ref 65–100)
GLUCOSE BLD STRIP.AUTO-MCNC: 255 MG/DL (ref 65–100)
GLUCOSE SERPL-MCNC: 173 MG/DL (ref 65–100)
HBA1C MFR BLD: 10.1 % (ref 4.2–6.3)
POTASSIUM SERPL-SCNC: 4.1 MMOL/L (ref 3.5–5.1)
SERVICE CMNT-IMP: ABNORMAL
SODIUM SERPL-SCNC: 143 MMOL/L (ref 136–145)
TROPONIN I SERPL-MCNC: <0.04 NG/ML

## 2017-07-04 PROCEDURE — 74011636637 HC RX REV CODE- 636/637: Performed by: EMERGENCY MEDICINE

## 2017-07-04 PROCEDURE — 74011000250 HC RX REV CODE- 250: Performed by: EMERGENCY MEDICINE

## 2017-07-04 PROCEDURE — 74011250636 HC RX REV CODE- 250/636: Performed by: EMERGENCY MEDICINE

## 2017-07-04 PROCEDURE — 99218 HC RM OBSERVATION: CPT

## 2017-07-04 PROCEDURE — 96372 THER/PROPH/DIAG INJ SC/IM: CPT

## 2017-07-04 PROCEDURE — 96374 THER/PROPH/DIAG INJ IV PUSH: CPT

## 2017-07-04 PROCEDURE — 74011250637 HC RX REV CODE- 250/637: Performed by: EMERGENCY MEDICINE

## 2017-07-04 PROCEDURE — 82962 GLUCOSE BLOOD TEST: CPT

## 2017-07-04 RX ORDER — OXYCODONE HYDROCHLORIDE 5 MG/1
15 TABLET ORAL
Status: DISCONTINUED | OUTPATIENT
Start: 2017-07-04 | End: 2017-07-05 | Stop reason: HOSPADM

## 2017-07-04 RX ORDER — FUROSEMIDE 40 MG/1
40 TABLET ORAL DAILY
Status: DISCONTINUED | OUTPATIENT
Start: 2017-07-04 | End: 2017-07-04

## 2017-07-04 RX ORDER — HEPARIN SODIUM 5000 [USP'U]/ML
5000 INJECTION, SOLUTION INTRAVENOUS; SUBCUTANEOUS EVERY 12 HOURS
Status: DISCONTINUED | OUTPATIENT
Start: 2017-07-04 | End: 2017-07-05 | Stop reason: HOSPADM

## 2017-07-04 RX ORDER — SODIUM CHLORIDE 0.9 % (FLUSH) 0.9 %
5-10 SYRINGE (ML) INJECTION AS NEEDED
Status: DISCONTINUED | OUTPATIENT
Start: 2017-07-04 | End: 2017-07-05 | Stop reason: HOSPADM

## 2017-07-04 RX ORDER — OXYCODONE HYDROCHLORIDE 5 MG/1
30 TABLET ORAL
Status: DISCONTINUED | OUTPATIENT
Start: 2017-07-04 | End: 2017-07-04

## 2017-07-04 RX ORDER — ALBUTEROL SULFATE 90 UG/1
2 AEROSOL, METERED RESPIRATORY (INHALATION)
Status: DISCONTINUED | OUTPATIENT
Start: 2017-07-04 | End: 2017-07-05 | Stop reason: HOSPADM

## 2017-07-04 RX ORDER — OXYCODONE HYDROCHLORIDE 15 MG/1
15 TABLET ORAL
COMMUNITY
End: 2017-01-01

## 2017-07-04 RX ORDER — ACETAMINOPHEN 325 MG/1
650 TABLET ORAL
Status: DISCONTINUED | OUTPATIENT
Start: 2017-07-04 | End: 2017-07-05 | Stop reason: HOSPADM

## 2017-07-04 RX ORDER — CLONIDINE HYDROCHLORIDE 0.1 MG/1
0.3 TABLET ORAL 2 TIMES DAILY
Status: DISCONTINUED | OUTPATIENT
Start: 2017-07-04 | End: 2017-07-05 | Stop reason: HOSPADM

## 2017-07-04 RX ORDER — FUROSEMIDE 40 MG/1
40 TABLET ORAL 2 TIMES DAILY
Status: DISCONTINUED | OUTPATIENT
Start: 2017-07-04 | End: 2017-07-05 | Stop reason: HOSPADM

## 2017-07-04 RX ORDER — IPRATROPIUM BROMIDE AND ALBUTEROL SULFATE 2.5; .5 MG/3ML; MG/3ML
3 SOLUTION RESPIRATORY (INHALATION)
Status: DISCONTINUED | OUTPATIENT
Start: 2017-07-04 | End: 2017-07-05 | Stop reason: HOSPADM

## 2017-07-04 RX ORDER — OXYCODONE HYDROCHLORIDE 5 MG/1
30 TABLET ORAL 2 TIMES DAILY
Status: DISCONTINUED | OUTPATIENT
Start: 2017-07-04 | End: 2017-07-04

## 2017-07-04 RX ORDER — AMLODIPINE BESYLATE 5 MG/1
10 TABLET ORAL DAILY
Status: DISCONTINUED | OUTPATIENT
Start: 2017-07-04 | End: 2017-07-05 | Stop reason: HOSPADM

## 2017-07-04 RX ORDER — PANTOPRAZOLE SODIUM 40 MG/1
40 TABLET, DELAYED RELEASE ORAL DAILY
Status: DISCONTINUED | OUTPATIENT
Start: 2017-07-04 | End: 2017-07-05 | Stop reason: HOSPADM

## 2017-07-04 RX ORDER — LABETALOL HYDROCHLORIDE 5 MG/ML
20 INJECTION, SOLUTION INTRAVENOUS
Status: DISCONTINUED | OUTPATIENT
Start: 2017-07-04 | End: 2017-07-05 | Stop reason: HOSPADM

## 2017-07-04 RX ORDER — LABETALOL 200 MG/1
400 TABLET, FILM COATED ORAL 3 TIMES DAILY
Status: DISCONTINUED | OUTPATIENT
Start: 2017-07-04 | End: 2017-07-05 | Stop reason: HOSPADM

## 2017-07-04 RX ORDER — AMITRIPTYLINE HYDROCHLORIDE 50 MG/1
50 TABLET, FILM COATED ORAL
Status: DISCONTINUED | OUTPATIENT
Start: 2017-07-04 | End: 2017-07-05 | Stop reason: HOSPADM

## 2017-07-04 RX ORDER — ONDANSETRON 2 MG/ML
4 INJECTION INTRAMUSCULAR; INTRAVENOUS
Status: DISCONTINUED | OUTPATIENT
Start: 2017-07-04 | End: 2017-07-05 | Stop reason: HOSPADM

## 2017-07-04 RX ORDER — HYDRALAZINE HYDROCHLORIDE 50 MG/1
100 TABLET, FILM COATED ORAL EVERY 8 HOURS
Status: DISCONTINUED | OUTPATIENT
Start: 2017-07-04 | End: 2017-07-05 | Stop reason: HOSPADM

## 2017-07-04 RX ORDER — SEVELAMER HYDROCHLORIDE 400 MG/1
800 TABLET, FILM COATED ORAL 3 TIMES DAILY
Status: DISCONTINUED | OUTPATIENT
Start: 2017-07-04 | End: 2017-07-05 | Stop reason: HOSPADM

## 2017-07-04 RX ORDER — GUAIFENESIN 100 MG/5ML
81 LIQUID (ML) ORAL DAILY
Status: DISCONTINUED | OUTPATIENT
Start: 2017-07-04 | End: 2017-07-05 | Stop reason: HOSPADM

## 2017-07-04 RX ORDER — SODIUM CHLORIDE 0.9 % (FLUSH) 0.9 %
5-10 SYRINGE (ML) INJECTION EVERY 8 HOURS
Status: DISCONTINUED | OUTPATIENT
Start: 2017-07-04 | End: 2017-07-05 | Stop reason: HOSPADM

## 2017-07-04 RX ADMIN — LABETALOL HCL 400 MG: 200 TABLET, FILM COATED ORAL at 21:36

## 2017-07-04 RX ADMIN — Medication 10 ML: at 02:40

## 2017-07-04 RX ADMIN — HYDRALAZINE HYDROCHLORIDE 100 MG: 50 TABLET, FILM COATED ORAL at 05:28

## 2017-07-04 RX ADMIN — INSULIN LISPRO 10 UNITS: 100 INJECTION, SUSPENSION SUBCUTANEOUS at 18:08

## 2017-07-04 RX ADMIN — AMLODIPINE BESYLATE 10 MG: 5 TABLET ORAL at 09:01

## 2017-07-04 RX ADMIN — HEPARIN SODIUM 5000 UNITS: 5000 INJECTION, SOLUTION INTRAVENOUS; SUBCUTANEOUS at 02:39

## 2017-07-04 RX ADMIN — FUROSEMIDE 40 MG: 40 TABLET ORAL at 18:08

## 2017-07-04 RX ADMIN — HYDRALAZINE HYDROCHLORIDE 100 MG: 50 TABLET, FILM COATED ORAL at 02:39

## 2017-07-04 RX ADMIN — RENAGEL 800 MG: 400 TABLET ORAL at 09:03

## 2017-07-04 RX ADMIN — HYDRALAZINE HYDROCHLORIDE 100 MG: 50 TABLET, FILM COATED ORAL at 21:36

## 2017-07-04 RX ADMIN — LABETALOL HCL 400 MG: 200 TABLET, FILM COATED ORAL at 09:01

## 2017-07-04 RX ADMIN — INSULIN LISPRO 30 UNITS: 100 INJECTION, SUSPENSION SUBCUTANEOUS at 09:00

## 2017-07-04 RX ADMIN — FUROSEMIDE 40 MG: 40 TABLET ORAL at 09:00

## 2017-07-04 RX ADMIN — ASPIRIN 81 MG: 81 TABLET, CHEWABLE ORAL at 09:01

## 2017-07-04 RX ADMIN — AMITRIPTYLINE HYDROCHLORIDE 50 MG: 50 TABLET, FILM COATED ORAL at 21:36

## 2017-07-04 RX ADMIN — LABETALOL HCL 400 MG: 200 TABLET, FILM COATED ORAL at 15:43

## 2017-07-04 RX ADMIN — RENAGEL 800 MG: 400 TABLET ORAL at 21:36

## 2017-07-04 RX ADMIN — CLONIDINE HYDROCHLORIDE 0.3 MG: 0.1 TABLET ORAL at 09:00

## 2017-07-04 RX ADMIN — Medication 10 ML: at 15:44

## 2017-07-04 RX ADMIN — CLONIDINE HYDROCHLORIDE 0.3 MG: 0.1 TABLET ORAL at 22:45

## 2017-07-04 RX ADMIN — HEPARIN SODIUM 5000 UNITS: 5000 INJECTION, SOLUTION INTRAVENOUS; SUBCUTANEOUS at 15:43

## 2017-07-04 RX ADMIN — LABETALOL HYDROCHLORIDE 20 MG: 5 INJECTION, SOLUTION INTRAVENOUS at 03:28

## 2017-07-04 RX ADMIN — RENAGEL 800 MG: 400 TABLET ORAL at 15:43

## 2017-07-04 RX ADMIN — Medication 10 ML: at 05:29

## 2017-07-04 RX ADMIN — AMITRIPTYLINE HYDROCHLORIDE 50 MG: 50 TABLET, FILM COATED ORAL at 02:39

## 2017-07-04 RX ADMIN — HYDRALAZINE HYDROCHLORIDE 100 MG: 50 TABLET, FILM COATED ORAL at 15:43

## 2017-07-04 RX ADMIN — PANTOPRAZOLE SODIUM 40 MG: 40 TABLET, DELAYED RELEASE ORAL at 09:01

## 2017-07-04 NOTE — PROGRESS NOTES
Care Management Interventions  PCP Verified by CM: Yes Lamberto Duncan- will update chart.)  Palliative Care Consult (Criteria: CHF and RRAT>21): No  Reason for No Palliative Care Consult: Other (see comment) (N/A)  Mode of Transport at Discharge: Other (see comment) (Emiliano Lindsay)  Transition of Care Consult (CM Consult): Discharge Planning  Current Support Network: Lives Alone  Confirm Follow Up Transport: Family  Plan discussed with Pt/Family/Caregiver: Yes  Discharge Location  Discharge Placement: Home with outpatient services     RRAT Score: 18    Initial Assessment: Pt is a 35 yo AAM admitted under Observation for CHF. CM met with the patient to discuss discharge planning and review facesheet to verify demographics. Patient is single, employed and lives alone. Patient reports that his mary Lindsay ([phone]) is supportive both emotionally and physically. Patient has not consented for CM to discuss discharge plans with anyone. Patient will be transported home by his [address] [place] [address] Way. Observation status reviewed with patient. Signed copy placed in patients chart to be scanned to medical records. Patient has HKP. Patient PCP is Lamberto Duncan MD. Patient has consented for CM to arrange PCP appointment and any other follow-up appointments as needed. Patient denies having any active [address] but is on O2 at home and uses a walker to assist with ambulating. Patient not able to recall the name of the company providing his oxygen. Patient states he fills his prescriptions at [facility] [address]. Patient denies need for assistance with medication at this time. Patient has decline need for referral for Mental Health or SA treatment. CM will continue to follow for further discharge planning.       Emergency Contact: Spouse: Sanna HansonLyefn-huireip-[phone]  Pertinent Medical Hx: (See H & P)  PCP/Specialists: Lamberto Duncan [phone]  Community Services: None reported  DME: O2, Cam    Moderate Risk Care Transition Plan:  1. [facility], SNF, acute rehab, community care coordination of resources. Patient will need [facility] referral for CHF. Will continue to assess for any additional needs. 2. Involve patient/caregiver in assessment, planning, education and implement of intervention. Discussed patient's need for PCP follow up, patient agreed to have CM make arrangements. Patient allowed to ask questions. None voiced at this time. CM to continue to participate in IDT rounds and educate patient as needed. CM will continue to discuss discharge needs upon discharge. 3. CM daily patient care huddles/interdisciplinary rounds. Rounded with IDT  4. PCP/Specialist appointment within 5  7 days made prior to discharge. Pending. MD office closed due to holiday. CM will follow-up once practice reopens. 5. Medication reconciliation Silvia Yan completed by MD  6. Handoff to [facility] Nurse Navigator or PCP practice. Continuing care paperwork will be faxed to PCP at discharge.     Jennifer Sweeney RN  [phone]

## 2017-07-04 NOTE — H&P
Hospitalist Admission Note    NAME: Luis Miguel Espinoza   :  1975   MRN:  [id number]     Date/Time:  2017 9:40 AM    Patient PCP: May Lima MD  ________________________________________________________________________    My assessment of this patient's clinical condition and my plan of care is as follows. Assessment / Plan:    Acute on chronic diastolic HF: POA  Likely due to accelerated HTN due to non compliance  Pt on home oxygen but does not use on continuous basis  RA sats on presentation 88-89%  Pro bnp 283, somewhat  elevated in setting of CKD, likely has no significance, better than previous one  -strict I and O  -Daily weight  -on diuretics  -Cardiology consult on board  Echo  : Systolic function was vigorous by visual assessment. Ejection fraction was estimated to be 70 %. There were no regional wall motion abnormalities. Wall thickness was moderately to markedly increased. There was moderate concentric hypertrophy. There was no asymmetric hypertrophy. Mass was definitely increased.  Features were consistent with a pseudonormal left ventricular filling pattern, with  concomitant abnormal relaxation and increased filling pressure (grade 2 diastolic dysfunction).     HTN: POA  Poorly controlled, likley from dietary and medication non compliance     DM: POA  hba1c 12.4 in 2017, will recheck  Cont insulin    CKD stage 4: POA  stable     Paroxysmal AFib  -was on eliquis in past but apparently d/eben because of bleeding issues    AOCD  Chronically elevated CK  S/p Bilat BKA  Morbid Obesity  Hx of oppositional behavior  Non compliance  counseled      Code Status: full  Surrogate Decision Maker: brother    DVT Prophylaxis: heparin  GI Prophylaxis: not indicated    Baseline: live by himself          Subjective:   CHIEF COMPLAINT: cough and shortness of breath    HISTORY OF PRESENT ILLNESS:     Anabella Garcia is a 36 y.o. male with h/o diabetes, stage 4 renal failure, diastolic heart failure, HTN, uncontrolled on home home oxygen, HLD, presented with cough productive of green sputum and increased shortness of breath for 2 days. Denies associated fever and chills. Apparently pt is on home 02 that he uses prn. His RA sats in the ED were 87% and 96% on 2L ine. Admits to medication non compliance. Case was discussed with cardiology in ER and advised admission for careful diuresis. BP in ER was 190/91 and CXR was neg for acute process. We were asked to admit for work up and evaluation of the above problems. Past Medical History:   Diagnosis Date    Arrhythmia     afib    Asthma     3/2013 last attack    CKD (chronic kidney disease), stage III 2/12/2013    Nephrology: Jerome Burch MD    Diabetes [facility])     Gout     Heart failure ([facility] [address]     Hyperlipidemia     Hypertension     Ill-defined condition     Positive PPD, treated 2001    treated 9 months        Past Surgical History:   Procedure Laterality Date    HX BELOW KNEE AMPUTATION Left     due to osteomyelitis    HX HEENT      tonsilectomy    HX HERNIA REPAIR      HX TONSILLECTOMY         Social History   Substance Use Topics    Smoking status: Never Smoker    Smokeless tobacco: Never Used    Alcohol use No        Family History   Problem Relation Age of Onset    Diabetes Mother     Hypertension Mother     Hypertension Brother     Diabetes Brother     Diabetes Maternal Grandmother     Hypertension Maternal Grandmother     Diabetes Other     Hypertension Other     Heart Disease Other      Allergies   Allergen Reactions    Ace Inhibitors Cough        Prior to Admission medications    Medication Sig Start Date End Date Taking? Authorizing Provider   glucose blood VI test strips (TRUETEST TEST STRIPS) strip Use to check sugars 3 times/day 4/7/17  Yes Richard De León MD   amLODIPine (NORVASC) 10 mg tablet Take 1 Tab by mouth daily.  4/7/17  Yes Richard De León MD   cloNIDine HCl (CATAPRES) 0.3 mg tablet TAKE ONE TABLET BY MOUTH TWICE DAILY 3/27/17  Yes Hayes Ashley MD   omeprazole (PRILOSEC) 40 mg capsule Take 40 mg by mouth daily. Yes Historical Provider   labetalol (NORMODYNE) 200 mg tablet Take 400 mg by mouth three (3) times daily. Yes Historical Provider   insulin lispro protamine/insulin lispro (HUMALOG MIX 75-25) 100 unit/mL (75-25) injection Take 20U qam and 10U qpm  Patient taking differently: Take 30 units qam and 10 units qpm 3/17/17  Yes Hayes Ashley MD   albuterol (PROAIR HFA) 90 mcg/actuation inhaler Take 2 Puffs by inhalation every four (4) hours as needed for Wheezing. 12/21/16  Yes Hayes Ashley MD   aspirin 81 mg chewable tablet Take 81 mg by mouth daily. Yes Historical Provider   RENVELA 800 mg tab tab TAKE ONE TABLET BY MOUTH THREE TIMES DAILY WITH MEALS 8/14/16  Yes Hayes Ashley MD   furosemide (LASIX) 40 mg tablet TAKE ONE TABLET BY MOUTH TWICE DAILY FOR leg swelling 8/14/16  Yes Hayes Ashley MD   hydrALAZINE (APRESOLINE) 100 mg tablet TAKE ONE TABLET BY MOUTH THREE TIMES DAILY 8/14/16  Yes Hayes Ashley MD   oxyCODONE IR (OXY-IR) 30 mg immediate release tablet Take 30 mg by mouth two (2) times a day. 6/3/16  Yes Historical Provider   amitriptyline (ELAVIL) 50 mg tablet TAKE ONE TABLET BY MOUTH EVERY EVENING 6/24/16  Yes Hayes Ashley MD       REVIEW OF SYSTEMS:     I am not able to complete the review of systems because:    The patient is intubated and sedated    The patient has altered mental status due to his acute medical problems    The patient has baseline aphasia from prior stroke(s)    The patient has baseline dementia and is not reliable historian    The patient is in acute medical distress and unable to provide information           Total of 12 systems reviewed as follows:       POSITIVE= underlined text  Negative = text not underlined  General:  fever, chills, sweats, generalized weakness, weight loss/gain,      loss of appetite   Eyes:    blurred vision, eye pain, loss of vision, double vision  ENT:    rhinorrhea, pharyngitis   Respiratory:   cough, sputum production, SOB, TAMEZ, wheezing, pleuritic pain   Cardiology:   chest pain, palpitations, orthopnea, PND, edema, syncope   Gastrointestinal:  abdominal pain , N/V, diarrhea, dysphagia, constipation, bleeding   Genitourinary:  frequency, urgency, dysuria, hematuria, incontinence   Muskuloskeletal :  arthralgia, myalgia, back pain  Hematology:  easy bruising, nose or gum bleeding, lymphadenopathy   Dermatological: rash, ulceration, pruritis, color change / jaundice  Endocrine:   hot flashes or polydipsia   Neurological:  headache, dizziness, confusion, focal weakness, paresthesia,     Speech difficulties, memory loss, gait difficulty  Psychological: Feelings of anxiety, depression, agitation    Objective:   VITALS:    Visit Vitals    /83    Pulse 76    Temp 98.9 °F (37.2 °C)    Resp 21    Ht 6' 2\" (1.88 m)    Wt 152 kg (335 lb)    SpO2 97%    BMI 43.01 kg/m2       PHYSICAL EXAM:    General:    Alert, no distress, appears stated age. HEENT: Atraumatic, anicteric sclerae, pink conjunctivae     No oral ulcers, mucosa moist, throat clear, dentition fair  Neck:  Supple, symmetrical,  thyroid: non tender  Lungs:   Clear to auscultation bilaterally. No Wheezing or Rhonchi. No rales. Chest wall:  No tenderness  No Accessory muscle use. Heart:   Regular  rhythm,  No  murmur   No edema  Abdomen:   Soft, non-tender. Not distended. Bowel sounds normal  Extremities: B/l BKA  Skin:     Not pale. Not Jaundiced  No rashes   Psych:  Good insight. Not depressed. Not anxious or agitated. Neurologic: EOMs intact. No facial asymmetry. No aphasia or slurred speech. Symmetrical strength, b/l BKA.  Alert and oriented X 4.     _______________________________________________________________________  Care Plan discussed with:    Comments   Patient x    Family      RN     Care Manager Consultant:      _______________________________________________________________________  Expected  Disposition:   Home with Family x   HH/PT/OT/RN    SNF/LTC    YANDY    ________________________________________________________________________  TOTAL TIME:  61 Minutes    Critical Care Provided     Minutes non procedure based      Comments     Reviewed previous records   >50% of visit spent in counseling and coordination of care  Discussion with patient and/or family and questions answered       ________________________________________________________________________  Signed: Ritika Mcdermott MD    Procedures: see electronic medical records for all procedures/Xrays and details which were not copied into this note but were reviewed prior to creation of Plan.     LAB DATA REVIEWED:    Recent Results (from the past 24 hour(s))   CBC W/O DIFF    Collection Time: 07/03/17 11:16 PM   Result Value Ref Range    WBC 8.8 4.1 - 11.1 K/uL    RBC 3.55 (L) 4.10 - 5.70 M/uL    HGB 9.2 (L) 12.1 - 17.0 g/dL    HCT 30.1 (L) 36.6 - 50.3 %    MCV 84.8 80.0 - 99.0 FL    MCH 25.9 (L) 26.0 - 34.0 PG    MCHC 30.6 30.0 - 36.5 g/dL    RDW 15.8 (H) 11.5 - 14.5 %    PLATELET 571 600 - 936 K/uL   METABOLIC PANEL, BASIC    Collection Time: 07/03/17 11:16 PM   Result Value Ref Range    Sodium 143 136 - 145 mmol/L    Potassium 4.1 3.5 - 5.1 mmol/L    Chloride 108 97 - 108 mmol/L    CO2 25 21 - 32 mmol/L    Anion gap 10 5 - 15 mmol/L    Glucose 173 (H) 65 - 100 mg/dL    BUN 62 (H) 6 - 20 MG/DL    Creatinine 3.90 (H) 0.70 - 1.30 MG/DL    BUN/Creatinine ratio 16 12 - 20      GFR est AA 21 (L) >60 ml/min/1.73m2    GFR est non-AA 17 (L) >60 ml/min/1.73m2    Calcium 8.2 (L) 8.5 - 10.1 MG/DL   TROPONIN I    Collection Time: 07/03/17 11:16 PM   Result Value Ref Range    Troponin-I, Qt. <0.04 <0.05 ng/mL   CK W/ CKMB & INDEX    Collection Time: 07/03/17 11:16 PM   Result Value Ref Range     (H) 39 - 308 U/L    CK - MB 3.6 (H) <3.6 NG/ML    CK-MB Index 0.4 0.0 - 2.5     PRO-BNP    Collection Time: 07/03/17 11:16 PM   Result Value Ref Range    NT pro- (H) 0 - 125 PG/ML   GLUCOSE, POC    Collection Time: 07/04/17  7:48 AM   Result Value Ref Range    Glucose (POC) 178 (H) 65 - 100 mg/dL    Performed by Tracy Ribeiro

## 2017-07-04 NOTE — H&P
GENERAL GENERIC H&P/CONSULT    Subjective: 36year old male with a \"bad cold\" for 3days    36year old diabetic with class 4 renal failure and known diastolic heart failure with HTN here with 2 days of cough productive of green sputum and increased shortness of breath. He has home 02 at @ liters that he uses prn. His RA sats in the ED were 87% and 96% on 2L which may not be far from baseline. He reports congestion but denies fever or chills. He states he has not had his medications today and received none in the ED. He is followed by Dr. Yared Terry for his renal failure. He has a history of atrial fibrillation but has not had palpitations or chest pain. He further denies nausea, vomiting or other symptoms. He is currently being evaluated for sleep apnea and was told he would likely need treatment for same. Past Medical History:   Diagnosis Date    Arrhythmia     afib    Asthma     3/2013 last attack    CKD (chronic kidney disease), stage III 2/12/2013    Nephrology: Ysabel Simmons MD    Diabetes [facility])     Gout     Heart failure ([facility] [address]     Hyperlipidemia     Hypertension     Ill-defined condition     Positive PPD, treated 2001    treated 9 months      Past Surgical History:   Procedure Laterality Date    HX BELOW KNEE AMPUTATION Left     due to osteomyelitis    HX HEENT      tonsilectomy    HX HERNIA REPAIR      HX TONSILLECTOMY        Prior to Admission medications    Medication Sig Start Date End Date Taking? Authorizing Provider   glucose blood VI test strips (TRUETEST TEST STRIPS) strip Use to check sugars 3 times/day 4/7/17  Yes Harvinder Albarran MD   amLODIPine (NORVASC) 10 mg tablet Take 1 Tab by mouth daily. 4/7/17  Yes Harvinder Albarran MD   cloNIDine HCl (CATAPRES) 0.3 mg tablet TAKE ONE TABLET BY MOUTH TWICE DAILY 3/27/17  Yes Harvinder Albarran MD   omeprazole (PRILOSEC) 40 mg capsule Take 40 mg by mouth daily.    Yes Historical Provider   labetalol (NORMODYNE) 200 mg tablet Take 400 mg by mouth three (3) times daily. Yes Historical Provider   insulin lispro protamine/insulin lispro (HUMALOG MIX 75-25) 100 unit/mL (75-25) injection Take 20U qam and 10U qpm  Patient taking differently: Take 30 units qam and 10 units qpm 3/17/17  Yes Molina Rivera MD   albuterol (PROAIR HFA) 90 mcg/actuation inhaler Take 2 Puffs by inhalation every four (4) hours as needed for Wheezing. 12/21/16  Yes Molina Rivera MD   aspirin 81 mg chewable tablet Take 81 mg by mouth daily. Yes Historical Provider   RENVELA 800 mg tab tab TAKE ONE TABLET BY MOUTH THREE TIMES DAILY WITH MEALS 8/14/16  Yes Molina Rivera MD   furosemide (LASIX) 40 mg tablet TAKE ONE TABLET BY MOUTH TWICE DAILY FOR leg swelling 8/14/16  Yes Molina Rivera MD   hydrALAZINE (APRESOLINE) 100 mg tablet TAKE ONE TABLET BY MOUTH THREE TIMES DAILY 8/14/16  Yes Molina Rivera MD   oxyCODONE IR (OXY-IR) 30 mg immediate release tablet Take 30 mg by mouth two (2) times a day. 6/3/16  Yes Historical Provider   amitriptyline (ELAVIL) 50 mg tablet TAKE ONE TABLET BY MOUTH EVERY EVENING 6/24/16  Yes Molina Rivera MD     Allergies   Allergen Reactions    Ace Inhibitors Cough      Social History   Substance Use Topics    Smoking status: Never Smoker    Smokeless tobacco: Never Used    Alcohol use No      Family History   Problem Relation Age of Onset    Diabetes Mother     Hypertension Mother     Hypertension Brother     Diabetes Brother     Diabetes Maternal Grandmother     Hypertension Maternal Grandmother     Diabetes Other     Hypertension Other     Heart Disease Other       Review of Systems   Constitutional: Negative for chills and fever. HENT: Positive for congestion. Negative for ear pain and mouth sores. Eyes: Negative. Respiratory: Positive for cough and shortness of breath. Negative for wheezing. Cardiovascular: Negative for chest pain. Gastrointestinal: Negative for diarrhea, nausea and vomiting.    Endocrine: Negative. Genitourinary: Negative. Musculoskeletal: Positive for myalgias. Skin: Negative for pallor and rash. Allergic/Immunologic: Negative. Neurological: Negative. Hematological: Negative. Psychiatric/Behavioral: Negative. Objective:    07/03 1901 - 07/04 0700  In: -   Out: 575 [Urine:575]     Patient Vitals for the past 8 hrs:   BP Temp Pulse Resp SpO2 Height Weight   07/04/17 0211 186/89 98.4 °F (36.9 °C) 86 14 98 % - -   07/04/17 0130 176/81 - - - 98 % - -   07/04/17 0100 168/82 - - - 96 % - -   07/04/17 0030 178/82 - - - 98 % - -   07/04/17 0002 - - - - 97 % - -   07/04/17 0001 175/74 - - - - - -   07/03/17 2337 - - - - (!) 89 % - -   07/03/17 2253 191/90 99 °F (37.2 °C) 90 20 (!) 87 % 6' 2\" (1.88 m) 152 kg (335 lb)     Physical Exam   Nursing note and vitals reviewed. Constitutional: He is oriented to person, place, and time. He appears well-developed. No distress. HENT:   Head: Normocephalic. Eyes: No scleral icterus. Cardiovascular: Normal rate and regular rhythm. Pulmonary/Chest: Effort normal. No respiratory distress. Few scattered crackles   Abdominal: Soft. He exhibits no distension. There is no tenderness. Musculoskeletal: Normal range of motion. Bilateral AKA prostheses   Neurological: He is alert and oriented to person, place, and time. He exhibits normal muscle tone. Skin: Skin is warm and dry. Psychiatric: He has a normal mood and affect.         Labs:    Recent Results (from the past 24 hour(s))   CBC W/O DIFF    Collection Time: 07/03/17 11:16 PM   Result Value Ref Range    WBC 8.8 4.1 - 11.1 K/uL    RBC 3.55 (L) 4.10 - 5.70 M/uL    HGB 9.2 (L) 12.1 - 17.0 g/dL    HCT 30.1 (L) 36.6 - 50.3 %    MCV 84.8 80.0 - 99.0 FL    MCH 25.9 (L) 26.0 - 34.0 PG    MCHC 30.6 30.0 - 36.5 g/dL    RDW 15.8 (H) 11.5 - 14.5 %    PLATELET 974 183 - 821 K/uL   METABOLIC PANEL, BASIC    Collection Time: 07/03/17 11:16 PM   Result Value Ref Range    Sodium 143 136 - 145 mmol/L    Potassium 4.1 3.5 - 5.1 mmol/L    Chloride 108 97 - 108 mmol/L    CO2 25 21 - 32 mmol/L    Anion gap 10 5 - 15 mmol/L    Glucose 173 (H) 65 - 100 mg/dL    BUN 62 (H) 6 - 20 MG/DL    Creatinine 3.90 (H) 0.70 - 1.30 MG/DL    BUN/Creatinine ratio 16 12 - 20      GFR est AA 21 (L) >60 ml/min/1.73m2    GFR est non-AA 17 (L) >60 ml/min/1.73m2    Calcium 8.2 (L) 8.5 - 10.1 MG/DL   TROPONIN I    Collection Time: 17 11:16 PM   Result Value Ref Range    Troponin-I, Qt. <0.04 <0.05 ng/mL   CK W/ CKMB & INDEX    Collection Time: 17 11:16 PM   Result Value Ref Range     (H) 39 - 308 U/L    CK - MB 3.6 (H) <3.6 NG/ML    CK-MB Index 0.4 0.0 - 2.5     PRO-BNP    Collection Time: 17 11:16 PM   Result Value Ref Range    NT pro- (H) 0 - 125 PG/ML         Chest X-Ray: INDICATION:  chest pain      COMPARISON: 3/25/2017     FINDINGS: PA and lateral views of the chest demonstrate a stable  cardiomediastinal silhouette and clear lungs bilaterally.  The visualized osseous  structures are unremarkable.     IMPRESSION  IMPRESSION: No acute process       Assessment:  Principal Problem:    Acute hypoxemic respiratory failure (HCC) (2013)    Active Problems:    CHF (congestive heart failure) ([facility] [address] (3/26/2017)      Chronic renal failure, stage 4 (severe) ([facility] [address] (2017)      Upper Respiratory Infection    Probable Obstructive Sleep Apnea      2016 10:41 AM - Josue, Card Result In       Narrative      [facility]  [address]  [phone]    Transthoracic Echocardiogram    Patient: Nate Huizar  MRN: [id number]  ACCT #: [de-identified]  : 1975  Age: 42 years  Gender: Male  Height: 74 in  Weight: 340.3 lb  BSA: 2.73 m squared  BP: / mmHg  Study date: 2016  Status:  Location: Bedside  [facility] ACC #: [id number]    Allergies: ACE INHIBITORS    Reading Physician: Sinan Albrecht MD  Technologist: ALICE Montenegro  Reading Physician: Ramon Grullon Velasquez Burch MD    IMPRESSIONS:  Concentric nonobstructive LVH with moderate diastolic impairment. Wall  synchrony and chamber efficiency are but remarkably better than when the  patient was in atrial fibrillation. Given modest left atrial size, he  probably shoul;d be able to maintain sinyus rhythm. There is endocardial  fibrosis consistent with hypertensive heart disease and the history of  poorly controlled hypertension. SUMMARY:  Left ventricle: The cavity was small. Systolic function was vigorous by  visual assessment. Ejection fraction was estimated to be 70 %. There were  no regional wall motion abnormalities. Wall thickness was moderately to  markedly increased. There was moderate concentric hypertrophy. There was  no asymmetric hypertrophy. Mass was definitely increased. Features were  consistent with a pseudonormal left ventricular filling pattern, with  concomitant abnormal relaxation and increased filling pressure (grade 2  diastolic dysfunction). Ventricular septum: Thickness was moderately increased. Right ventricle: The ventricle was moderately dilated. Systolic function  was moderately reduced. Wall thickness was moderately increased. Atrial septum: The septum bows from left to right, consistent with  increased left atrial pressure. Mitral valve: No obvious mass, vegetation or thrombus noted. Aortic valve: No obvious mass, vegetation or thrombus noted. Pericardium: A small, free-flowing pericardial effusion was identified  circumferential to the heart. There was no evidence for pericardial  constriction. Summary Measurements  2D measurements:  Left Atrium:   Ao Diam was 2.2 cm.  LA Diam was 5 cm.  LVOT Diam was 2.2  cm. Left Ventricle:   %FS was 27.4 %.  EDV(Teich) was 89.7 ml.  EF(Teich)  was 53.5 %.  ESV(Teich) was 41.7 ml.  IVSd was 1.9 cm.  LVIDd was 4.4 cm. LVIDs was 3.2 cm.  LVPWd was 1.7 cm.  SV(Teich) was 48 ml.  Unspecified  Anatomy:   TAYLER Planimetry was 3.6 cm2. Aman Looney Planimetry was 0 cm2/m2.  MVA  Planimetry was 5 cm2. CW measurements:  Mitral Valve:   MV PHT was 53.7 ms.  MV VTI was 30.4 cm.  MV Vmax was 1.3  m/s.  MV Vmean was 0.7 m/s.  MV maxPG was 6.5 mmHg.  MV meanPG was 2.2  mmHg.  MVA By PHT was 4.1 cm2. Tricuspid Valve:   TR Vmax was 1.8 m/s.  TR maxPG was 12.5 mmHg. PW measurements:  Left Atrium:   LVOT Vmax was 0.8 m/s.  LVOT maxPG was 2.8 mmHg. Mitral Valve:   MV A Rolando was 0.5 m/s.  MV Dec Pickett was 4.9 m/s2.  MV DecT  was 95.8 ms.  MV E Rolando was 0.4 m/s.  MV E/A Ratio was 0.9 . Pulmonic Valve:   PV Vmax was 1.1 m/s.  PV maxPG was 4.7 mmHg. Unspecified Anatomy:   Lateral E' was 0.1 m/s.  Lateral E/E' was 7.8 . Septal E' was 0.1 m/s.  Septal E/E' was 6.8 . INDICATIONS: Assess left ventricular function. HISTORY: Prior history: Severe hypertension with hypertensive heart  disease. PROCEDURE: The study included complete 2D imaging, M-mode, complete  spectral Doppler, and color Doppler. LEFT VENTRICLE: The cavity was small. Systolic function was vigorous by  visual assessment. Ejection fraction was estimated to be 70 %. There were  no regional wall motion abnormalities. Wall thickness was moderately to  markedly increased. There was moderate concentric hypertrophy. There was  no asymmetric hypertrophy. Mass was definitely increased. DOPPLER:  Features were consistent with a pseudonormal left ventricular filling  pattern, with concomitant abnormal relaxation and increased filling  pressure (grade 2 diastolic dysfunction). VENTRICULAR SEPTUM: Thickness was moderately increased. There was normal  motion. There was normal contour. RIGHT VENTRICLE: The ventricle was moderately dilated. Systolic function  was moderately reduced. There were no regional wall motion abnormalities. Wall thickness was moderately increased. DOPPLER: Systolic pressure was  within the normal range.     LEFT ATRIUM: Size was at the upper limits of normal. There was hypertrophy  of the free wall No foreign bodies were observed. No mass was present. There was no spontaneous echo contrast (\"smoke\"). ATRIAL SEPTUM: The septum bows from left to right, consistent with  increased left atrial pressure. RIGHT ATRIUM: Size was normal.    MITRAL VALVE: Normal valve structure. There was normal leaflet separation. No obvious mass, vegetation or thrombus noted. DOPPLER: The transmitral  velocity was within the normal range. There was no evidence for stenosis. There was trivial regurgitation. AORTIC VALVE: The valve was trileaflet. Leaflets exhibited normal  thickness and normal cuspal separation. No obvious mass, vegetation or  thrombus noted. DOPPLER: Transaortic velocity was within the normal range. There was no stenosis. There was trivial regurgitation. TRICUSPID VALVE: Normal valve structure. There was normal leaflet  separation. DOPPLER: The transtricuspid velocity was within the normal  range. There was no evidence for tricuspid stenosis. There was no  regurgitation. PULMONIC VALVE: Leaflets exhibited normal thickness, no calcification, and  normal cuspal separation. DOPPLER: The transpulmonic velocity was within  the normal range. There was no regurgitation. AORTA: The root exhibited normal size. PERICARDIUM: A small, free-flowing pericardial effusion was identified  circumferential to the heart. Features were not consistent with tamponade  physiology. There was no evidence for pericardial constriction.     SYSTEM MEASUREMENT TABLES    2D  Ao Diam: 2.2 cm  LA Diam: 5 cm  LVOT Diam: 2.2 cm  %FS: 27.4 %  EDV(Teich): 89.7 ml  EF(Teich): 53.5 %  ESV(Teich): 41.7 ml  IVSd: 1.9 cm  LVIDd: 4.4 cm  LVIDs: 3.2 cm  LVPWd: 1.7 cm  SV(Teich): 48 ml  TAYLER Planimetry: 3.6 cm2  AVAI Planimetry: 0 cm2/m2  MVA Planimetry: 5 cm2    CW  MV PHT: 53.7 ms  MV VTI: 30.4 cm  MV Vmax: 1.3 m/s  MV Vmean: 0.7 m/s  MV maxP.5 mmHg  MV meanP.2 mmHg  MVA By PHT: 4.1 cm2  TR Vmax: 1.8 m/s  TR maxP.5 mmHg    PW  LVOT Vmax: 0.8 m/s  LVOT maxP.8 mmHg  MV A Rolando: 0.5 m/s  MV Dec Clinch: 4.9 m/s2  MV DecT: 95.8 ms  MV E Rolando: 0.4 m/s  MV E/A Ratio: 0.9  PV Vmax: 1.1 m/s  PV maxP.7 mmHg  Lateral E': 0.1 m/s  Lateral E/E': 7.8  Septal E': 0.1 m/s  Septal E/E': 6.8    Prepared and E-signed by    Gerry Hardin MD  Signed [id number] 14:04:25    Addendum Date: 2016 10:39:58 AM  This report was completed and confirmed on 16 by Gerry Hardin MD. It  appears confirmation and transmission to [facility] is so far incomplete    Addendum entered by: Gerry Hardin MD               Plan: Review EKG from ED, repeat Troponin x 1, restart medications, O2 for now, cardiology to see, may be close to baseline with superimposed URI aggravating oxygenation    Signed:  Urbano Wyman MD 2017      EKG: NSR, nonspecific ST T changes, no acute changes

## 2017-07-04 NOTE — PROGRESS NOTES
[facility] Admission Pharmacy Medication Reconciliation     Recommendations/Findings:   1) Patient takes Oxycodone IR to 15 mg po TID prn.  2) Confirmed Humalog Insulin protamine/lispro 75/25 Mix as 30 units sc QAM and 10 units sc QPM  3) Patient states, for his nerve pain, he was given samples of medication at [facility] [place] from Dr. Irvin Smiley. Patient does not know name of medication. Information obtained from: Patient interview, Carol Herman    Past Medical History/Disease States:  Past Medical History:   Diagnosis Date    Arrhythmia     afib    Asthma     3/2013 last attack    CKD (chronic kidney disease), stage III 2/12/2013    Nephrology: Letha Maddox MD    Diabetes [facility])     Gout     Heart failure ([facility] [address]     Hyperlipidemia     Hypertension     Ill-defined condition     Positive PPD, treated 2001    treated 9 months     Patient allergies: Allergies as of 07/03/2017 - Review Complete 07/03/2017   Allergen Reaction Noted    Ace inhibitors Cough 03/02/2016     Prior to Admission Medications   Prescriptions Last Dose Informant Patient Reported? Taking? RENVELA 800 mg tab tab 7/3/2017 at Unknown time Self No Yes   Sig: TAKE ONE TABLET BY MOUTH THREE TIMES DAILY WITH MEALS   albuterol (PROAIR HFA) 90 mcg/actuation inhaler 7/3/2017 at Unknown time  No Yes   Sig: Take 2 Puffs by inhalation every four (4) hours as needed for Wheezing. amLODIPine (NORVASC) 10 mg tablet 7/3/2017 at Unknown time  No Yes   Sig: Take 1 Tab by mouth daily. amitriptyline (ELAVIL) 50 mg tablet 7/3/2017 at Unknown time Self No Yes   Sig: TAKE ONE TABLET BY MOUTH EVERY EVENING   aspirin 81 mg chewable tablet 7/3/2017 at Unknown time Self Yes Yes   Sig: Take 81 mg by mouth daily.    cloNIDine HCl (CATAPRES) 0.3 mg tablet 7/3/2017 at Unknown time  No Yes   Sig: TAKE ONE TABLET BY MOUTH TWICE DAILY   furosemide (LASIX) 40 mg tablet 7/3/2017 at Unknown time Self No Yes   Sig: TAKE ONE TABLET BY MOUTH TWICE DAILY FOR leg swelling glucose blood VI test strips (TRUETEST TEST STRIPS) strip 7/3/2017 at Unknown time  No Yes   Sig: Use to check sugars 3 times/day   hydrALAZINE (APRESOLINE) 100 mg tablet 7/3/2017 at Unknown time Self No Yes   Sig: TAKE ONE TABLET BY MOUTH THREE TIMES DAILY   insulin lispro protamine/insulin lispro (HUMALOG MIX 75-25) 100 unit/mL (75-25) injection 7/3/2017 at Unknown time  No Yes   Sig: Take 20U qam and 10U qpm   Patient taking differently: Take 30 units qam and 10 units qpm   labetalol (NORMODYNE) 200 mg tablet 7/3/2017 at Unknown time  Yes Yes   Sig: Take 400 mg by mouth three (3) times daily. omeprazole (PRILOSEC) 40 mg capsule 7/3/2017 at Unknown time  Yes Yes   Sig: Take 40 mg by mouth daily. oxyCODONE IR (OXY-IR) 15 mg immediate release tablet 7/3/2017 at Unknown time  Yes Yes   Sig: Take 15 mg by mouth every eight (8) hours as needed.          Thank you,  Asha Wright, [facility]

## 2017-07-04 NOTE — ED NOTES
Report called to Shelbie Kendall RN on Telemetry/Med-Surg. SBAR given. Pt to be transported to room via stretcher.

## 2017-07-04 NOTE — PROGRESS NOTES
Spiritual Care Assessment/Progress Notes    Mirela Gonzalez [id number]  [id number]    1975  36 y.o.  male    Patient Telephone Number: [phone] (home)   Jew Affiliation: Leslie Felix   Language: English   Extended Emergency Contact Information  Primary Emergency Contact: [address] [place] [address] Street Phone: [phone]  Relation: Other Relative   Patient Active Problem List    Diagnosis Date Noted    Chronic renal failure, stage 4 (severe) ([address] 07/04/2017    CHF (congestive heart failure) ([address] 03/26/2017    Hypertensive urgency 03/26/2017    Long term current use of anticoagulant 12/19/2016    CKD (chronic kidney disease) stage 3, GFR 30-59 ml/min 12/18/2016    Accelerated hypertension with diastolic congestive heart failure, NYHA class 3 ([address] 11/29/2016    Pulmonary edema 11/07/2016    Heart failure ([address] 11/07/2016    Diabetic foot ulcer ([address] 03/15/2016    Septic arthritis of foot ([address] 03/05/2016    Osteomyelitis ([address] 03/04/2016    Acute diastolic CHF (congestive heart failure) ([address] 03/02/2016    Pneumonia 03/02/2016    Cellulitis and abscess of leg 03/02/2016    Atrial fibrillation ([address] 03/02/2016    HTN (hypertension) 03/02/2016    Diabetic foot ulcer with osteomyelitis ([address] 02/20/2016    Symptomatic anemia 01/09/2016    Elevated serum alkaline phosphatase level 11/08/2015    Hyponatremia 11/08/2015    Acute renal failure (ARF) ([address] 11/08/2015    Type 2 diabetes mellitus with right diabetic foot ulcer ([address] 11/08/2015    Iron deficiency anemia 11/08/2015    Diabetic foot ulcer ([address] 07/19/2015    Acute diastolic heart failure ([address] 06/25/2015    Atrial fibrillation and flutter ([address] 06/24/2015    Hx of BKA ([address] 10/23/2014    Streptococcal infection 10/22/2014    Proteus infection 10/22/2014    SIRS with acute organ dysfunction due to infectious process ([facility] [address] 10/21/2014     Class: Present on Admission    Open wound of toe 10/21/2014     Class: Present on Admission    Aortic regurgitation 10/21/2014     Class: Chronic    Renal failure, acute ([facility] 10/21/2014     Class: Present on Admission    Gram-positive cocci bacteremia 10/21/2014    Cellulitis and abscess of leg 10/21/2014    History of positive PPD 02/17/2013    Acute hypoxemic respiratory failure ([facility] 02/16/2013    Malignant hypertension 02/16/2013    Uncontrolled type II diabetes mellitus ([facility] 02/16/2013    HTN (hypertension) 01/19/2012    Morbid obesity with BMI of 40.0-44.9, adult ([facility] 01/19/2012     Class: Present on Admission        Date: 7/4/2017       Level of Anglican/Spiritual Activity:  []         Involved in casimiro tradition/spiritual practice    []         Not involved in casimiro tradition/spiritual practice  [x]         Spiritually oriented    []         Claims no spiritual orientation    []         seeking spiritual identity  []         Feels alienated from Mormon practice/tradition  []         Feels angry about Mormon practice/tradition  []         Spirituality/Mormon tradition  a resource for coping at this time.   []         Not able to assess due to medical condition    Services Provided Today:  []         crisis intervention    []         reading Scriptures  [x]         spiritual assessment    []         prayer  [x]         empathic listening/emotional support  []         rites and rituals (cite in comments)  []         life review     []         Mormon support  []         theological development   []         advocacy  []         ethical dialog     []         blessing  []         bereavement support    []         support to family  []         anticipatory grief support   [x]         help with AMD  []         spiritual guidance    []         meditation      Spiritual Care Needs  [x]         Emotional Support  [x]         Spiritual/Anglican Care  []         Loss/Adjustment  []         Advocacy/Referral                /Ethics  []         No needs expressed at this time  []         Other: (note in               comments)  Spiritual Care Plan  []         Follow up visits with               pt/family  []         Provide materials  []         Schedule sacraments  []         Contact Community               Clergy  [x]         Follow up as needed  []         Other: (note in               comments)     Comments: Responded to in [employer] request for Advance Medical Directive (AMD). Mr. Anupama Marin confirmed he has completed a AMD.  He indicated he would get his significant other to bring the form in so staff can scan it into his chart. He verbally shared his brother Pablito Ricks is his primary decision maker on his AMD form. He indicated his spirituality is strong and he has good support from his significant other. Provided assurance of prayer.   Visited by: Hiral Osorio [id number] [facility] ([id number])

## 2017-07-04 NOTE — ACP (ADVANCE CARE PLANNING)
Responded to in [employer] request for Advance Medical Directive (AMD). Mr. Hali Ibarra confirmed he has completed a AMD.  He indicated he would get his significant other to bring the form in so staff can scan it into his chart. He verbally shared his brother Eron Burrell is his primary decision maker on his AMD form.   Visited by: Lilia Guzman [id number] [facility] ([id number])

## 2017-07-04 NOTE — CONSULTS
Cardiology consultation:    I was notified by Dr. Betty Gupta last evening about this admission and asked by Dr. Megan Santiago to assess this 36year old male who was admitted for dyspnea in a context of respiratory infection with low room air oxygen saturations. Mr. Janine Navarro is a 36year old male with early onset adult diabetes and chronic renal insufficiency. He has early onset peripheral vascular disease with chronic non healing leg ulcers, recurrent infection, and he eventually underwent bilateral BKA. He has prostheses, which he is wearing at present. He has nonobstructive hypertrophic cardiomyopathy. His last echo during 2016 was summarized as follows:    Left ventricle: The cavity was small. Systolic function was vigorous by  visual assessment. Ejection fraction was estimated to be 70 %. There were  no regional wall motion abnormalities. Wall thickness was moderately to  markedly increased. There was moderate concentric hypertrophy. There was  no asymmetric hypertrophy. Mass was definitely increased. Features were  consistent with a pseudonormal left ventricular filling pattern, with  concomitant abnormal relaxation and increased filling pressure (grade 2  diastolic dysfunction). Ventricular septum: Thickness was moderately increased. Right ventricle: The ventricle was moderately dilated. Systolic function  was moderately reduced. Wall thickness was moderately increased. Atrial septum: The septum bows from left to right, consistent with  increased left atrial pressure. Mitral valve: No obvious mass, vegetation or thrombus noted. Aortic valve: No obvious mass, vegetation or thrombus noted. Pericardium: A small, free-flowing pericardial effusion was identified  circumferential to the heart. There was no evidence for pericardial  constriction. At present, he denies chest pain. He states the dyspnea he was noticing yesterday is now absent.  On exam, he is an overweight middle aged male appearing older than stated age. He is in no apparent distress but he has loud inspiratory and expiratory airway noise audible over both lung fields. By localization, this is not upper airway stridor. The lung fields themselves seem clear. Cardiac rhythm is regular without audible murmur or gallop. BP is 152/73. He does have some sacral edema. 12 lead EKG shows sinus rhythm with P wave prolongation consistent with LA conduction abnormality. QRS and ST segments are normal with no sign of ischemia or strain. Chest X ray shows significant cardiac enlargement with emphasis on the right heart. Lung vascular markings are prominent:             Hgb is 9.2. WBC is 8.8, curiously with no differential. BUN/Cr = 62/3.9. CK is 805 with MB of 3.6. Troponin is normal NT pro BNP is not severely elevated. Hgb A1C is over 10. Impression; Very noncompliant patient by history    Respiratory infection with large airway noise, no sign of consolidation    Diastolic LV failure by history    Paroxysmal atrial fibrillation by history. Right heart enlargement    Hemodynamically stable, probably doesn't need hospitalization from the cardiac standpoint.      Suggest; Continue current medications    He can be discharged according to pulmonary needs    Thank you for this consultation    Adriana Marie MD [facility] [place]

## 2017-07-05 VITALS
TEMPERATURE: 98.6 F | DIASTOLIC BLOOD PRESSURE: 80 MMHG | SYSTOLIC BLOOD PRESSURE: 156 MMHG | OXYGEN SATURATION: 97 % | HEART RATE: 66 BPM | BODY MASS INDEX: 40.43 KG/M2 | WEIGHT: 315 LBS | RESPIRATION RATE: 18 BRPM | HEIGHT: 74 IN

## 2017-07-05 LAB
ANION GAP BLD CALC-SCNC: 12 MMOL/L (ref 5–15)
BUN SERPL-MCNC: 59 MG/DL (ref 6–20)
BUN/CREAT SERPL: 16 (ref 12–20)
CALCIUM SERPL-MCNC: 8.5 MG/DL (ref 8.5–10.1)
CHLORIDE SERPL-SCNC: 108 MMOL/L (ref 97–108)
CO2 SERPL-SCNC: 22 MMOL/L (ref 21–32)
CREAT SERPL-MCNC: 3.67 MG/DL (ref 0.7–1.3)
GLUCOSE BLD STRIP.AUTO-MCNC: 191 MG/DL (ref 65–100)
GLUCOSE BLD STRIP.AUTO-MCNC: 225 MG/DL (ref 65–100)
GLUCOSE SERPL-MCNC: 204 MG/DL (ref 65–100)
POTASSIUM SERPL-SCNC: 4.4 MMOL/L (ref 3.5–5.1)
SERVICE CMNT-IMP: ABNORMAL
SERVICE CMNT-IMP: ABNORMAL
SODIUM SERPL-SCNC: 142 MMOL/L (ref 136–145)

## 2017-07-05 PROCEDURE — 82962 GLUCOSE BLOOD TEST: CPT

## 2017-07-05 PROCEDURE — 80048 BASIC METABOLIC PNL TOTAL CA: CPT

## 2017-07-05 PROCEDURE — 77010033678 HC OXYGEN DAILY

## 2017-07-05 PROCEDURE — 74011636637 HC RX REV CODE- 636/637: Performed by: EMERGENCY MEDICINE

## 2017-07-05 PROCEDURE — 99218 HC RM OBSERVATION: CPT

## 2017-07-05 PROCEDURE — 74011250637 HC RX REV CODE- 250/637: Performed by: EMERGENCY MEDICINE

## 2017-07-05 PROCEDURE — 74011250636 HC RX REV CODE- 250/636: Performed by: EMERGENCY MEDICINE

## 2017-07-05 PROCEDURE — 96372 THER/PROPH/DIAG INJ SC/IM: CPT

## 2017-07-05 PROCEDURE — 36415 COLL VENOUS BLD VENIPUNCTURE: CPT

## 2017-07-05 RX ORDER — GUAIFENESIN/DEXTROMETHORPHAN 100-10MG/5
10 SYRUP ORAL
Status: DISCONTINUED | OUTPATIENT
Start: 2017-07-05 | End: 2017-07-05 | Stop reason: HOSPADM

## 2017-07-05 RX ADMIN — INSULIN LISPRO 30 UNITS: 100 INJECTION, SUSPENSION SUBCUTANEOUS at 08:22

## 2017-07-05 RX ADMIN — Medication 10 ML: at 06:57

## 2017-07-05 RX ADMIN — CLONIDINE HYDROCHLORIDE 0.3 MG: 0.1 TABLET ORAL at 08:22

## 2017-07-05 RX ADMIN — AMLODIPINE BESYLATE 10 MG: 5 TABLET ORAL at 08:22

## 2017-07-05 RX ADMIN — ASPIRIN 81 MG: 81 TABLET, CHEWABLE ORAL at 08:22

## 2017-07-05 RX ADMIN — PANTOPRAZOLE SODIUM 40 MG: 40 TABLET, DELAYED RELEASE ORAL at 08:22

## 2017-07-05 RX ADMIN — FUROSEMIDE 40 MG: 40 TABLET ORAL at 08:22

## 2017-07-05 RX ADMIN — HEPARIN SODIUM 5000 UNITS: 5000 INJECTION, SOLUTION INTRAVENOUS; SUBCUTANEOUS at 01:26

## 2017-07-05 RX ADMIN — LABETALOL HCL 400 MG: 200 TABLET, FILM COATED ORAL at 08:22

## 2017-07-05 RX ADMIN — HYDRALAZINE HYDROCHLORIDE 100 MG: 50 TABLET, FILM COATED ORAL at 06:56

## 2017-07-05 RX ADMIN — RENAGEL 800 MG: 400 TABLET ORAL at 08:22

## 2017-07-05 RX ADMIN — Medication 10 ML: at 00:10

## 2017-07-05 NOTE — PROGRESS NOTES
Care Management Interventions  PCP Verified by CM: Yes Temi Oliver- anam update chart.)  Palliative Care Consult (Criteria: CHF and RRAT>21): No  Reason for No Palliative Care Consult: Other (see comment) (N/A)  Mode of Transport at Discharge: Other (see comment) (Emiliano Fagan)  Transition of Care Consult (CM Consult): Discharge Planning  Current Support Network: Lives Alone  Confirm Follow Up Transport: Family  Plan discussed with Pt/Family/Caregiver: Yes  Discharge Location  Discharge Placement: Home with outpatient services    Patient will follow up with Dr. Slona Schumacher on 7/7, Dr. Candido Diaz on 8/1, and patient will be provided with his cardiology appointment date and time.     [address]ri [address]

## 2017-07-05 NOTE — PROGRESS NOTES
Bedside and Verbal shift change report given to Juan Jones (oncoming nurse) by Vira Rouse (offgoing nurse). Report included the following information SBAR, Kardex, MAR, Accordion, Recent Results and Cardiac Rhythm normal sinus rhythm. 0730 Bedside and Verbal shift change report given to Vira Rouse (oncoming nurse) by Juan Jones (offgoing nurse). Report included the following information SBAR, Kardex, Intake/Output, Accordion, Recent Results and Cardiac Rhythm normal sinus rhythm.

## 2017-07-05 NOTE — DISCHARGE SUMMARY
Hospitalist Discharge Summary     Patient ID:  Katelynn Hong  [id number]  13 y.o.  1975    PCP on record: Valentino Dearth., MD    Admit date: 7/3/2017  Discharge date and time: 7/5/2017      DISCHARGE DIAGNOSIS:    Acute on chronic diastolic HF  HTN  DM    CKD stage 4  Paroxysmal AFib  AOCD  Chronically elevated CK  S/p Bilat BKA  Morbid Obesity  Hx of oppositional behavior  Non compliance      CONSULTATIONS:  IP CONSULT TO CARDIOLOGY  IP CONSULT TO CARDIOLOGY  IP CONSULT TO HOSPITALIST    Excerpted HPI from H&P of Emanuel Mackay MD:    Cecelia Carrasquillo is a 36 y.o. male with h/o diabetes, stage 4 renal failure, diastolic heart failure, HTN, uncontrolled on home home oxygen, HLD, presented with cough productive of green sputum and increased shortness of breath for 2 days. Denies associated fever and chills. Apparently pt is on home 02 that he uses prn.  His RA sats in the ED were 87% and 96% on 2L ine. Admits to medication non compliance. Case was discussed with cardiology in ER and advised admission for careful diuresis. BP in ER was 190/91 and CXR was neg for acute process.      We were asked to admit for work up and evaluation of the above problems. ______________________________________________________________________  DISCHARGE SUMMARY/HOSPITAL COURSE:  for full details see H&P, daily progress notes, labs, consult notes. Acute on chronic diastolic HF: POA  Likely due to accelerated HTN due to non compliance  Pt on home oxygen but does not use on continuous basis  RA sats improved ranging 94-97%, improved symptomatically  Pro bnp 283, somewhat elevated, in setting of CKD likely has no significance, better than previous one  -strict I and O, Daily weight and on diuretics  -Cardiology consult on board, much appreciated  Echo 77/7028 : Systolic function was vigorous by visual assessment. Ejection fraction was estimated to be 70 %.  There were no regional wall motion abnormalities. Wall thickness was moderately to markedly increased. There was moderate concentric hypertrophy. There was no asymmetric hypertrophy. Mass was definitely increased. Features were consistent with a pseudonormal left ventricular filling pattern, with  concomitant abnormal relaxation and increased filling pressure (grade 2 diastolic dysfunction).       HTN: POA  Poorly controlled, likley from dietary and medication non compliance  Resume home medications    DM: POA  hba1c 10.7  Cont insulin     CKD stage 4: POA  stable      Paroxysmal AFib  -was on eliquis in past but apparently d/eben because of bleeding issues     AOCD  Chronically elevated CK  S/p Bilat BKA  Morbid Obesity  Hx of oppositional behavior  Non compliance  counseled        Code Status: full  Surrogate Decision Maker: brother  Camden Sanchez: live by himself    _______________________________________________________________________  Patient seen and examined by me on discharge day. Pertinent Findings:  Gen:    Not in distress  Chest: Clear lungs  CVS:   Regular rhythm. No edema  Abd:  Soft, not distended, not tender  Neuro:  Awake and Alert, baseline  _______________________________________________________________________  DISCHARGE MEDICATIONS:   Current Discharge Medication List      CONTINUE these medications which have NOT CHANGED    Details   oxyCODONE IR (OXY-IR) 15 mg immediate release tablet Take 15 mg by mouth every eight (8) hours as needed. glucose blood VI test strips (TRUETEST TEST STRIPS) strip Use to check sugars 3 times/day  Qty: 100 Strip, Refills: 11      amLODIPine (NORVASC) 10 mg tablet Take 1 Tab by mouth daily. Qty: 30 Tab, Refills: 11      cloNIDine HCl (CATAPRES) 0.3 mg tablet TAKE ONE TABLET BY MOUTH TWICE DAILY  Qty: 60 Tab, Refills: 5    Associated Diagnoses: Essential hypertension, benign      omeprazole (PRILOSEC) 40 mg capsule Take 40 mg by mouth daily.       labetalol (NORMODYNE) 200 mg tablet Take 400 mg by mouth three (3) times daily. insulin lispro protamine/insulin lispro (HUMALOG MIX 75-25) 100 unit/mL (75-25) injection Take 20U qam and 10U qpm  Qty: 6 Vial, Refills: 3      albuterol (PROAIR HFA) 90 mcg/actuation inhaler Take 2 Puffs by inhalation every four (4) hours as needed for Wheezing. Qty: 1 Inhaler, Refills: 11      aspirin 81 mg chewable tablet Take 81 mg by mouth daily. RENVELA 800 mg tab tab TAKE ONE TABLET BY MOUTH THREE TIMES DAILY WITH MEALS  Qty: 90 Tab, Refills: 11      furosemide (LASIX) 40 mg tablet TAKE ONE TABLET BY MOUTH TWICE DAILY FOR leg swelling  Qty: 60 Tab, Refills: 11      hydrALAZINE (APRESOLINE) 100 mg tablet TAKE ONE TABLET BY MOUTH THREE TIMES DAILY  Qty: 90 Tab, Refills: 11      amitriptyline (ELAVIL) 50 mg tablet TAKE ONE TABLET BY MOUTH EVERY EVENING  Qty: 30 Tab, Refills: 11             My Recommended Diet, Activity, Wound Care, and follow-up labs are listed in the patient's Discharge Insturctions which I have personally completed and reviewed.     _______________________________________________________________________  DISPOSITION:    Home with Family: x   Home with HH/PT/OT/RN:    SNF/LTC:    YANDY:    OTHER:        Condition at Discharge:  Stable  _______________________________________________________________________  Follow up with:   PCP : Colby Caal MD  Follow-up Information     Follow up With Details Comments Contact Info    Colby Caal MD   [address] [facility] [phone]                Total time in minutes spent coordinating this discharge (includes going over instructions, follow-up, prescriptions, and preparing report for sign off to her PCP) :  40 minutes    Signed:  Major Mckee MD

## 2017-07-05 NOTE — PROGRESS NOTES
Cardiology:    Mr. Blaise Meigs feels well today. His wheezing is diminished and entirely absent except for the right lower lobe. He does not have postural dyspnea. He has trivial edema. There is no sign of DVT. Cardiac auscultation shows regular rhythm with normal quality S2, S1 is muffled. Jugular veins are flat in a semi-seated position. Rhythm is stable. There has been no sign of recurrence of atrial fibrillation. Blood pressure has been running between 233 and 255 systolic through today. He is on such high doses of his existing medications that this should be addressed mostly with education regarding salt restriction. Since this was not a heart failure admission, but rather an episode of abnormal airways reactivity because of a probable viral bronchitis, he does not need immediate office follow-up. Ideally, we should see him in approximately 2 weeks and he has been strongly advised to bring his medications with him at that time.     Miky Hastings MD

## 2017-07-05 NOTE — PROGRESS NOTES
Pt discharged to home, instructions reviewed with pt and copies given along with discharge instructions. Pt's IV removed, opportunity for questions provided.

## 2017-07-05 NOTE — INTERDISCIPLINARY ROUNDS
CM rounded with IDT and discussed patient's care. CM assisted with discharge planning. Patient will follow up with Dr Gaurang Dean on 7/7/17. Patient will follow up with Dr. Cesar Bender on 8/1/17 at 1pm. This is the earliest available appointment due to patient having missed his 7/3/17 appointment. Patient will follow up with Dr. Jessenia Light on 7/5/17 at 2:45pm. Patient is agreeable to walking over to Dr. Eddi Liriano office following his inpatient discharge.     [address]

## 2017-07-06 ENCOUNTER — TELEPHONE (OUTPATIENT)
Dept: CASE MANAGEMENT | Age: 42
End: 2017-07-06

## 2017-07-06 LAB
ATRIAL RATE: 87 BPM
CALCULATED P AXIS, ECG09: 54 DEGREES
CALCULATED R AXIS, ECG10: 61 DEGREES
CALCULATED T AXIS, ECG11: 78 DEGREES
DIAGNOSIS, 93000: NORMAL
P-R INTERVAL, ECG05: 160 MS
Q-T INTERVAL, ECG07: 372 MS
QRS DURATION, ECG06: 92 MS
QTC CALCULATION (BEZET), ECG08: 447 MS
VENTRICULAR RATE, ECG03: 87 BPM

## 2017-07-06 NOTE — TELEPHONE ENCOUNTER
Care manager called patient to follow up. Identified as correct patient. Confirmed PCP f/u with Dr. Nathaniel Gunn on 7/7 and f/u with Dr. Art Chapa on 8/1. Patient states that he has all needed prescriptions and does not have any questions at this time.     Macielwellington Cast, MSW  [phone]

## 2017-07-07 ENCOUNTER — OFFICE VISIT (OUTPATIENT)
Dept: INTERNAL MEDICINE CLINIC | Age: 42
End: 2017-07-07

## 2017-07-07 VITALS
HEIGHT: 74 IN | HEART RATE: 76 BPM | OXYGEN SATURATION: 94 % | TEMPERATURE: 98 F | BODY MASS INDEX: 40.43 KG/M2 | SYSTOLIC BLOOD PRESSURE: 144 MMHG | RESPIRATION RATE: 16 BRPM | DIASTOLIC BLOOD PRESSURE: 86 MMHG | WEIGHT: 315 LBS

## 2017-07-07 DIAGNOSIS — Z79.4 CONTROLLED TYPE 2 DIABETES MELLITUS WITHOUT COMPLICATION, WITH LONG-TERM CURRENT USE OF INSULIN (HCC): Primary | ICD-10-CM

## 2017-07-07 DIAGNOSIS — J45.31 MILD PERSISTENT ASTHMA WITH ACUTE EXACERBATION: ICD-10-CM

## 2017-07-07 DIAGNOSIS — E11.9 CONTROLLED TYPE 2 DIABETES MELLITUS WITHOUT COMPLICATION, WITH LONG-TERM CURRENT USE OF INSULIN (HCC): Primary | ICD-10-CM

## 2017-07-07 LAB — GLUCOSE POC: 158 MG/DL

## 2017-07-07 RX ORDER — ALBUTEROL SULFATE 0.83 MG/ML
2.5 SOLUTION RESPIRATORY (INHALATION)
Qty: 1 PACKAGE | Refills: 12 | Status: SHIPPED | OUTPATIENT
Start: 2017-07-07 | End: 2017-01-01

## 2017-07-07 RX ORDER — FLUTICASONE PROPIONATE AND SALMETEROL 250; 50 UG/1; UG/1
1 POWDER RESPIRATORY (INHALATION) 2 TIMES DAILY
Qty: 1 INHALER | Refills: 12 | Status: SHIPPED | OUTPATIENT
Start: 2017-07-07 | End: 2017-01-01 | Stop reason: SDUPTHER

## 2017-07-07 RX ORDER — FLUTICASONE PROPIONATE AND SALMETEROL 250; 50 UG/1; UG/1
1 POWDER RESPIRATORY (INHALATION) 2 TIMES DAILY
Qty: 1 INHALER | Refills: 12 | Status: SHIPPED | OUTPATIENT
Start: 2017-07-07 | End: 2017-01-01

## 2017-07-07 RX ORDER — METHYLPREDNISOLONE SODIUM SUCCINATE 40 MG/ML
40 INJECTION, POWDER, LYOPHILIZED, FOR SOLUTION INTRAMUSCULAR; INTRAVENOUS ONCE
Qty: 1 VIAL | Refills: 0
Start: 2017-07-07 | End: 2017-07-07

## 2017-07-07 RX ORDER — NEBULIZER AND COMPRESSOR
1 EACH MISCELLANEOUS
Qty: 1 EACH | Refills: 0 | Status: SHIPPED | OUTPATIENT
Start: 2017-07-07 | End: 2017-07-12 | Stop reason: SDUPTHER

## 2017-07-07 RX ORDER — PREDNISONE 10 MG/1
TABLET ORAL
Qty: 21 TAB | Refills: 0 | Status: SHIPPED | OUTPATIENT
Start: 2017-07-07 | End: 2017-01-01 | Stop reason: SDUPTHER

## 2017-07-07 NOTE — PATIENT INSTRUCTIONS
[employer] Activation    Thank you for requesting access to [employer]. Please follow the instructions below to securely access and download your online medical record. [employer] allows you to send messages to your doctor, view your test results, renew your prescriptions, schedule appointments, and more. How Do I Sign Up? 1. In your internet browser, go to [email or web address]  2. Click on the First Time User? Click Here link in the Sign In box. You will be redirect to the New Member Sign Up page. 3. Enter your [employer] Access Code exactly as it appears below. You will not need to use this code after youve completed the sign-up process. If you do not sign up before the expiration date, you must request a new code. [employer] Access Code: [email or web address]  Expires: 10/3/2017  5:55 AM (This is the date your [employer] access code will )    4. Enter the last four digits of your Social Security Number (xxxx) and Date of Birth (mm/dd/yyyy) as indicated and click Submit. You will be taken to the next sign-up page. 5. Create a [employer] ID. This will be your [employer] login ID and cannot be changed, so think of one that is secure and easy to remember. 6. Create a [employer] password. You can change your password at any time. 7. Enter your Password Reset Question and Answer. This can be used at a later time if you forget your password. 8. Enter your e-mail address. You will receive e-mail notification when new information is available in [address]. 9. Click Sign Up. You can now view and download portions of your medical record. 10. Click the Download Summary menu link to download a portable copy of your medical information. Additional Information    If you have questions, please visit the Frequently Asked Questions section of the [employer] website at [email or web address] Remember, [employer] is NOT to be used for urgent needs. For medical emergencies, dial 911.

## 2017-07-07 NOTE — PROGRESS NOTES
Anca Duenas is a 36 y.o. male and presents with Diabetes; CHF; and Hospital Follow Up (bronchitis)  . Subjective:    Asthma Review:  The patient is being seen for follow up of asthma,  currently in some distress,wheezing has been reported   Asthma symptoms have occured frequently. Wheezing when present is described as moderate and not easily relieved with rescue bronchodilator. The patient does report use of a steroid inhaler. Frequency of use of quick-relief meds: frequent   Regimen compliance: The patient reports adherence to this regimen. .  Diabetes Mellitus Review:  He has diabetes mellitus. Diabetic ROS - medication compliance: compliant all of the time, diabetic diet compliance: compliant all of the time, home glucose monitoring: is performed. Known diabetic complications: neuropathy in hands  Cardiovascular risk factors: family history, dyslipidemia, diabetes mellitus, obesity, hypertension  Current diabetic medications include insulin   Eye exam current (within one year): no  Weight trend: stable  Prior visit with dietician: no  Current diet: \"healthy\" diet  in general  Current exercise: walking  Current monitoring regimen: home blood tests - daily  Home blood sugar records: trend: stable  Any episodes of hypoglycemia? no  Is He on ACE inhibitor or angiotensin II receptor blocker? Yes     Hypertension Review:  The patient has essential hypertension  Diet and Lifestyle: generally follows a  low sodium diet, exercises sporadically  Home BP Monitoring: is not measured at home. Pertinent ROS: taking medications as instructed, no medication side effects noted, no TIA's, no chest pain on exertion, no dyspnea on exertion, no swelling of ankles. Chronic Renal Disease Review:  HPI: Anca Duenas, ([id number]) is a 36 y.o. male who returns for follow up of   chronic renal disease caused by diabetic nephropathy. te patient  is followed by renal  The patient has experienced the following symptoms: no problems   The patient has not experienced any of the following symptoms: no problems   Prescribed diet is NICKY   The following actions have been prescribed for slowing the   progression of CRF: cardiovascular risk factor reduction including diabetes mellitus, obesity, male gender, hypertension   The patientis not  on dialysis              Review of Systems  Constitutional: negative for fevers, chills, anorexia and weight loss  Eyes:   negative for visual disturbance and irritation  ENT:   negative for tinnitus,sore throat,nasal congestion,ear pains. hoarseness  Respiratory:  cough,  dyspnea,wheezing  CV:   negative for chest pain, palpitations, lower extremity edema  GI:   negative for nausea, vomiting, diarrhea, abdominal pain,melena  Endo:               negative for polyuria,polydipsia,polyphagia,heat intolerance  Genitourinary: negative for frequency, dysuria and hematuria  Integument:  negative for rash and pruritus  Hematologic:  negative for easy bruising and gum/nose bleeding  Musculoskel:  myalgias, arthralgias, back pain, muscle weakness, joint pain  Neurological:  negative for headaches, dizziness, vertigo, memory problems and gait   Behavl/Psych: negative for feelings of anxiety, depression, mood changes    Past Medical History:   Diagnosis Date    Arrhythmia     afib    Asthma     3/2013 last attack    CKD (chronic kidney disease), stage III 2/12/2013    Nephrology: Ale Corrales MD    Diabetes [facility])     Gout     [facility]     Hyperlipidemia     Hypertension     Ill-defined condition     Positive PPD, treated 2001    treated 9 months     Past Surgical History:   Procedure Laterality Date    HX BELOW KNEE AMPUTATION Left     due to osteomyelitis    HX HEENT      tonsilectomy    HX HERNIA REPAIR      HX TONSILLECTOMY       Social History     Social History    Marital status:      Spouse name: N/A    Number of children: N/A    Years of education: N/A     Social History Main Topics    Smoking status: Never Smoker    Smokeless tobacco: Never Used    Alcohol use No    Drug use: No    Sexual activity: Not Currently     Partners: Female     Other Topics Concern    None     Social History Narrative         Per conversation 03/26/17    He would like his new Medical  power of  to be his Brother Michael Atkins. (Previously was his Mother [employer])        Never smoker     Family History   Problem Relation Age of Onset    Diabetes Mother     Hypertension Mother     Hypertension Brother     Diabetes Brother     Diabetes Maternal Grandmother     Hypertension Maternal Grandmother     Diabetes Other     Hypertension Other     Heart Disease Other      Current Outpatient Prescriptions   Medication Sig Dispense Refill    fluticasone-salmeterol (ADVAIR) 250-50 mcg/dose diskus inhaler Take 1 Puff by inhalation two (2) times a day. 1 Inhaler 12    predniSONE (DELTASONE) 10 mg tablet 6 tabs today and reduce by 1 tab daily 21 Tab 0    methylPREDNISolone (SOLU-MEDROL) 40 mg solr solution 40 mg by IntraVENous route once for 1 dose. 1 Vial 0    Nebulizer & Compressor machine 1 Each by Does Not Apply route four (4) times daily as needed. 1 Each 0    albuterol (PROVENTIL VENTOLIN) 2.5 mg /3 mL (0.083 %) nebulizer solution 3 mL by Nebulization route every four (4) hours as needed for Wheezing. 1 Package 12    fluticasone-salmeterol (ADVAIR) 250-50 mcg/dose diskus inhaler Take 1 Puff by inhalation two (2) times a day. 1 Inhaler 12    oxyCODONE IR (OXY-IR) 15 mg immediate release tablet Take 15 mg by mouth every eight (8) hours as needed.  glucose blood VI test strips (TRUETEST TEST STRIPS) strip Use to check sugars 3 times/day 100 Strip 11    amLODIPine (NORVASC) 10 mg tablet Take 1 Tab by mouth daily. 30 Tab 11    cloNIDine HCl (CATAPRES) 0.3 mg tablet TAKE ONE TABLET BY MOUTH TWICE DAILY 60 Tab 5    omeprazole (PRILOSEC) 40 mg capsule Take 40 mg by mouth daily.  labetalol (NORMODYNE) 200 mg tablet Take 400 mg by mouth three (3) times daily.  insulin lispro protamine/insulin lispro (HUMALOG MIX 75-25) 100 unit/mL (75-25) injection Take 20U qam and 10U qpm (Patient taking differently: Take 30 units qam and 10 units qpm) 6 Vial 3    albuterol (PROAIR HFA) 90 mcg/actuation inhaler Take 2 Puffs by inhalation every four (4) hours as needed for Wheezing. 1 Inhaler 11    aspirin 81 mg chewable tablet Take 81 mg by mouth daily.  RENVELA 800 mg tab tab TAKE ONE TABLET BY MOUTH THREE TIMES DAILY WITH MEALS 90 Tab 11    furosemide (LASIX) 40 mg tablet TAKE ONE TABLET BY MOUTH TWICE DAILY FOR leg swelling 60 Tab 11    hydrALAZINE (APRESOLINE) 100 mg tablet TAKE ONE TABLET BY MOUTH THREE TIMES DAILY 90 Tab 11    amitriptyline (ELAVIL) 50 mg tablet TAKE ONE TABLET BY MOUTH EVERY EVENING 30 Tab 11     Allergies   Allergen Reactions    Ace Inhibitors Cough       Objective:  Visit Vitals    /86    Pulse 76    Temp 98 °F (36.7 °C) (Oral)    Resp 16    Ht 6' 2\" (1.88 m)    Wt 333 lb (151 kg)    SpO2 94%    BMI 42.75 kg/m2     Physical Exam:   General appearance - alert, well appearing, and in no distress  Mental status - alert, oriented to person, place, and time  EYE-DESI, EOMI, corneas normal, no foreign bodies  ENT-ENT exam normal, no neck nodes or sinus tenderness  Nose - normal and patent, no erythema, discharge or polyps  Mouth - mucous membranes moist, pharynx normal without lesions  Neck - supple, no significant adenopathy   Chest - scattered wheezes,  symmetric air entry   Heart - normal rate, regular rhythm, normal S1, S2, no murmurs, rubs, clicks or gallops   Abdomen - soft, nontender, nondistended, no masses or organomegaly  Lymph- no adenopathy palpable  Ext-bilateral aka  Skin-Warm and dry.  no hyperpigmentation, vitiligo, or suspicious lesions  Neuro -alert, oriented, normal speech, no focal findings or movement disorder noted  Neck-normal C-spine, no tenderness, full ROM without pain  Feet-no nail deformities or callus formation with good pulses noted      Results for orders placed or performed in visit on 17   AMB POC GLUCOSE BLOOD, BY GLUCOSE MONITORING DEVICE   Result Value Ref Range    Glucose  mg/dL       Assessment/Plan:    ICD-10-CM ICD-9-CM    1. Controlled type 2 diabetes mellitus without complication, with long-term current use of insulin (HCC) E11.9 250.00 AMB POC GLUCOSE BLOOD, BY GLUCOSE MONITORING DEVICE    Z79.4 V58.67    2. Mild persistent asthma with acute exacerbation J45.31 493.92      Orders Placed This Encounter    AMB POC GLUCOSE BLOOD, BY GLUCOSE MONITORING DEVICE    fluticasone-salmeterol (ADVAIR) 250-50 mcg/dose diskus inhaler     Sig: Take 1 Puff by inhalation two (2) times a day. Dispense:  1 Inhaler     Refill:  12    predniSONE (DELTASONE) 10 mg tablet     Si tabs today and reduce by 1 tab daily     Dispense:  21 Tab     Refill:  0    methylPREDNISolone (SOLU-MEDROL) 40 mg solr solution     Si mg by IntraVENous route once for 1 dose. Dispense:  1 Vial     Refill:  0    Nebulizer & Compressor machine     Si Each by Does Not Apply route four (4) times daily as needed. Dispense:  1 Each     Refill:  0    albuterol (PROVENTIL VENTOLIN) 2.5 mg /3 mL (0.083 %) nebulizer solution     Sig: 3 mL by Nebulization route every four (4) hours as needed for Wheezing. Dispense:  1 Package     Refill:  12    fluticasone-salmeterol (ADVAIR) 250-50 mcg/dose diskus inhaler     Sig: Take 1 Puff by inhalation two (2) times a day. Dispense:  1 Inhaler     Refill:  12     lose weight, follow low fat diet, follow low salt diet,Take 81mg aspirin daily  Patient Instructions   [facility] Sight Activation    Thank you for requesting access to [facility]. Please follow the instructions below to securely access and download your online medical record.  Second Sight allows you to send messages to your doctor, view your test results, renew your prescriptions, schedule appointments, and more. How Do I Sign Up? 1. In your internet browser, go to [email or web address]. [employer]  2. Click on the First Time User? Click Here link in the Sign In box. You will be redirect to the New Member Sign Up page. 3. Enter your fabrooms Access Code exactly as it appears below. You will not need to use this code after youve completed the sign-up process. If you do not sign up before the expiration date, you must request a new code. fabrooms Access Code: [email or web address]  Expires: 10/3/2017  5:55 AM (This is the date your fabrooms access code will )    4. Enter the last four digits of your Social Security Number (xxxx) and Date of Birth (mm/dd/yyyy) as indicated and click Submit. You will be taken to the next sign-up page. 5. Create a fabrooms ID. This will be your fabrooms login ID and cannot be changed, so think of one that is secure and easy to remember. 6. Create a fabrooms password. You can change your password at any time. 7. Enter your Password Reset Question and Answer. This can be used at a later time if you forget your password. 8. Enter your e-mail address. You will receive e-mail notification when new information is available in [address] 9. Click Sign Up. You can now view and download portions of your medical record. 10. Click the Download Summary menu link to download a portable copy of your medical information. Additional Information    If you have questions, please visit the Frequently Asked Questions section of the fabrooms website at [email or web address] Remember, fabrooms is NOT to be used for urgent needs. For medical emergencies, dial 911. Follow-up Disposition:  Return in about 1 week (around 2017), or if symptoms worsen or fail to improve. I have reviewed with the patient details of the assessment and plan and all questions were answered.  Relevent patient education was performed. The most recent lab findings were reviewed with the patient. An After Visit Summary was printed and given to the patient.

## 2017-07-07 NOTE — MR AVS SNAPSHOT
Visit Information Date & Time Provider Department Dept. Phone Encounter #  
 7/7/2017 10:30 AM Larence Spatz., MD [address] [facility] [phone] [id number] Follow-up Instructions Return in about 1 week (around 7/14/2017), or if symptoms worsen or fail to improve. Upcoming Health Maintenance Date Due MICROALBUMIN Q1 8/28/2016 Pneumococcal 19-64 Highest Risk (2 of 3 - PCV13) 10/14/2016 EYE EXAM RETINAL OR DILATED Q1 4/13/2017 DTaP/Tdap/Td series (1 - Tdap) 8/23/2018* INFLUENZA AGE 9 TO ADULT 8/1/2017 LIPID PANEL Q1 11/9/2017 FOOT EXAM Q1 12/6/2017 HEMOGLOBIN A1C Q6M 1/3/2018 *Topic was postponed. The date shown is not the original due date. Allergies as of 7/7/2017  Review Complete On: 7/4/2017 By: Diamond Regan MD  
  
 Severity Noted Reaction Type Reactions Ace Inhibitors  03/02/2016    Cough Current Immunizations  Reviewed on 12/19/2016 Name Date Influenza Vaccine 11/6/2014  4:23 PM  
 Influenza Vaccine (Quad) PF 12/19/2016 11:33 AM  
 Influenza Vaccine Intradermal PF 10/14/2015 Influenza Vaccine PF 2/17/2013  1:11 PM  
 Pneumococcal Polysaccharide (PPSV-23) 10/14/2015,  Deferred (Patient Refused) Not reviewed this visit You Were Diagnosed With   
  
 Codes Comments Controlled type 2 diabetes mellitus without complication, with long-term current use of insulin (HCC)    -  Primary ICD-10-CM: E11.9, Z79.4 ICD-9-CM: 250.00, V58.67 Mild persistent asthma with acute exacerbation     ICD-10-CM: J45.31 
ICD-9-CM: 493.92 Vitals BP Pulse Temp Resp Height(growth percentile) Weight(growth percentile) 144/86 76 98 °F (36.7 °C) (Oral) 16 6' 2\" (1.88 m) 333 lb (151 kg) SpO2 BMI Smoking Status 94% 42.75 kg/m2 Never Smoker Vitals History BMI and BSA Data Body Mass Index Body Surface Area 42.75 kg/m 2 2.81 m 2 Preferred Pharmacy Pharmacy Name Phone [facility] [address], [address] [address] [address]. [phone] Your Updated Medication List  
  
   
This list is accurate as of: 7/7/17 11:39 AM.  Always use your most recent med list.  
  
  
  
  
 * albuterol 90 mcg/actuation inhaler Commonly known as:  PROAIR HFA Take 2 Puffs by inhalation every four (4) hours as needed for Wheezing. * albuterol 2.5 mg /3 mL (0.083 %) nebulizer solution Commonly known as:  PROVENTIL VENTOLIN  
3 mL by Nebulization route every four (4) hours as needed for Wheezing. amitriptyline 50 mg tablet Commonly known as:  ELAVIL TAKE ONE TABLET BY MOUTH EVERY EVENING  
  
 amLODIPine 10 mg tablet Commonly known as:  Skip Newcomer Take 1 Tab by mouth daily. aspirin 81 mg chewable tablet Take 81 mg by mouth daily. cloNIDine HCl 0.3 mg tablet Commonly known as:  CATAPRES  
TAKE ONE TABLET BY MOUTH TWICE DAILY * fluticasone-salmeterol 250-50 mcg/dose diskus inhaler Commonly known as:  ADVAIR Take 1 Puff by inhalation two (2) times a day. * fluticasone-salmeterol 250-50 mcg/dose diskus inhaler Commonly known as:  ADVAIR Take 1 Puff by inhalation two (2) times a day. furosemide 40 mg tablet Commonly known as:  LASIX TAKE ONE TABLET BY MOUTH TWICE DAILY FOR leg swelling  
  
 glucose blood VI test strips strip Commonly known as:  TRUETEST TEST STRIPS Use to check sugars 3 times/day  
  
 hydrALAZINE 100 mg tablet Commonly known as:  APRESOLINE  
TAKE ONE TABLET BY MOUTH THREE TIMES DAILY  
  
 insulin lispro protamine/insulin lispro 100 unit/mL (75-25) injection Commonly known as:  HumaLOG Mix 75-25 Take 20U qam and 10U qpm  
  
 labetalol 200 mg tablet Commonly known as:  Caye Creed Take 400 mg by mouth three (3) times daily. methylPREDNISolone 40 mg Solr solution Commonly known as:  SOLU-MEDROL 40 mg by IntraVENous route once for 1 dose. Nebulizer & Compressor machine 1 Each by Does Not Apply route four (4) times daily as needed. omeprazole 40 mg capsule Commonly known as:  PRILOSEC Take 40 mg by mouth daily. oxyCODONE IR 15 mg immediate release tablet Commonly known as:  OXY-IR Take 15 mg by mouth every eight (8) hours as needed. predniSONE 10 mg tablet Commonly known as:  DELTASONE  
6 tabs today and reduce by 1 tab daily RENVELA 800 mg Tab tab Generic drug:  sevelamer carbonate TAKE ONE TABLET BY MOUTH THREE TIMES DAILY WITH MEALS * Notice: This list has 4 medication(s) that are the same as other medications prescribed for you. Read the directions carefully, and ask your doctor or other care provider to review them with you. Prescriptions Printed Refills Nebulizer & Compressor machine 0 Si Each by Does Not Apply route four (4) times daily as needed. Class: Print Route: Does Not Apply  
 albuterol (PROVENTIL VENTOLIN) 2.5 mg /3 mL (0.083 %) nebulizer solution 12 Sig: 3 mL by Nebulization route every four (4) hours as needed for Wheezing. Class: Print Route: Nebulization  
 fluticasone-salmeterol (ADVAIR) 250-50 mcg/dose diskus inhaler 12 Sig: Take 1 Puff by inhalation two (2) times a day. Class: Print Route: Inhalation Prescriptions Sent to Pharmacy Refills  
 fluticasone-salmeterol (ADVAIR) 250-50 mcg/dose diskus inhaler 12 Sig: Take 1 Puff by inhalation two (2) times a day. Class: Normal  
 Pharmacy: 1 [place], [place] - [address] Ph #: [phone] Route: Inhalation  
 predniSONE (DELTASONE) 10 mg tablet 0 Si tabs today and reduce by 1 tab daily Class: Normal  
 Pharmacy: 1 [place], [place] - [address] Ph #: [phone] We Performed the Following AMB POC GLUCOSE BLOOD, BY GLUCOSE MONITORING DEVICE [78470 CPT(R)] Follow-up Instructions Return in about 1 week (around 2017), or if symptoms worsen or fail to improve. Patient Instructions MyChart Activation Thank you for requesting access to [facility]. Please follow the instructions below to securely access and download your online medical record. Savision allows you to send messages to your doctor, view your test results, renew your prescriptions, schedule appointments, and more. How Do I Sign Up? 1. In your internet browser, go to [email or web address] 
2. Click on the First Time User? Click Here link in the Sign In box. You will be redirect to the New Member Sign Up page. 3. Enter your Savision Access Code exactly as it appears below. You will not need to use this code after youve completed the sign-up process. If you do not sign up before the expiration date, you must request a new code. Savision Access Code: [email or web address] Expires: 10/3/2017  5:55 AM (This is the date your Savision access code will ) 4. Enter the last four digits of your Social Security Number (xxxx) and Date of Birth (mm/dd/yyyy) as indicated and click Submit. You will be taken to the next sign-up page. 5. Create a Savision ID. This will be your Savision login ID and cannot be changed, so think of one that is secure and easy to remember. 6. Create a Savision password. You can change your password at any time. 7. Enter your Password Reset Question and Answer. This can be used at a later time if you forget your password. 8. Enter your e-mail address. You will receive e-mail notification when new information is available in [address] 9. Click Sign Up. You can now view and download portions of your medical record. 10. Click the Download Summary menu link to download a portable copy of your medical information. Additional Information If you have questions, please visit the Frequently Asked Questions section of the Savision website at [email or web address] Remember, [employer] is NOT to be used for urgent needs. For medical emergencies, dial 911. Introducing [facility]! Kiesha Roman introduces ZenoLink patient portal. Now you can access parts of your medical record, email your doctor's office, and request medication refills online. 1. In your internet browser, go to [email or web address] 2. Click on the First Time User? Click Here link in the Sign In box. You will see the New Member Sign Up page. 3. Enter your ZenoLink Access Code exactly as it appears below. You will not need to use this code after youve completed the sign-up process. If you do not sign up before the expiration date, you must request a new code. · ZenoLink Access Code: [email or web address] Expires: 10/3/2017  5:55 AM 
 
4. Enter the last four digits of your Social Security Number (xxxx) and Date of Birth (mm/dd/yyyy) as indicated and click Submit. You will be taken to the next sign-up page. 5. Create a ZenoLink ID. This will be your ZenoLink login ID and cannot be changed, so think of one that is secure and easy to remember. 6. Create a ZenoLink password. You can change your password at any time. 7. Enter your Password Reset Question and Answer. This can be used at a later time if you forget your password. 8. Enter your e-mail address. You will receive e-mail notification when new information is available in [address] 9. Click Sign Up. You can now view and download portions of your medical record. 10. Click the Download Summary menu link to download a portable copy of your medical information. If you have questions, please visit the Frequently Asked Questions section of the ZenoLink website. Remember, ZenoLink is NOT to be used for urgent needs. For medical emergencies, dial 911. Now available from your iPhone and Android! Please provide this summary of care documentation to your next provider. Your primary care clinician is listed as Enmanuel Voss. If you have any questions after today's visit, please call [phone].

## 2017-07-09 LAB
BACTERIA SPEC CULT: NORMAL
BACTERIA SPEC CULT: NORMAL
SERVICE CMNT-IMP: NORMAL
SERVICE CMNT-IMP: NORMAL

## 2017-07-13 ENCOUNTER — TELEPHONE (OUTPATIENT)
Dept: INTERNAL MEDICINE CLINIC | Age: 42
End: 2017-07-13

## 2017-07-13 ENCOUNTER — OFFICE VISIT (OUTPATIENT)
Dept: INTERNAL MEDICINE CLINIC | Age: 42
End: 2017-07-13

## 2017-07-13 VITALS
OXYGEN SATURATION: 95 % | RESPIRATION RATE: 22 BRPM | WEIGHT: 315 LBS | HEIGHT: 74 IN | DIASTOLIC BLOOD PRESSURE: 80 MMHG | TEMPERATURE: 98.2 F | HEART RATE: 87 BPM | SYSTOLIC BLOOD PRESSURE: 160 MMHG | BODY MASS INDEX: 40.43 KG/M2

## 2017-07-13 DIAGNOSIS — N18.4 CHRONIC RENAL FAILURE, STAGE 4 (SEVERE) (HCC): Primary | ICD-10-CM

## 2017-07-13 DIAGNOSIS — I10 ESSENTIAL HYPERTENSION: ICD-10-CM

## 2017-07-13 DIAGNOSIS — J45.41 MODERATE PERSISTENT ASTHMA WITH ACUTE EXACERBATION: ICD-10-CM

## 2017-07-13 DIAGNOSIS — E11.621 TYPE 2 DIABETES MELLITUS WITH RIGHT DIABETIC FOOT ULCER (HCC): ICD-10-CM

## 2017-07-13 DIAGNOSIS — L97.519 TYPE 2 DIABETES MELLITUS WITH RIGHT DIABETIC FOOT ULCER (HCC): ICD-10-CM

## 2017-07-13 LAB — GLUCOSE POC: 331 MG/DL

## 2017-07-13 RX ORDER — NEBULIZER AND COMPRESSOR
1 EACH MISCELLANEOUS
Qty: 1 EACH | Refills: 0 | Status: SHIPPED | OUTPATIENT
Start: 2017-07-13 | End: 2017-07-13 | Stop reason: SDUPTHER

## 2017-07-13 RX ORDER — NEBULIZER AND COMPRESSOR
1 EACH MISCELLANEOUS
Qty: 1 EACH | Refills: 0 | Status: SHIPPED | OUTPATIENT
Start: 2017-07-13 | End: 2017-01-01

## 2017-07-13 NOTE — MR AVS SNAPSHOT
Visit Information Date & Time Provider Department Dept. Phone Encounter #  
 7/13/2017  2:30 PM Leona Cervantes MD [address] [facility] [phone] [id number] Follow-up Instructions Return in about 3 months (around 10/13/2017), or if symptoms worsen or fail to improve. Upcoming Health Maintenance Date Due MICROALBUMIN Q1 8/28/2016 Pneumococcal 19-64 Highest Risk (2 of 3 - PCV13) 10/14/2016 EYE EXAM RETINAL OR DILATED Q1 4/13/2017 DTaP/Tdap/Td series (1 - Tdap) 8/23/2018* INFLUENZA AGE 9 TO ADULT 8/1/2017 LIPID PANEL Q1 11/9/2017 FOOT EXAM Q1 12/6/2017 HEMOGLOBIN A1C Q6M 1/3/2018 *Topic was postponed. The date shown is not the original due date. Allergies as of 7/13/2017  Review Complete On: 7/13/2017 By: Jamia Mckoy LPN Severity Noted Reaction Type Reactions Ace Inhibitors  03/02/2016    Cough Current Immunizations  Reviewed on 12/19/2016 Name Date Influenza Vaccine 11/6/2014  4:23 PM  
 Influenza Vaccine (Quad) PF 12/19/2016 11:33 AM  
 Influenza Vaccine Intradermal PF 10/14/2015 Influenza Vaccine PF 2/17/2013  1:11 PM  
 Pneumococcal Polysaccharide (PPSV-23) 10/14/2015,  Deferred (Patient Refused) Not reviewed this visit Vitals BP Pulse Temp Resp Height(growth percentile) Weight(growth percentile) 160/80 (BP 1 Location: Right arm, BP Patient Position: Sitting) 87 98.2 °F (36.8 °C) (Oral) 22 6' 2\" (1.88 m) 324 lb (147 kg) SpO2 BMI Smoking Status 95% 41.6 kg/m2 Never Smoker BMI and BSA Data Body Mass Index Body Surface Area  
 41.6 kg/m 2 2.77 m 2 Preferred Pharmacy Pharmacy Name Phone [facility] [address], [address] [phone] Your Updated Medication List  
  
   
This list is accurate as of: 7/13/17  3:01 PM.  Always use your most recent med list.  
  
  
  
  
 * albuterol 90 mcg/actuation inhaler Commonly known as:  PROAIR HFA Take 2 Puffs by inhalation every four (4) hours as needed for Wheezing. * albuterol 2.5 mg /3 mL (0.083 %) nebulizer solution Commonly known as:  PROVENTIL VENTOLIN  
3 mL by Nebulization route every four (4) hours as needed for Wheezing. amitriptyline 50 mg tablet Commonly known as:  ELAVIL TAKE ONE TABLET BY MOUTH EVERY EVENING  
  
 amLODIPine 10 mg tablet Commonly known as:  Remonia Grates Take 1 Tab by mouth daily. aspirin 81 mg chewable tablet Take 81 mg by mouth daily. cloNIDine HCl 0.3 mg tablet Commonly known as:  CATAPRES  
TAKE ONE TABLET BY MOUTH TWICE DAILY * fluticasone-salmeterol 250-50 mcg/dose diskus inhaler Commonly known as:  ADVAIR Take 1 Puff by inhalation two (2) times a day. * fluticasone-salmeterol 250-50 mcg/dose diskus inhaler Commonly known as:  ADVAIR Take 1 Puff by inhalation two (2) times a day. furosemide 40 mg tablet Commonly known as:  LASIX TAKE ONE TABLET BY MOUTH TWICE DAILY FOR leg swelling  
  
 glucose blood VI test strips strip Commonly known as:  TRUETEST TEST STRIPS Use to check sugars 3 times/day  
  
 hydrALAZINE 100 mg tablet Commonly known as:  APRESOLINE  
TAKE ONE TABLET BY MOUTH THREE TIMES DAILY  
  
 insulin lispro protamine/insulin lispro 100 unit/mL (75-25) injection Commonly known as:  HumaLOG Mix 75-25 Take 20U qam and 10U qpm  
  
 labetalol 200 mg tablet Commonly known as:  Amelie Dani Take 400 mg by mouth three (3) times daily. Nebulizer & Compressor machine 1 Each by Does Not Apply route four (4) times daily as needed. omeprazole 40 mg capsule Commonly known as:  PRILOSEC Take 40 mg by mouth daily. oxyCODONE IR 15 mg immediate release tablet Commonly known as:  OXY-IR Take 15 mg by mouth every eight (8) hours as needed. predniSONE 10 mg tablet Commonly known as:  Penny Rico  
 6 tabs today and reduce by 1 tab daily RENVELA 800 mg Tab tab Generic drug:  sevelamer carbonate TAKE ONE TABLET BY MOUTH THREE TIMES DAILY WITH MEALS * Notice: This list has 4 medication(s) that are the same as other medications prescribed for you. Read the directions carefully, and ask your doctor or other care provider to review them with you. Follow-up Instructions Return in about 3 months (around 10/13/2017), or if symptoms worsen or fail to improve. Patient Instructions MyChart Activation Thank you for requesting access to [employer]. Please follow the instructions below to securely access and download your online medical record. [employer] allows you to send messages to your doctor, view your test results, renew your prescriptions, schedule appointments, and more. How Do I Sign Up? 1. In your internet browser, go to [email or web address] 
2. Click on the First Time User? Click Here link in the Sign In box. You will be redirect to the New Member Sign Up page. 3. Enter your [employer] Access Code exactly as it appears below. You will not need to use this code after youve completed the sign-up process. If you do not sign up before the expiration date, you must request a new code. [employer] Access Code: [email or web address] Expires: 10/3/2017  5:55 AM (This is the date your [employer] access code will ) 4. Enter the last four digits of your Social Security Number (xxxx) and Date of Birth (mm/dd/yyyy) as indicated and click Submit. You will be taken to the next sign-up page. 5. Create a Dexmo ID. This will be your [employer] login ID and cannot be changed, so think of one that is secure and easy to remember. 6. Create a [employer]mo password. You can change your password at any time. 7. Enter your Password Reset Question and Answer. This can be used at a later time if you forget your password. 8. Enter your e-mail address.  You will receive e-mail notification when new information is available in [employer]. 9. Click Sign Up. You can now view and download portions of your medical record. 10. Click the Download Summary menu link to download a portable copy of your medical information. Additional Information If you have questions, please visit the Frequently Asked Questions section of the Apiary website at [email or web address] Remember, [employer] is NOT to be used for urgent needs. For medical emergencies, dial 911. Introducing [facility]! Rachelle Cintron introduces Apiary patient portal. Now you can access parts of your medical record, email your doctor's office, and request medication refills online. 1. In your internet browser, go to [email or web address] 2. Click on the First Time User? Click Here link in the Sign In box. You will see the New Member Sign Up page. 3. Enter your Apiary Access Code exactly as it appears below. You will not need to use this code after youve completed the sign-up process. If you do not sign up before the expiration date, you must request a new code. · Apiary Access Code: [email or web address] Expires: 10/3/2017  5:55 AM 
 
4. Enter the last four digits of your Social Security Number (xxxx) and Date of Birth (mm/dd/yyyy) as indicated and click Submit. You will be taken to the next sign-up page. 5. Create a Apiary ID. This will be your Apiary login ID and cannot be changed, so think of one that is secure and easy to remember. 6. Create a Apiary password. You can change your password at any time. 7. Enter your Password Reset Question and Answer. This can be used at a later time if you forget your password. 8. Enter your e-mail address. You will receive e-mail notification when new information is available in [address] 9. Click Sign Up. You can now view and download portions of your medical record.  
10. Click the Download Summary menu link to download a portable copy of your medical information. If you have questions, please visit the Frequently Asked Questions section of the [employer] website. Remember, [employer] is NOT to be used for urgent needs. For medical emergencies, dial 911. Now available from your iPhone and Android! Please provide this summary of care documentation to your next provider. Your primary care clinician is listed as Monica Cheema. If you have any questions after today's visit, please call [phone].

## 2017-07-13 NOTE — PROGRESS NOTES
Marlena Topete is a 36 y.o. male and presents with Asthma and Diabetes  . Subjective:    Asthma Review:  The patient is being seen for follow up of asthma,  currently in no distress,wheezing has been reported   Asthma symptoms have occured frequently. Wheezing when present is described as moderate and not easily relieved with rescue bronchodilator. The patient does report use of a steroid inhaler. Frequency of use of quick-relief meds: frequent   Regimen compliance: The patient reports adherence to this regimen. He has had difficulty in obtaining a nebulizer machine. .  Diabetes Mellitus Review:  He has diabetes mellitus. Diabetic ROS - medication compliance: compliant all of the time, diabetic diet compliance: compliant all of the time, home glucose monitoring: is performed. Known diabetic complications: neuropathy in hands  Cardiovascular risk factors: family history, dyslipidemia, diabetes mellitus, obesity, hypertension  Current diabetic medications include insulin   Eye exam current (within one year): no  Weight trend: stable  Prior visit with dietician: no  Current diet: \"healthy\" diet  in general  Current exercise: walking  Current monitoring regimen: home blood tests - daily  Home blood sugar records: trend: stable  Any episodes of hypoglycemia? no  Is He on ACE inhibitor or angiotensin II receptor blocker? Yes     Hypertension Review:  The patient has essential hypertension  Diet and Lifestyle: generally follows a  low sodium diet, exercises sporadically  Home BP Monitoring: is not measured at home. Pertinent ROS: taking medications as instructed, no medication side effects noted, no TIA's, no chest pain on exertion, no dyspnea on exertion, no swelling of ankles. Chronic Renal Disease Review:  HPI: Marlena Topete, ([id number]) is a 36 y.o. male who returns for follow up of   chronic renal disease caused by diabetic nephropathy. te patient  is followed by renal  The patient has experienced the following symptoms: no problems   The patient has not experienced any of the following symptoms: no problems   Prescribed diet is NICKY   The following actions have been prescribed for slowing the   progression of CRF: cardiovascular risk factor reduction including diabetes mellitus, obesity, male gender, hypertension   The patientis not  on dialysis        Review of Systems  Constitutional: negative for fevers, chills, anorexia and weight loss  Eyes:   negative for visual disturbance and irritation  ENT:   negative for tinnitus,sore throat,nasal congestion,ear pains. hoarseness  Respiratory:  cough,  dyspnea,wheezing  CV:   negative for chest pain, palpitations, lower extremity edema  GI:   negative for nausea, vomiting, diarrhea, abdominal pain,melena  Endo:               negative for polyuria,polydipsia,polyphagia,heat intolerance  Genitourinary: negative for frequency, dysuria and hematuria  Integument:  negative for rash and pruritus  Hematologic:  negative for easy bruising and gum/nose bleeding  Musculoskel:  myalgias, arthralgias, back pain, muscle weakness, joint pain  Neurological:  negative for headaches, dizziness, vertigo, memory problems and gait   Behavl/Psych: negative for feelings of anxiety, depression, mood changes    Past Medical History:   Diagnosis Date    Arrhythmia     afib    Asthma     3/2013 last attack    CKD (chronic kidney disease), stage III 2/12/2013    Nephrology: Tierra Benedict MD    Diabetes [facility])     Gout     [facility]     Hyperlipidemia     Hypertension     Ill-defined condition     Positive PPD, treated 2001    treated 9 months     Past Surgical History:   Procedure Laterality Date    HX BELOW KNEE AMPUTATION Left     due to osteomyelitis    HX HEENT      tonsilectomy    HX HERNIA REPAIR      HX TONSILLECTOMY       Social History     Social History    Marital status:      Spouse name: N/A    Number of children: N/A    Years of education: N/A     Social History Main Topics    Smoking status: Never Smoker    Smokeless tobacco: Never Used    Alcohol use No    Drug use: No    Sexual activity: Not Currently     Partners: Female     Other Topics Concern    None     Social History Narrative         Per conversation 03/26/17    He would like his new Medical  power of  to be his Brother Michael Atkins. (Previously was his Mother Vi Aguilar)        Never smoker     Family History   Problem Relation Age of Onset    Diabetes Mother     Hypertension Mother     Hypertension Brother     Diabetes Brother     Diabetes Maternal Grandmother     Hypertension Maternal Grandmother     Diabetes Other     Hypertension Other     Heart Disease Other      Current Outpatient Prescriptions   Medication Sig Dispense Refill    Nebulizer & Compressor machine 1 Each by Does Not Apply route four (4) times daily as needed. 1 Each 0    glucose blood VI test strips (TRUETEST TEST STRIPS) strip Use to check sugars 3 times/day 100 Strip 11    fluticasone-salmeterol (ADVAIR) 250-50 mcg/dose diskus inhaler Take 1 Puff by inhalation two (2) times a day. 1 Inhaler 12    predniSONE (DELTASONE) 10 mg tablet 6 tabs today and reduce by 1 tab daily 21 Tab 0    albuterol (PROVENTIL VENTOLIN) 2.5 mg /3 mL (0.083 %) nebulizer solution 3 mL by Nebulization route every four (4) hours as needed for Wheezing. 1 Package 12    fluticasone-salmeterol (ADVAIR) 250-50 mcg/dose diskus inhaler Take 1 Puff by inhalation two (2) times a day. 1 Inhaler 12    oxyCODONE IR (OXY-IR) 15 mg immediate release tablet Take 15 mg by mouth every eight (8) hours as needed.  amLODIPine (NORVASC) 10 mg tablet Take 1 Tab by mouth daily. 30 Tab 11    cloNIDine HCl (CATAPRES) 0.3 mg tablet TAKE ONE TABLET BY MOUTH TWICE DAILY 60 Tab 5    omeprazole (PRILOSEC) 40 mg capsule Take 40 mg by mouth daily.       labetalol (NORMODYNE) 200 mg tablet Take 400 mg by mouth three (3) times daily.      insulin lispro protamine/insulin lispro (HUMALOG MIX 75-25) 100 unit/mL (75-25) injection Take 20U qam and 10U qpm (Patient taking differently: Take 30 units qam and 10 units qpm) 6 Vial 3    albuterol (PROAIR HFA) 90 mcg/actuation inhaler Take 2 Puffs by inhalation every four (4) hours as needed for Wheezing. 1 Inhaler 11    aspirin 81 mg chewable tablet Take 81 mg by mouth daily.  RENVELA 800 mg tab tab TAKE ONE TABLET BY MOUTH THREE TIMES DAILY WITH MEALS 90 Tab 11    furosemide (LASIX) 40 mg tablet TAKE ONE TABLET BY MOUTH TWICE DAILY FOR leg swelling 60 Tab 11    hydrALAZINE (APRESOLINE) 100 mg tablet TAKE ONE TABLET BY MOUTH THREE TIMES DAILY 90 Tab 11    amitriptyline (ELAVIL) 50 mg tablet TAKE ONE TABLET BY MOUTH EVERY EVENING 30 Tab 11     Allergies   Allergen Reactions    Ace Inhibitors Cough       Objective:  Visit Vitals    /80 (BP 1 Location: Right arm, BP Patient Position: Sitting)    Pulse 87    Temp 98.2 °F (36.8 °C) (Oral)    Resp 22    Ht 6' 2\" (1.88 m)    Wt 324 lb (147 kg)    SpO2 95%    BMI 41.6 kg/m2     Physical Exam:   General appearance - alert, well appearing, and in no distress  Mental status - alert, oriented to person, place, and time  EYE-DESI, EOMI, corneas normal, no foreign bodies  ENT-ENT exam normal, no neck nodes or sinus tenderness  Nose - normal and patent, no erythema, discharge or polyps  Mouth - mucous membranes moist, pharynx normal without lesions  Neck - supple, no significant adenopathy   Chest - scattered wheezes,  symmetric air entry   Heart - normal rate, regular rhythm, normal S1, S2, no murmurs, rubs, clicks or gallops   Abdomen - soft, nontender, nondistended, no masses or organomegaly  Lymph- no adenopathy palpable  Ext-bilateral aka  Skin-Warm and dry.  no hyperpigmentation, vitiligo, or suspicious lesions  Neuro -alert, oriented, normal speech, no focal findings or movement disorder noted  Neck-normal C-spine, no tenderness, full ROM without pain  Feet-no nail deformities or callus formation with good pulses noted      Results for orders placed or performed in visit on 17   AMB POC GLUCOSE BLOOD, BY GLUCOSE MONITORING DEVICE   Result Value Ref Range    Glucose  mg/dL       Assessment/Plan:  No diagnosis found. No orders of the defined types were placed in this encounter. lose weight, follow low fat diet, follow low salt diet,Take 81mg aspirin daily  Patient Instructions   MyChart Activation    Thank you for requesting access to [employer]. Please follow the instructions below to securely access and download your online medical record. [employer] allows you to send messages to your doctor, view your test results, renew your prescriptions, schedule appointments, and more. How Do I Sign Up? 1. In your internet browser, go to [email or web address]  2. Click on the First Time User? Click Here link in the Sign In box. You will be redirect to the New Member Sign Up page. 3. Enter your [employer] Access Code exactly as it appears below. You will not need to use this code after youve completed the sign-up process. If you do not sign up before the expiration date, you must request a new code. [employer] Access Code: [email or web address]  Expires: 10/3/2017  5:55 AM (This is the date your [employer] access code will )    4. Enter the last four digits of your Social Security Number (xxxx) and Date of Birth (mm/dd/yyyy) as indicated and click Submit. You will be taken to the next sign-up page. 5. Create a [employer] ID. This will be your [employer] login ID and cannot be changed, so think of one that is secure and easy to remember. 6. Create a [employer] password. You can change your password at any time. 7. Enter your Password Reset Question and Answer. This can be used at a later time if you forget your password. 8. Enter your e-mail address. You will receive e-mail notification when new information is available in [address]   9. Click Sign Up. You can now view and download portions of your medical record. 10. Click the Download Summary menu link to download a portable copy of your medical information. Additional Information    If you have questions, please visit the Frequently Asked Questions section of the [email or web address] website at [email or web address] Remember, [email or web address] is NOT to be used for urgent needs. For medical emergencies, dial 911. Follow-up Disposition:  Return in about 3 months (around 10/13/2017), or if symptoms worsen or fail to improve. I have reviewed with the patient details of the assessment and plan and all questions were answered. Relevent patient education was performed. The most recent lab findings were reviewed with the patient. An After Visit Summary was printed and given to the patient.

## 2017-07-13 NOTE — TELEPHONE ENCOUNTER
Faxed nebulizer machine order to [phone] [facility] PER PATIENT REQUEST. PATIENT HAS THE ORIGINAL SCRIPT.

## 2017-07-13 NOTE — PATIENT INSTRUCTIONS
[facility] Activation    Thank you for requesting access to [employer]. Please follow the instructions below to securely access and download your online medical record. [facility] [facility] allows you to send messages to your doctor, view your test results, renew your prescriptions, schedule appointments, and more. How Do I Sign Up? 1. In your internet browser, go to [email or web address]  2. Click on the First Time User? Click Here link in the Sign In box. You will be redirect to the New Member Sign Up page. 3. Enter your [facility] [facility] Access Code exactly as it appears below. You will not need to use this code after youve completed the sign-up process. If you do not sign up before the expiration date, you must request a new code. [facility] Access Code: [email or web address]  Expires: 10/3/2017  5:55 AM (This is the date your [facility] access code will )    4. Enter the last four digits of your Social Security Number (xxxx) and Date of Birth (mm/dd/yyyy) as indicated and click Submit. You will be taken to the next sign-up page. 5. Create a [facility] ID. This will be your [facility] [facility] login ID and cannot be changed, so think of one that is secure and easy to remember. 6. Create a [facility] [facility] password. You can change your password at any time. 7. Enter your Password Reset Question and Answer. This can be used at a later time if you forget your password. 8. Enter your e-mail address. You will receive e-mail notification when new information is available in [address] 9. Click Sign Up. You can now view and download portions of your medical record. 10. Click the Download Summary menu link to download a portable copy of your medical information. Additional Information    If you have questions, please visit the Frequently Asked Questions section of the [employer] website at [email or web address] Remember, [facility] is NOT to be used for urgent needs. For medical emergencies, dial 911.

## 2017-09-13 NOTE — DISCHARGE INSTRUCTIONS
Heart Failure: Care Instructions  Your Care Instructions    Heart failure occurs when your heart does not pump as much blood as the body needs. Failure does not mean that the heart has stopped pumping but rather that it is not pumping as well as it should. Over time, this causes fluid buildup in your lungs and other parts of your body. Fluid buildup can cause shortness of breath, fatigue, swollen ankles, and other problems. By taking medicines regularly, reducing sodium (salt) in your diet, checking your weight every day, and making lifestyle changes, you can feel better and live longer. Follow-up care is a key part of your treatment and safety. Be sure to make and go to all appointments, and call your doctor if you are having problems. It's also a good idea to know your test results and keep a list of the medicines you take. How can you care for yourself at home? Medicines  · Be safe with medicines. Take your medicines exactly as prescribed. Call your doctor if you think you are having a problem with your medicine. · Do not take any vitamins, over-the-counter medicine, or herbal products without talking to your doctor first. Edgardo Veronica not take ibuprofen (Advil or Motrin) and naproxen (Aleve) without talking to your doctor first. They could make your heart failure worse. · You may be taking some of the following medicine. ¨ Beta-blockers can slow heart rate, decrease blood pressure, and improve your condition. Taking a beta-blocker may lower your chance of needing to be hospitalized. ¨ Angiotensin-converting enzyme inhibitors (ACEIs) reduce the heart's workload, lower blood pressure, and reduce swelling. Taking an ACEI may lower your chance of needing to be hospitalized again. ¨ Angiotensin II receptor blockers (ARBs) work like ACEIs. Your doctor may prescribe them instead of ACEIs. ¨ Diuretics, also called water pills, reduce swelling.   ¨ Potassium supplements replace this important mineral, which is sometimes lost with diuretics. ¨ Aspirin and other blood thinners prevent blood clots, which can cause a stroke or heart attack. You will get more details on the specific medicines your doctor prescribes. Diet  · Your doctor may suggest that you limit sodium to 2,000 milligrams (mg) a day or less. That is less than 1 teaspoon of salt a day, including all the salt you eat in cooking or in packaged foods. People get most of their sodium from processed foods. Fast food and restaurant meals also tend to be very high in sodium. · Ask your doctor how much liquid you can drink each day. You may have to limit liquids. Weight  · Weigh yourself without clothing at the same time each day. Record your weight. Call your doctor if you have a sudden weight gain, such as more than 2 to 3 pounds in a day or 5 pounds in a week. (Your doctor may suggest a different range of weight gain.) A sudden weight gain may mean that your heart failure is getting worse. Activity level  · Start light exercise (if your doctor says it is okay). Even if you can only do a small amount, exercise will help you get stronger, have more energy, and manage your weight and your stress. Walking is an easy way to get exercise. Start out by walking a little more than you did before. Bit by bit, increase the amount you walk. · When you exercise, watch for signs that your heart is working too hard. You are pushing yourself too hard if you cannot talk while you are exercising. If you become short of breath or dizzy or have chest pain, stop, sit down, and rest.  · If you feel \"wiped out\" the day after you exercise, walk slower or for a shorter distance until you can work up to a better pace. · Get enough rest at night. Sleeping with 1 or 2 pillows under your upper body and head may help you breathe easier. Lifestyle changes  · Do not smoke. Smoking can make a heart condition worse.  If you need help quitting, talk to your doctor about stop-smoking programs and medicines. These can increase your chances of quitting for good. Quitting smoking may be the most important step you can take to protect your heart. · Limit alcohol to 2 drinks a day for men and 1 drink a day for women. Too much alcohol can cause health problems. · Avoid getting sick from colds and the flu. Get a pneumococcal vaccine shot. If you have had one before, ask your doctor whether you need another dose. Get a flu shot each year. If you must be around people with colds or the flu, wash your hands often. When should you call for help? Call 911 if you have symptoms of sudden heart failure such as:  · You have severe trouble breathing. · You cough up pink, foamy mucus. · You have a new irregular or rapid heartbeat. Call your doctor now or seek immediate medical care if:  · You have new or increased shortness of breath. · You are dizzy or lightheaded, or you feel like you may faint. · You have sudden weight gain, such as more than 2 to 3 pounds in a day or 5 pounds in a week. (Your doctor may suggest a different range of weight gain.)  · You have increased swelling in your legs, ankles, or feet. · You are suddenly so tired or weak that you cannot do your usual activities. Watch closely for changes in your health, and be sure to contact your doctor if:  · You develop new symptoms. Where can you learn more? Go to http://amparo-ramon.info/. Enter R954 in the search box to learn more about \"Heart Failure: Care Instructions. \"  Current as of: April 3, 2017  Content Version: 11.3  © 7967-2769 [employer]. Care instructions adapted under license by [employer] (which disclaims liability or warranty for this information). If you have questions about a medical condition or this instruction, always ask your healthcare professional. [address] any warranty or liability for your use of this information.            Chronic Kidney Disease: Care Instructions  Your Care Instructions  Chronic kidney disease happens when your kidneys don't work as well as they should. Your kidneys have a few important jobs. They remove waste from your blood. This waste leaves your body in your urine. They also balance your body's fluids and chemicals. When your kidneys don't work well, extra waste and fluid can build up. This can poison the body and sometimes cause death. The most common causes of this disease are diabetes and high blood pressure. In some cases, the disease develops in 2 to 3 months. But it usually develops over many years. If you take medicine and make healthy changes to your lifestyle, you may be able to prevent the disease from getting worse. But if your kidney damage gets worse, you may need dialysis or a kidney transplant. Dialysis uses a machine to filter waste from the blood. A transplant is surgery to give you a healthy kidney from another person. Follow-up care is a key part of your treatment and safety. Be sure to make and go to all appointments, and call your doctor if you are having problems. It's also a good idea to know your test results and keep a list of the medicines you take. How can you care for yourself at home? Treatments and appointments  · Be safe with medicines. Take your medicines exactly as prescribed. Call your doctor if you have any problems with your medicine. You also may take medicine to control your blood pressure or to treat diabetes. Many people who have diabetes take blood pressure medicine. · If you have diabetes, do your best to keep your blood sugar in your target range. You may do this by eating healthy food and exercising. You may also take medicines. · Go to your dialysis appointments if you have this treatment. · Do not take ibuprofen (Advil, Motrin), naproxen (Aleve), or similar medicines, unless your doctor tells you to. These may make the disease worse.   · Do not take any vitamins, over-the-counter medicines, or herbal products without talking to your doctor first.  · Do not smoke or use other tobacco products. Smoking can reduce blood flow to the kidneys. If you need help quitting, talk to your doctor about stop-smoking programs and medicines. These can increase your chances of quitting for good. · Do not drink alcohol or use illegal drugs. · Talk to your doctor about an exercise plan. Exercise helps lower your blood pressure. It also makes you feel better. · If you have an advance directive, let your doctor know. It may include a living will and a durable power of  for health care. If you don't have one, you may want to prepare one. It lets your doctor and loved ones know your health care wishes if you become unable to speak for yourself. Diet  · Talk to a registered dietitian. He or she can help you make a meal plan that is right for you. Most people with kidney disease need to limit salt (sodium), fluids, and protein. Some also have to limit potassium and phosphorus. · You may have to give up many foods you like. But try to focus on the fact that this will help you stay healthy for as long as possible. · If you have a hard time eating enough, talk to your doctor or dietitian about ways to add calories to your diet. · Your diet may change as your disease changes. See your doctor for regular testing. And work with a dietitian to change your diet as needed. When should you call for help? Call 911 anytime you think you may need emergency care. For example, call if:  · You passed out (lost consciousness). Call your doctor now or seek immediate medical care if:  · You have less urine than normal or no urine. · You have trouble urinating or can urinate only very small amounts. · You are confused or have trouble thinking clearly. · You feel weaker or more tired than usual.  · You are very thirsty, lightheaded, or dizzy. · You have nausea and vomiting.   · You have new swelling of your arms or feet, or your swelling is worse. · You have blood in your urine. · You have new or worse trouble breathing. Watch closely for changes in your health, and be sure to contact your doctor if:  · You have any problems with your medicine or other treatment. Where can you learn more? Go to http://amparo-ramon.info/. Enter N276 in the search box to learn more about \"Chronic Kidney Disease: Care Instructions. \"  Current as of: April 3, 2017  Content Version: 11.3  © 2319-1908 [employer]. Care instructions adapted under license by [employer] (which disclaims liability or warranty for this information). If you have questions about a medical condition or this instruction, always ask your healthcare professional. [address] any warranty or liability for your use of this information.

## 2017-09-13 NOTE — ED NOTES
....Discharge summary and discharge medications reviewed with patient and appropriate educational materials and side effects teaching were provided. patient  Given 3 paper prescriptions and 0 electronic prescriptions sent to pt's listed pharmacy. Patient (s) verbalized understanding of the importance of discussing medications with his or her physician or clinic they will be following up with. No si/s of acute distress prior to discharge. 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 No other pt complaints. Denied SOB. 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

## 2017-09-13 NOTE — ED NOTES
Pt reported no SOB while sitting up in bed, 97% on 3L, noticably SOB when 02 sat 94% on 3L NC when lying flatter. Pt encouraged to stay sitting up. No si/s of acute distress. Call bell within reach.

## 2017-09-13 NOTE — ED NOTES

## 2017-09-13 NOTE — ED NOTES
renny Horner, ambulated pt around the unit with pulse ox 81-88% on room air. Pt reported that \"i use oxygen at night and 2liters as needed during the day. \" pt denied SOB with ambulation and reported that \"i feel much better. I'm good to go home. \" Charly Escalante, was notified. No si/s of acute distress. Call bell within reach.

## 2017-09-13 NOTE — ED NOTES
Pt worried about \"my blood sugar drops if I don't eat. \" ok for pt to eat/drink per jorge a randall. Pt given juice/crackers. No si/s of acute distress. Pt reported that he felt much better. jorge a Randall, weaned pt off 02 and asked to ambulate pt to see if his oxygen remains >94% on room air.

## 2017-09-13 NOTE — ED PROVIDER NOTES
Patient is a 36 y.o. male presenting with shortness of breath. Shortness of Breath   Associated symptoms include rhinorrhea. Pertinent negatives include no fever, no headaches, no sore throat, no ear pain, no neck pain, no wheezing, no chest pain, no vomiting, no abdominal pain and no rash. To ED with complaints of shortness of breath with exertion, better with rest.  Minimal dry cough. No fevers. sts has been off lasix for about a week \"cant get my doctor\". Able to sleep flat. Notes slight edema to abdomen. Denies lower extremity swelling. S/p BKA \"infections\". No CP. Denies HA, dizziness, palpitations, diaphoresis, syncope. Notes h/o CHF with frequent admissions. Unsure of cardiac history but notes missed last appt with cardiologist Dr. Rivera [place] end of August 2017. Pt reports that recent labs with pcp were \"better - my HGB was up and my creatinine down\". Sts has had multiple transfusions due to anemia in past. sts anemia related to renal failure. Denies any recent bleeding, melena, BRBPR. Review of EMR - lasix rx was sent to pharmacy by his pcp today.        Past Medical History:   Diagnosis Date    Arrhythmia     afib    Asthma     3/2013 last attack    CKD (chronic kidney disease), stage III 2/12/2013    Nephrology: Bryna Canavan, MD    Diabetes [facility])     Gout     Heart failure ([facility] [address]     Hyperlipidemia     Hypertension     Ill-defined condition     Positive PPD, treated 2001    treated 9 months       Past Surgical History:   Procedure Laterality Date    HX BELOW KNEE AMPUTATION Left     due to osteomyelitis    HX HEENT      tonsilectomy    HX HERNIA REPAIR      HX TONSILLECTOMY           Family History:   Problem Relation Age of Onset    Diabetes Mother     Hypertension Mother     Hypertension Brother     Diabetes Brother     Diabetes Maternal Grandmother     Hypertension Maternal Grandmother     Diabetes Other     Hypertension Other     Heart Disease Other Social History     Social History    Marital status:      Spouse name: N/A    Number of children: N/A    Years of education: N/A     Occupational History    Not on file. Social History Main Topics    Smoking status: Never Smoker    Smokeless tobacco: Never Used    Alcohol use No    Drug use: No    Sexual activity: Not Currently     Partners: Female     Other Topics Concern    Not on file     Social History Narrative         Per conversation 03/26/17    He would like his new Medical  power of  to be his Brother Michael Atkins. (Previously was his Mother Aretha Martinez)        Never smoker         ALLERGIES: Ace inhibitors    Review of Systems   Constitutional: Negative for chills, fatigue and fever. HENT: Positive for congestion and rhinorrhea. Negative for ear pain, sinus pressure, sore throat and voice change. Eyes: Negative for photophobia and discharge. Respiratory: Positive for shortness of breath. Negative for chest tightness and wheezing. See hpi   Cardiovascular: Negative for chest pain and palpitations. Gastrointestinal: Negative for abdominal pain, constipation, nausea and vomiting. Genitourinary: Negative for dysuria and hematuria. Musculoskeletal: Negative for back pain and neck pain. Skin: Negative for rash and wound. Neurological: Negative for dizziness, syncope and headaches. Psychiatric/Behavioral: Negative for confusion. Vitals:    09/13/17 1356 09/13/17 1521   BP: 160/77 169/72   Pulse: 82 77   Resp: 16    Temp: 98.6 °F (37 °C)    SpO2: 94%    Weight: 149.7 kg (330 lb)    Height: 6' 3\" (1.905 m)             Physical Exam   Constitutional: He is oriented to person, place, and time. He appears well-developed and well-nourished. No distress. HENT:   Head: Normocephalic.    Right Ear: External ear normal.   Left Ear: External ear normal.   Nose: Nose normal.   Mouth/Throat: Oropharynx is clear and moist.   Eyes: Conjunctivae are normal. Pupils are equal, round, and reactive to light. Neck: Normal range of motion. No JVD present. Cardiovascular: Normal rate. Exam reveals no gallop and no friction rub. No murmur heard. Pulmonary/Chest: Effort normal. He has no wheezes. He has no rales. He exhibits no tenderness. Abdominal: Soft. There is no tenderness. There is no rebound. Musculoskeletal:   Prosthesis in place B. No significant edema noted to hands, abd, thights   Lymphadenopathy:     He has no cervical adenopathy. Neurological: He is alert and oriented to person, place, and time. Skin: Skin is warm and dry. He is not diaphoretic. Psychiatric: He has a normal mood and affect. Nursing note and vitals reviewed. MDM  Number of Diagnoses or Management Options  Diagnosis management comments: DDX: CHF, ACS, electrolyte abnormality, asthma, anemia, arrhythmia       Amount and/or Complexity of Data Reviewed  Clinical lab tests: ordered and reviewed  Tests in the radiology section of CPT®: ordered and reviewed      ED Course       ED EKG interpretation:  Rhythm: normal sinus rhythm; and regular . Rate (approx.): 80; Axis: normal; P wave: normal; QRS interval: normal ; ST/T wave: non-specific changes; . This EKG was interpreted by FRANCE Macedo,ED Provider, also reviewed by Dr. Melton. Procedures      3:35 PM  Feeling better. Sats 99% on 4L O2.  sts has not taken any breathing treatment for his asthma in a while. Lungs without rales. Very slight exp wheeze. Will give duoneb. D/W Adam HOUGH, (works with Dr. Rosalia España), she will follow labs and see if needed. 4:23 PM  Has had small amount urine output. Feeling better. Oxygen removed. Will ambulate and recheck sats. Sats 89 to 92 on room air when walking. Pt now notes that he has oxygen at home which he uses when he needs it.      Long discussion with patient regarding labs, xrays and his condition in general.  He feels much better and would like to go home. Denies any sob when up ambulating in ED with RA only. Given renal failure, fluid status may be challenging to manage. Advised patient that on last admission sats improved to mid 90s on RA and he may benefit from admission and further diuresis. Advised risks of going home include deterioration of underlying condition, worsening shortness of breath and even death. He expresses understanding of these risks and would like to go home. He will return at first sign of any worsening. Plans to get nebulizer as his pcp has prescribed as well as lasix refill and resume his medications as prescribed. He further will follow up with Dr. Karissa Hector. Pt advised that we will be happy to see him again and to please return if any worsening. LABORATORY TESTS:  Recent Results (from the past 12 hour(s))   EKG, 12 LEAD, INITIAL    Collection Time: 09/13/17  3:09 PM   Result Value Ref Range    Ventricular Rate 80 BPM    Atrial Rate 80 BPM    P-R Interval 144 ms    QRS Duration 92 ms    Q-T Interval 378 ms    QTC Calculation (Bezet) 435 ms    Calculated P Axis 31 degrees    Calculated R Axis 55 degrees    Calculated T Axis 70 degrees    Diagnosis       Normal sinus rhythm  Nonspecific T wave abnormality  Abnormal ECG  When compared with ECG of 03-JUL-2017 23:28,  No significant change was found     CBC WITH AUTOMATED DIFF    Collection Time: 09/13/17  3:28 PM   Result Value Ref Range    WBC 7.2 4.1 - 11.1 K/uL    RBC 3.07 (L) 4.10 - 5.70 M/uL    HGB 8.0 (L) 12.1 - 17.0 g/dL    HCT 26.2 (L) 36.6 - 50.3 %    MCV 85.3 80.0 - 99.0 FL    MCH 26.1 26.0 - 34.0 PG    MCHC 30.5 30.0 - 36.5 g/dL    RDW 15.5 (H) 11.5 - 14.5 %    PLATELET 001 863 - 367 K/uL    NEUTROPHILS 79 (H) 32 - 75 %    LYMPHOCYTES 11 (L) 12 - 49 %    MONOCYTES 8 5 - 13 %    EOSINOPHILS 2 0 - 7 %    BASOPHILS 0 0 - 1 %    ABS. NEUTROPHILS 5.6 1.8 - 8.0 K/UL    ABS. LYMPHOCYTES 0.8 0.8 - 3.5 K/UL    ABS. MONOCYTES 0.6 0.0 - 1.0 K/UL    ABS.  EOSINOPHILS 0.2 0.0 - 0.4 K/UL    ABS. BASOPHILS 0.0 0.0 - 0.1 K/UL   METABOLIC PANEL, COMPREHENSIVE    Collection Time: 09/13/17  3:28 PM   Result Value Ref Range    Sodium 145 136 - 145 mmol/L    Potassium 4.1 3.5 - 5.1 mmol/L    Chloride 111 (H) 97 - 108 mmol/L    CO2 21 21 - 32 mmol/L    Anion gap 13 5 - 15 mmol/L    Glucose 74 65 - 100 mg/dL    BUN 61 (H) 6 - 20 MG/DL    Creatinine 3.96 (H) 0.70 - 1.30 MG/DL    BUN/Creatinine ratio 15 12 - 20      GFR est AA 20 (L) >60 ml/min/1.73m2    GFR est non-AA 17 (L) >60 ml/min/1.73m2    Calcium 8.7 8.5 - 10.1 MG/DL    Bilirubin, total 0.5 0.2 - 1.0 MG/DL    ALT (SGPT) 21 12 - 78 U/L    AST (SGOT) 9 (L) 15 - 37 U/L    Alk. phosphatase 97 45 - 117 U/L    Protein, total 7.3 6.4 - 8.2 g/dL    Albumin 3.7 3.5 - 5.0 g/dL    Globulin 3.6 2.0 - 4.0 g/dL    A-G Ratio 1.0 (L) 1.1 - 2.2     TROPONIN I    Collection Time: 09/13/17  3:28 PM   Result Value Ref Range    Troponin-I, Qt. <0.04 <0.05 ng/mL   NT-PRO BNP    Collection Time: 09/13/17  3:28 PM   Result Value Ref Range    NT pro- (H) 0 - 125 PG/ML       IMAGING RESULTS:  XR CHEST PA LAT   Final Result          MEDICATIONS GIVEN:  Medications   furosemide (LASIX) injection 80 mg (80 mg IntraVENous Given 9/13/17 1521)   albuterol-ipratropium (DUO-NEB) 2.5 MG-0.5 MG/3 ML (3 mL Nebulization Given 9/13/17 1633)       IMPRESSION:  1. Congestive heart failure, unspecified congestive heart failure chronicity, unspecified congestive heart failure type ([address]    2. Uncomplicated asthma, unspecified asthma severity    3. Anemia, unspecified type    4. Chronic renal failure, unspecified stage    5. Moderate persistent asthma with acute exacerbation        PLAN:  1. Discharge Medication List as of 9/13/2017  5:11 PM      START taking these medications    Details   !! furosemide (LASIX) 40 mg tablet Take 1 Tab by mouth two (2) times a day., Print, Disp-10 Tab, R-0       !! - Potential duplicate medications found. Please discuss with provider.       CONTINUE these medications which have CHANGED    Details   Nebulizer & Compressor machine 1 Each by Does Not Apply route four (4) times daily as needed. , Print, Disp-1 Each, R-0      albuterol (PROVENTIL VENTOLIN) 2.5 mg /3 mL (0.083 %) nebulizer solution 3 mL by Nebulization route every four (4) hours as needed for Wheezing., Print, Disp-1 Package, R-12         CONTINUE these medications which have NOT CHANGED    Details   glucose blood VI test strips (TRUETEST TEST STRIPS) strip Use to check sugars 3 times/day, Normal, Disp-100 Strip, R-11      amLODIPine (NORVASC) 10 mg tablet Take 1 Tab by mouth daily. , Normal, Disp-30 Tab, R-11      cloNIDine HCl (CATAPRES) 0.3 mg tablet TAKE ONE TABLET BY MOUTH TWICE DAILY, Normal, Disp-60 Tab, R-5      labetalol (NORMODYNE) 200 mg tablet Take 400 mg by mouth three (3) times daily. , Historical Med      insulin lispro protamine/insulin lispro (HUMALOG MIX 75-25) 100 unit/mL (75-25) injection Take 20U qam and 10U qpm, Normal, Disp-6 Vial, R-3      aspirin 81 mg chewable tablet Take 81 mg by mouth daily. , Historical Med      RENVELA 800 mg tab tab TAKE ONE TABLET BY MOUTH THREE TIMES DAILY WITH MEALS, Normal, Disp-90 Tab, R-11      !! furosemide (LASIX) 40 mg tablet TAKE ONE TABLET BY MOUTH TWICE DAILY FOR leg swelling, Normal, Disp-60 Tab, R-11      hydrALAZINE (APRESOLINE) 100 mg tablet TAKE ONE TABLET BY MOUTH THREE TIMES DAILY, Normal, Disp-90 Tab, R-11      amitriptyline (ELAVIL) 50 mg tablet TAKE ONE TABLET BY MOUTH EVERY EVENING, Normal, Disp-30 Tab, R-11      !! fluticasone-salmeterol (ADVAIR) 250-50 mcg/dose diskus inhaler Take 1 Puff by inhalation two (2) times a day., Normal, Disp-1 Inhaler, R-12      predniSONE (DELTASONE) 10 mg tablet 6 tabs today and reduce by 1 tab daily, Normal, Disp-21 Tab, R-0      !! fluticasone-salmeterol (ADVAIR) 250-50 mcg/dose diskus inhaler Take 1 Puff by inhalation two (2) times a day., Print, Disp-1 Inhaler, R-12      oxyCODONE IR (OXY-IR) 15 mg immediate release tablet Take 15 mg by mouth every eight (8) hours as needed., Historical Med      omeprazole (PRILOSEC) 40 mg capsule Take 40 mg by mouth daily. , Historical Med      albuterol (PROAIR HFA) 90 mcg/actuation inhaler Take 2 Puffs by inhalation every four (4) hours as needed for Wheezing., Normal, Disp-1 Inhaler, R-11       !! - Potential duplicate medications found. Please discuss with provider.         2.   Follow-up Information     Follow up With Details Comments Contact Info    Larence Spatz., MD   [address]      Edwige Martinez, [address] [place], [place] [address] [phone]          Return to ED if worse

## 2017-09-14 NOTE — PROGRESS NOTES
Spoke with ER FRANCE Zuniga at length about this patient, who is well known to our service. Pt routinely misses clinic visits then appears in ER or is admitted with SOB and fluid overload. Today, pt is c/o SOB. His NT Pro-NP was minimally elevated at 456. Chronically anemic and CKD. Not fluid overloaded today. Pt can be discharged and followed as outpatient.     Discussed with Dr. Kalee Mayes PA-C

## 2017-09-18 NOTE — PROGRESS NOTES
[facility]   [address] [facility], [address] [place] [address] Road                                          NEW PATIENT HPI/FOLLOW-UP      NAME:  Anca Duenas   :   1975   MRN:   [id number]   PCP:  Padmini Hannon MD           Subjective: The patient is a 36y.o. year old male h/o HTN, morbid obesity, T2DM, CKD stage 3, atrial flutter, diastolic congestive heart failure, NYHA Class 3, who returns for a routine follow-up. Since the last visit, patient reports 1 month of worsening TAMEZ. Was seen in ER last week. Sx have not improved. Pt notes that last time he felt like this he had to get shots from his nephrologist (procrit?). He has a h/o low Hgb and review of results for past 12-24  Months shows Hgb in 8-11 range. Pt denies melena or ashanti blood in stool. No hematemesis or other known blood loss. Reports change in exercise tolerance. Denies chest pain, edema, medication intolerance, palpitations, PND/orthopnea, wheezing, sputum, syncope, dizziness or light headedness. Doing satisfactorily. Review of Systems  General: Pt denies excessive weight gain or loss. Pt is able to conduct ADL's. Respiratory: +shortness of breath, TAMEZ, Denies wheezing or stridor.   Cardiovascular: Denies precordial pain, palpitations, edema or PND  Gastrointestinal: Denies poor appetite, indigestion, abdominal pain or blood in stool  Peripheral vascular: Denies claudication, leg cramps  Neuropsychiatric: Denies paresthesias,tingling,numbness,anxiety,depression,fatigue  Musculoskeletal: Denies pain,tenderness, soreness,swelling      Past Medical History:   Diagnosis Date    Arrhythmia     afib    Asthma     3/2013 last attack    CKD (chronic kidney disease), stage III 2013    Nephrology: Jone Miller MD    Diabetes [facility])     Gout     Heart failure ([facility]     Hyperlipidemia     Hypertension     Ill-defined condition     Positive PPD, treated     treated 9 months Patient Active Problem List    Diagnosis Date Noted    Chronic renal failure, stage 4 (severe) ([facility] 07/04/2017    CHF (congestive heart failure) ([facility] 03/26/2017    Hypertensive urgency 03/26/2017    Long term current use of anticoagulant 12/19/2016    CKD (chronic kidney disease) stage 3, GFR 30-59 ml/min 12/18/2016    Accelerated hypertension with diastolic congestive heart failure, NYHA class 3 ([address] 11/29/2016    Pulmonary edema 11/07/2016    Heart failure ([address] 11/07/2016    Diabetic foot ulcer ([address] 03/15/2016    Septic arthritis of foot ([facility] 03/05/2016    Osteomyelitis ([facility] 03/04/2016    Acute diastolic CHF (congestive heart failure) ([facility] 03/02/2016    Pneumonia 03/02/2016    Cellulitis and abscess of leg 03/02/2016    Atrial fibrillation ([address] 03/02/2016    HTN (hypertension) 03/02/2016    Diabetic foot ulcer with osteomyelitis ([address] 02/20/2016    Symptomatic anemia 01/09/2016    Elevated serum alkaline phosphatase level 11/08/2015    Hyponatremia 11/08/2015    Acute renal failure (ARF) ([address] 11/08/2015    Type 2 diabetes mellitus with right diabetic foot ulcer ([address] 11/08/2015    Iron deficiency anemia 11/08/2015    Diabetic foot ulcer ([facility] 07/19/2015    Acute diastolic heart failure ([address] 06/25/2015    Atrial fibrillation and flutter ([address] 06/24/2015    Hx of BKA ([address] 10/23/2014    Streptococcal infection 10/22/2014    Proteus infection 10/22/2014    SIRS with acute organ dysfunction due to infectious process ([address] 10/21/2014     Class: Present on Admission    Open wound of toe 10/21/2014     Class: Present on Admission    Aortic regurgitation 10/21/2014     Class: Chronic    Renal failure, acute ([address] 10/21/2014     Class: Present on Admission    Gram-positive cocci bacteremia 10/21/2014    Cellulitis and abscess of leg 10/21/2014    History of positive PPD 02/17/2013    Acute hypoxemic respiratory failure ([facility] 02/16/2013    Malignant hypertension 02/16/2013    Uncontrolled type II diabetes mellitus ([facility] 02/16/2013    HTN (hypertension) 01/19/2012    Morbid obesity with BMI of 40.0-44.9, adult ([facility] 01/19/2012     Class: Present on Admission      Past Surgical History:   Procedure Laterality Date    HX BELOW KNEE AMPUTATION Left     due to osteomyelitis    HX HEENT      tonsilectomy    HX HERNIA REPAIR      HX TONSILLECTOMY       Allergies   Allergen Reactions    Ace Inhibitors Cough      Family History   Problem Relation Age of Onset    Diabetes Mother     Hypertension Mother     Hypertension Brother     Diabetes Brother     Diabetes Maternal Grandmother     Hypertension Maternal Grandmother     Diabetes Other     Hypertension Other     Heart Disease Other       Social History     Social History    Marital status:      Spouse name: N/A    Number of children: N/A    Years of education: N/A     Occupational History    Not on file. Social History Main Topics    Smoking status: Never Smoker    Smokeless tobacco: Never Used    Alcohol use No    Drug use: No    Sexual activity: Not Currently     Partners: Female     Other Topics Concern    Not on file     Social History Narrative         Per conversation 03/26/17    He would like his new Medical  power of  to be his Brother Michael Atkins. (Previously was his Mother Vi Aguilar)        Never smoker      Current Outpatient Prescriptions   Medication Sig    hydrALAZINE (APRESOLINE) 100 mg tablet TAKE ONE TABLET BY MOUTH THREE TIMES DAILY    furosemide (LASIX) 40 mg tablet TAKE ONE TABLET BY MOUTH TWICE DAILY FOR leg swelling    Nebulizer & Compressor machine 1 Each by Does Not Apply route four (4) times daily as needed.  albuterol (PROVENTIL VENTOLIN) 2.5 mg /3 mL (0.083 %) nebulizer solution 3 mL by Nebulization route every four (4) hours as needed for Wheezing.     glucose blood VI test strips (TRUETEST TEST STRIPS) strip Use to check sugars 3 times/day    fluticasone-salmeterol (ADVAIR) 250-50 mcg/dose diskus inhaler Take 1 Puff by inhalation two (2) times a day.  oxyCODONE IR (OXY-IR) 15 mg immediate release tablet Take 15 mg by mouth every eight (8) hours as needed.  amLODIPine (NORVASC) 10 mg tablet Take 1 Tab by mouth daily.  cloNIDine HCl (CATAPRES) 0.3 mg tablet TAKE ONE TABLET BY MOUTH TWICE DAILY    omeprazole (PRILOSEC) 40 mg capsule Take 40 mg by mouth daily.  labetalol (NORMODYNE) 200 mg tablet Take 400 mg by mouth three (3) times daily.  insulin lispro protamine/insulin lispro (HUMALOG MIX 75-25) 100 unit/mL (75-25) injection Take 20U qam and 10U qpm (Patient taking differently: Take 30 units qam and 10 units qpm)    aspirin 81 mg chewable tablet Take 81 mg by mouth daily.  RENVELA 800 mg tab tab TAKE ONE TABLET BY MOUTH THREE TIMES DAILY WITH MEALS    amitriptyline (ELAVIL) 50 mg tablet TAKE ONE TABLET BY MOUTH EVERY EVENING     No current facility-administered medications for this visit. I have reviewed the MAs notes, vitals, problem list, allergy list, medical history, family medical, social history and medications. Objective:     Physical Exam:     Vitals:    09/18/17 1450   BP: 176/85   Pulse: 79   SpO2: 93%   Weight: 337 lb (152.9 kg)   Height: 6' 3\" (1.905 m)    Body mass index is 42.12 kg/(m^2). General: WDWN obese adult male in wheelchair, in no acute distress. Flat affect. Appears fatigued. HEENT: No carotid bruits, no JVD, trach is midline. Heart:  Normal S1/S2 negative S3 or S4. Regular, no murmur, gallop or rub.   Respiratory: Clear bilaterally, no wheezing or rales  Abdomen:   Soft, non-tender, bowel sounds are active.   Extremities:  No edema, normal cap refill, no cyanosis. Neuro: A&Ox3, speech clear, gait stable. Skin: Pallor. Nail beds pale. No rashes or lesions. No diaphoresis.   Vascular: 2+ pulses symmetric in all extremities      Data Review:       Cardiographics:    EKG: NSR,normal ME interval. Prolonged P wave - consider LAE. Normal axis. No ST segment changes. Cardiology Labs:    Results for orders placed or performed during the hospital encounter of 09/13/17   EKG, 12 LEAD, INITIAL   Result Value Ref Range    Ventricular Rate 80 BPM    Atrial Rate 80 BPM    P-R Interval 144 ms    QRS Duration 92 ms    Q-T Interval 378 ms    QTC Calculation (Bezet) 435 ms    Calculated P Axis 31 degrees    Calculated R Axis 55 degrees    Calculated T Axis 70 degrees    Diagnosis       Normal sinus rhythm  Nonspecific T wave abnormality  Abnormal ECG  When compared with ECG of 03-JUL-2017 23:28,  No significant change was found         Lab Results   Component Value Date/Time    Cholesterol, total 218 11/09/2016 04:48 AM    HDL Cholesterol 39 11/09/2016 04:48 AM    LDL, calculated 150.4 11/09/2016 04:48 AM    Triglyceride 143 11/09/2016 04:48 AM    CHOL/HDL Ratio 5.6 11/09/2016 04:48 AM       Lab Results   Component Value Date/Time    Sodium 145 09/13/2017 03:28 PM    Potassium 4.1 09/13/2017 03:28 PM    Chloride 111 09/13/2017 03:28 PM    CO2 21 09/13/2017 03:28 PM    Anion gap 13 09/13/2017 03:28 PM    Glucose 74 09/13/2017 03:28 PM    BUN 61 09/13/2017 03:28 PM    Creatinine 3.96 09/13/2017 03:28 PM    BUN/Creatinine ratio 15 09/13/2017 03:28 PM    GFR est AA 20 09/13/2017 03:28 PM    GFR est non-AA 17 09/13/2017 03:28 PM    Calcium 8.7 09/13/2017 03:28 PM    Bilirubin, total 0.5 09/13/2017 03:28 PM    AST (SGOT) 9 09/13/2017 03:28 PM    Alk. phosphatase 97 09/13/2017 03:28 PM    Protein, total 7.3 09/13/2017 03:28 PM    Albumin 3.7 09/13/2017 03:28 PM    Globulin 3.6 09/13/2017 03:28 PM    A-G Ratio 1.0 09/13/2017 03:28 PM    ALT (SGPT) 21 09/13/2017 03:28 PM      Results for Gary Mckinney (MRN [id number]) as of 9/18/2017 15:28   Ref.  Range 3/25/2017 23:51 3/26/2017 10:23 7/3/2017 23:16 9/13/2017 15:28   HGB Latest Ref Range: 12.1 - 17.0 g/dL 10.0 (L) 9.9 (L) 9.2 (L) 8.0 (L)       Assessment: ICD-10-CM ICD-9-CM    1. Atrial fibrillation and flutter (HCC) I48.91 427.31 AMB POC EKG ROUTINE W/ 12 LEADS, INTER & REP    I48.92 427.32    2. Malignant hypertension I10 401.0 AMB POC EKG ROUTINE W/ 12 LEADS, INTER & REP   3. Acute diastolic CHF (congestive heart failure) (HCC) I50.31 428.31 AMB POC EKG ROUTINE W/ 12 LEADS, INTER & REP     428.0          Discussion: Patient presents at this time stable from a cardiac perspective. Appears pale and fatigued. Symptoms likely related to low Hgb. Has appointment with nephrology in 3 days -- may need procrit again. Pt instructed to follow up with us in 2 weeks. Will redraw labs to check Hgb if Nephrology does not during upcoming visit. Plan:   Discussed with Dr. Meme Peters     1. Continue same meds. -- CBC and CMP at next visit. -- May need transfusion      2.. Call with questions or concerns. 3.Follow up: 2 weeks    I have discussed the diagnosis with the patient and the intended plan as seen in the above orders. The patient has received an after-visit summary and questions were answered concerning future plans. I have discussed any concerning medication side effects and warnings with the patient as well.     Angela Lizarraga PA-C  9/18/2017

## 2017-09-18 NOTE — MR AVS SNAPSHOT
Visit Information Date & Time Provider Department Dept. Phone Encounter #  
 9/18/2017  2:00 AM Deniz Chavira MD [facility] at [facility] [phone] [id number] Your Appointments 9/21/2017 10:45 AM  
ROUTINE CARE with Onesimo Ferrell MD  
[facility] [address] [place] [address] ([facility]) Appt Note: fu  
 [address] [facility] [facility] [address] [place] [address]  
[phone]  
  
   
 [address] [address]  
  
    
 10/13/2017 11:15 AM  
ROUTINE CARE with Onesimo Ferrell MD  
[facility] - [address] [place] [address] ([facility]) Appt Note: 3mo fu  
 [address] [facility] [facility] [address] [address]  
[phone] Upcoming Health Maintenance Date Due MICROALBUMIN Q1 8/28/2016 Pneumococcal 19-64 Highest Risk (2 of 3 - PCV13) 10/14/2016 EYE EXAM RETINAL OR DILATED Q1 4/13/2017 INFLUENZA AGE 9 TO ADULT 8/1/2017 DTaP/Tdap/Td series (1 - Tdap) 8/23/2018* LIPID PANEL Q1 11/9/2017 FOOT EXAM Q1 12/6/2017 HEMOGLOBIN A1C Q6M 1/3/2018 *Topic was postponed. The date shown is not the original due date. Allergies as of 9/18/2017  Review Complete On: 9/13/2017 By: Kyrie Blanca RN Severity Noted Reaction Type Reactions Ace Inhibitors  03/02/2016    Cough Current Immunizations  Reviewed on 12/19/2016 Name Date Influenza Vaccine 11/6/2014  4:23 PM  
 Influenza Vaccine (Quad) PF 12/19/2016 11:33 AM  
 Influenza Vaccine Intradermal PF 10/14/2015 Influenza Vaccine PF 2/17/2013  1:11 PM  
 Pneumococcal Polysaccharide (PPSV-23) 10/14/2015,  Deferred (Patient Refused) Not reviewed this visit You Were Diagnosed With   
  
 Codes Comments Atrial fibrillation and flutter ([address]    -  Primary ICD-10-CM: I48.91, I48.92 
ICD-9-CM: 427.31, 427.32 Malignant hypertension     ICD-10-CM: I10 
ICD-9-CM: 401.0 Acute diastolic CHF (congestive heart failure) (HCC)     ICD-10-CM: I50.31 ICD-9-CM: 428.31, 428.0 CKD (chronic kidney disease) stage 3, GFR 30-59 ml/min     ICD-10-CM: N18.3 ICD-9-CM: 352. 3 Anemia, unspecified type     ICD-10-CM: D64.9 ICD-9-CM: 285.9 Hx of BKA, unspecified laterality ([facility] [address]     ICD-10-CM: S99.197 ICD-9-CM: V49.75 Vitals BP Pulse Height(growth percentile) Weight(growth percentile) SpO2 BMI  
 176/85 79 6' 3\" (1.905 m) 337 lb (152.9 kg) 93% 42.12 kg/m2 Smoking Status Never Smoker Vitals History BMI and BSA Data Body Mass Index Body Surface Area  
 42.12 kg/m 2 2.84 m 2 Preferred Pharmacy Pharm[facility] Name Phone [id number] [phone] Your Updated Medication List  
  
   
This list is accurate as of: 9/18/17  3:49 PM.  Always use your most recent med list.  
  
  
  
  
 albuterol 2.5 mg /3 mL (0.083 %) nebulizer solution Commonly known as:  PROVENTIL VENTOLIN  
3 mL by Nebulization route every four (4) hours as needed for Wheezing. amitriptyline 50 mg tablet Commonly known as:  ELAVIL TAKE ONE TABLET BY MOUTH EVERY EVENING  
  
 amLODIPine 10 mg tablet Commonly known as:  Sallie Diaz Take 1 Tab by mouth daily. aspirin 81 mg chewable tablet Take 81 mg by mouth daily. cloNIDine HCl 0.3 mg tablet Commonly known as:  CATAPRES  
TAKE ONE TABLET BY MOUTH TWICE DAILY  
  
 fluticasone-salmeterol 250-50 mcg/dose diskus inhaler Commonly known as:  ADVAIR Take 1 Puff by inhalation two (2) times a day. furosemide 40 mg tablet Commonly known as:  LASIX TAKE ONE TABLET BY MOUTH TWICE DAILY FOR leg swelling  
  
 glucose blood VI test strips strip Commonly known as:  TRUETEST TEST STRIPS Use to check sugars 3 times/day  
  
 hydrALAZINE 100 mg tablet Commonly known as:  APRESOLINE  
 TAKE ONE TABLET BY MOUTH THREE TIMES DAILY  
  
 insulin lispro protamine/insulin lispro 100 unit/mL (75-25) injection Commonly known as:  HumaLOG Mix 75-25 Take 20U qam and 10U qpm  
  
 labetalol 200 mg tablet Commonly known as:  Marlene Day Take 400 mg by mouth three (3) times daily. Nebulizer & Compressor machine 1 Each by Does Not Apply route four (4) times daily as needed. omeprazole 40 mg capsule Commonly known as:  PRILOSEC Take 40 mg by mouth daily. oxyCODONE IR 15 mg immediate release tablet Commonly known as:  OXY-IR Take 15 mg by mouth every eight (8) hours as needed. RENVELA 800 mg Tab tab Generic drug:  sevelamer carbonate TAKE ONE TABLET BY MOUTH THREE TIMES DAILY WITH MEALS We Performed the Following AMB POC EKG ROUTINE W/ 12 LEADS, INTER & REP [26983 CPT(R)] Patient Instructions -- Please follow up with Dr. Carmen Chan as planned this week -- Please call us to let us know how you are doing 
 -- Our concern is that anemia is causing your symptoms -- We'd like to see you back in 2 weeks 
 -- We'll check labs then, unless they are done with Dr. Carmen Chan Anemia: Care Instructions Your Care Instructions Anemia is a low level of red blood cells, which carry oxygen throughout your body. Many things can cause anemia. Lack of iron is one of the most common causes. Your body needs iron to make hemoglobin, a substance in red blood cells that carries oxygen from the lungs to your body's cells. Without enough iron, the body produces fewer and smaller red blood cells. As a result, your body's cells do not get enough oxygen, and you feel tired and weak. And you may have trouble concentrating. Bleeding is the most common cause of a lack of iron. You may have heavy menstrual bleeding or bleeding caused by conditions such as ulcers, hemorrhoids, or cancer.  Regular use of aspirin or other anti-inflammatory medicines (such as ibuprofen) also can cause bleeding in some people. A lack of iron in your diet also can cause anemia, especially at times when the body needs more iron, such as during pregnancy, infancy, and the teen years. Your doctor may have prescribed iron pills. It may take several months of treatment for your iron levels to return to normal. Your doctor also may suggest that you eat foods that are rich in iron, such as meat and beans. There are many other causes of anemia. It is not always due to a lack of iron. Finding the specific cause of your anemia will help your doctor find the right treatment for you. Follow-up care is a key part of your treatment and safety. Be sure to make and go to all appointments, and call your doctor if you are having problems. It's also a good idea to know your test results and keep a list of the medicines you take. How can you care for yourself at home? · Take your medicines exactly as prescribed. Call your doctor if you think you are having a problem with your medicine. · If your doctor recommends iron pills, take them as directed: ¨ Try to take the pills on an empty stomach about 1 hour before or 2 hours after meals. But you may need to take iron with food to avoid an upset stomach. ¨ Do not take antacids or drink milk or caffeine drinks (such as coffee, tea, or cola) at the same time or within 2 hours of the time that you take your iron. They can make it hard for your body to absorb the iron. ¨ Vitamin C (from food or supplements) helps your body absorb iron. Try taking iron pills with a glass of orange juice or some other food that is high in vitamin C, such as citrus fruits. ¨ Iron pills may cause stomach problems, such as heartburn, nausea, diarrhea, constipation, and cramps. Be sure to drink plenty of fluids, and include fruits, vegetables, and fiber in your diet each day. Iron pills often make your bowel movements dark or green. ¨ If you forget to take an iron pill, do not take a double dose of iron the next time you take a pill. ¨ Keep iron pills out of the reach of small children. An overdose of iron can be very dangerous. · Follow your doctor's advice about eating iron-rich foods. These include red meat, shellfish, poultry, eggs, beans, raisins, whole-grain bread, and leafy green vegetables. · Steam vegetables to help them keep their iron content. When should you call for help? Call 911 anytime you think you may need emergency care. For example, call if: 
· You have symptoms of a heart attack. These may include: ¨ Chest pain or pressure, or a strange feeling in the chest. 
¨ Sweating. ¨ Shortness of breath. ¨ Nausea or vomiting. ¨ Pain, pressure, or a strange feeling in the back, neck, jaw, or upper belly or in one or both shoulders or arms. ¨ Lightheadedness or sudden weakness. ¨ A fast or irregular heartbeat. After you call 911, the  may tell you to chew 1 adult-strength or 2 to 4 low-dose aspirin. Wait for an ambulance. Do not try to drive yourself. · You passed out (lost consciousness). Call your doctor now or seek immediate medical care if: 
· You have new or increased shortness of breath. · You are dizzy or lightheaded, or you feel like you may faint. · Your fatigue and weakness continue or get worse. · You have any abnormal bleeding, such as: 
¨ Nosebleeds. ¨ Vaginal bleeding that is different (heavier, more frequent, at a different time of the month) than what you are used to. ¨ Bloody or black stools, or rectal bleeding. ¨ Bloody or pink urine. Watch closely for changes in your health, and be sure to contact your doctor if: 
· You do not get better as expected. Where can you learn more? Go to http://amparo-ramon.info/. Enter R301 in the search box to learn more about \"Anemia: Care Instructions. \" Current as of: October 13, 2016 Content Version: 11.3 © 2233-4345 Healthwise, Incorporated. Care instructions adapted under license by [employer] (which disclaims liability or warranty for this information). If you have questions about a medical condition or this instruction, always ask your healthcare professional. [address] any warranty or liability for your use of this information. LABS From ED Visit on 9/13/17: 
 
Results for Elease Mode (MRN [id number]) as of 9/18/2017 15:28 Ref. Range 9/13/2017 15:28 WBC Latest Ref Range: 4.1 - 11.1 K/uL 7.2 RBC Latest Ref Range: 4.10 - 5.70 M/uL 3.07 (L) HGB Latest Ref Range: 12.1 - 17.0 g/dL 8.0 (L) HCT Latest Ref Range: 36.6 - 50.3 % 26.2 (L) MCV Latest Ref Range: 80.0 - 99.0 FL 85.3 MCH Latest Ref Range: 26.0 - 34.0 PG 26.1 MCHC Latest Ref Range: 30.0 - 36.5 g/dL 30.5 RDW Latest Ref Range: 11.5 - 14.5 % 15.5 (H) PLATELET Latest Ref Range: 150 - 400 K/uL 163 NEUTROPHILS Latest Ref Range: 32 - 75 % 79 (H) LYMPHOCYTES Latest Ref Range: 12 - 49 % 11 (L) MONOCYTES Latest Ref Range: 5 - 13 % 8 EOSINOPHILS Latest Ref Range: 0 - 7 % 2  
BASOPHILS Latest Ref Range: 0 - 1 % 0  
ABS. NEUTROPHILS Latest Ref Range: 1.8 - 8.0 K/UL 5.6  
ABS. LYMPHOCYTES Latest Ref Range: 0.8 - 3.5 K/UL 0.8 ABS. MONOCYTES Latest Ref Range: 0.0 - 1.0 K/UL 0.6  
ABS. EOSINOPHILS Latest Ref Range: 0.0 - 0.4 K/UL 0.2  
ABS. BASOPHILS Latest Ref Range: 0.0 - 0.1 K/UL 0.0 Results for Elease Mode (MRN [id number]) as of 9/18/2017 15:28 Ref. Range 9/13/2017 15:28 Sodium Latest Ref Range: 136 - 145 mmol/L 145 Potassium Latest Ref Range: 3.5 - 5.1 mmol/L 4.1 Chloride Latest Ref Range: 97 - 108 mmol/L 111 (H) CO2 Latest Ref Range: 21 - 32 mmol/L 21 Anion gap Latest Ref Range: 5 - 15 mmol/L 13 Glucose Latest Ref Range: 65 - 100 mg/dL 74 BUN Latest Ref Range: 6 - 20 MG/DL 61 (H) Creatinine Latest Ref Range: 0.70 - 1.30 MG/DL 3.96 (H) BUN/Creatinine ratio Latest Ref Range: 12 - 20   15 Calcium Latest Ref Range: 8.5 - 10.1 MG/DL 8.7 GFR est non-AA Latest Ref Range: >60 ml/min/1.73m2 17 (L) GFR est AA Latest Ref Range: >60 ml/min/1.73m2 20 (L) Bilirubin, total Latest Ref Range: 0.2 - 1.0 MG/DL 0.5 Protein, total Latest Ref Range: 6.4 - 8.2 g/dL 7.3 Albumin Latest Ref Range: 3.5 - 5.0 g/dL 3.7 Globulin Latest Ref Range: 2.0 - 4.0 g/dL 3.6 A-G Ratio Latest Ref Range: 1.1 - 2.2   1.0 (L) ALT (SGPT) Latest Ref Range: 12 - 78 U/L 21 AST Latest Ref Range: 15 - 37 U/L 9 (L) Alk. phosphatase Latest Ref Range: 45 - 117 U/L 97 Troponin-I, Qt. Latest Ref Range: <0.05 ng/mL <0.04 NT pro-BNP Latest Ref Range: 0 - 125 PG/ (H) Introducing [facility] SERVICES! [employer] introduces [employer] patient portal. Now you can access parts of your medical record, email your doctor's office, and request medication refills online. 1. In your internet browser, go to [email or web address] 2. Click on the First Time User? Click Here link in the Sign In box. You will see the New Member Sign Up page. 3. Enter your [employer] Access Code exactly as it appears below. You will not need to use this code after youve completed the sign-up process. If you do not sign up before the expiration date, you must request a new code. · [employer] Access Code: [email or web address] Expires: 10/3/2017  5:55 AM 
 
4. Enter the last four digits of your Social Security Number (xxxx) and Date of Birth (mm/dd/yyyy) as indicated and click Submit. You will be taken to the next sign-up page. 5. Create a [employer] ID. This will be your [employer] login ID and cannot be changed, so think of one that is secure and easy to remember. 6. Create a [employer] password. You can change your password at any time. 7. Enter your Password Reset Question and Answer. This can be used at a later time if you forget your password. 8. Enter your e-mail address. You will receive e-mail notification when new information is available in [address] 9. Click Sign Up. You can now view and download portions of your medical record. 10. Click the Download Summary menu link to download a portable copy of your medical information. If you have questions, please visit the Frequently Asked Questions section of the [employer] website. Remember, [employer] is NOT to be used for urgent needs. For medical emergencies, dial 911. Now available from your iPhone and Android! Please provide this summary of care documentation to your next provider. Your primary care clinician is listed as Steve Geller. If you have any questions after today's visit, please call [phone].

## 2017-09-18 NOTE — PATIENT INSTRUCTIONS
-- Please follow up with Dr. Cesar Bender as planned this week  -- Please call us to let us know how you are doing   -- Our concern is that anemia is causing your symptoms  -- We'd like to see you back in 2 weeks   -- We'll check labs then, unless they are done with Dr. Cesar Bender      Anemia: Care Instructions  Your Care Instructions    Anemia is a low level of red blood cells, which carry oxygen throughout your body. Many things can cause anemia. Lack of iron is one of the most common causes. Your body needs iron to make hemoglobin, a substance in red blood cells that carries oxygen from the lungs to your body's cells. Without enough iron, the body produces fewer and smaller red blood cells. As a result, your body's cells do not get enough oxygen, and you feel tired and weak. And you may have trouble concentrating. Bleeding is the most common cause of a lack of iron. You may have heavy menstrual bleeding or bleeding caused by conditions such as ulcers, hemorrhoids, or cancer. Regular use of aspirin or other anti-inflammatory medicines (such as ibuprofen) also can cause bleeding in some people. A lack of iron in your diet also can cause anemia, especially at times when the body needs more iron, such as during pregnancy, infancy, and the teen years. Your doctor may have prescribed iron pills. It may take several months of treatment for your iron levels to return to normal. Your doctor also may suggest that you eat foods that are rich in iron, such as meat and beans. There are many other causes of anemia. It is not always due to a lack of iron. Finding the specific cause of your anemia will help your doctor find the right treatment for you. Follow-up care is a key part of your treatment and safety. Be sure to make and go to all appointments, and call your doctor if you are having problems. It's also a good idea to know your test results and keep a list of the medicines you take.   How can you care for yourself at home?  · Take your medicines exactly as prescribed. Call your doctor if you think you are having a problem with your medicine. · If your doctor recommends iron pills, take them as directed:  ¨ Try to take the pills on an empty stomach about 1 hour before or 2 hours after meals. But you may need to take iron with food to avoid an upset stomach. ¨ Do not take antacids or drink milk or caffeine drinks (such as coffee, tea, or cola) at the same time or within 2 hours of the time that you take your iron. They can make it hard for your body to absorb the iron. ¨ Vitamin C (from food or supplements) helps your body absorb iron. Try taking iron pills with a glass of orange juice or some other food that is high in vitamin C, such as citrus fruits. ¨ Iron pills may cause stomach problems, such as heartburn, nausea, diarrhea, constipation, and cramps. Be sure to drink plenty of fluids, and include fruits, vegetables, and fiber in your diet each day. Iron pills often make your bowel movements dark or green. ¨ If you forget to take an iron pill, do not take a double dose of iron the next time you take a pill. ¨ Keep iron pills out of the reach of small children. An overdose of iron can be very dangerous. · Follow your doctor's advice about eating iron-rich foods. These include red meat, shellfish, poultry, eggs, beans, raisins, whole-grain bread, and leafy green vegetables. · Steam vegetables to help them keep their iron content. When should you call for help? Call 911 anytime you think you may need emergency care. For example, call if:  · You have symptoms of a heart attack. These may include:  ¨ Chest pain or pressure, or a strange feeling in the chest.  ¨ Sweating. ¨ Shortness of breath. ¨ Nausea or vomiting. ¨ Pain, pressure, or a strange feeling in the back, neck, jaw, or upper belly or in one or both shoulders or arms. ¨ Lightheadedness or sudden weakness. ¨ A fast or irregular heartbeat.   After you call 911, the  may tell you to chew 1 adult-strength or 2 to 4 low-dose aspirin. Wait for an ambulance. Do not try to drive yourself. · You passed out (lost consciousness). Call your doctor now or seek immediate medical care if:  · You have new or increased shortness of breath. · You are dizzy or lightheaded, or you feel like you may faint. · Your fatigue and weakness continue or get worse. · You have any abnormal bleeding, such as:  ¨ Nosebleeds. ¨ Vaginal bleeding that is different (heavier, more frequent, at a different time of the month) than what you are used to. ¨ Bloody or black stools, or rectal bleeding. ¨ Bloody or pink urine. Watch closely for changes in your health, and be sure to contact your doctor if:  · You do not get better as expected. Where can you learn more? Go to http://amparoActive Mediaramon.info/. Enter R301 in the search box to learn more about \"Anemia: Care Instructions. \"  Current as of: October 13, 2016  Content Version: 11.3  © 9477-1207 [employer]. Care instructions adapted under license by [employer] (which disclaims liability or warranty for this information). If you have questions about a medical condition or this instruction, always ask your healthcare professional. [address] any warranty or liability for your use of this information. LABS From ED Visit on 9/13/17:    Results for Reynold Jimenez (MRN [id number]) as of 9/18/2017 15:28   Ref.  Range 9/13/2017 15:28   WBC Latest Ref Range: 4.1 - 11.1 K/uL 7.2   RBC Latest Ref Range: 4.10 - 5.70 M/uL 3.07 (L)   HGB Latest Ref Range: 12.1 - 17.0 g/dL 8.0 (L)   HCT Latest Ref Range: 36.6 - 50.3 % 26.2 (L)   MCV Latest Ref Range: 80.0 - 99.0 FL 85.3   MCH Latest Ref Range: 26.0 - 34.0 PG 26.1   MCHC Latest Ref Range: 30.0 - 36.5 g/dL 30.5   RDW Latest Ref Range: 11.5 - 14.5 % 15.5 (H)   PLATELET Latest Ref Range: 150 - 400 K/uL 163   NEUTROPHILS Latest Ref Range: 32 - 75 % 79 (H)   LYMPHOCYTES Latest Ref Range: 12 - 49 % 11 (L)   MONOCYTES Latest Ref Range: 5 - 13 % 8   EOSINOPHILS Latest Ref Range: 0 - 7 % 2   BASOPHILS Latest Ref Range: 0 - 1 % 0   ABS. NEUTROPHILS Latest Ref Range: 1.8 - 8.0 K/UL 5.6   ABS. LYMPHOCYTES Latest Ref Range: 0.8 - 3.5 K/UL 0.8   ABS. MONOCYTES Latest Ref Range: 0.0 - 1.0 K/UL 0.6   ABS. EOSINOPHILS Latest Ref Range: 0.0 - 0.4 K/UL 0.2   ABS. BASOPHILS Latest Ref Range: 0.0 - 0.1 K/UL 0.0   Results for aDsha Murphy (MRN [id number]) as of 9/18/2017 15:28   Ref. Range 9/13/2017 15:28   Sodium Latest Ref Range: 136 - 145 mmol/L 145   Potassium Latest Ref Range: 3.5 - 5.1 mmol/L 4.1   Chloride Latest Ref Range: 97 - 108 mmol/L 111 (H)   CO2 Latest Ref Range: 21 - 32 mmol/L 21   Anion gap Latest Ref Range: 5 - 15 mmol/L 13   Glucose Latest Ref Range: 65 - 100 mg/dL 74   BUN Latest Ref Range: 6 - 20 MG/DL 61 (H)   Creatinine Latest Ref Range: 0.70 - 1.30 MG/DL 3.96 (H)   BUN/Creatinine ratio Latest Ref Range: 12 - 20   15   Calcium Latest Ref Range: 8.5 - 10.1 MG/DL 8.7   GFR est non-AA Latest Ref Range: >60 ml/min/1.73m2 17 (L)   GFR est AA Latest Ref Range: >60 ml/min/1.73m2 20 (L)   Bilirubin, total Latest Ref Range: 0.2 - 1.0 MG/DL 0.5   Protein, total Latest Ref Range: 6.4 - 8.2 g/dL 7.3   Albumin Latest Ref Range: 3.5 - 5.0 g/dL 3.7   Globulin Latest Ref Range: 2.0 - 4.0 g/dL 3.6   A-G Ratio Latest Ref Range: 1.1 - 2.2   1.0 (L)   ALT (SGPT) Latest Ref Range: 12 - 78 U/L 21   AST Latest Ref Range: 15 - 37 U/L 9 (L)   Alk. phosphatase Latest Ref Range: 45 - 117 U/L 97   Troponin-I, Qt.  Latest Ref Range: <0.05 ng/mL <0.04   NT pro-BNP Latest Ref Range: 0 - 125 PG/ (H)

## 2017-09-26 NOTE — IP AVS SNAPSHOT
2700 [facility] [address] [place] [address] 
[phone] Patient: Migel Quintanilla MRN: [id number] OCX:7/06/3109 You are allergic to the following Allergen Reactions Ace Inhibitors Cough Recent Documentation Height Weight BMI Smoking Status 1.88 m (!) 160.6 kg 45.45 kg/m2 Never Smoker Emergency Contacts Name Discharge Info Relation Home Work Mobile [address] [employer] CAREGIVER [3] Girlfriend [18] [phone] Sanna Cuba DISCHARGE CAREGIVER [3] Other Relative [6] [phone] About your hospitalization You were admitted on:  September 27, 2017 You last received care in the:  [facility] 6S NEURO-SCI TELE You were discharged on:  September 28, 2017 Unit phone number:  [phone] Why you were hospitalized Your primary diagnosis was: Anemia Your diagnoses also included:  Acute Pulmonary Edema (Hcc), Acute On Chronic Diastolic Chf (Congestive Heart Failure) (Hcc) Providers Seen During Your Hospitalizations Provider Role Specialty Primary office phone Graciela Rocha MD Attending Provider Emergency Medicine [phone] Rajesh Angelo MD Attending Provider [facility] [phone] Angelo Ghosh MD Attending Provider Internal Medicine [phone] Your Primary Care Physician (PCP) Primary Care Physician Office Phone Office Fax [address] [phone] Follow-up Information Follow up With Details Comments Contact Info Yuni Schmidt MD In 2 weeks see below for details. [address] [place] [address] 
[phone] Genna Castaneda MD In 1 week call and make appointment  208 [facility] [address] [place] [address] 
[phone] Rosalba Rangel MD   4602 [facility] [address] [place] [address] 
[phone] Your Appointments  Monday October 02, 2017 10:20 AM EDT  
 New Patient with Donald Douglass MD  
[facility] [address] ([address]) [address] [place] [address], [address] 2305 [facility]  
[phone] Monday October 02, 2017  1:30 PM EDT  
ESTABLISHED PATIENT with Tre Richardson MD  
1400 [facility]) [address] [place] [address]  
[phone] Wednesday October 04, 2017  1:00 PM EDT INFUSION 30 with [address]  
1401 [facility] ([facility]) [address] [place] [address] 7 [id number] [phone] Friday October 13, 2017 11:15 AM EDT ROUTINE CARE with Wing Marsha MD  
[facility] - [address] [place] [address] ([address]) 2830 [facility],6Th Floor [facility] 7 [address]  
[phone] Thursday October 19, 2017  9:20 AM EDT  
ESTABLISHED PATIENT with Tre Richardson MD  
1400 [facility]) [address] [place] [address]  
[phone] Current Discharge Medication List  
  
CONTINUE these medications which have CHANGED Dose & Instructions Dispensing Information Comments Morning Noon Evening Bedtime  
 cloNIDine HCl 0.3 mg tablet Commonly known as:  CATAPRES What changed:  See the new instructions. Your last dose was: Your next dose is:    
   
   
 Dose:  0.3 mg Take 1 Tab by mouth three (3) times daily. Quantity:  90 Tab Refills:  2 CONTINUE these medications which have NOT CHANGED Dose & Instructions Dispensing Information Comments Morning Noon Evening Bedtime * albuterol 90 mcg/actuation inhaler Commonly known as:  PROVENTIL HFA, VENTOLIN HFA, PROAIR HFA Your last dose was: Your next dose is:    
   
   
 Dose:  2 Puff Take 2 Puffs by inhalation daily. Refills:  0  
     
   
   
   
  
 * albuterol 2.5 mg /3 mL (0.083 %) nebulizer solution Commonly known as:  PROVENTIL VENTOLIN Your last dose was: Your next dose is:    
   
   
 Dose:  2.5 mg  
3 mL by Nebulization route every four (4) hours as needed for Wheezing. Quantity:  1 Package Refills:  12  
     
   
   
   
  
 amitriptyline 50 mg tablet Commonly known as:  ELAVIL Your last dose was: Your next dose is: TAKE ONE TABLET BY MOUTH EVERY EVENING Quantity:  30 Tab Refills:  11 amLODIPine 10 mg tablet Commonly known as:  Dayanara Salvador Your last dose was: Your next dose is:    
   
   
 Dose:  10 mg Take 1 Tab by mouth daily. Quantity:  30 Tab Refills:  11  
     
   
   
   
  
 aspirin 81 mg chewable tablet Your last dose was: Your next dose is:    
   
   
 Dose:  81 mg Take 81 mg by mouth daily. Refills:  0  
     
   
   
   
  
 fluticasone-salmeterol 250-50 mcg/dose diskus inhaler Commonly known as:  ADVAIR Your last dose was: Your next dose is:    
   
   
 Dose:  1 Puff Take 1 Puff by inhalation daily. Refills:  0  
     
   
   
   
  
 furosemide 40 mg tablet Commonly known as:  LASIX Your last dose was: Your next dose is: TAKE ONE TABLET BY MOUTH TWICE DAILY FOR leg swelling Quantity:  60 Tab Refills:  11  
     
   
   
   
  
 HumaLOG Mix 75-25 100 unit/mL (75-25) injection Generic drug:  insulin lispro protamine/insulin lispro Your last dose was: Your next dose is: Take 30 units in the morning and 10 units at night. Refills:  0  
     
   
   
   
  
 hydrALAZINE 100 mg tablet Commonly known as:  APRESOLINE Your last dose was: Your next dose is: TAKE ONE TABLET BY MOUTH THREE TIMES DAILY Quantity:  90 Tab Refills:  11  
     
   
   
   
  
 INTEGRA PLUS 125 mg iron- 1 mg Cap Generic drug:  Iron Fum & P-FA-Vit B & C No.9 Your last dose was: Your next dose is:    
   
   
 Dose:  1 Cap Take 1 Cap by mouth daily. Refills:  0  
     
   
   
   
  
 labetalol 300 mg tablet Commonly known as:  Brigida Pale Your last dose was: Your next dose is:    
   
   
 Dose:  300 mg Take 300 mg by mouth every eight (8) hours. Refills:  0 LIPITOR 40 mg tablet Generic drug:  atorvastatin Your last dose was: Your next dose is:    
   
   
 Dose:  40 mg Take 40 mg by mouth nightly. Refills:  0 LYRICA 75 mg capsule Generic drug:  pregabalin Your last dose was: Your next dose is:    
   
   
 Dose:  75 mg Take 75 mg by mouth three (3) times daily. Refills:  0  
     
   
   
   
  
 omeprazole 40 mg capsule Commonly known as:  PRILOSEC Your last dose was: Your next dose is:    
   
   
 Dose:  40 mg Take 40 mg by mouth daily. Refills:  0  
     
   
   
   
  
 oxyCODONE IR 15 mg immediate release tablet Commonly known as:  OXY-IR Your last dose was: Your next dose is:    
   
   
 Dose:  15 mg Take 15 mg by mouth every eight (8) hours as needed. Refills:  0  
     
   
   
   
  
 RAYALDEE 30 mcg Cs24 Generic drug:  calcifediol Your last dose was: Your next dose is:    
   
   
 Dose:  30 mcg Take 30 mcg by mouth nightly. Refills:  0  
     
   
   
   
  
 RENVELA 800 mg Tab tab Generic drug:  sevelamer carbonate Your last dose was: Your next dose is: TAKE ONE TABLET BY MOUTH THREE TIMES DAILY WITH MEALS Quantity:  90 Tab Refills:  11  
     
   
   
   
  
 sodium bicarbonate 650 mg tablet Your last dose was: Your next dose is:    
   
   
 Dose:  650 mg Take 650 mg by mouth two (2) times a day. Refills:  0 ZOFRAN ODT 8 mg disintegrating tablet Generic drug:  ondansetron Your last dose was: Your next dose is:    
   
   
 Dose:  8 mg Take 8 mg by mouth three (3) times daily. Refills:  0  
     
   
   
   
  
 * Notice: This list has 2 medication(s) that are the same as other medications prescribed for you. Read the directions carefully, and ask your doctor or other care provider to review them with you. Where to Get Your Medications Information on where to get these meds will be given to you by the nurse or doctor. ! Ask your nurse or doctor about these medications  
  cloNIDine HCl 0.3 mg tablet Discharge Instructions Please bring this form with you to show your primary care provider at your follow-up appointment. Primary care provider:  Dr. Manuel Munson MD 
 
Discharging provider:  Meka Casiano MD 
 
You have been admitted to the hospital with the following diagnoses: · Anemia · Acute pulmonary edema (HCC) · Acute on chronic diastolic CHF (congestive heart failure) ([facility] [address] FOLLOW-UP CARE RECOMMENDATIONS: 
 
APPOINTMENTS: 
Follow-up Information Follow up With Details Comments Contact Info Manuel Munson MD In 2 weeks discharge follow up  [address] [place] [address] 
[phone] Xiao Garcia MD  call and make appointment  208 [facility] [address] [place] [address] 
[phone] FOLLOW-UP TESTS recommended: none PENDING TEST RESULTS: 
At the time of your discharge the following test results are still pending: none Please make sure you review these results with your outpatient follow-up provider(s). SYMPTOMS to watch for: chest pain, shortness of breath, fever, chills, nausea, vomiting, diarrhea, change in mentation, falling, weakness, bleeding. DIET/what to eat:  Renal Diet ACTIVITY:  Activity as tolerated WOUND CARE: none EQUIPMENT needed:  none What to do if new or unexpected symptoms occur? If you experience any of the above symptoms (or should other concerns or questions arise after discharge) please call your primary care physician. Return to the emergency room if you cannot get hold of your doctor. · It is very important that you keep your follow-up appointment(s). · Please bring discharge papers, medication list (and/or medication bottles) to your follow-up appointments for review by your outpatient provider(s). · Please check the list of medications and be sure it includes every medication (even non-prescription medications) that your provider wants you to take. · It is important that you take the medication exactly as they are prescribed. · Keep your medication in the bottles provided by the pharmacist and keep a list of the medication names, dosages, and times to be taken in your wallet. · Do not take other medications without consulting your doctor. · If you have any questions about your medications or other instructions, please talk to your nurse or care provider before you leave the hospital. 
 
I understand that if any problems occur once I am at home I am to contact my physician. These instructions were explained to me and I had the opportunity to ask questions. Discharge Instructions Attachments/References HEART FAILURE: AVOIDING TRIGGERS (ENGLISH) Discharge Orders None [place] Announcement We are excited to announce that we are making your provider's discharge notes available to you in Backchat. You will see these notes when they are completed and signed by the physician that discharged you from your recent hospital stay. If you have any questions or concerns about any information you see in [facility], please call the Health Information Department where you were seen or reach out to your Primary Care Provider for more information about your plan of care. Introducing [facility]! Michelle Conroy introduces Stipple patient portal. Now you can access parts of your medical record, email your doctor's office, and request medication refills online. 1. In your internet browser, go to [email or web address] 2. Click on the First Time User? Click Here link in the Sign In box. You will see the New Member Sign Up page. 3. Enter your Stipple Access Code exactly as it appears below. You will not need to use this code after youve completed the sign-up process. If you do not sign up before the expiration date, you must request a new code. · Stipple Access Code: [email or web address] Expires: 10/3/2017  5:55 AM 
 
4. Enter the last four digits of your Social Security Number (xxxx) and Date of Birth (mm/dd/yyyy) as indicated and click Submit. You will be taken to the next sign-up page. 5. Create a Stipple ID. This will be your Stipple login ID and cannot be changed, so think of one that is secure and easy to remember. 6. Create a Stipple password. You can change your password at any time. 7. Enter your Password Reset Question and Answer. This can be used at a later time if you forget your password. 8. Enter your e-mail address. You will receive e-mail notification when new information is available in [address] 9. Click Sign Up. You can now view and download portions of your medical record. 10. Click the Download Summary menu link to download a portable copy of your medical information. If you have questions, please visit the Frequently Asked Questions section of the Stipple website. Remember, Stipple is NOT to be used for urgent needs. For medical emergencies, dial 911. Now available from your iPhone and Android! General Information Please provide this summary of care documentation to your next provider. Patient Signature:  ____________________________________________________________ Date:  ____________________________________________________________  
  
Althia Kras Provider Signature:  ____________________________________________________________ Date:  ____________________________________________________________ More Information Avoiding Triggers With Heart Failure: Care Instructions Your Care Instructions Triggers are anything that make your heart failure flare up. A flare-up is also called \"sudden heart failure\" or \"acute heart failure. \" When you have a flare-up, fluid builds up in your lungs, and you have problems breathing. You might need to go to the hospital. By watching for changes in your condition and avoiding triggers, you can prevent heart failure flare-ups. Follow-up care is a key part of your treatment and safety. Be sure to make and go to all appointments, and call your doctor if you are having problems. It's also a good idea to know your test results and keep a list of the medicines you take. How can you care for yourself at home? Watch for changes in your weight and condition · Weigh yourself without clothing at the same time each day. Record your weight. Call your doctor if you have sudden weight gain, such as more than 2 to 3 pounds in a day or 5 pounds in a week. (Your doctor may suggest a different range of weight gain.) A sudden weight gain may mean that your heart failure is getting worse. · Keep a daily record of your symptoms. Write down any changes in how you feel, such as new shortness of breath, cough, or problems eating. Also record if your ankles are more swollen than usual and if you feel more tired than usual. Note anything that you ate or did that could have triggered these changes. Limit sodium Sodium causes your body to hold on to extra water. This may cause your heart failure symptoms to get worse. People get most of their sodium from processed foods. Fast food and restaurant meals also tend to be very high in sodium. · Your doctor may suggest that you limit sodium to 2,000 milligrams (mg) a day or less. That is less than 1 teaspoon of salt a day, including all the salt you eat in cooking or in packaged foods. · Read food labels on cans and food packages. They tell you how much sodium you get in one serving. Check the serving size. If you eat more than one serving, you are getting more sodium. · Be aware that sodium can come in forms other than salt, including monosodium glutamate (MSG), sodium citrate, and sodium bicarbonate (baking soda). MSG is often added to Asian food. You can sometimes ask for food without MSG or salt. · Slowly reducing salt will help you adjust to the taste. Take the salt shaker off the table. · Flavor your food with garlic, lemon juice, onion, vinegar, herbs, and spices instead of salt. Do not use soy sauce, steak sauce, onion salt, garlic salt, mustard, or ketchup on your food, unless it is labeled \"low-sodium\" or \"low-salt. \" 
· Make your own salad dressings, sauces, and ketchup without adding salt. · Use fresh or frozen ingredients, instead of canned ones, whenever you can. Choose low-sodium canned goods. · Eat less processed food and food from restaurants, including fast food. Exercise as directed Moderate, regular exercise is very good for your heart. It improves your blood flow and helps control your weight. But too much exercise can stress your heart and cause a heart failure flare-up. · Check with your doctor before you start an exercise program. 
· Walking is an easy way to get exercise. Start out slowly. Gradually increase the length and pace of your walk. Swimming, riding a bike, and using a treadmill are also good forms of exercise. · When you exercise, watch for signs that your heart is working too hard. You are pushing yourself too hard if you cannot talk while you are exercising.  If you become short of breath or dizzy or have chest pain, stop, sit down, and rest. 
 · Do not exercise when you do not feel well. Take medicines correctly · Take your medicines exactly as prescribed. Call your doctor if you think you are having a problem with your medicine. · Make a list of all the medicines you take. Include those prescribed to you by other doctors and any over-the-counter medicines, vitamins, or supplements you take. Take this list with you when you go to any doctor. · Take your medicines at the same time every day. It may help you to post a list of all the medicines you take every day and what time of day you take them. · Make taking your medicine as simple as you can. Plan times to take your medicines when you are doing other things, such as eating a meal or getting ready for bed. This will make it easier to remember to take your medicines. · Get organized. Use helpful tools, such as daily or weekly pill containers. When should you call for help? Call 911 if you have symptoms of sudden heart failure such as: 
· You have severe trouble breathing. · You cough up pink, foamy mucus. · You have a new irregular or rapid heartbeat. Call your doctor now or seek immediate medical care if: 
· You have new or increased shortness of breath. · You are dizzy or lightheaded, or you feel like you may faint. · You have sudden weight gain, such as more than 2 to 3 pounds in a day or 5 pounds in a week. (Your doctor may suggest a different range of weight gain.) · You have increased swelling in your legs, ankles, or feet. · You are suddenly so tired or weak that you cannot do your usual activities. Watch closely for changes in your health, and be sure to contact your doctor if you develop new symptoms. Where can you learn more? Go to http://amparo-ramon.info/. Enter S645 in the search box to learn more about \"Avoiding Triggers With Heart Failure: Care Instructions. \" Current as of: February 23, 2017 Content Version: 11.3 © 6644-3918 Healthwise, Incorporated. Care instructions adapted under license by [employer] (which disclaims liability or warranty for this information). If you have questions about a medical condition or this instruction, always ask your healthcare professional. [address] any warranty or liability for your use of this information.

## 2017-09-26 NOTE — IP AVS SNAPSHOT
[address] 
[phone] Patient: Miriam Davenport MRN: [id number] VTQ:6/60/5756 Current Discharge Medication List  
  
CONTINUE these medications which have CHANGED Dose & Instructions Dispensing Information Comments Morning Noon Evening Bedtime  
 cloNIDine HCl 0.3 mg tablet Commonly known as:  CATAPRES What changed:  See the new instructions. Your last dose was: Your next dose is:    
   
   
 Dose:  0.3 mg Take 1 Tab by mouth three (3) times daily. Quantity:  90 Tab Refills:  2 CONTINUE these medications which have NOT CHANGED Dose & Instructions Dispensing Information Comments Morning Noon Evening Bedtime * albuterol 90 mcg/actuation inhaler Commonly known as:  PROVENTIL HFA, VENTOLIN HFA, PROAIR HFA Your last dose was: Your next dose is:    
   
   
 Dose:  2 Puff Take 2 Puffs by inhalation daily. Refills:  0  
     
   
   
   
  
 * albuterol 2.5 mg /3 mL (0.083 %) nebulizer solution Commonly known as:  PROVENTIL VENTOLIN Your last dose was: Your next dose is:    
   
   
 Dose:  2.5 mg  
3 mL by Nebulization route every four (4) hours as needed for Wheezing. Quantity:  1 Package Refills:  12  
     
   
   
   
  
 amitriptyline 50 mg tablet Commonly known as:  ELAVIL Your last dose was: Your next dose is: TAKE ONE TABLET BY MOUTH EVERY EVENING Quantity:  30 Tab Refills:  11 amLODIPine 10 mg tablet Commonly known as:  Leighann Spruce Your last dose was: Your next dose is:    
   
   
 Dose:  10 mg Take 1 Tab by mouth daily. Quantity:  30 Tab Refills:  11  
     
   
   
   
  
 aspirin 81 mg chewable tablet Your last dose was: Your next dose is:    
   
   
 Dose:  81 mg Take 81 mg by mouth daily. Refills:  0 fluticasone-salmeterol 250-50 mcg/dose diskus inhaler Commonly known as:  ADVAIR Your last dose was: Your next dose is:    
   
   
 Dose:  1 Puff Take 1 Puff by inhalation daily. Refills:  0  
     
   
   
   
  
 furosemide 40 mg tablet Commonly known as:  LASIX Your last dose was: Your next dose is: TAKE ONE TABLET BY MOUTH TWICE DAILY FOR leg swelling Quantity:  60 Tab Refills:  11  
     
   
   
   
  
 HumaLOG Mix 75-25 100 unit/mL (75-25) injection Generic drug:  insulin lispro protamine/insulin lispro Your last dose was: Your next dose is: Take 30 units in the morning and 10 units at night. Refills:  0  
     
   
   
   
  
 hydrALAZINE 100 mg tablet Commonly known as:  APRESOLINE Your last dose was: Your next dose is: TAKE ONE TABLET BY MOUTH THREE TIMES DAILY Quantity:  90 Tab Refills:  11 INTEGRA PLUS 125 mg iron- 1 mg Cap Generic drug:  Iron Fum & P-FA-Vit B & C No.9 Your last dose was: Your next dose is:    
   
   
 Dose:  1 Cap Take 1 Cap by mouth daily. Refills:  0  
     
   
   
   
  
 labetalol 300 mg tablet Commonly known as:  Marlane Angelucci Your last dose was: Your next dose is:    
   
   
 Dose:  300 mg Take 300 mg by mouth every eight (8) hours. Refills:  0 LIPITOR 40 mg tablet Generic drug:  atorvastatin Your last dose was: Your next dose is:    
   
   
 Dose:  40 mg Take 40 mg by mouth nightly. Refills:  0 LYRICA 75 mg capsule Generic drug:  pregabalin Your last dose was: Your next dose is:    
   
   
 Dose:  75 mg Take 75 mg by mouth three (3) times daily. Refills:  0  
     
   
   
   
  
 omeprazole 40 mg capsule Commonly known as:  PRILOSEC Your last dose was: Your next dose is: Dose:  40 mg Take 40 mg by mouth daily. Refills:  0  
     
   
   
   
  
 oxyCODONE IR 15 mg immediate release tablet Commonly known as:  OXY-IR Your last dose was: Your next dose is:    
   
   
 Dose:  15 mg Take 15 mg by mouth every eight (8) hours as needed. Refills:  0  
     
   
   
   
  
 RAYALDEE 30 mcg Cs24 Generic drug:  calcifediol Your last dose was: Your next dose is:    
   
   
 Dose:  30 mcg Take 30 mcg by mouth nightly. Refills:  0  
     
   
   
   
  
 RENVELA 800 mg Tab tab Generic drug:  sevelamer carbonate Your last dose was: Your next dose is: TAKE ONE TABLET BY MOUTH THREE TIMES DAILY WITH MEALS Quantity:  90 Tab Refills:  11  
     
   
   
   
  
 sodium bicarbonate 650 mg tablet Your last dose was: Your next dose is:    
   
   
 Dose:  650 mg Take 650 mg by mouth two (2) times a day. Refills:  0 ZOFRAN ODT 8 mg disintegrating tablet Generic drug:  ondansetron Your last dose was: Your next dose is:    
   
   
 Dose:  8 mg Take 8 mg by mouth three (3) times daily. Refills:  0  
     
   
   
   
  
 * Notice: This list has 2 medication(s) that are the same as other medications prescribed for you. Read the directions carefully, and ask your doctor or other care provider to review them with you. Where to Get Your Medications Information on where to get these meds will be given to you by the nurse or doctor. ! Ask your nurse or doctor about these medications  
  cloNIDine HCl 0.3 mg tablet

## 2017-09-26 NOTE — ED NOTES
Bedside and Verbal shift change report given to Edcosme Madrigal RN (oncoming nurse) by Mitzi Sheets (offgoing nurse). Report included the following information SBAR and Kardex.

## 2017-09-26 NOTE — ED PROVIDER NOTES
HPI Comments: Marlena Topete is a 36 y.o. male with PMhx significant for HTN, DM, CHF, and PNA who presents ambulatory to the ED with cc of worsening shortness of breath. Pt reports having an iron infusion appointment tomorrow due to anemia. He notes recently needing to use oxygen at home, but not regularly before the onset of symptoms. Pt states SOB is relieved with laying down and exacerbated with walking or standing up. Pt reports amputation bilateral lower limbs due to a staff infection. He specifically denies any chest pain, fever, chills, nausea or vomiting. Social Hx: - Tobacco, - EtOH, - Illicit Drugs    PCP: Shea Pena MD    There are no other complaints, changes or physical findings at this time. The history is provided by the patient. No  was used. Past Medical History:   Diagnosis Date    Arrhythmia     afib    Asthma     3/2013 last attack    CKD (chronic kidney disease), stage III 2/12/2013    Nephrology: Chintan Sosa MD    Diabetes [facility])     Gout     Heart failure ([facility] [address]     Hyperlipidemia     Hypertension     Ill-defined condition     Positive PPD, treated 2001    treated 9 months       Past Surgical History:   Procedure Laterality Date    HX BELOW KNEE AMPUTATION Left     due to osteomyelitis    HX HEENT      tonsilectomy    HX HERNIA REPAIR      HX TONSILLECTOMY           Family History:   Problem Relation Age of Onset    Diabetes Mother     Hypertension Mother     Hypertension Brother     Diabetes Brother     Diabetes Maternal Grandmother     Hypertension Maternal Grandmother     Diabetes Other     Hypertension Other     Heart Disease Other        Social History     Social History    Marital status:      Spouse name: N/A    Number of children: N/A    Years of education: N/A     Occupational History    Not on file.      Social History Main Topics    Smoking status: Never Smoker    Smokeless tobacco: Never Used  Alcohol use No    Drug use: No    Sexual activity: Not Currently     Partners: Male     Other Topics Concern    Not on file     Social History Narrative         Per conversation 03/26/17    He would like his new Medical  power of  to be his Brother Michael Atkins. (Previously was his Mother Corinna Adkins)        Never smoker         ALLERGIES: Ace inhibitors    Review of Systems   Constitutional: Negative for chills and fever. HENT: Negative for congestion, rhinorrhea and sore throat. Eyes: Negative for discharge and redness. Respiratory: Positive for shortness of breath. Negative for cough. Cardiovascular: Negative for chest pain. Gastrointestinal: Negative for abdominal distention, abdominal pain, constipation, diarrhea and nausea. Genitourinary: Negative for decreased urine volume and urgency. Musculoskeletal: Negative for arthralgias and back pain. Skin: Negative for rash. Neurological: Negative for dizziness, weakness, numbness and headaches. Psychiatric/Behavioral: Negative for confusion and decreased concentration. All other systems reviewed and are negative. Vitals:    09/26/17 1734 09/26/17 1800 09/26/17 1900 09/26/17 2000   BP: 159/63 161/69 168/74 166/79   Pulse: 77 74 76 67   Resp: 22 24 26 24   Temp:       SpO2: 94% 97% 95% 98%   Weight:       Height:                Physical Exam   Constitutional: He is oriented to person, place, and time. Obese   HENT:   Head: Normocephalic and atraumatic. Mouth/Throat: Mucous membranes are dry. Eyes: EOM are normal. Pupils are equal, round, and reactive to light. Conjunctiva pale   Neck: Normal range of motion. Neck supple. Cardiovascular: Normal rate, regular rhythm and normal heart sounds. Exam reveals no gallop and no friction rub. No murmur heard. Pulmonary/Chest: Effort normal and breath sounds normal. No respiratory distress. He has no wheezes. He has no rales. Abdominal: Soft.  Bowel sounds are normal. He exhibits no distension. There is no tenderness. There is no rebound and no guarding. Musculoskeletal: Normal range of motion. He exhibits no edema or tenderness. Bilateral AKA's    Lymphadenopathy:     He has no cervical adenopathy. Neurological: He is alert and oriented to person, place, and time. He has normal strength. No cranial nerve deficit or sensory deficit. He displays a negative Romberg sign. Coordination and gait normal.   Skin: Skin is warm, dry and intact. No ecchymosis, no lesion and no rash noted. Rash is not urticarial. He is not diaphoretic. No erythema. Psychiatric: He has a normal mood and affect. Nursing note and vitals reviewed. MDM  Number of Diagnoses or Management Options  Anemia, unspecified type:   Chronic renal failure, stage 4 (severe) ([facility] [address]):   Diagnosis management comments: DDx: CHF, anemia, dehydration, PNA       Amount and/or Complexity of Data Reviewed  Clinical lab tests: ordered and reviewed  Tests in the radiology section of CPT®: ordered and reviewed  Tests in the medicine section of CPT®: ordered and reviewed  Review and summarize past medical records: yes  Discuss the patient with other providers: ([facility], Nephrology)  Independent visualization of images, tracings, or specimens: yes    Patient Progress  Patient progress: stable    ED Course       Procedures    EKG interpretation: (Preliminary)  18:06  Rhythm: normal sinus rhythm; and regular . Rate (approx.): 75 bpm; Axis: normal; P wave: normal; QRS interval: normal ; ST/T wave: normal;     CONSULT NOTE:  7:30 PM  Portia Mata MD spoke with Dr. Adrian,  Specialty: Nephrologist  Discussed patient's hx, disposition, and available diagnostic and imaging results. Reviewed care plans. Consultant agrees with plans as outlined. Recommends transfer to [facility] so the patient can receive a higher level of care.     CONSULT NOTE:  7:45 PM  Portia Mata MD spoke with Dr. Ovi Huggins,  Specialty: Emergency  Discussed patient's hx, disposition, and available diagnostic and imaging results. Reviewed care plans. Consultant agrees with plans as outlined. Accepting patient for transfer.       LABORATORY TESTS:  Recent Results (from the past 12 hour(s))   URINALYSIS W/ RFLX MICROSCOPIC    Collection Time: 09/26/17  6:16 PM   Result Value Ref Range    Color YELLOW/STRAW      Appearance CLEAR CLEAR      Specific gravity 1.015 1.003 - 1.030      pH (UA) 5.5 5.0 - 8.0      Protein >300 (A) NEG mg/dL    Glucose NEGATIVE  NEG mg/dL    Ketone NEGATIVE  NEG mg/dL    Bilirubin NEGATIVE  NEG      Blood NEGATIVE  NEG      Urobilinogen 0.2 0.2 - 1.0 EU/dL    Nitrites NEGATIVE  NEG      Leukocyte Esterase NEGATIVE  NEG     DRUG SCREEN, URINE    Collection Time: 09/26/17  6:16 PM   Result Value Ref Range    AMPHETAMINES NEGATIVE  NEG      BARBITURATES NEGATIVE  NEG      BENZODIAZEPINES NEGATIVE  NEG      COCAINE NEGATIVE  NEG      METHADONE NEGATIVE  NEG      OPIATES NEGATIVE  NEG      PCP(PHENCYCLIDINE) NEGATIVE  NEG      THC (TH-CANNABINOL) NEGATIVE  NEG      Drug screen comment (NOTE)    URINE MICROSCOPIC ONLY    Collection Time: 09/26/17  6:16 PM   Result Value Ref Range    WBC 0-4 0 - 4 /hpf    RBC 0-5 0 - 5 /hpf    Epithelial cells FEW FEW /lpf    Bacteria 1+ (A) NEG /hpf    Spermatozoa PRESENT     CK W/ REFLX CKMB    Collection Time: 09/26/17  6:17 PM   Result Value Ref Range     (H) 39 - 308 U/L   TROPONIN I    Collection Time: 09/26/17  6:17 PM   Result Value Ref Range    Troponin-I, Qt. <0.04 <0.05 ng/mL   NT-PRO BNP    Collection Time: 09/26/17  6:17 PM   Result Value Ref Range    NT pro- (H) 0 - 125 PG/ML   BLOOD GAS, ARTERIAL    Collection Time: 09/26/17  6:17 PM   Result Value Ref Range    pH 7.36 7.35 - 7.45      PCO2 40 35.0 - 45.0 mmHg    PO2 56 (L) 80 - 100 mmHg    O2 SAT 88 (L) 92 - 97 %    BICARBONATE 22 22 - 26 mmol/L    BASE DEFICIT 3.4 mmol/L    O2 METHOD NASAL O2      O2 FLOW RATE 2.00 L/min    Sample source ARTERIAL      SITE LEFT BRACHIAL      ADRIAN'S TEST YES     METABOLIC PANEL, COMPREHENSIVE    Collection Time: 09/26/17  6:17 PM   Result Value Ref Range    Sodium 139 136 - 145 mmol/L    Potassium 4.3 3.5 - 5.1 mmol/L    Chloride 106 97 - 108 mmol/L    CO2 20 (L) 21 - 32 mmol/L    Anion gap 13 5 - 15 mmol/L    Glucose 204 (H) 65 - 100 mg/dL    BUN 72 (H) 6 - 20 MG/DL    Creatinine 4.59 (H) 0.70 - 1.30 MG/DL    BUN/Creatinine ratio 16 12 - 20      GFR est AA 17 (L) >60 ml/min/1.73m2    GFR est non-AA 14 (L) >60 ml/min/1.73m2    Calcium 8.8 8.5 - 10.1 MG/DL    Bilirubin, total 0.7 0.2 - 1.0 MG/DL    ALT (SGPT) 24 12 - 78 U/L    AST (SGOT) 16 15 - 37 U/L    Alk. phosphatase 102 45 - 117 U/L    Protein, total 6.9 6.4 - 8.2 g/dL    Albumin 3.2 (L) 3.5 - 5.0 g/dL    Globulin 3.7 2.0 - 4.0 g/dL    A-G Ratio 0.9 (L) 1.1 - 2.2     CBC WITH AUTOMATED DIFF    Collection Time: 09/26/17  6:17 PM   Result Value Ref Range    WBC 10.0 4.1 - 11.1 K/uL    RBC 2.25 (L) 4.10 - 5.70 M/uL    HGB 5.8 (LL) 12.1 - 17.0 g/dL    HCT 19.1 (LL) 36.6 - 50.3 %    MCV 84.9 80.0 - 99.0 FL    MCH 25.8 (L) 26.0 - 34.0 PG    MCHC 30.4 30.0 - 36.5 g/dL    RDW 16.1 (H) 11.5 - 14.5 %    PLATELET 669 743 - 433 K/uL    NEUTROPHILS 88 (H) 32 - 75 %    LYMPHOCYTES 6 (L) 12 - 49 %    MONOCYTES 5 5 - 13 %    EOSINOPHILS 1 0 - 7 %    BASOPHILS 0 0 - 1 %    ABS. NEUTROPHILS 8.8 (H) 1.8 - 8.0 K/UL    ABS. LYMPHOCYTES 0.6 (L) 0.8 - 3.5 K/UL    ABS. MONOCYTES 0.5 0.0 - 1.0 K/UL    ABS. EOSINOPHILS 0.1 0.0 - 0.4 K/UL    ABS. BASOPHILS 0.0 0.0 - 0.1 K/UL    RBC COMMENTS ANISOCYTOSIS  1+        RBC COMMENTS HYPOCHROMIA  1+        DF SMEAR SCANNED     CK-MB,QUANT. Collection Time: 09/26/17  6:17 PM   Result Value Ref Range    CK - MB 2.3 <3.6 NG/ML    CK-MB Index 0.6 0.0 - 2.5         IMAGING RESULTS:  XR CHEST PORT   Final Result   CXR Results  (Last 48 hours)               09/26/17 7969  XR CHEST PORT Final result    Impression:  IMPRESSION: Pulmonary edema. Narrative:  EXAM:  XR CHEST PORT       INDICATION:  sob       COMPARISON:  None. FINDINGS: A portable AP radiograph of the chest was obtained. The patient is on   a cardiac monitor. There are increased markings throughout the lung fields   bilaterally. Cardiomediastinal contours are stable. The bones and soft tissues   are grossly within normal limits. MEDICATIONS GIVEN:  Medications   0.9% sodium chloride infusion 250 mL (not administered)   sodium chloride (NS) flush 5-10 mL (not administered)       IMPRESSION:  1. Anemia, unspecified type    2. Chronic renal failure, stage 4 (severe) ([facility])        PLAN:  1. Current Discharge Medication List        2. Follow-up Information     None        Return to ED if worse     TRANSFER NOTE:  7:49 PM  Patient is being transferred to the ED at [facility], transfer accepted by Dr. Ovi Huggins. The reasons for patient's transfer have been discussed with the patient and available family. They convey agreement and understanding for the need to be transferred as explained to them by Portia Mata MD.          ATTESTATION:  This note is prepared by Marylee Roys, acting as Scribe for Portia Mata MD.     Portia Mata MD: The scribe's documentation has been prepared under my direction and personally reviewed by me in its entirety. I confirm that the note above accurately reflects all work, treatment, procedures, and medical decision making performed by me.

## 2017-09-26 NOTE — ED NOTES
Emergency Department Nursing Plan of Care       The Nursing Plan of Care is developed from the Nursing assessment and Emergency Department Attending provider initial evaluation. The plan of care may be reviewed in the ED Provider note.     The Plan of Care was developed with the following considerations:   Patient / Family readiness to learn indicated by:verbalized understanding  Persons(s) to be included in education: patient and family  Barriers to Learning/Limitations:No    Signed     Andreea Mclaughlin, LEELEE    9/26/2017   5:52 PM

## 2017-09-27 PROBLEM — J81.0 ACUTE PULMONARY EDEMA (HCC): Status: ACTIVE | Noted: 2017-01-01

## 2017-09-27 PROBLEM — I50.33 ACUTE ON CHRONIC DIASTOLIC CHF (CONGESTIVE HEART FAILURE) (HCC): Status: ACTIVE | Noted: 2017-01-01

## 2017-09-27 PROBLEM — D64.9 ANEMIA: Status: ACTIVE | Noted: 2017-01-01

## 2017-09-27 NOTE — DIABETES MGMT
DTC Consult Note    Recommendations/ Comments:  Consider changing correction scale to normal sensitivity scale to prevent low BS given renal status     Consult received for:  [x]             Assessment of home management                    Chart reviewed and initial evaluation complete on Anusha Crocker. Patient is a 36 y.o. male with hx Type 2 Diabetes on Humalog 75/25 30 units AM and 10 units PM.  Pt states to always take his insulin but he has not check his BS in the last month because he ran out of strips. He reports to have low BS frequently after breakfast.  He eats breakfast at around 6 am and lunch at 1pm.  I suggested to have a midmorning snack with carbohydrate and protein to prevent low BS. Assessed and instructed patient on the following:   ·  interpretation of lab results, blood sugar goals, complications of diabetes mellitus, hypoglycemia prevention and treatment, nutrition, referred to Diabetes Educator, site rotation, use of insulin vial/syringe and self-injection of insulin    Encouraged the following:   · increased exercise, dietary modifications: avoid sweet beverages, eat every 4-5 hours, regular blood sugar monitoring: 3 times daily, follow up with PCP    Provided patient with the following: [x]             Survival skills education materials               [x]             Insulin education materials                              [x]             Outpatient DTC contact number                            Discussed with patient  need for follow up appointment for diabetes management after discharge.       A1c:   Lab Results   Component Value Date/Time    Hemoglobin A1c 8.0 09/27/2017 07:55 AM       Recent Glucose Results:   Lab Results   Component Value Date/Time    GLU 93 09/27/2017 07:55 AM    GLUCPOC 172 (H) 09/27/2017 11:59 AM        Lab Results   Component Value Date/Time    Creatinine 4.18 09/27/2017 07:55 AM     Estimated Creatinine Clearance: 35.6 mL/min (based on Cr of 4.18). Active Orders   Diet    DIET DIABETIC CONSISTENT CARB Regular        PO intake: No data found. Current hospital DM medication: Humalog for correction, insulin resistant scale      Will continue to follow as needed. Thank you.     Reid Reeves, [address]  Ph: [phone]

## 2017-09-27 NOTE — ED NOTES
Care assumed by LINDA Yuan RN pt is A+Ox3 clear speaking. VSS pt awaiting disposition NAD noted.  Pt tolerating food and fluids

## 2017-09-27 NOTE — NURSE NAVIGATOR
Chart reviewed by Heart Failure Nurse Navigator. Heart Failure database completed. EF Pending  Previous EF 70%  (echo dated 11/8/16)  ACEi/ARB: not indicated. Patient with allergy to ACEi d/t cough  BB: not indicated . CRT not indicated. NYHA Functional Class not assigned. Documentation requested for NYHA Functional Class via Provider message on the [address] [place] [address] Failure Teach Back in Patient Education. Heart Failure Avoiding Triggers on Discharge Instructions. Cardiology:  Dr. Ashley Brothers. Not yet consulted. CHUN Montes De Oca/Gennaro notified of admission.

## 2017-09-27 NOTE — ED PROVIDER NOTES
HPI Comments: 36 y.o. male with past medical history significant for CHF, HTN, DM, CKD, Asthma, b/l aka s/p hx osteomyelitis who presents from [facility] via EMS with chief complaint of SOB. Pt reports 1 week hx of SOB. He was evaluated at [facility] a week ago for symptoms and advised to f/u with Cardiologist for low hemoglobin. Pt was evaluated by  [facility] who recommended pt f/u with Nephrology for low hemoglobin and elevated creatinine. Pt complied. Nephrology prescribed Procrit Injection, Iron supplements and upcoming iron infusion. Pt states that his SOB has been persistent since last week. He has been on 2L O2 for the past 1 week. Pt presented to [facility] ER today with increasing SOB nad found to have Hgb 5.8. After consulting with Nephrology it was advised pt be transfered here for admission and transfusion. While in ED, pt remains mildly SOB but states that the O2 has helped. Pt takes his lasix regularly. Pt not currently on dialysis but has been in the past. He denies any other acute medical concerns at this time. Chart review: 9/13/17 [facility] ED - CHF, Asthma, Anemia and Chronic renal failure. HGB: 8.0 Creatinine: 3.96. Today (9/26/17) Creatinine 4.59, HGB 5.8. PCP: Gloria Pires MD  Cardiology: Mohit Gonzales MD  Nephrology: Zoraida Lerner. Olimpia Armijo MD    Note written by Eva Vargas, as dictated by Elias Simon MD 11:54 PM    The history is provided by the patient. No  was used.         Past Medical History:   Diagnosis Date    Arrhythmia     afib    Asthma     3/2013 last attack    CKD (chronic kidney disease), stage III 2/12/2013    Nephrology: Nitesh Govea MD    Diabetes [facility])     Gout     Heart failure ([facility] [address]     Hyperlipidemia     Hypertension     Ill-defined condition     Positive PPD, treated 2001    treated 9 months       Past Surgical History:   Procedure Laterality Date    HX BELOW KNEE AMPUTATION Left     due to osteomyelitis    HX HEENT      tonsilectomy    HX HERNIA REPAIR      HX TONSILLECTOMY           Family History:   Problem Relation Age of Onset    Diabetes Mother     Hypertension Mother     Hypertension Brother     Diabetes Brother     Diabetes Maternal Grandmother     Hypertension Maternal Grandmother     Diabetes Other     Hypertension Other     Heart Disease Other        Social History     Social History    Marital status:      Spouse name: N/A    Number of children: N/A    Years of education: N/A     Occupational History    Not on file. Social History Main Topics    Smoking status: Never Smoker    Smokeless tobacco: Never Used    Alcohol use No    Drug use: No    Sexual activity: Not Currently     Partners: Male     Other Topics Concern    Not on file     Social History Narrative         Per conversation 03/26/17    He would like his new Medical  power of  to be his Brother Michael Atkins. (Previously was his Mother Lam Quintanilla)        Never smoker         ALLERGIES: Ace inhibitors    Review of Systems   Constitutional: Negative for chills and fever. HENT: Negative for congestion, nosebleeds and sore throat. Eyes: Negative for pain and discharge. Respiratory: Positive for shortness of breath. Negative for cough. Cardiovascular: Negative for chest pain and palpitations. Gastrointestinal: Negative for abdominal pain, constipation, nausea and vomiting. Genitourinary: Negative for decreased urine volume, dysuria and flank pain. Musculoskeletal: Negative for myalgias. Skin: Negative for rash and wound. Neurological: Negative for seizures and syncope. Hematological: Does not bruise/bleed easily. Psychiatric/Behavioral: Negative for confusion, self-injury and suicidal ideas.        Vitals:    09/26/17 2203   BP: 159/76   Pulse: 72   Resp: 22   Temp: 98.4 °F (36.9 °C)   SpO2: 97%   Weight: 152.9 kg (337 lb)   Height: 6' 2\" (1.88 m)            Physical Exam Constitutional: He is oriented to person, place, and time. He appears well-developed and well-nourished. Obese   HENT:   Head: Normocephalic and atraumatic. Eyes: EOM are normal. Pupils are equal, round, and reactive to light. Neck: Normal range of motion. Neck supple. Cardiovascular: Normal rate, regular rhythm, normal heart sounds and intact distal pulses. Pulmonary/Chest: Effort normal. No respiratory distress. He has no wheezes. He has rales (b/l, basilar). Abdominal: Soft. Bowel sounds are normal. There is no tenderness. There is no rebound and no guarding. Musculoskeletal:   B/l AKAs   Neurological: He is alert and oriented to person, place, and time. Skin: Skin is warm and dry. There is pallor. Psychiatric: He has a normal mood and affect. His behavior is normal.   Nursing note and vitals reviewed. Note written by Eva Soler, as dictated by Agustin Butt MD 10:45 PM     MDM  Number of Diagnoses or Management Options  Acute pulmonary edema [facility]):   Acute renal failure superimposed on chronic kidney disease ([facility] [address]): Anemia, unspecified type:   Dyspnea, unspecified type:   Diagnosis management comments: 42yM with CRI, CHF presents as as transfer from [facility] for hgb 5.8. Originally presented with SOB to [facility] ER. PT reports requiring 2L NC O2 for past week. Appears pale, HD stable, nad, no resp distress on 2L NC. Plan-d/w nephrology, transfuse, admit.     Labs remarkable for Hgb 5.8  Creat up from 3.8 to 4.6       Amount and/or Complexity of Data Reviewed  Clinical lab tests: reviewed  Tests in the radiology section of CPT®: reviewed  Review and summarize past medical records: yes  Discuss the patient with other providers: yes  Independent visualization of images, tracings, or specimens: yes    Risk of Complications, Morbidity, and/or Mortality  Presenting problems: high  Diagnostic procedures: moderate  Management options: moderate    Patient Progress  Patient progress: stable    ED Course     PROGRESS NOTE:  10:44 PM  Reviewed Chest XR performed at [facility] this evening (6pm) and compared findings to prior chest XRs 9/13/17 and 7/3/17. CONSULT NOTE:  10:55 PM Nino Tobias MD spoke with Dr. Roxana Dunn, Consult for Nephrology. Discussed available diagnostic tests and clinical findings. Dr. Roxana Dunn is in agreement with plan as outlined. Dr. Roxana Dunn recommends giving lasix. CONSULT NOTE:  11:09 PM Nino Tobias MD spoke with Dr. Toshia Bergman, Consult for Hospitalist.  Discussed available diagnostic tests and clinical findings. Dr. Toshia Bergman will see and admit patient for further evaluation.      Procedures

## 2017-09-27 NOTE — CDMP QUERY
The diagnosis of acute respiratory failure has been documented for your patient. Currently, the documentation does not meet criteria for this diagnosis, and may be challenged by an external reviewer. Please remember to include the clinical indicators to support this diagnosis. Current Documentation:  - Respirations:  22  - Air hunger: Not documented   - Use of accessory muscles of respiration: Not documented  - Inability to speak in full sentences: Not documented     - Cyanosis: None documented  - Pulse ox 97% NC 2L    ABG's  pH: 7.36  PCO2: 40  PO2: 56     REFERENCE:  Hypoxemic respiratory failure is characterized by a PaO2 less than 60 mmHg (or 10 mmHg below COPD patients baseline) and a normal or low arterial PaCO2. Acute Respiratory Failure indicators include:   - Respirations <12 or >25   - Air hunger   - Use of accessory muscles of respiration   - Inability to speak in full sentences   - Cyanosis     AND    - Pulse ox <90% RA or <95% on O2   - pH <7.35 or >7.45   - pO2 < 60 mm Hg (or 10mm below COPD patient's baseline)   - pCO2 >50mm Hg (or 10mm above COPD patient's baseline)     Please clarify and document your clinical opinion in the progress notes and discharge summary including the definitive and/or presumptive diagnosis, (suspected or probable), related to the above clinical findings.  Please include clinical findings supporting your diagnosis    Thank you  Rosemarie Marie  [facility]  [phone]

## 2017-09-27 NOTE — PROGRESS NOTES
Admission Medication Reconciliation:    Information obtained from: patient, rx query, patient's med lists, medication bottles patient brought    Significant PMH/Disease States:   Past Medical History:   Diagnosis Date    Arrhythmia     afib    Asthma     3/2013 last attack    CKD (chronic kidney disease), stage III 2/12/2013    Nephrology: Vivian Mccallum MD    Diabetes [facility])     Gout     Heart failure ([facility] [address]     Hyperlipidemia     Hypertension     Ill-defined condition     Positive PPD, treated 2001    treated 9 months       Chief Complaint for this Admission:   Chief Complaint   Patient presents with    Anemia       Allergies:  Ace inhibitors    Prior to Admission Medications:   Prior to Admission Medications   Prescriptions Last Dose Informant Patient Reported? Taking? Iron Fum & P-FA-Vit B & C No.9 (INTEGRA PLUS) 125 mg iron- 1 mg cap 9/26/2017 at am  Yes Yes   Sig: Take 1 Cap by mouth daily. RENVELA 800 mg tab tab 9/26/2017 at pm Self No Yes   Sig: TAKE ONE TABLET BY MOUTH THREE TIMES DAILY WITH MEALS   albuterol (PROVENTIL HFA, VENTOLIN HFA, PROAIR HFA) 90 mcg/actuation inhaler 9/26/2017 at am  Yes Yes   Sig: Take 2 Puffs by inhalation daily. albuterol (PROVENTIL VENTOLIN) 2.5 mg /3 mL (0.083 %) nebulizer solution   No Yes   Sig: 3 mL by Nebulization route every four (4) hours as needed for Wheezing. amLODIPine (NORVASC) 10 mg tablet 9/26/2017 at am  No Yes   Sig: Take 1 Tab by mouth daily. amitriptyline (ELAVIL) 50 mg tablet 9/26/2017 at am Self No Yes   Sig: TAKE ONE TABLET BY MOUTH EVERY EVENING   aspirin 81 mg chewable tablet 9/26/2017 at am Self Yes Yes   Sig: Take 81 mg by mouth daily. atorvastatin (LIPITOR) 40 mg tablet 9/26/2017 at pm  Yes Yes   Sig: Take 40 mg by mouth nightly. calcifediol (RAYALDEE) 30 mcg Cs24 9/26/2017 at pm  Yes Yes   Sig: Take 30 mcg by mouth nightly.    cloNIDine HCl (CATAPRES) 0.3 mg tablet 9/26/2017 at pm  No Yes   Sig: TAKE ONE TABLET BY MOUTH TWICE DAILY   fluticasone-salmeterol (ADVAIR) 250-50 mcg/dose diskus inhaler 9/26/2017 at am  Yes Yes   Sig: Take 1 Puff by inhalation daily. furosemide (LASIX) 40 mg tablet 9/26/2017 at pm  No Yes   Sig: TAKE ONE TABLET BY MOUTH TWICE DAILY FOR leg swelling   hydrALAZINE (APRESOLINE) 100 mg tablet 9/26/2017 at pm  No Yes   Sig: TAKE ONE TABLET BY MOUTH THREE TIMES DAILY   insulin lispro protamine/insulin lispro (HUMALOG MIX 75-25) 100 unit/mL (75-25) injection 9/26/2017 at pm  Yes Yes   Sig: Take 30 units in the morning and 10 units at night. labetalol (NORMODYNE) 300 mg tablet 9/26/2017 at pm  Yes Yes   Sig: Take 300 mg by mouth every eight (8) hours. omeprazole (PRILOSEC) 40 mg capsule 9/26/2017 at am  Yes Yes   Sig: Take 40 mg by mouth daily. ondansetron (ZOFRAN ODT) 8 mg disintegrating tablet 9/26/2017 at am  Yes Yes   Sig: Take 8 mg by mouth three (3) times daily. oxyCODONE IR (OXY-IR) 15 mg immediate release tablet   Yes Yes   Sig: Take 15 mg by mouth every eight (8) hours as needed. pregabalin (LYRICA) 75 mg capsule 9/26/2017 at pm  Yes Yes   Sig: Take 75 mg by mouth three (3) times daily. sodium bicarbonate 650 mg tablet 9/26/2017 at am  Yes Yes   Sig: Take 650 mg by mouth two (2) times a day. Facility-Administered Medications: None         Comments/Recommendations:   Patient was a good historian and brought medication lists and bottles with him. Added sodium bicarbonate, albuterol inhaler, zofran, lipitor, lyrica, rayaldee, and integra. Removed no medications. Changed advair from twice daily to once daily and labetalol 200mg three times daily to 300mg three times daily.

## 2017-09-27 NOTE — PROGRESS NOTES
Patient was admitted from [facility] yesterday. Patient has been on 2 liters O2 via nasal cannula over the past week. Patient refuted that he is on oxygen at home. Patient is followed by his nephrologist Dr. Jose Gauthier and cardiologist Dr. Gosia Sparks. On arrival in the ED, initial recorded vital signs were /76, HR 72, RR 22, O2sat= 97% on 2 liters O2 nasal cannula.    Chest xray portable showed pulmonary edema. Hemoglobin= 5.8. BUN= 72. Creatinine= 4.59 (compared to 3.96 on 9/13/2017).   Rosy Antonio RN CRM

## 2017-09-27 NOTE — PROGRESS NOTES
TRANSFER - OUT REPORT:    Verbal report given to Imelda(name) on Roderyck I Artelia Bamberger  being transferred to [facility](unit) for routine progression of care       Report consisted of patients Situation, Background, Assessment and   Recommendations(SBAR). Information from the following report(s) SBAR, Kardex and MAR was reviewed with the receiving nurse. Lines:   Peripheral IV 09/27/17 Left Arm (Active)   Site Assessment Clean, dry, & intact 9/27/2017  8:27 AM   Phlebitis Assessment 0 9/27/2017  8:27 AM   Infiltration Assessment 0 9/27/2017  8:27 AM   Dressing Status Clean, dry, & intact 9/27/2017  8:27 AM   Dressing Type Tape;Transparent 9/27/2017  8:27 AM   Hub Color/Line Status Pink;Capped 9/27/2017  8:27 AM   Action Taken Open ports on tubing capped 9/27/2017  8:27 AM   Alcohol Cap Used Yes 9/27/2017  8:27 AM        Opportunity for questions and clarification was provided.       Patient transported with:   Monitor  Tech

## 2017-09-27 NOTE — CONSULTS
[address] [facility] [address] [facility]   [address] [place] [address]       Name:  Ana Mixon   MR#:  [id number]   :  1975   Account #:  [de-identified]    Date of Consultation:  2017   Date of Adm:  2017       REFERRING PHYSICIAN: Dr. Brigida Cohen: Management of a patient with   advanced chronic kidney disease. HISTORY OF PRESENT ILLNESS: The patient is well-known to us, a   11-year-old [facility] American man with chronic kidney disease due to   diabetic nephropathy. He has been followed by Dr. Amy Smith in   our office. The patient was last seen in September when he presented   to the office with shortness of breath. He was found to be anemic. The   patient was started on anemia management with iron infusion and   Procrit, but he has not gotten any of these due to insurance delay. The   patient developed shortness of breath at home, which was   progressively worsening, and it was exacerbated by minimal exertion. The patient had developed shortness of breath at rest. He presented to   the emergency room for evaluation this morning and he was found to   have hemoglobin of 5.8. A decision for admission was made and when   I saw the patient, he already has had a transfusion of red blood cells   started; otherwise, the patient has no significant change in his medical   history. The patient at least 2 hemodialysis treatments acutely while   hospitalized in the past, but he was never committed to maintenance   dialysis. PAST MEDICAL HISTORY: Cardiac arrhythmia, Afib, asthma, CKD   stage 4, diabetes, gout, heart failure, hyperlipidemia, hypertension, and   positive PPD test.    PAST SURGICAL HISTORY: Bilateral below-the-knee amputations   due to osteomyelitis, tonsillectomy, hernia repair. ALLERGIES: ALLERGIC TO ACE INHIBITORS. MEDICATION LIST AT HOME CONSISTS OF   1. Hydralazine. 2. Furosemide 40 twice a day.    3. Nebulizer with albuterol and Ventolin. 4. Advair Diskus. 5. Oxycodone for pain. 6. Norvasc. 7. Catapres twice a day. 8. Prilosec. 9. Normodyne. 10. Insulin. 11. Renvela. 12. Elavil. FAMILY HISTORY: Positive for diabetes and hypertension on multiple   family members. SOCIAL HISTORY: The patient lives independently and he wears   prostheses on both lower extremities and does not use a wheelchair. The patient denies alcohol, tobacco and illicit drug abuse. REVIEW OF SYSTEMS: As outlined in history of present illness. The   patient denies chills, fever, dysuric symptoms, chest pain, dizziness,   lightheadedness, nausea, vomiting, dysphagia, shakes, fevers, heat or   cold intolerance, hemoptysis, diarrhea, constipation, melena, or   hematemesis. PHYSICAL EXAMINATION   GENERAL: Middle-aged black male who is awake, alert, oriented. He   does not appear to be in any distress. He is somewhat pale. VITAL   SIGNS: Blood pressure is 170/77, heart rate is 67, respirations 20. HEENT: Head is normocephalic. Eyes with anicteric sclerae. Pupils are   equal, reactive to light and accommodation. Ears and nose without   abnormal discharge. NECK: Supple with no increased JVP, carotid bruit or thyromegaly. LUNGS: Fairly clear to auscultation with vesicular breath sounds. No   wheezes, rales or rhonchi. HEART: S1 and S2 with regular rate and rhythm. ABDOMEN: Obese, soft with normoactive bowel sounds. EXTREMITIES: Below-the-knee amputation bilaterally. The patient has   a prosthesis on both legs. No edema. SKIN: Warm and dry with no rashes. NEUROLOGICAL: Grossly nonfocal. The patient is awake, alert,   oriented to self, time, and place. LABORATORY DATA: Hematology early yesterday actually before   admission, his hemoglobin was 5.1, today is 6.8. White blood count is   9.2. Chemistry: BUN is 67, creatinine is 4.1, CO2 is 24, potassium is 4. Hemoglobin A1c is 8. Iron saturation is 22%. IMPRESSION:   1.  Chronic kidney disease, stage 4, from diabetic nephropathy. The   patient's GFR is gradually declining due to the natural progression of   his disease. His electrolytes are acceptable. The patient has no   significant acid-base disorder. He does not have any hypervolemia. 2. Anemia of chronic kidney disease. The patient was started on   anemia management, but he had acute drop in his hemoglobin, which   most probably explains his dyspnea due to relative poor oxygen   delivery. 3. Secondary hyperparathyroidism. The patient was on calcitriol   before, but he has not taken it now. RECOMMENDATIONS:   1. No need of immediate renal replacement therapy. 2. Better control of blood pressure. I would increase the clonidine from   0.3 twice a day to 0.3 mg 3 times daily. 3. Anemia management. Start Procrit. Complete transfusion of red   blood cells. 4. Management of secondary hyperparathyroidism. Check PTH,   phosphorus and start calcitriol again if indicated. 5. Avoid any unnecessary nephrotoxic agents. Our service will follow   very closely. Thank you very much for the opportunity to be part of this patient's   care.         MD DEIDRA Bradley / RLD   D:  09/27/2017   12:20   T:  09/27/2017   12:56   Job #:  [id number]

## 2017-09-27 NOTE — H&P
History & Physical    Date of admission: 9/26/2017    Patient name: Laura Abraham  MRN: [id number]  YOB: 1975  Age: 36 y.o. Primary care provider:  Ember Escoto MD     Source of Information: patient, ED and medical records                              Chief complaint:  Shortness of breath/ anemia    History of present illness  Chun Smith is a 36 y.o. male with past medical history of type 2 DM, CKD, gout, ischemic right foot ulcer, hyperlipidemia, asthma, right BKA, and morbid obesity presented to the ED from home with chief complaint of shortness of breath and anemia. Patient is a limited / reluctant historian. Majority of history was obtained per my review of ED and medical reports. Per these collective reports, patient has chronic shortness of breath which worsened over the past week, constant, moderate severity, aggravated by activity, walking, present at rest, without specific aggravating or alleviating factors. Per ED note, patient has been on 2 liters O2 via nasal cannula over the past week. Patient refuted that he is on oxygen at home. Patient is followed by his nephrologist Dr. Mustapha Newell and cardiologist Dr. Jaciel Manley. Patient was recently noted to have low hemoglobin and increased creatinine. He was prescribed Procrit and iron supplements and to start iron IV infusion. Patient reportedly had hemodialysis in the past but not currently. There were no reports of new onset syncope, loss of consciousness, headache, neck pain, visual disturbance, numbness, paresthesias, focal weakness, chest pain, palpitations, abdominal pain, nausea, vomiting, diarrhea, calf pain, increased leg swelling/ edema, fever, chills. On arrival in the ED, initial recorded vital signs were /76, HR 72, RR 22, O2sat= 97% on 2 liters O2 nasal cannula. Chest xray portable showed pulmonary edema. Hemoglobin= 5.8. BUN= 72. Creatinine= 4.59 (compared to 3.96 on 9/13/2017). Past Medical History:   Diagnosis Date    Arrhythmia     afib    Asthma     3/2013 last attack    CKD (chronic kidney disease), stage III 2/12/2013    Nephrology: Albin Augustine MD    Diabetes [facility])     Gout     Heart failure ([facility] [address]     Hyperlipidemia     Hypertension     Ill-defined condition     Positive PPD, treated 2001    treated 9 months      Past Surgical History:   Procedure Laterality Date    HX BELOW KNEE AMPUTATION Left     due to osteomyelitis    HX HEENT      tonsilectomy    HX HERNIA REPAIR      HX TONSILLECTOMY       Prior to Admission medications    Medication Sig Start Date End Date Taking? Authorizing Provider   hydrALAZINE (APRESOLINE) 100 mg tablet TAKE ONE TABLET BY MOUTH THREE TIMES DAILY 9/15/17   Ryder Escobar MD   furosemide (LASIX) 40 mg tablet TAKE ONE TABLET BY MOUTH TWICE DAILY FOR leg swelling 9/14/17   Ryder Escobar MD   Nebulizer & Compressor machine 1 Each by Does Not Apply route four (4) times daily as needed. 9/13/17   FRANCE Kelly   albuterol (PROVENTIL VENTOLIN) 2.5 mg /3 mL (0.083 %) nebulizer solution 3 mL by Nebulization route every four (4) hours as needed for Wheezing. 9/13/17   FRANCE Kelly   glucose blood VI test strips (TRUETEST TEST STRIPS) strip Use to check sugars 3 times/day 7/8/17   Ryder Escobar MD   fluticasone-salmeterol (ADVAIR) 250-50 mcg/dose diskus inhaler Take 1 Puff by inhalation two (2) times a day. 7/7/17   Ryder Escobar MD   oxyCODONE IR (OXY-IR) 15 mg immediate release tablet Take 15 mg by mouth every eight (8) hours as needed. Historical Provider   amLODIPine (NORVASC) 10 mg tablet Take 1 Tab by mouth daily. 4/7/17   Mari Monte MD   cloNIDine HCl (CATAPRES) 0.3 mg tablet TAKE ONE TABLET BY MOUTH TWICE DAILY 3/27/17   Mari Monte MD   omeprazole (PRILOSEC) 40 mg capsule Take 40 mg by mouth daily.     Historical Provider   labetalol (NORMODYNE) 200 mg tablet Take 400 mg by mouth three (3) times daily. Historical Provider   insulin lispro protamine/insulin lispro (HUMALOG MIX 75-25) 100 unit/mL (75-25) injection Take 20U qam and 10U qpm  Patient taking differently: Take 30 units qam and 10 units qpm 3/17/17   Louise Delong MD   aspirin 81 mg chewable tablet Take 81 mg by mouth daily. Historical Provider   RENVELA 800 mg tab tab TAKE ONE TABLET BY MOUTH THREE TIMES DAILY WITH MEALS 8/14/16   Louise Delong MD   amitriptyline (ELAVIL) 50 mg tablet TAKE ONE TABLET BY MOUTH EVERY EVENING 6/24/16   Louise Delong MD     Allergies   Allergen Reactions    Ace Inhibitors Cough      Family History   Problem Relation Age of Onset    Diabetes Mother     Hypertension Mother     Hypertension Brother     Diabetes Brother     Diabetes Maternal Grandmother     Hypertension Maternal Grandmother     Diabetes Other     Hypertension Other     Heart Disease Other          Social history  Patient resides  x  Independently                Ambulates          x  Assisted walker         Alcohol history   x  denies           Smoking history  x  None             History   Smoking Status    Never Smoker   Smokeless Tobacco    Never Used       Code status  x  Full code       Review of systems  I performed a ten systems review; pertinent positives were as noted in HPI, otherwise negative. Physical Examination   Visit Vitals    /74    Pulse 67    Temp 98.4 °F (36.9 °C)    Resp 20    Ht 6' 2\" (1.88 m)    Wt 152.9 kg (337 lb)    SpO2 97%    BMI 43.27 kg/m2      O2 Flow Rate (L/min): 2 l/min   O2 Device: Nasal cannula    General:  Morbidly obese male in no acute respiratory distress   Head:  Normocephalic, without obvious abnormality, atraumatic   Eyes:  Conjunctivae/corneas clear. PERRL, EOMs intact   E/N/M/T: Nares normal. Septum midline.  No nasal drainage or sinus tenderness  Tongue midline/ non-edematous  Clear oropharynx   Neck: Normal appearance and movements, symmetrical, trachea midline  No palpable adenopathy  No thyroid enlargement, tenderness or nodules  No carotid bruit   Normal JVD   Lungs:   Symmetrical chest expansion and respiratory effort  Clear to auscultation bilaterally   Chest wall:  No tenderness or deformity   Heart:  Regular rate and rhythm   Sounds normal; no murmur, click, rub or gallop   Abdomen:   Soft, no tenderness  No rebound, guarding, or rigidity  Non-distended  Bowel sounds normal  No masses or hepatosplenomegaly  No hernias present   Back: No CVA tenderness   Extremities: Bilateral BKA; wearing bilateral leg prosthetic devices  No cyanosis or edema     Pulses 2+ radial/ 1+ DP bilateral pulses   Skin: No rashes or ulcers   Musculo-      skeletal: Gait not tested  No calf tenderness   Neuro: GCS 15. Moves all extremities x 4. No slurred speech. No facial droop. Sensation grossly intact.      Psych: Alert, oriented x 3           Data Review    24 Hour Results:  Recent Results (from the past 24 hour(s))   URINALYSIS W/ RFLX MICROSCOPIC    Collection Time: 09/26/17  6:16 PM   Result Value Ref Range    Color YELLOW/STRAW      Appearance CLEAR CLEAR      Specific gravity 1.015 1.003 - 1.030      pH (UA) 5.5 5.0 - 8.0      Protein >300 (A) NEG mg/dL    Glucose NEGATIVE  NEG mg/dL    Ketone NEGATIVE  NEG mg/dL    Bilirubin NEGATIVE  NEG      Blood NEGATIVE  NEG      Urobilinogen 0.2 0.2 - 1.0 EU/dL    Nitrites NEGATIVE  NEG      Leukocyte Esterase NEGATIVE  NEG     DRUG SCREEN, URINE    Collection Time: 09/26/17  6:16 PM   Result Value Ref Range    AMPHETAMINES NEGATIVE  NEG      BARBITURATES NEGATIVE  NEG      BENZODIAZEPINES NEGATIVE  NEG      COCAINE NEGATIVE  NEG      METHADONE NEGATIVE  NEG      OPIATES NEGATIVE  NEG      PCP(PHENCYCLIDINE) NEGATIVE  NEG      THC (TH-CANNABINOL) NEGATIVE  NEG      Drug screen comment (NOTE)    URINE MICROSCOPIC ONLY    Collection Time: 09/26/17  6:16 PM Result Value Ref Range    WBC 0-4 0 - 4 /hpf    RBC 0-5 0 - 5 /hpf    Epithelial cells FEW FEW /lpf    Bacteria 1+ (A) NEG /hpf    Spermatozoa PRESENT     CK W/ REFLX CKMB    Collection Time: 09/26/17  6:17 PM   Result Value Ref Range     (H) 39 - 308 U/L   TROPONIN I    Collection Time: 09/26/17  6:17 PM   Result Value Ref Range    Troponin-I, Qt. <0.04 <0.05 ng/mL   NT-PRO BNP    Collection Time: 09/26/17  6:17 PM   Result Value Ref Range    NT pro- (H) 0 - 125 PG/ML   BLOOD GAS, ARTERIAL    Collection Time: 09/26/17  6:17 PM   Result Value Ref Range    pH 7.36 7.35 - 7.45      PCO2 40 35.0 - 45.0 mmHg    PO2 56 (L) 80 - 100 mmHg    O2 SAT 88 (L) 92 - 97 %    BICARBONATE 22 22 - 26 mmol/L    BASE DEFICIT 3.4 mmol/L    O2 METHOD NASAL O2      O2 FLOW RATE 2.00 L/min    Sample source ARTERIAL      SITE LEFT BRACHIAL      ADRIAN'S TEST YES     METABOLIC PANEL, COMPREHENSIVE    Collection Time: 09/26/17  6:17 PM   Result Value Ref Range    Sodium 139 136 - 145 mmol/L    Potassium 4.3 3.5 - 5.1 mmol/L    Chloride 106 97 - 108 mmol/L    CO2 20 (L) 21 - 32 mmol/L    Anion gap 13 5 - 15 mmol/L    Glucose 204 (H) 65 - 100 mg/dL    BUN 72 (H) 6 - 20 MG/DL    Creatinine 4.59 (H) 0.70 - 1.30 MG/DL    BUN/Creatinine ratio 16 12 - 20      GFR est AA 17 (L) >60 ml/min/1.73m2    GFR est non-AA 14 (L) >60 ml/min/1.73m2    Calcium 8.8 8.5 - 10.1 MG/DL    Bilirubin, total 0.7 0.2 - 1.0 MG/DL    ALT (SGPT) 24 12 - 78 U/L    AST (SGOT) 16 15 - 37 U/L    Alk.  phosphatase 102 45 - 117 U/L    Protein, total 6.9 6.4 - 8.2 g/dL    Albumin 3.2 (L) 3.5 - 5.0 g/dL    Globulin 3.7 2.0 - 4.0 g/dL    A-G Ratio 0.9 (L) 1.1 - 2.2     CBC WITH AUTOMATED DIFF    Collection Time: 09/26/17  6:17 PM   Result Value Ref Range    WBC 10.0 4.1 - 11.1 K/uL    RBC 2.25 (L) 4.10 - 5.70 M/uL    HGB 5.8 (LL) 12.1 - 17.0 g/dL    HCT 19.1 (LL) 36.6 - 50.3 %    MCV 84.9 80.0 - 99.0 FL    MCH 25.8 (L) 26.0 - 34.0 PG    MCHC 30.4 30.0 - 36.5 g/dL RDW 16.1 (H) 11.5 - 14.5 %    PLATELET 361 392 - 817 K/uL    NEUTROPHILS 88 (H) 32 - 75 %    LYMPHOCYTES 6 (L) 12 - 49 %    MONOCYTES 5 5 - 13 %    EOSINOPHILS 1 0 - 7 %    BASOPHILS 0 0 - 1 %    ABS. NEUTROPHILS 8.8 (H) 1.8 - 8.0 K/UL    ABS. LYMPHOCYTES 0.6 (L) 0.8 - 3.5 K/UL    ABS. MONOCYTES 0.5 0.0 - 1.0 K/UL    ABS. EOSINOPHILS 0.1 0.0 - 0.4 K/UL    ABS. BASOPHILS 0.0 0.0 - 0.1 K/UL    RBC COMMENTS ANISOCYTOSIS  1+        RBC COMMENTS HYPOCHROMIA  1+        DF SMEAR SCANNED     CK-MB,QUANT.     Collection Time: 09/26/17  6:17 PM   Result Value Ref Range    CK - MB 2.3 <3.6 NG/ML    CK-MB Index 0.6 0.0 - 2.5     PATHOLOGIST REVIEW    Collection Time: 09/26/17  6:17 PM   Result Value Ref Range    Pathologist review (NOTE)    TYPE + CROSSMATCH    Collection Time: 09/26/17  6:55 PM   Result Value Ref Range    Crossmatch Expiration 09/29/2017     ABO/Rh(D) O NEGATIVE     Antibody screen NEG     Unit number [id number]     Blood component type RC LR AS3,2     Unit division 00     Status of unit REL FROM [facility]     Crossmatch result Compatible     Unit number [id number]     Blood component type RC LR AS3,1     Unit division 00     Status of unit REL FROM [facility]     Crossmatch result Compatible     Unit number [id number]     Blood component type RC LR AS1     Unit division 00     Status of unit REL FROM [facility]     Crossmatch result Compatible     Unit number [id number]     Blood component type RC LR AS1     Unit division 00     Status of unit REL FROM [facility]     Crossmatch result Compatible    VITAMIN B12    Collection Time: 09/26/17  7:22 PM   Result Value Ref Range    Vitamin B12 507 211 - 911 pg/mL   IRON PROFILE    Collection Time: 09/26/17  7:22 PM   Result Value Ref Range    Iron 40 35 - 150 ug/dL    TIBC 184 (L) 250 - 450 ug/dL    Iron % saturation 22 20 - 50 %   TYPE + CROSSMATCH    Collection Time: 09/27/17 12:35 AM   Result Value Ref Range    Crossmatch Expiration 09/30/2017     ABO/Rh(D) [place] NEGATIVE Antibody screen NEG     Unit number [id number]     Blood component type RC LR AS1     Unit division 00     Status of unit ALLOCATED     Crossmatch result Compatible     Unit number [id number]     Blood component type RC LR AS1     Unit division 00     Status of unit ALLOCATED     Crossmatch result Compatible      Recent Labs      09/26/17 1817   WBC  10.0   HGB  5.8*   HCT  19.1*   PLT  233     Recent Labs      09/26/17 1817   NA  139   K  4.3   CL  106   CO2  20*   GLU  204*   BUN  72*   CREA  4.59*   CA  8.8   ALB  3.2*   TBILI  0.7   SGOT  16   ALT  24       Imaging  Chest xray portable:  COMPARISON:  None.     FINDINGS: A portable AP radiograph of the chest was obtained. The patient is on  a cardiac monitor. There are increased markings throughout the lung fields  bilaterally. Cardiomediastinal contours are stable. The bones and soft tissues  are grossly within normal limits.      IMPRESSION: Pulmonary edema. Assessment and Plan   1. Anemia- acute on chronic. Admit to telemetry. Transfuse 2 units PRBCs. Repeat CBC post-transfusion. Order stool occult blood test.      2.  Acute pulmonary edema. ER MD spoke with nephrologist on call with recommendations for administration of Lasix. Patient was given Lasix 80 mg IV prior to blood transfusion. Monitor fluid status closely with strict Is/Os/ daily weights. 3.  Acute on chronic diastolic CHF (congestive heart failure). Plan as above. Order 2d echocardiogram this a.m. Consult cardiologist.    4.  Acute superimposed on chronic kidney disease- stage 4. Consult nephrologist for further evaluation and treatments. 5.  Acute hypoxemic respiratory failure. Continue oxygen therapy and place on continuous pulse oximetry monitoring. 6.  Type 2 diabetes mellitus with hyperglycemia. Order Humalog insulin correctional coverage, scheduled blood glucose checks and check hemoglobin A1c level. 7.  Hypertension.   Resume patient's home BP medication. Monitor BP and provide additional anti-hypertensive therapy as needed. 8.  Asthma. May have Duoneb nebulizer treatments prn.    9.  Morbid obesity. Would encourage diet/ weight loss/ and eventual exercise. VTE prophylaxis. No anti-coagulants/ anti-platelets with noted anemia. No compression devices as patient has BKAs.                Signed by: Rajesh Angelo MD    September 27, 2017 at 1:53 AM

## 2017-09-27 NOTE — CDMP QUERY
Please clarify if this patient is being treated/managed for:    =>Chronic Hypoxic Respiratory Failure in the setting of  Chronic shortness of breath which is described as constant,  moderate severity, aggravated by activity, walking, present at rest, Asthma, CHF and obesity, with reported home O2 use requiring supplemental O2, continuous pulse oximetry monitoring, Lab monitoring. =>Other Explanation of clinical findings  =>Unable to Determine (no explanation of clinical findings)    The medical record reflects the following clinical findings, treatment, and risk factors:    Risk Factors: Asthma, Obesity, CHF    Clinical Indicators: Chronic shortness of breath which is described as constant,  moderate severity, aggravated by activity, walking, present at rest, Asthma, CHF and obesity, with reported home O2 use     ABG's  pH: 7.36  PCO2: 40  PO2: 56       Treatment: supplemental O2, continuous pulse oximetry monitoring, Lab monitoring    Please clarify and document your clinical opinion in the progress notes and discharge summary including the definitive and/or presumptive diagnosis, (suspected or probable), related to the above clinical findings. Please include clinical findings supporting your diagnosis.     Thank you  Dee Campos  [facility]  [phone]

## 2017-09-27 NOTE — ED NOTES
Report called to [facility] ER. Pt accepted plan of care. TRANSFER - OUT REPORT:    Verbal report given to King Klaus RN(name) on [address]  being transferred to [address] [place] [address] Dr. Ortiz ER(unit) for routine progression of care       Report consisted of patients Situation, Background, Assessment and   Recommendations(SBAR). Information from the following report(s) SBAR, Kardex and ED Summary was reviewed with the receiving nurse. Lines:   Peripheral IV 09/26/17 Right; Lower Arm (Active)   Site Assessment Clean, dry, & intact 9/26/2017  6:23 PM   Phlebitis Assessment 0 9/26/2017  6:23 PM   Infiltration Assessment 0 9/26/2017  6:23 PM   Dressing Status Clean, dry, & intact 9/26/2017  6:23 PM   Dressing Type Transparent;4 X 4;Tape 9/26/2017  6:23 PM   Hub Color/Line Status Flushed;Patent;Pink;Capped 9/26/2017  6:23 PM   Action Taken Blood drawn 9/26/2017  6:23 PM        Opportunity for questions and clarification was provided.       Patient transported with:   [facility]

## 2017-09-28 NOTE — PROGRESS NOTES
Problem: Falls - Risk of  Goal: *Absence of Falls  Document Tonia Fall Risk and appropriate interventions in the flowsheet.    Outcome: Progressing Towards Goal  Fall Risk Interventions:              Medication Interventions: Assess postural VS orthostatic hypotension, Teach patient to arise slowly, Patient to call before getting OOB

## 2017-09-28 NOTE — PROGRESS NOTES
Primary Nurse Marilin Limon RN and Toshia Méndez RN performed a dual skin assessment on this patient No impairment noted  Jacob score is 18.

## 2017-09-28 NOTE — DISCHARGE INSTRUCTIONS
Please bring this form with you to show your primary care provider at your follow-up appointment. Primary care provider:  Dr. Eloy Warren MD    Discharging provider:  Camelia Rick MD    You have been admitted to the hospital with the following diagnoses:  · Anemia  · Acute pulmonary edema (HCC)  · Acute on chronic diastolic CHF (congestive heart failure) ([facility] [address]    FOLLOW-UP CARE RECOMMENDATIONS:    APPOINTMENTS:  Follow-up Information     Follow up With Details Comments Contact Info    Eloy Warren MD In 2 weeks discharge follow up  [address] [id number] [phone]      Steven Lockett MD  call and make appointment  [address] [address] [phone]              FOLLOW-UP TESTS recommended: none    PENDING TEST RESULTS:  At the time of your discharge the following test results are still pending: none  Please make sure you review these results with your outpatient follow-up provider(s). SYMPTOMS to watch for: chest pain, shortness of breath, fever, chills, nausea, vomiting, diarrhea, change in mentation, falling, weakness, bleeding. DIET/what to eat:  Renal Diet    ACTIVITY:  Activity as tolerated    WOUND CARE: none    EQUIPMENT needed:  none      What to do if new or unexpected symptoms occur? If you experience any of the above symptoms (or should other concerns or questions arise after discharge) please call your primary care physician. Return to the emergency room if you cannot get hold of your doctor. · It is very important that you keep your follow-up appointment(s). · Please bring discharge papers, medication list (and/or medication bottles) to your follow-up appointments for review by your outpatient provider(s). · Please check the list of medications and be sure it includes every medication (even non-prescription medications) that your provider wants you to take.     · It is important that you take the medication exactly as they are prescribed. · Keep your medication in the bottles provided by the pharmacist and keep a list of the medication names, dosages, and times to be taken in your wallet. · Do not take other medications without consulting your doctor. · If you have any questions about your medications or other instructions, please talk to your nurse or care provider before you leave the hospital.    I understand that if any problems occur once I am at home I am to contact my physician. These instructions were explained to me and I had the opportunity to ask questions.

## 2017-09-28 NOTE — PROGRESS NOTES
Problem: Impaired Skin Integrity/Pressure Injury Treatment  Goal: *Prevention of pressure ulcer    09/27/17 2000   Wound Prevention and Protection Methods   Orientation of Wound Prevention Posterior   Location of Wound Prevention Sacrum/Coccyx   Dressing Present  No   Read Only, Retired: Wound Treatment (non-mechanical)   Wound Offloading (Prevention Methods) Bed, pressure reduction mattress;Repositioning

## 2017-09-28 NOTE — INTERDISCIPLINARY ROUNDS
IDR/SLIDR Summary          Patient: Damaso Otero MRN: [id number]    Age: 36 y.o. YOB: 1975 Room/Bed: [facility]   Admit Diagnosis: Anemia  Acute pulmonary edema (HCC)  Acute on chronic diastolic CHF (congestive heart failure) (HCC)  Principal Diagnosis: Anemia   Goals: safety, maintain hgb  Readmission: NO  Quality Measure: CHF  VTE Prophylaxis: Not needed  Influenza Vaccine screening completed? YES  Pneumococcal Vaccine screening completed? YES  Mobility needs: No   Nutrition plan:Yes  Consults:Respiratory    Financial concerns:No  Escalated to CM? NO  RRAT Score: 18   Interventions:H2H  Testing due for pt today?  NO  LOS: 1 days Expected length of stay 2 days  Discharge plan: home   PCP: Bari Rodrigues MD  Transportation needs: No    Days before discharge:one day until discharge   Discharge disposition: Home    Signed:     Christopher Granda  9/28/2017  12:28 AM

## 2017-09-28 NOTE — DISCHARGE SUMMARY
Discharge Summary     Patient:  Cyndy Ybarra       MRN: [id number]       YOB: 1975       Age: 36 y.o. Date of admission:  9/26/2017    Date of discharge:  9/28/2017    Primary care provider: Dr. Sp Acosta MD    Admitting provider:  Jeanie Hilliard MD    Discharging provider:  Joanna Cespedes MD - [phone]  If unavailable, call [phone] and ask the  to page the triage hospitalist.    Consultations  · IP CONSULT TO HOSPITALIST  · IP CONSULT TO NEPHROLOGY    Procedures  · * No surgery found *    Discharge destination: HOME. The patient is stable for discharge. Admission diagnosis  · Anemia  · Acute pulmonary edema (HCC)  · Acute on chronic diastolic CHF (congestive heart failure) ([facility]    Current Discharge Medication List      CONTINUE these medications which have CHANGED    Details   cloNIDine HCl (CATAPRES) 0.3 mg tablet Take 1 Tab by mouth three (3) times daily. Qty: 90 Tab, Refills: 2    Associated Diagnoses: Essential hypertension, benign         CONTINUE these medications which have NOT CHANGED    Details   sodium bicarbonate 650 mg tablet Take 650 mg by mouth two (2) times a day. albuterol (PROVENTIL HFA, VENTOLIN HFA, PROAIR HFA) 90 mcg/actuation inhaler Take 2 Puffs by inhalation daily. ondansetron (ZOFRAN ODT) 8 mg disintegrating tablet Take 8 mg by mouth three (3) times daily. atorvastatin (LIPITOR) 40 mg tablet Take 40 mg by mouth nightly. pregabalin (LYRICA) 75 mg capsule Take 75 mg by mouth three (3) times daily. calcifediol (RAYALDEE) 30 mcg Cs24 Take 30 mcg by mouth nightly. Iron Fum & P-FA-Vit B & C No.9 (INTEGRA PLUS) 125 mg iron- 1 mg cap Take 1 Cap by mouth daily. fluticasone-salmeterol (ADVAIR) 250-50 mcg/dose diskus inhaler Take 1 Puff by inhalation daily.       insulin lispro protamine/insulin lispro (HUMALOG MIX 75-25) 100 unit/mL (75-25) injection Take 30 units in the morning and 10 units at night. labetalol (NORMODYNE) 300 mg tablet Take 300 mg by mouth every eight (8) hours. hydrALAZINE (APRESOLINE) 100 mg tablet TAKE ONE TABLET BY MOUTH THREE TIMES DAILY  Qty: 90 Tab, Refills: 11      furosemide (LASIX) 40 mg tablet TAKE ONE TABLET BY MOUTH TWICE DAILY FOR leg swelling  Qty: 60 Tab, Refills: 11      albuterol (PROVENTIL VENTOLIN) 2.5 mg /3 mL (0.083 %) nebulizer solution 3 mL by Nebulization route every four (4) hours as needed for Wheezing. Qty: 1 Package, Refills: 12      oxyCODONE IR (OXY-IR) 15 mg immediate release tablet Take 15 mg by mouth every eight (8) hours as needed. amLODIPine (NORVASC) 10 mg tablet Take 1 Tab by mouth daily. Qty: 30 Tab, Refills: 11      omeprazole (PRILOSEC) 40 mg capsule Take 40 mg by mouth daily. aspirin 81 mg chewable tablet Take 81 mg by mouth daily. RENVELA 800 mg tab tab TAKE ONE TABLET BY MOUTH THREE TIMES DAILY WITH MEALS  Qty: 90 Tab, Refills: 11      amitriptyline (ELAVIL) 50 mg tablet TAKE ONE TABLET BY MOUTH EVERY EVENING  Qty: 30 Tab, Refills: 11             Follow-up Information     Follow up With Details Comments Contact Info    Ladena Najjar., MD In 2 weeks discharge follow up  [address] [address]  [phone]      Brenda Hartman MD  call and make appointment  [address] [place] [address]  [phone]            Final discharge diagnoses and brief hospital course  Please also refer to the admission H&P for details on the presenting problem. 35 yo male with PMHx of AFib, Chronic Diastolic CHF,  DM type 2, CKD 4 due to Diabetic nephropathy, B/L BKA due to diabetes complications, HLD< admitted for SOB. Pt uses Home Oxygen 2L per minutes. In ER, pt found to have Hb 5.8, Cr 4.59 and CXR showed Pulmonary edema.        Dyspnea due to Volume Overload and Severe Anemia  - improved with 2U PRBcs and Lasix  - back on 2L NC, stable  - monitor Hb  - management of anemia as outpt with nephrology    Anemia of Chronic disease due to progressive CKD 4  - s/p transfusion  - further management of anemia with Nephrology as outpt    Acute Pulmonary Edema due to Volume overload   - improved with diuresis  - c/w lasix BID    HTN  - c/w Amlodipine, Labetalol, Hydralazine  - Clonidine increased to TID    CKD 4 due to Diabetic Nephropathy  - f/u with Nephrology  - c/w Renvela and Bicarb tabs    DM type 2 with Hyperglycemia  - improved after resume Humalog mix 70/30 BID    Diabetic Neuropathy  - c/w Amitriptyline and Lyrica    Chronic Respiratory Failure POA  - on Home O2  - will cont with home O2 2L    Morbid Obesity  Body mass index is 45.45 kg/(m^2).       FOLLOW-UP TESTS recommended: none     PENDING TEST RESULTS:  At the time of your discharge the following test results are still pending: none  Please make sure you review these results with your outpatient follow-up provider(s).     SYMPTOMS to watch for: chest pain, shortness of breath, fever, chills, nausea, vomiting, diarrhea, change in mentation, falling, weakness, bleeding.     DIET/what to eat:  Renal Diet     ACTIVITY:  Activity as tolerated     WOUND CARE: none     EQUIPMENT needed:  none  Physical examination at discharge  Visit Vitals    /64    Pulse 69    Temp 98.6 °F (37 °C)    Resp 22    Ht 6' 2\" (1.88 m)    Wt (!) 160.6 kg (354 lb)    SpO2 95%    BMI 45.45 kg/m2     AO3  PERRLA  Lung: Clear  CVS: RRR  Abd: soft NT ND  Ext: no edema    Pertinent imaging studies:        Recent Labs      09/28/17 0251 09/27/17 0755 09/26/17 1817   WBC  9.2  9.2  10.0   HGB  7.3*  6.8*  5.8*   HCT  23.0*  22.6*  19.1*   PLT  273  266  233     Recent Labs      09/28/17 0251 09/27/17 0755 09/26/17 1817   NA  141  142  139   K  4.3  4.0  4.3   CL  109*  111*  106   CO2  24  24  20*   BUN  65*  67*  72*   CREA  4.02*  4.18*  4.59*   GLU  206*  93  204*   CA  9.3  9.3 8.9  8.8   MG  1.9   --    --    PHOS  2.7  2.8   --    --      Recent Labs      09/28/17   0251  09/26/17 1817   SGOT   --   16   AP   --   102   TP   --   6.9   ALB  3.2*  3.2*   GLOB   --   3.7     No results for input(s): INR, PTP, APTT in the last 72 hours.     No lab exists for component: INREXT   Recent Labs      09/28/17 0251 09/26/17   1922   TIBC  192*  184*   PSAT  14*  22      Recent Labs      09/26/17 1817   PH  7.36   PCO2  40   PO2  56*     Recent Labs      09/26/17 1817   CKMB  2.3     No components found for: GLPOC    Chronic Diagnoses:    Problem List as of 9/28/2017  Date Reviewed: 9/27/2017          Codes Class Noted - Resolved    Acute pulmonary edema ([facility] ICD-10-CM: J81.0  ICD-9-CM: 518.4  9/27/2017 - Present        * (Principal)Anemia ICD-10-CM: D64.9  ICD-9-CM: 285.9  9/27/2017 - Present        Acute on chronic diastolic CHF (congestive heart failure) ([facility] ICD-10-CM: I50.33  ICD-9-CM: 428.33, 428.0  9/27/2017 - Present        Chronic renal failure, stage 4 (severe) ([facility] ICD-10-CM: N18.4  ICD-9-CM: 585.4  7/4/2017 - Present        CHF (congestive heart failure) ([facility] ICD-10-CM: I50.9  ICD-9-CM: 428.0  3/26/2017 - Present        Hypertensive urgency ICD-10-CM: I16.0  ICD-9-CM: 401.9  3/26/2017 - Present        Long term current use of anticoagulant ICD-10-CM: Z79.01  ICD-9-CM: V58.61  12/19/2016 - Present        CKD (chronic kidney disease) stage 3, GFR 30-59 ml/min ICD-10-CM: N18.3  ICD-9-CM: 585.3  12/18/2016 - Present        Accelerated hypertension with diastolic congestive heart failure, NYHA class 3 (HCC) (Chronic) ICD-10-CM: I11.0, I50.30  ICD-9-CM: 402.01, 428.30  11/29/2016 - Present        Pulmonary edema ICD-10-CM: J81.1  ICD-9-CM: 268  11/7/2016 - Present        Heart failure ([facility] ICD-10-CM: I50.9  ICD-9-CM: 428.9  11/7/2016 - Present        Diabetic foot ulcer ([facility] ICD-10-CM: E11.621, M24.217  ICD-9-CM: 250.80, 707.15  3/15/2016 - Present        Septic arthritis of foot [facility]) ICD-10-CM: M00.9  ICD-9-CM: 711.07  3/5/2016 - Present        Osteomyelitis ([facility] ICD-10-CM: M86.9  ICD-9-CM: 730.20  3/4/2016 - Present        Acute diastolic CHF (congestive heart failure) (HCC) ICD-10-CM: I50.31  ICD-9-CM: 428.31, 428.0  3/2/2016 - Present        Pneumonia ICD-10-CM: J18.9  ICD-9-CM: 765  3/2/2016 - Present        Cellulitis and abscess of leg ICD-10-CM: L02.419, L03.119  ICD-9-CM: 682.6  3/2/2016 - Present        Atrial fibrillation ([facility] ICD-10-CM: I48.91  ICD-9-CM: 427.31  3/2/2016 - Present        HTN (hypertension) ICD-10-CM: I10  ICD-9-CM: 401.9  3/2/2016 - Present        Diabetic foot ulcer with osteomyelitis ([facility] ICD-10-CM: E11.621, E11.69, L97.509, M86.9  ICD-9-CM: 250.80, 707.15, 730.27, 731.8  2/20/2016 - Present        Symptomatic anemia ICD-10-CM: D64.9  ICD-9-CM: 285.9  1/9/2016 - Present        Elevated serum alkaline phosphatase level ICD-10-CM: R74.8  ICD-9-CM: 790.5  11/8/2015 - Present        Hyponatremia ICD-10-CM: E87.1  ICD-9-CM: 276.1  11/8/2015 - Present        Acute renal failure (ARF) ([facility] ICD-10-CM: N17.9  ICD-9-CM: 584.9  11/8/2015 - Present        Type 2 diabetes mellitus with right diabetic foot ulcer ([facility] ICD-10-CM: E11.621, L97.519  ICD-9-CM: 250.80, 707.15  11/8/2015 - Present        Iron deficiency anemia ICD-10-CM: D50.9  ICD-9-CM: 280.9  11/8/2015 - Present        Diabetic foot ulcer ([address] [place] [address] ICD-10-CM: E11.621, L97.509  ICD-9-CM: 250.80, 707.15  7/19/2015 - Present        Acute diastolic heart failure ([facility] ICD-10-CM: I50.31  ICD-9-CM: 428.31  6/25/2015 - Present        Atrial fibrillation and flutter ([address] [address] ICD-10-CM: I48.91, I48.92  ICD-9-CM: 427.31, 427.32  6/24/2015 - Present        Hx of BKA ([address] [place] [address] ICD-10-CM: Z89.519  ICD-9-CM: V49.75  10/23/2014 - Present        Streptococcal infection ICD-10-CM: A49.1  ICD-9-CM: 041.00  10/22/2014 - Present        Proteus infection ICD-10-CM: A49.8  ICD-9-CM: 041.6  10/22/2014 - Present        SIRS with acute organ dysfunction due to infectious process [facility]) ICD-10-CM: A41.9, R65.20  ICD-9-CM: 038.9, 995.92 Present on Admission 10/21/2014 - Present        Open wound of toe ICD-10-CM: S91.109A  ICD-9-CM: 893.0 Present on Admission 10/21/2014 - Present        Aortic regurgitation ICD-10-CM: I35.1  ICD-9-CM: 424.1 Chronic 10/21/2014 - Present    Overview Signed 10/21/2014 11:30 AM by Hiral Guy MD     Echo 2013              Renal failure, acute [facility]) ICD-10-CM: N17.9  ICD-9-CM: 584.9 Present on Admission 10/21/2014 - Present        Gram-positive cocci bacteremia ICD-10-CM: R78.81  ICD-9-CM: 790.7, 041.89  10/21/2014 - Present        Cellulitis and abscess of leg ICD-10-CM: L02.419, L03.119  ICD-9-CM: 682.6  10/21/2014 - Present        History of positive PPD ICD-10-CM: R76.11  ICD-9-CM: 795.51  2/17/2013 - Present        Acute hypoxemic respiratory failure (HCC) ICD-10-CM: J96.01  ICD-9-CM: 518.81  2/16/2013 - Present        Malignant hypertension ICD-10-CM: I10  ICD-9-CM: 401.0  2/16/2013 - Present        Uncontrolled type II diabetes mellitus ([facility] ICD-10-CM: E11.65  ICD-9-CM: 250.02  2/16/2013 - Present        HTN (hypertension) ICD-10-CM: I10  ICD-9-CM: 401.9  1/19/2012 - Present        Morbid obesity with BMI of 40.0-44.9, adult [facility]) ICD-10-CM: E66.01, Z68.41  ICD-9-CM: 278.01, V85.41 Present on Admission 1/19/2012 - Present    Overview Addendum 10/21/2014 11:29 AM by Hiral Guy MD     Body mass index is 40.04 kg/(m^2).               RESOLVED: Leg ulcer ([facility] ICD-10-CM: L97.909  ICD-9-CM: 707.10  3/5/2016 - 4/19/2016        RESOLVED: Respiratory failure with hypoxia ([facility] ICD-10-CM: J96.91  ICD-9-CM: 518.81  2/14/2016 - 2/26/2016        RESOLVED: Acute respiratory failure with hypoxemia (HCC) ICD-10-CM: J96.01  ICD-9-CM: 518.81  11/8/2015 - 11/18/2015        RESOLVED: Severe sepsis ([facility] ICD-10-CM: A41.9, R65.20  ICD-9-CM: 038.9, 995.92  11/8/2015 - 11/18/2015        RESOLVED: Obstructive uropathy ICD-10-CM: N13.9  ICD-9-CM: 599.60  11/8/2015 - 11/18/2015        RESOLVED: Pneumonia ICD-10-CM: J18.9  ICD-9-CM: 106  11/5/2015 - 11/18/2015        RESOLVED: A-fib ([address] [address] ICD-10-CM: I48.91  ICD-9-CM: 427.31  6/25/2015 - 4/19/2016        RESOLVED: Gangrene ([facility] ICD-10-CM: P91  ICD-9-CM: 785.4  10/23/2014 - 4/19/2016        RESOLVED: Sepsis ([facility] ICD-10-CM: A41.9  ICD-9-CM: 038.9, 995.91  10/23/2014 - 4/19/2016        RESOLVED: Osteomyelitis of foot ([facility] ICD-10-CM: M86.9  ICD-9-CM: 730.27  10/22/2014 - 4/19/2016    Overview Signed 10/22/2014  1:29 PM by Audra Crump MD     IMPRESSION:   1. Osteomyelitis of the first and second distal phalanges. 2. Cellulitis and myositis. 3. Phlegmon deep to the dorsal skin breakdown superficial to the distal   second metatarsal. No drainable abscess. RESOLVED: Type 2 diabetes mellitus with left diabetic foot ulcer ([facility] ICD-10-CM: E11.621, L97.529  ICD-9-CM: 250.80, 707.15 Present on Admission 10/21/2014 - 4/19/2016        RESOLVED: Sepsis ([facility] ICD-10-CM: A41.9  ICD-9-CM: 038.9, 995.91  10/21/2014 - 10/22/2014        RESOLVED: DKA (diabetic ketoacidoses) ([facility] ICD-10-CM: E13.10  ICD-9-CM: 250.10  10/20/2014 - 4/19/2016        RESOLVED: Pneumonia ICD-10-CM: J18.9  ICD-9-CM: 486  8/7/2013 - 10/21/2014        RESOLVED: Sepsis ICD-10-CM: A41.9  ICD-9-CM: 995.91  2/17/2013 - 2/26/2016        RESOLVED: Rhabdomyolysis ICD-10-CM: M62.82  ICD-9-CM: 728.88  2/17/2013 - 10/21/2014        RESOLVED: Pneumonia, organism unspecified ICD-10-CM: J18.9  ICD-9-CM: 185  2/16/2013 - 10/21/2014              Time spent on discharge related activities today greater than 30 minutes.       Signed:  Stoney Willson MD                 Hospitalist, Internal Medicine      Cc: Everardo Mckinney MD

## 2017-09-28 NOTE — PROGRESS NOTES
Problem: Falls - Risk of  Goal: *Absence of Falls  Document Tonia Fall Risk and appropriate interventions in the flowsheet.    Outcome: Progressing Towards Goal  Fall Risk Interventions:              Medication Interventions: Assess postural VS orthostatic hypotension, Bed/chair exit alarm, Teach patient to arise slowly, Evaluate medications/consider consulting pharmacy, Patient to call before getting OOB, Utilize gait belt for transfers/ambulation

## 2017-09-28 NOTE — ROUTINE PROCESS
Bedside and Verbal shift change report given to Rod Tomas (oncoming nurse) by Omar Morgan (offgoing nurse). Report included the following information SBAR, Kardex, Intake/Output, MAR, Recent Results, Med Rec Status and Cardiac Rhythm NSR.

## 2017-09-28 NOTE — PROGRESS NOTES
Bedside and Verbal shift change report given to Pam De La Rosa RN (oncoming nurse) by Terri Reeves RN (offgoing nurse). Report included the following information SBAR, Kardex, MAR, Accordion, Recent Results and Cardiac Rhythm NSR.

## 2017-09-28 NOTE — PROGRESS NOTES
Assumed care of pt this am. Pt is alert and oriented with no known needs at this time. Pt is resting in bed. Pt has been given all discharge instructions with no questions at this time. Pt given education on CHF and ways to prevent future exacerbations. Follow up appointments made. Pt has now been discharged.

## 2017-10-04 NOTE — PROGRESS NOTES
[facility] Progress Note    Date: 2017    Name: Loc Vera    MRN: [id number]         : 1975    Mr. Yared Thomas Arrived ambulatory and in no distress for Injectafer infusion. Assessment was completed, no acute issues at this time, no new complaints voiced. 24 gauge IV placed to the right FA without difficulty. Mr. Andrzej Lanier vitals were reviewed. Patient Vitals for the past 12 hrs:   Temp Pulse Resp BP SpO2   10/04/17 1430 - - - - 95 %   10/04/17 1427 - 77 - 142/77 (!) 81 %   10/04/17 1403 - 74 - 148/77 -   10/04/17 1251 97 °F (36.1 °C) 73 20 138/73 -       Pre-medications  were administered as ordered and chemotherapy was initiated. ferric carboxymaltose (INJECTAFER) 750 mg in 0.9% sodium chloride 250 mL IVPB       Mr. Yared Thomas tolerated treatment well and was discharged from [address] [address] in stable condition at 1440. He is to return on  at 1pm for his next appointment, patient aware.     Shaila Mckee RN  2017

## 2017-10-04 NOTE — PROGRESS NOTES
Problem: Knowledge Deficit  Goal: *Verbalizes understanding of procedures and medications  Outcome: Progressing Towards Goal  Patient able to verbalize reasons for today's visit. All questions and concerns answered. Problem: Patient Education: Go to Education Activity  Goal: Patient/Family Education  Outcome: Progressing Towards Goal  Patient provided with written information regarding today's prescribed medication.

## 2017-10-04 NOTE — DISCHARGE INSTRUCTIONS
Ferric Carboxymaltose (By injection)   Ferric Carboxymaltose (KIM-ik rolando-box-ee-MAWL-tose)  Treats anemia (not enough iron in the blood). Brand Name(s): Injectafer   There may be other brand names for this medicine. When This Medicine Should Not Be Used: This medicine is not right for everyone. You should not receive it if you had an allergic reaction to ferric carboxymaltose. How to Use This Medicine:   Injectable  · Your doctor will prescribe your dose and schedule. This medicine is given through a needle placed in a vein. · A nurse or other health provider will give you this medicine. · Read and follow the patient instructions that come with this medicine. Talk to your doctor or pharmacist if you have any questions. Drugs and Foods to Avoid:   Ask your doctor or pharmacist before using any other medicine, including over-the-counter medicines, vitamins, and herbal products. · Some foods and medicines can affect how this medicine works. Tell your doctor if you are also using an iron supplement that you take by mouth. Warnings While Using This Medicine:   · Tell your doctor if you are pregnant or breastfeeding, or if you have liver disease or high blood pressure. Tell your doctor if you have had an allergic reaction to injectable iron products. · Tell any doctor or dentist who treats you that you are using this medicine. This medicine may affect certain medical test results. · Your doctor will do lab tests at regular visits to check on the effects of this medicine. Keep all appointments.   Possible Side Effects While Using This Medicine:   Call your doctor right away if you notice any of these side effects:  · Allergic reaction: Itching or hives, swelling in your face or hands, swelling or tingling in your mouth or throat, chest tightness, trouble breathing  · Chest pain, trouble breathing  · Fast, slow, or pounding heartbeat  · Lightheadedness, dizziness, fainting  If you notice these less serious side effects, talk with your doctor:   · Nausea  · Pain, discoloration, or a bruise under your skin where the needle is placed  If you notice other side effects that you think are caused by this medicine, tell your doctor. Call your doctor for medical advice about side effects. You may report side effects to FDA at 0-293-USF-4100  © 2017 [facility] Information is for End User's use only and may not be sold, redistributed or otherwise used for commercial purposes. The above information is an  only. It is not intended as medical advice for individual conditions or treatments. Talk to your doctor, nurse or pharmacist before following any medical regimen to see if it is safe and effective for you. Ferric Carboxymaltose (Injectafer) - (By injection)   Why this medicine is used:   Treats low levels of iron in the blood. Contact a nurse or doctor right away if you have:  · Fast or pounding heartbeat  · Lightheadedness, dizziness, or fainting  · Warmth or redness in your face, neck, arms, or upper chest     Common side effects:  · Nausea  © 2017 [facility] Information is for End User's use only and may not be sold, redistributed or otherwise used for commercial purposes.

## 2017-10-04 NOTE — IP AVS SNAPSHOT
[address] [id number] [phone] Patient: Merlin Gate MRN: [id number] BID:[id number] You are allergic to the following Allergen Reactions Ace Inhibitors Cough Recent Documentation Smoking Status Never Smoker Emergency Contacts Name Discharge Info Relation Home Work Mobile [address] [employer] CAREGIVER [3] Girlfriend [18] [phone] Sanna Cuba DISCHARGE CAREGIVER [3] Other Relative [6] [phone] About your hospitalization You were admitted on:  October 4, 2017 You last received care in the:  [address] [place] [address] You were discharged on:  October 4, 2017 Unit phone number:  [phone] Why you were hospitalized Your primary diagnosis was:  Not on File Your Primary Care Physician (PCP) Primary Care Physician Office Phone Office Fax [address] , [address] [phone] Follow-up Information None Your Appointments Wednesday October 11, 2017  1:00 PM EDT INFUSION 30 with 0 [facility] 1  
[facility] ([facility]) [address] [place] [phone]  
[phone] Friday October 13, 2017 11:15 AM EDT ROUTINE CARE with Favio Maxwell MD  
[facility] - [address] [place] [address] ([address]) [address] [facility],[address] [facility] 7 [address]  
[phone] Monday October 16, 2017  2:20 PM EDT  
ESTABLISHED PATIENT with MD Vannesa Rodas [facility]) [address]  
[phone] Thursday October 19, 2017  9:20 AM EDT  
ESTABLISHED PATIENT with MD Vannesa Rodas [facility]) [address]  
[phone] Current Discharge Medication List  
  
ASK your doctor about these medications Dose & Instructions Dispensing Information Comments Morning Noon Evening Bedtime * albuterol 90 mcg/actuation inhaler Commonly known as:  PROVENTIL HFA, VENTOLIN HFA, PROAIR HFA Your last dose was: Your next dose is:    
   
   
 Dose:  2 Puff Take 2 Puffs by inhalation daily. Refills:  0  
     
   
   
   
  
 * albuterol 2.5 mg /3 mL (0.083 %) nebulizer solution Commonly known as:  PROVENTIL VENTOLIN Your last dose was: Your next dose is:    
   
   
 Dose:  2.5 mg  
3 mL by Nebulization route every four (4) hours as needed for Wheezing. Quantity:  1 Package Refills:  12  
     
   
   
   
  
 amitriptyline 50 mg tablet Commonly known as:  ELAVIL Your last dose was: Your next dose is: TAKE ONE TABLET BY MOUTH EVERY EVENING Quantity:  30 Tab Refills:  11 amLODIPine 10 mg tablet Commonly known as:  Annell Mill Your last dose was: Your next dose is:    
   
   
 Dose:  10 mg Take 1 Tab by mouth daily. Quantity:  30 Tab Refills:  11  
     
   
   
   
  
 aspirin 81 mg chewable tablet Your last dose was: Your next dose is:    
   
   
 Dose:  81 mg Take 81 mg by mouth daily. Refills:  0  
     
   
   
   
  
 cloNIDine HCl 0.3 mg tablet Commonly known as:  CATAPRES Your last dose was: Your next dose is:    
   
   
 Dose:  0.3 mg Take 1 Tab by mouth three (3) times daily. Quantity:  90 Tab Refills:  2  
     
   
   
   
  
 fluticasone-salmeterol 250-50 mcg/dose diskus inhaler Commonly known as:  ADVAIR Your last dose was: Your next dose is:    
   
   
 Dose:  1 Puff Take 1 Puff by inhalation daily. Refills:  0  
     
   
   
   
  
 furosemide 40 mg tablet Commonly known as:  LASIX Your last dose was: Your next dose is: TAKE ONE TABLET BY MOUTH TWICE DAILY FOR leg swelling Quantity:  60 Tab Refills:  11  
     
   
   
   
  
 HumaLOG Mix 75-25 100 unit/mL (75-25) injection Generic drug:  insulin lispro protamine/insulin lispro Your last dose was: Your next dose is: Take 30 units in the morning and 10 units at night. Refills:  0  
     
   
   
   
  
 hydrALAZINE 100 mg tablet Commonly known as:  APRESOLINE Your last dose was: Your next dose is: TAKE ONE TABLET BY MOUTH THREE TIMES DAILY Quantity:  90 Tab Refills:  11 INTEGRA PLUS 125 mg iron- 1 mg Cap Generic drug:  Iron Fum & P-FA-Vit B & C No.9 Your last dose was: Your next dose is:    
   
   
 Dose:  1 Cap Take 1 Cap by mouth daily. Refills:  0  
     
   
   
   
  
 labetalol 300 mg tablet Commonly known as:  Roslynn Flax Your last dose was: Your next dose is:    
   
   
 Dose:  300 mg Take 300 mg by mouth every eight (8) hours. Refills:  0 LIPITOR 40 mg tablet Generic drug:  atorvastatin Your last dose was: Your next dose is:    
   
   
 Dose:  40 mg Take 40 mg by mouth nightly. Refills:  0 LYRICA 75 mg capsule Generic drug:  pregabalin Your last dose was: Your next dose is:    
   
   
 Dose:  75 mg Take 75 mg by mouth three (3) times daily. Refills:  0  
     
   
   
   
  
 omeprazole 40 mg capsule Commonly known as:  PRILOSEC Your last dose was: Your next dose is:    
   
   
 Dose:  40 mg Take 40 mg by mouth daily. Refills:  0  
     
   
   
   
  
 oxyCODONE IR 15 mg immediate release tablet Commonly known as:  OXY-IR Your last dose was: Your next dose is:    
   
   
 Dose:  15 mg Take 15 mg by mouth every eight (8) hours as needed. Refills:  0 RAYALDEE 30 mcg Cs24 Generic drug:  calcifediol Your last dose was: Your next dose is:    
   
   
 Dose:  30 mcg Take 30 mcg by mouth nightly. Refills:  0  
     
   
   
   
  
 RENVELA 800 mg Tab tab Generic drug:  sevelamer carbonate Your last dose was: Your next dose is: TAKE ONE TABLET BY MOUTH THREE TIMES DAILY WITH MEALS Quantity:  90 Tab Refills:  11  
     
   
   
   
  
 sodium bicarbonate 650 mg tablet Your last dose was: Your next dose is:    
   
   
 Dose:  650 mg Take 650 mg by mouth two (2) times a day. Refills:  0 ZOFRAN ODT 8 mg disintegrating tablet Generic drug:  ondansetron Your last dose was: Your next dose is:    
   
   
 Dose:  8 mg Take 8 mg by mouth three (3) times daily. Refills:  0  
     
   
   
   
  
 * Notice: This list has 2 medication(s) that are the same as other medications prescribed for you. Read the directions carefully, and ask your doctor or other care provider to review them with you. Discharge Instructions Ferric Carboxymaltose (By injection) Ferric Carboxymaltose (KIM-ik rolando-box-ee-MAWL-tose) Treats anemia (not enough iron in the blood). Brand Name(s): Injectafer There may be other brand names for this medicine. When This Medicine Should Not Be Used: This medicine is not right for everyone. You should not receive it if you had an allergic reaction to ferric carboxymaltose. How to Use This Medicine:  
Injectable · Your doctor will prescribe your dose and schedule. This medicine is given through a needle placed in a vein. · A nurse or other health provider will give you this medicine. · Read and follow the patient instructions that come with this medicine. Talk to your doctor or pharmacist if you have any questions. Drugs and Foods to Avoid: Ask your doctor or pharmacist before using any other medicine, including over-the-counter medicines, vitamins, and herbal products. · Some foods and medicines can affect how this medicine works. Tell your doctor if you are also using an iron supplement that you take by mouth. Warnings While Using This Medicine: · Tell your doctor if you are pregnant or breastfeeding, or if you have liver disease or high blood pressure. Tell your doctor if you have had an allergic reaction to injectable iron products. · Tell any doctor or dentist who treats you that you are using this medicine. This medicine may affect certain medical test results. · Your doctor will do lab tests at regular visits to check on the effects of this medicine. Keep all appointments. Possible Side Effects While Using This Medicine:  
Call your doctor right away if you notice any of these side effects: · Allergic reaction: Itching or hives, swelling in your face or hands, swelling or tingling in your mouth or throat, chest tightness, trouble breathing · Chest pain, trouble breathing · Fast, slow, or pounding heartbeat · Lightheadedness, dizziness, fainting If you notice these less serious side effects, talk with your doctor: · Nausea · Pain, discoloration, or a bruise under your skin where the needle is placed If you notice other side effects that you think are caused by this medicine, tell your doctor. Call your doctor for medical advice about side effects. You may report side effects to FDA at 9-316-FDA-9128 © 2017 [address] Information is for End User's use only and may not be sold, redistributed or otherwise used for commercial purposes. The above information is an  only. It is not intended as medical advice for individual conditions or treatments. Talk to your doctor, nurse or pharmacist before following any medical regimen to see if it is safe and effective for you. Ferric Carboxymaltose (Injectafer) - (By injection) Why this medicine is used: Treats low levels of iron in the blood. Contact a nurse or doctor right away if you have: 
· Fast or pounding heartbeat · Lightheadedness, dizziness, or fainting · Warmth or redness in your face, neck, arms, or upper chest  
 
Common side effects: 
· Nausea © 2017 [address] Information is for End User's use only and may not be sold, redistributed or otherwise used for commercial purposes. Discharge Orders None Introducing [facility]! [facility] introduces [employer] patient portal. Now you can access parts of your medical record, email your doctor's office, and request medication refills online. 1. In your internet browser, go to [email or web address] 2. Click on the First Time User? Click Here link in the Sign In box. You will see the New Member Sign Up page. 3. Enter your [employer] Access Code exactly as it appears below. You will not need to use this code after youve completed the sign-up process. If you do not sign up before the expiration date, you must request a new code. · [employer] Access Code: [email or web address] Expires: 1/2/2018 12:45 PM 
 
4. Enter the last four digits of your Social Security Number (xxxx) and Date of Birth (mm/dd/yyyy) as indicated and click Submit. You will be taken to the next sign-up page. 5. Create a [employer] ID. This will be your [employer] login ID and cannot be changed, so think of one that is secure and easy to remember. 6. Create a [employer] password. You can change your password at any time. 7. Enter your Password Reset Question and Answer. This can be used at a later time if you forget your password. 8. Enter your e-mail address. You will receive e-mail notification when new information is available in [address] 9. Click Sign Up. You can now view and download portions of your medical record. 10. Click the Download Summary menu link to download a portable copy of your medical information. If you have questions, please visit the Frequently Asked Questions section of the MyChart website. Remember, MyChart is NOT to be used for urgent needs. For medical emergencies, dial 911. Now available from your iPhone and Android! General Information Please provide this summary of care documentation to your next provider. Patient Signature:  ____________________________________________________________ Date:  ____________________________________________________________  
  
Kaylen Hernán Provider Signature:  ____________________________________________________________ Date:  ____________________________________________________________

## 2017-10-11 NOTE — PROGRESS NOTES
Problem: Knowledge Deficit  Goal: *Verbalizes understanding of procedures and medications  Outcome: Progressing Towards Goal  Patient voiced understanding for receiving today's medication. Patient refused written discharge information today (patient received on day one).

## 2017-10-11 NOTE — PROGRESS NOTES
[facility] Progress Note    Date: 2017    Name: Brett Shen    MRN: [id number]         : 1975    Mr. Curtistine Olszewski Arrived ambulatory and in no distress for Injectafer infusion. Assessment was completed, no acute issues at this time, no new complaints voiced. 24 gauge IV placed to the right FA without difficulty. Mr. Lowell Lund vitals were reviewed. Patient Vitals for the past 12 hrs:   Temp Pulse Resp BP SpO2   10/11/17 1408 98.1 °F (36.7 °C) 95 18 144/74 -   10/11/17 1245 98.1 °F (36.7 °C) 73 18 124/68 (!) 88 %       Pre-medications  were administered as ordered and chemotherapy was initiated. ferric carboxymaltose (INJECTAFER) 750 mg in 0.9% sodium chloride 250 mL IVPB       Mr. Curtistine Olszewski tolerated treatment well and was discharged from [place] [address] in stable condition at 1440. No future appointments noted.        Pam Murphy RN  2017

## 2017-10-16 NOTE — PROGRESS NOTES
Eunice Migeldoreen did not appear for this office visit, nor did he contact us to cancel or change the appointment. This is not unusual in his case.

## 2017-10-19 NOTE — PROGRESS NOTES
This is the second time this week that Mr. Ed Singletary made an appointment and did not keep it. Most likely he will appear in the emergency room within the next 5 or 6 days.

## 2017-10-21 PROBLEM — R09.02 HYPOXIA: Status: ACTIVE | Noted: 2017-01-01

## 2017-10-21 NOTE — Clinical Note
Patient Class[de-identified] Outpatient [102] Type of Bed: Remote Telemetry [29] Admitting Diagnosis: Hypoxia [[id number]] Admitting Physician: Zeny Gilmore [[id number]] Attending Physician: Zeny Gilmore [[id number]]

## 2017-10-21 NOTE — IP AVS SNAPSHOT
2700 [facility] [address] [place] [address] 
[phone] Patient: Karma Zayas MRN: [id number] EHI:[id number]/[id number] You are allergic to the following Allergen Reactions Ace Inhibitors Cough Recent Documentation Weight BMI Smoking Status 144 kg 40.76 kg/m2 Never Smoker Emergency Contacts Name Discharge Info Relation Home Work Mobile 60[employer] CAREGIVER [3] Girlfriend [18] [phone] Sanna Cuba DISCHARGE CAREGIVER [3] Other Relative [6] [phone] About your hospitalization You were admitted on:  October 21, 2017 You last received care in the:  [phone] You were discharged on:  October 23, 2017 Unit phone number:  [phone] Why you were hospitalized Your primary diagnosis was:  Hypoxia Providers Seen During Your Hospitalizations Provider Role Specialty Primary office phone Jose Raul Abdalla DO Attending Provider Emergency Medicine [phone] Hardie Meigs, DO Attending Provider Internal Medicine [phone] Tawanda Case MD Attending Provider Internal Medicine [phone] Your Primary Care Physician (PCP) Primary Care Physician Office Phone Office Fax [address], [address] [place] [address] [phone] Follow-up Information Follow up With Details Comments Contact Info Matthias Yuan MD On 11/1/2017 For discharge followup at 2:30PM  [address] [place] [address] 
[phone] Mando Machado MD On 10/23/2017  [facility] [address] [place] [address] 
[phone] Elizabeth Pickett MD In 1 week  208 [facility] [address] [place] [address] 
[phone] Your Appointments Monday October 23, 2017  1:20 PM EDT  
ESTABLISHED PATIENT with Mando Machado MD  
[facility]) [address] [place] [address]  
[phone] Wednesday November 01, 2017  2:30 PM EDT ACUTE CARE with Jorge Gonzalez MD  
[facility] 710 [facility] [place] ([facility]) 2830 [facility],[address] Floor [facility] [id number]  
[phone] Current Discharge Medication List  
  
CONTINUE these medications which have NOT CHANGED Dose & Instructions Dispensing Information Comments Morning Noon Evening Bedtime * albuterol 90 mcg/actuation inhaler Commonly known as:  PROVENTIL HFA, VENTOLIN HFA, PROAIR HFA Your last dose was: Your next dose is:    
   
   
 Dose:  2 Puff Take 2 Puffs by inhalation daily. Refills:  0  
     
   
   
   
  
 * albuterol 2.5 mg /3 mL (0.083 %) nebulizer solution Commonly known as:  PROVENTIL VENTOLIN Your last dose was: Your next dose is:    
   
   
 Dose:  2.5 mg  
3 mL by Nebulization route every four (4) hours as needed for Wheezing. Quantity:  1 Package Refills:  12  
     
   
   
   
  
 amitriptyline 50 mg tablet Commonly known as:  ELAVIL Your last dose was: Your next dose is: TAKE ONE TABLET BY MOUTH EVERY EVENING Quantity:  30 Tab Refills:  11 amLODIPine 10 mg tablet Commonly known as:  Love Breach Your last dose was: Your next dose is:    
   
   
 Dose:  10 mg Take 1 Tab by mouth daily. Quantity:  30 Tab Refills:  11  
     
   
   
   
  
 aspirin 81 mg chewable tablet Your last dose was: Your next dose is:    
   
   
 Dose:  81 mg Take 81 mg by mouth daily. Refills:  0  
     
   
   
   
  
 cloNIDine HCl 0.3 mg tablet Commonly known as:  CATAPRES Your last dose was: Your next dose is:    
   
   
 Dose:  0.3 mg Take 1 Tab by mouth three (3) times daily. Quantity:  90 Tab Refills:  2 fluticasone-salmeterol 250-50 mcg/dose diskus inhaler Commonly known as:  ADVAIR Your last dose was: Your next dose is:    
   
   
 Dose:  1 Puff Take 1 Puff by inhalation daily. Refills:  0  
     
   
   
   
  
 furosemide 40 mg tablet Commonly known as:  LASIX Your last dose was: Your next dose is: TAKE ONE TABLET BY MOUTH TWICE DAILY FOR leg swelling Quantity:  60 Tab Refills:  11  
     
   
   
   
  
 HumaLOG Mix 75-25 100 unit/mL (75-25) injection Generic drug:  insulin lispro protamine/insulin lispro Your last dose was: Your next dose is: Take 30 units in the morning and 10 units at night. Refills:  0  
     
   
   
   
  
 hydrALAZINE 100 mg tablet Commonly known as:  APRESOLINE Your last dose was: Your next dose is: TAKE ONE TABLET BY MOUTH THREE TIMES DAILY Quantity:  90 Tab Refills:  11 INTEGRA PLUS 125 mg iron- 1 mg Cap Generic drug:  Iron Fum & P-FA-Vit B & C No.9 Your last dose was: Your next dose is:    
   
   
 Dose:  1 Cap Take 1 Cap by mouth daily. Refills:  0  
     
   
   
   
  
 labetalol 300 mg tablet Commonly known as:  Lennis Sanjay Your last dose was: Your next dose is:    
   
   
 Dose:  300 mg Take 300 mg by mouth every eight (8) hours. Refills:  0 LIPITOR 40 mg tablet Generic drug:  atorvastatin Your last dose was: Your next dose is:    
   
   
 Dose:  40 mg Take 40 mg by mouth nightly. Refills:  0 LYRICA 75 mg capsule Generic drug:  pregabalin Your last dose was: Your next dose is:    
   
   
 Dose:  75 mg Take 75 mg by mouth three (3) times daily. Refills:  0  
     
   
   
   
  
 methocarbamol 750 mg tablet Commonly known as:  ROBAXIN Your last dose was: Your next dose is: Dose:  750 mg Take 750 mg by mouth. Refills:  0  
     
   
   
   
  
 omeprazole 40 mg capsule Commonly known as:  PRILOSEC Your last dose was: Your next dose is:    
   
   
 Dose:  40 mg Take 1 Cap by mouth daily. Quantity:  30 Cap Refills:  3  
     
   
   
   
  
 oxyCODONE IR 15 mg immediate release tablet Commonly known as:  OXY-IR Your last dose was: Your next dose is:    
   
   
 Dose:  15 mg Take 15 mg by mouth every eight (8) hours as needed. Refills:  0  
     
   
   
   
  
 RAYALDEE 30 mcg Cs24 Generic drug:  calcifediol Your last dose was: Your next dose is:    
   
   
 Dose:  30 mcg Take 30 mcg by mouth nightly. Refills:  0  
     
   
   
   
  
 sevelamer carbonate 800 mg Tab tab Commonly known as:  Babetta Hill Your last dose was: Your next dose is: TAKE ONE TABLET BY MOUTH THREE TIMES DAILY WITH MEALS Quantity:  90 Tab Refills:  11  
     
   
   
   
  
 sodium bicarbonate 650 mg tablet Your last dose was: Your next dose is:    
   
   
 Dose:  650 mg Take 650 mg by mouth two (2) times a day. Refills:  0 ZOFRAN ODT 8 mg disintegrating tablet Generic drug:  ondansetron Your last dose was: Your next dose is:    
   
   
 Dose:  8 mg Take 8 mg by mouth three (3) times daily. Refills:  0  
     
   
   
   
  
 * Notice: This list has 2 medication(s) that are the same as other medications prescribed for you. Read the directions carefully, and ask your doctor or other care provider to review them with you. Discharge Instructions Discharge Instructions PATIENT ID: Elia Holt MRN: [id number] YOB: 1975 DATE OF ADMISSION: 10/21/2017  8:52 PM   
DATE OF DISCHARGE: 10/22/2017 PRIMARY CARE PROVIDER: Karen Doshi MD  
 
ATTENDING PHYSICIAN: Ita Carson MD 
 DISCHARGING PROVIDER: Sandeep Cagle MD   
To contact this individual call [phone] and ask the  to page. If unavailable ask to be transferred the Adult Hospitalist Department. DISCHARGE DIAGNOSES  
SOB 
 
CONSULTATIONS: IP CONSULT TO HOSPITALIST 
IP CONSULT TO CARDIOLOGY PROCEDURES/SURGERIES: * No surgery found * PENDING TEST RESULTS:  
At the time of discharge the following test results are still pending: none FOLLOW UP APPOINTMENTS:  
Follow-up Information Follow up With Details Comments Contact Info Nichelle Napoles MD In 1 week  [facility] [address] [place] [address] 
[phone] Jesus Bolaños MD On 10/23/2017  [address] 
[phone] Sukhwinder Hernandez MD In 1 week  208 [facility] [address] [place] [address] Street 
[phone] ADDITIONAL CARE RECOMMENDATIONS:  
Follow up with PMD, cardiologist, Nephrologist 
Please expect some tests to be done at their clinic DIET: Renal Diet ACTIVITY: Activity as tolerated DISCHARGE MEDICATIONS: 
 See Medication Reconciliation Form · It is important that you take the medication exactly as they are prescribed. · Keep your medication in the bottles provided by the pharmacist and keep a list of the medication names, dosages, and times to be taken in your wallet. · Do not take other medications without consulting your doctor. NOTIFY YOUR PHYSICIAN FOR ANY OF THE FOLLOWING:  
Fever over 101 degrees for 24 hours. Chest pain, shortness of breath, fever, chills, nausea, vomiting, diarrhea, change in mentation, falling, weakness, bleeding. Severe pain or pain not relieved by medications. Or, any other signs or symptoms that you may have questions about. DISPOSITION: 
 x Home With: 
 OT  PT  [facility]  RN  
  
 SNF/Inpatient Rehab/LTAC Independent/assisted living Hospice Other: CDMP Checked:  
Yes x PROBLEM LIST Updated: 
Yes x Signed: Brea West MD 
10/22/2017 
2:27 PM 
 
Discharge Orders None [facility] Announcement We are excited to announce that we are making your provider's discharge notes available to you in [facility]. You will see these notes when they are completed and signed by the physician that discharged you from your recent hospital stay. If you have any questions or concerns about any information you see in [facility], please call the Health Information Department where you were seen or reach out to your Primary Care Provider for more information about your plan of care. Introducing [facility]! Fadi Saavedra introduces [facility] patient portal. Now you can access parts of your medical record, email your doctor's office, and request medication refills online. 1. In your internet browser, go to [email or web address] A/[email or web address] 2. Click on the First Time User? Click Here link in the Sign In box. You will see the New Member Sign Up page. 3. Enter your [facility] Access Code exactly as it appears below. You will not need to use this code after youve completed the sign-up process. If you do not sign up before the expiration date, you must request a new code. · [facility] Access Code: [email or web address] Expires: 1/2/2018 12:45 PM 
 
4. Enter the last four digits of your Social Security Number (xxxx) and Date of Birth (mm/dd/yyyy) as indicated and click Submit. You will be taken to the next sign-up page. 5. Create a [facility] ID. This will be your [facility] login ID and cannot be changed, so think of one that is secure and easy to remember. 6. Create a [facility] password. You can change your password at any time. 7. Enter your Password Reset Question and Answer. This can be used at a later time if you forget your password. 8. Enter your e-mail address. You will receive e-mail notification when new information is available in [address] 9. Click Sign Up. You can now view and download portions of your medical record. 10. Click the Download Summary menu link to download a portable copy of your medical information. If you have questions, please visit the Frequently Asked Questions section of the [facility] website. Remember, MyChart is NOT to be used for urgent needs. For medical emergencies, dial 911. Now available from your iPhone and Android! General Information Please provide this summary of care documentation to your next provider. Patient Signature:  ____________________________________________________________ Date:  ____________________________________________________________  
  
Lali  Provider Signature:  ____________________________________________________________ Date:  ____________________________________________________________

## 2017-10-21 NOTE — IP AVS SNAPSHOT
[address] 
[phone] Patient: Alondra Zaldivar MRN: [id number] KKM:[id number] Current Discharge Medication List  
  
CONTINUE these medications which have NOT CHANGED Dose & Instructions Dispensing Information Comments Morning Noon Evening Bedtime * albuterol 90 mcg/actuation inhaler Commonly known as:  PROVENTIL HFA, VENTOLIN HFA, PROAIR HFA Your last dose was: Your next dose is:    
   
   
 Dose:  2 Puff Take 2 Puffs by inhalation daily. Refills:  0  
     
   
   
   
  
 * albuterol 2.5 mg /3 mL (0.083 %) nebulizer solution Commonly known as:  PROVENTIL VENTOLIN Your last dose was: Your next dose is:    
   
   
 Dose:  2.5 mg  
3 mL by Nebulization route every four (4) hours as needed for Wheezing. Quantity:  1 Package Refills:  12  
     
   
   
   
  
 amitriptyline 50 mg tablet Commonly known as:  ELAVIL Your last dose was: Your next dose is: TAKE ONE TABLET BY MOUTH EVERY EVENING Quantity:  30 Tab Refills:  11 amLODIPine 10 mg tablet Commonly known as:  Yaneth Blade Your last dose was: Your next dose is:    
   
   
 Dose:  10 mg Take 1 Tab by mouth daily. Quantity:  30 Tab Refills:  11  
     
   
   
   
  
 aspirin 81 mg chewable tablet Your last dose was: Your next dose is:    
   
   
 Dose:  81 mg Take 81 mg by mouth daily. Refills:  0  
     
   
   
   
  
 cloNIDine HCl 0.3 mg tablet Commonly known as:  CATAPRES Your last dose was: Your next dose is:    
   
   
 Dose:  0.3 mg Take 1 Tab by mouth three (3) times daily. Quantity:  90 Tab Refills:  2  
     
   
   
   
  
 fluticasone-salmeterol 250-50 mcg/dose diskus inhaler Commonly known as:  ADVAIR Your last dose was: Your next dose is:    
   
   
 Dose:  1 Puff Take 1 Puff by inhalation daily. Refills:  0  
     
   
   
   
  
 furosemide 40 mg tablet Commonly known as:  LASIX Your last dose was: Your next dose is: TAKE ONE TABLET BY MOUTH TWICE DAILY FOR leg swelling Quantity:  60 Tab Refills:  11  
     
   
   
   
  
 HumaLOG Mix 75-25 100 unit/mL (75-25) injection Generic drug:  insulin lispro protamine/insulin lispro Your last dose was: Your next dose is: Take 30 units in the morning and 10 units at night. Refills:  0  
     
   
   
   
  
 hydrALAZINE 100 mg tablet Commonly known as:  APRESOLINE Your last dose was: Your next dose is: TAKE ONE TABLET BY MOUTH THREE TIMES DAILY Quantity:  90 Tab Refills:  11 INTEGRA PLUS 125 mg iron- 1 mg Cap Generic drug:  Iron Fum & P-FA-Vit B & C No.9 Your last dose was: Your next dose is:    
   
   
 Dose:  1 Cap Take 1 Cap by mouth daily. Refills:  0  
     
   
   
   
  
 labetalol 300 mg tablet Commonly known as:  Jordan Corns Your last dose was: Your next dose is:    
   
   
 Dose:  300 mg Take 300 mg by mouth every eight (8) hours. Refills:  0 LIPITOR 40 mg tablet Generic drug:  atorvastatin Your last dose was: Your next dose is:    
   
   
 Dose:  40 mg Take 40 mg by mouth nightly. Refills:  0 LYRICA 75 mg capsule Generic drug:  pregabalin Your last dose was: Your next dose is:    
   
   
 Dose:  75 mg Take 75 mg by mouth three (3) times daily. Refills:  0  
     
   
   
   
  
 methocarbamol 750 mg tablet Commonly known as:  ROBAXIN Your last dose was: Your next dose is:    
   
   
 Dose:  750 mg Take 750 mg by mouth. Refills:  0  
     
   
   
   
  
 omeprazole 40 mg capsule Commonly known as:  PRILOSEC Your last dose was: Your next dose is:    
   
   
 Dose:  40 mg Take 1 Cap by mouth daily. Quantity:  30 Cap Refills:  3  
     
   
   
   
  
 oxyCODONE IR 15 mg immediate release tablet Commonly known as:  OXY-IR Your last dose was: Your next dose is:    
   
   
 Dose:  15 mg Take 15 mg by mouth every eight (8) hours as needed. Refills:  0  
     
   
   
   
  
 RAYALDEE 30 mcg Cs24 Generic drug:  calcifediol Your last dose was: Your next dose is:    
   
   
 Dose:  30 mcg Take 30 mcg by mouth nightly. Refills:  0  
     
   
   
   
  
 sevelamer carbonate 800 mg Tab tab Commonly known as:  Abbie Giles Your last dose was: Your next dose is: TAKE ONE TABLET BY MOUTH THREE TIMES DAILY WITH MEALS Quantity:  90 Tab Refills:  11  
     
   
   
   
  
 sodium bicarbonate 650 mg tablet Your last dose was: Your next dose is:    
   
   
 Dose:  650 mg Take 650 mg by mouth two (2) times a day. Refills:  0 ZOFRAN ODT 8 mg disintegrating tablet Generic drug:  ondansetron Your last dose was: Your next dose is:    
   
   
 Dose:  8 mg Take 8 mg by mouth three (3) times daily. Refills:  0  
     
   
   
   
  
 * Notice: This list has 2 medication(s) that are the same as other medications prescribed for you. Read the directions carefully, and ask your doctor or other care provider to review them with you.

## 2017-10-22 NOTE — ED PROVIDER NOTES
HPI Comments: This patient presents with fatigue, and some exertional dyspnea that started earlier today. He has a history of recurrent anemia due to chronic kidney disease. He also has a history of CHF. Most recently, he missed 2 appointments with Dr. Doris Jackman on October 16 and 19th. The patient says that he had problems with transportation. He is scheduled to see Dr. Doris Jackman in 2 days. He is a nonsmoker. No lung disease. No chest pain. No abdominal pain. No fever. He has had some mild hemoptysis over the past week. No current pain. He has mild fatigue. Blood transfusions in the past have helped him. The history is provided by the patient. Past Medical History:   Diagnosis Date    Arrhythmia     afib    Asthma     3/2013 last attack    CKD (chronic kidney disease), stage III 2/12/2013    Nephrology: Cristi Gloria MD    Diabetes [facility])     Gout     Heart failure ([facility] [address]     Hyperlipidemia     Hypertension     Ill-defined condition     Positive PPD, treated 2001    treated 9 months       Past Surgical History:   Procedure Laterality Date    HX BELOW KNEE AMPUTATION Left     due to osteomyelitis    HX HEENT      tonsilectomy    HX HERNIA REPAIR      HX TONSILLECTOMY           Family History:   Problem Relation Age of Onset    Diabetes Mother     Hypertension Mother     Hypertension Brother     Diabetes Brother     Diabetes Maternal Grandmother     Hypertension Maternal Grandmother     Diabetes Other     Hypertension Other     Heart Disease Other        Social History     Social History    Marital status:      Spouse name: N/A    Number of children: N/A    Years of education: N/A     Occupational History    Not on file.      Social History Main Topics    Smoking status: Never Smoker    Smokeless tobacco: Never Used    Alcohol use No    Drug use: No    Sexual activity: Not Currently     Partners: Male     Other Topics Concern    Not on file     Social History Narrative Per conversation 03/26/17    He would like his new Medical  power of  to be his Brother Michael Atkins. (Previously was his Mother Kodi Shelley)        Never smoker         ALLERGIES: Ace inhibitors    Review of Systems       Vitals:    10/21/17 2051   BP: 154/75   Pulse: 83   Resp: 18   Temp: 98.5 °F (36.9 °C)   SpO2: (!) 74%            Physical Exam   Constitutional: He appears well-developed and well-nourished. He appears distressed. HENT:   Head: Normocephalic. Mouth/Throat: Oropharynx is clear and moist.   Eyes: Conjunctivae are normal. Pupils are equal, round, and reactive to light. Neck: No tracheal deviation present. Pulmonary/Chest: He is in respiratory distress. He has rales (bibasilar only - mostly clear). Abdominal: Soft. He exhibits no distension. There is no tenderness. There is no rebound and no guarding. Musculoskeletal:   bilat BKA   Skin: Skin is warm and dry. He is not diaphoretic. Psychiatric: He has a normal mood and affect. MDM  Number of Diagnoses or Management Options  Critical Care  Total time providing critical care: 30-74 minutes  30 minutes    ED Course       Procedures    Pulse ox 74% on room air in triage - needs 6 L O2 to get over 90%    ED EKG interpretation:  Rhythm: normal sinus rhythm; and regular . Rate (approx.): 82 bpm; Axis: normal; ST/T wave: normal.   Note written by Eva Stewart, as dictated by Vahe Gardner DO 9:53 PM    CONSULT NOTE:  11:26 PM Vahe Gardner DO spoke with Dr. Zoraida Pérez MD, Consult for Hospitalist. Discussed available diagnostic tests and clinical findings. Provider is in agreement with care plans as outlined. Dr. Zoraida Pérez MD will see and admit the patient. Labs reviewed  Hemoptysis may be from pulmonary edema - no night sweats or weight loss.

## 2017-10-22 NOTE — H&P
History and Physical    Subjective:     Patient is a 65-year-old -American male with multiple comorbidities including bilateral knee amputation, diabetes mellitus type II, essential hypertension, chronic kidney disease stage IV-V. Patient also has anemia of chronic disease, and has required multiple transfusions over the past year, the most recent being on September 27, 2017. He states that he has had G. I. workup for the anemia which was negative. He has been receiving Epogen  injections subcutaneously. Patient states that he can usually sense when his hemoglobin is dropping, as he starts having increasing dyspnea on exertion. The same happened on day of admission. Patient does use prosthetics bilaterally to ambulate. He states that he found himself short of breath with just walking on level ground, no more than 20-30 yards. He denies chest pain, lightheadedness, although he does get very tired. Patient states that he was recently started on home oxygen at night. He has no history of tobacco use or COPD, but does have a history of asthma. Patient states that he was having apneic episodes at night, which is presumably the reason why he was started on home oxygen, although he states that he has never been formally tested for sleep apnea. Patient was under the impression that he was started on oxygen at night for his paroxysmal atrial fibrillation. Patients oxygen saturation was found to be 74% on room air in triage       Past Medical History:   Diagnosis Date    Arrhythmia     Paroxysmal atrial fibrillation.  Asthma, stable, 3/2013 last exacerbation. Cor pulmonale, per echocardiogram of November, 2016.  CKD (chronic kidney disease), stage IV-V. Nephrologist: Albin Augustine MD 2/12/2013    Diabetes ([facility] [address]     Gout     Hyperlipidemia     Hypertension     Positive PPD in 2001, treated  9 months.   Patient believes that he was exposed to tuberculosis through volunteer work at [employer], although he did have a trip to [facility] shortly before being diagnosed with tuberculosis. 2001    Grade II diastolic dysfunction, per echo of November, 2016      Past Surgical History:   Procedure Laterality Date    HX BELOW KNEE AMPUTATION Left     due to osteomyelitis    HX (?UMBILICAL) HERNIA REPAIR at age of 7 months      HX TONSILLECTOMY       Family History   Problem Relation Age of Onset    Diabetes Mother     Hypertension Mother     Hypertension Brother     Diabetes Brother     Diabetes Maternal Grandmother     Hypertension Maternal Grandmother     Diabetes Other     Hypertension Other     Heart Disease Other       Social History   Substance Use Topics    Smoking status: Never Smoker    Smokeless tobacco: Never Used    Alcohol use No history of excessive alcohol intake. Recreational drug use  No history of recreational drug use. .       Prior to Admission medications    Medication Sig Start Date End Date Taking? Authorizing Provider   methocarbamol (ROBAXIN) 750 mg tablet Take 750 mg by mouth. 7/13/17   Historical Provider   sevelamer carbonate (RENVELA) 800 mg tab tab TAKE ONE TABLET BY MOUTH THREE TIMES DAILY WITH MEALS 10/13/17   Terese Nieto MD   omeprazole (PRILOSEC) 40 mg capsule Take 1 Cap by mouth daily. 10/13/17   Terese Nieto MD   cloNIDine HCl (CATAPRES) 0.3 mg tablet Take 1 Tab by mouth three (3) times daily. 9/28/17   Mandeep Curtis MD   sodium bicarbonate 650 mg tablet Take 650 mg by mouth two (2) times a day. Historical Provider   albuterol (PROVENTIL HFA, VENTOLIN HFA, PROAIR HFA) 90 mcg/actuation inhaler Take 2 Puffs by inhalation daily. Historical Provider   ondansetron (ZOFRAN ODT) 8 mg disintegrating tablet Take 8 mg by mouth three (3) times daily. Historical Provider   atorvastatin (LIPITOR) 40 mg tablet Take 40 mg by mouth nightly. Historical Provider   pregabalin (LYRICA) 75 mg capsule Take 75 mg by mouth three (3) times daily. Historical Provider   calcifediol (RAYALDEE) 30 mcg Cs24 Take 30 mcg by mouth nightly. Historical Provider   Iron Fum & P-FA-Vit B & C No.9 (INTEGRA PLUS) 125 mg iron- 1 mg cap Take 1 Cap by mouth daily. Historical Provider   fluticasone-salmeterol (ADVAIR) 250-50 mcg/dose diskus inhaler Take 1 Puff by inhalation daily. Historical Provider   insulin lispro protamine/insulin lispro (HUMALOG MIX 75-25) 100 unit/mL (75-25) injection Take 30 units in the morning and 10 units at night. Historical Provider   labetalol (NORMODYNE) 300 mg tablet Take 300 mg by mouth every eight (8) hours. Historical Provider   hydrALAZINE (APRESOLINE) 100 mg tablet TAKE ONE TABLET BY MOUTH THREE TIMES DAILY 9/15/17   Jorge Gonzalez MD   furosemide (LASIX) 40 mg tablet TAKE ONE TABLET BY MOUTH TWICE DAILY FOR leg swelling 9/14/17   Jorge Gonzalez MD   albuterol (PROVENTIL VENTOLIN) 2.5 mg /3 mL (0.083 %) nebulizer solution 3 mL by Nebulization route every four (4) hours as needed for Wheezing. 9/13/17   FRANCE Coronel   oxyCODONE IR (OXY-IR) 15 mg immediate release tablet Take 15 mg by mouth every eight (8) hours as needed. Historical Provider   amLODIPine (NORVASC) 10 mg tablet Take 1 Tab by mouth daily. 4/7/17   Emilie Torres MD   aspirin 81 mg chewable tablet Take 81 mg by mouth daily. Historical Provider   amitriptyline (ELAVIL) 50 mg tablet TAKE ONE TABLET BY MOUTH EVERY EVENING 6/24/16   Emilie Torres MD     Allergies   Allergen Reactions    Ace Inhibitors Cough        Review of Systems:  A comprehensive review of systems was negative except for that written in the History of Present Illness. Objective: Intake and Output:            Physical Exam:   Visit Vitals    BP (!) 174/96    Pulse 78    Temp 99.2 °F (37.3 °C)    Resp 20    SpO2 97%     General:  Alert, cooperative, no distress, appears stated age. Head:  Normocephalic, without obvious abnormality, atraumatic.    Eyes: Conjunctivae/corneas clear. PERRL, EOMs intact. Throat: Lips, mucosa, and tongue normal.    Neck: Supple, symmetrical, trachea midline, no adenopathy, thyroid: no enlargement/tenderness/nodules,  no JVD. Lungs:   Clear to auscultation bilaterally. Chest wall:  No tenderness or deformity. Heart:  Regular rate and rhythm, S1, S2 normal, no murmur, click, rub or gallop. Abdomen:   Soft, non-tender. Bowel sounds normal. No masses,  Non-distended. Extremities: Bilateral below the knee amputations; no cyanosis or edema. Pulses: 2+ and symmetric all extremities. Skin: Skin color, texture, turgor normal. No rashes or lesions   Lymph nodes: Cervical, supraclavicular, and axillary nodes normal.   Neurologic: CNII-XII intact. Moves all extremities spontaneously. ECG:  As personally reviewed, shows normal sinus rhythm with pulse rate 82, no acute ST-T wave changes. Echocardiogram of November, 2016:  left ventricle: Ejection fraction was estimated to be 70 %. There were  no regional wall motion abnormalities. Wall thickness was moderately to  markedly increased. There was moderate concentric hypertrophy. There was  no asymmetric hypertrophy. Mass was definitely increased. DOPPLER:  Features were consistent with a pseudonormal left ventricular filling  pattern, with concomitant abnormal relaxation and increased filling  pressure (grade 2 diastolic dysfunction). VENTRICULAR SEPTUM: Thickness was moderately increased. There was normal  motion. There was normal contour. RIGHT VENTRICLE: The ventricle was moderately dilated. Systolic function  was moderately reduced. There were no regional wall motion abnormalities. Wall thickness was moderately increased. DOPPLER: Systolic pressure was  within the normal range. LEFT ATRIUM: Size was at the upper limits of normal. There was hypertrophy  of the free wall No foreign bodies were observed. No mass was present.   There was no spontaneous echo contrast (\"smoke\"). ATRIAL SEPTUM: The septum bows from left to right, consistent with  increased left atrial pressure. RIGHT ATRIUM: Size was normal.    MITRAL VALVE: Normal valve structure. There was normal leaflet separation. No obvious mass, vegetation or thrombus noted. DOPPLER: The transmitral  velocity was within the normal range. There was no evidence for stenosis. There was trivial regurgitation. AORTIC VALVE: The valve was trileaflet. Leaflets exhibited normal  thickness and normal cuspal separation. No obvious mass, vegetation or  thrombus noted. DOPPLER: Transaortic velocity was within the normal range. There was no stenosis. There was trivial regurgitation. TRICUSPID VALVE: Normal valve structure. There was normal leaflet  separation. DOPPLER: The transtricuspid velocity was within the normal  range. There was no evidence for tricuspid stenosis. There was no  regurgitation. PULMONIC VALVE: Leaflets exhibited normal thickness, no calcification, and  normal cuspal separation. DOPPLER: The transpulmonic velocity was within  the normal range. There was no regurgitation. Data Review: Recent Results (from the past 24 hour(s))   CBC WITH AUTOMATED DIFF    Collection Time: 10/21/17  9:24 PM   Result Value Ref Range    WBC 10.7 4.1 - 11.1 K/uL    RBC 2.62 (L) 4.10 - 5.70 M/uL    HGB 6.8 (L) 12.1 - 17.0 g/dL    HCT 22.8 (L) 36.6 - 50.3 %    MCV 87.0 80.0 - 99.0 FL    MCH 26.0 26.0 - 34.0 PG    MCHC 29.8 (L) 30.0 - 36.5 g/dL    RDW 18.5 (H) 11.5 - 14.5 %    PLATELET 080 088 - 328 K/uL    NEUTROPHILS 84 (H) 32 - 75 %    LYMPHOCYTES 9 (L) 12 - 49 %    MONOCYTES 5 5 - 13 %    EOSINOPHILS 2 0 - 7 %    BASOPHILS 0 0 - 1 %    ABS. NEUTROPHILS 9.0 (H) 1.8 - 8.0 K/UL    ABS. LYMPHOCYTES 1.0 0.8 - 3.5 K/UL    ABS. MONOCYTES 0.5 0.0 - 1.0 K/UL    ABS. EOSINOPHILS 0.2 0.0 - 0.4 K/UL    ABS.  BASOPHILS 0.0 0.0 - 0.1 K/UL    DF SMEAR SCANNED      RBC COMMENTS RBC FRAGMENTS  POLYCHROMASIA  1+        RBC COMMENTS ANISOCYTOSIS  1+       METABOLIC PANEL, COMPREHENSIVE    Collection Time: 10/21/17  9:24 PM   Result Value Ref Range    Sodium 141 136 - 145 mmol/L    Potassium 4.3 3.5 - 5.1 mmol/L    Chloride 108 97 - 108 mmol/L    CO2 24 21 - 32 mmol/L    Anion gap 9 5 - 15 mmol/L    Glucose 250 (H) 65 - 100 mg/dL    BUN 65 (H) 6 - 20 MG/DL    Creatinine 4.51 (H) 0.70 - 1.30 MG/DL    BUN/Creatinine ratio 14 12 - 20      GFR est AA 17 (L) >60 ml/min/1.73m2    GFR est non-AA 14 (L) >60 ml/min/1.73m2    Calcium 8.0 (L) 8.5 - 10.1 MG/DL    Bilirubin, total 0.8 0.2 - 1.0 MG/DL    ALT (SGPT) 17 12 - 78 U/L    AST (SGOT) 12 (L) 15 - 37 U/L    Alk.  phosphatase 100 45 - 117 U/L    Protein, total 7.1 6.4 - 8.2 g/dL    Albumin 3.6 3.5 - 5.0 g/dL    Globulin 3.5 2.0 - 4.0 g/dL    A-G Ratio 1.0 (L) 1.1 - 2.2     TYPE & SCREEN    Collection Time: 10/21/17  9:24 PM   Result Value Ref Range    Crossmatch Expiration 10/24/2017     ABO/Rh(D) Para Tobias NEGATIVE     Antibody screen NEG    TROPONIN I    Collection Time: 10/21/17  9:24 PM   Result Value Ref Range    Troponin-I, Qt. <0.04 <0.05 ng/mL   NT-PRO BNP    Collection Time: 10/21/17  9:24 PM   Result Value Ref Range    NT pro- (H) 0 - 125 PG/ML   POC VENOUS BLOOD GAS    Collection Time: 10/21/17  9:32 PM   Result Value Ref Range    Device: NASAL CANNULA      Flow rate (POC) 2 L/M    pH, venous (POC) 7.410 7.32 - 7.42      pCO2, venous (POC) 39.6 (L) 41 - 51 MMHG    pO2, venous (POC) 40 25 - 40 mmHg    HCO3, venous (POC) 25.1 23.0 - 28.0 MMOL/L    sO2, venous (POC) 76 65 - 88 %    Base excess, venous (POC) 0 mmol/L    Allens test (POC) N/A      Site OTHER      Specimen type (POC) VENOUS BLOOD     EKG, 12 LEAD, INITIAL    Collection Time: 10/21/17  9:44 PM   Result Value Ref Range    Ventricular Rate 82 BPM    Atrial Rate 82 BPM    P-R Interval 162 ms    QRS Duration 94 ms    Q-T Interval 382 ms    QTC Calculation (Bezet) 446 ms    Calculated P Axis 49 degrees    Calculated R Axis 74 degrees Calculated T Axis 28 degrees    Diagnosis              Chest x-ray was negative for acute cardiopulmonary process. Assessment:           Plan:       Assessment/plan:  1. Severe anemia, presumably anemia of chronic disease. Patient is receiving Epogen injections subcutaneously. Transfuse one unit of PRBC. 2. Chronic kidney disease stage 4-5. Baseline GFR usually is around 15, with 1-2 point variation. 3. Hypoxia. Supplemental oxygen. Patient has chronic diastolic heart failure and anemia. Additionally, obstructive sleep apnea and sequelae of pulmonary hypertension  suspected in the setting of morbid obesity. 4. Diabetes mellitus type II. Continue home insulin regimen. 5. Essential hypertension. Resume home medications. 6. Cor pulmonale. Suspect pulmonary hypertension secondary to years of untreated obstructive sleep apnea, in the setting of morbid obesity.     Signed By: Ilya Mas DO     October 21, 2017

## 2017-10-22 NOTE — PROGRESS NOTES
TRANSFER - IN REPORT:    Verbal report received from Pepper Ochoa RN(name) on Ronnell Singh  being received from ED(unit) for routine progression of care      Report consisted of patients Situation, Background, Assessment and   Recommendations(SBAR). Information from the following report(s) SBAR, Kardex, ED Summary, Intake/Output, MAR and Recent Results was reviewed with the receiving nurse. Opportunity for questions and clarification was provided. Assessment completed upon patients arrival to unit and care assumed.

## 2017-10-22 NOTE — ED NOTES
TRANSFER - OUT REPORT:    Verbal report given to Hilario Stauffer RN (name) on Elia Holt  being transferred to [facility] [place] (unit) for routine progression of care       Report consisted of patients Situation, Background, Assessment and   Recommendations(SBAR). Information from the following report(s) SBAR, Kardex, ED Summary, Intake/Output, MAR and Recent Results was reviewed with the receiving nurse. Lines:       Opportunity for questions and clarification was provided.

## 2017-10-22 NOTE — DISCHARGE SUMMARY
Discharge Summary       PATIENT ID: Ana Azevedo  MRN: [id number]   YOB: 1975    DATE OF ADMISSION: 10/21/2017  8:52 PM    DATE OF DISCHARGE: 10/22/2017   PRIMARY CARE PROVIDER: Morgan Noland MD     ATTENDING PHYSICIAN: Dr Yordan Kuo  DISCHARGING PROVIDER: Yordan Kuo MD    To contact this individual call [phone] and ask the  to page. If unavailable ask to be transferred the Adult Hospitalist Department. CONSULTATIONS: IP CONSULT TO HOSPITALIST  IP CONSULT TO CARDIOLOGY    PROCEDURES/SURGERIES: * No surgery found *    ADMITTING DIAGNOSES & HOSPITAL COURSE:   Severe acute on chronic anemia  -sec to CKD  -recently admitted under similar circumstances  -s/p blood transfusion 1 unit PRBC 10/21  -Repeat labs this am, improved    CKD stage IV/V  -sec to diabetic nephropathy  -outpatient follow up with Nephrology, Dr Amena Mccallum. Already has an appointment this week    Secondary hyperparathyroidism  -Outpatient follow up with Dr Vitaliy Guardado    DM type 2  -On SSI/humalog  -monitor    Hypoxia with acute on chronic hypoxic respiratory failure  -On 2-3 l of oxygen at home, currently reported 5l 87%  -history of diastolic heart failure, reviewed echo from 2016  -pro   -CXR 10/21 Cardiomegaly and pulmonary edema  -now claims that he is at his baseline, wean off oxygen as able, if able to decrease it to 2l will discharge the patient today    Chronic diastolic CHF  -Wanted to get an echo and be seen by Dr Billie Rao but the patient already has an appointment with him tomorrow. He claims that his girlfriend has already taken off to take him to the clinic    HTN  -now improved but still uncontrolled    PVD  -s/p bilateral BKA    Non compliance  -reviewed from past notes  -counseled. He claims that the main reason he has missed appointments is because of [employer].     Diabetic peripheral neuropathy  -on Lyrica    Will start on Renal diet    Code status: FULL CODE  DVT prophylaxis: scd  PTA: home        DISCHARGE DIAGNOSES / PLAN:      1. Anemia  2. SOB       PENDING TEST RESULTS:   At the time of discharge the following test results are still pending: none    FOLLOW UP APPOINTMENTS:    Follow-up Information     Follow up With Details Comments Contact Info    Sp Acosta MD In 1 week  [address]      Donato Kocher, MD On 10/23/2017  [address]      John Doshi MD In 1 week  [address] [phone]             ADDITIONAL CARE RECOMMENDATIONS:   Follow up with PMD/Cardiologist/Nephrologist  Please expect some tests to be done at their clinic    DIET: Renal Diet    ACTIVITY: Activity as tolerated      DISCHARGE MEDICATIONS:  Current Discharge Medication List      CONTINUE these medications which have NOT CHANGED    Details   methocarbamol (ROBAXIN) 750 mg tablet Take 750 mg by mouth.      sevelamer carbonate (RENVELA) 800 mg tab tab TAKE ONE TABLET BY MOUTH THREE TIMES DAILY WITH MEALS  Qty: 90 Tab, Refills: 11      omeprazole (PRILOSEC) 40 mg capsule Take 1 Cap by mouth daily. Qty: 30 Cap, Refills: 3      cloNIDine HCl (CATAPRES) 0.3 mg tablet Take 1 Tab by mouth three (3) times daily. Qty: 90 Tab, Refills: 2    Associated Diagnoses: Essential hypertension, benign      sodium bicarbonate 650 mg tablet Take 650 mg by mouth two (2) times a day. ondansetron (ZOFRAN ODT) 8 mg disintegrating tablet Take 8 mg by mouth three (3) times daily. atorvastatin (LIPITOR) 40 mg tablet Take 40 mg by mouth nightly. calcifediol (RAYALDEE) 30 mcg Cs24 Take 30 mcg by mouth nightly. Iron Fum & P-FA-Vit B & C No.9 (INTEGRA PLUS) 125 mg iron- 1 mg cap Take 1 Cap by mouth daily. fluticasone-salmeterol (ADVAIR) 250-50 mcg/dose diskus inhaler Take 1 Puff by inhalation daily.       insulin lispro protamine/insulin lispro (HUMALOG MIX 75-25) 100 unit/mL (75-25) injection Take 30 units in the morning and 10 units at night. labetalol (NORMODYNE) 300 mg tablet Take 300 mg by mouth every eight (8) hours. hydrALAZINE (APRESOLINE) 100 mg tablet TAKE ONE TABLET BY MOUTH THREE TIMES DAILY  Qty: 90 Tab, Refills: 11      furosemide (LASIX) 40 mg tablet TAKE ONE TABLET BY MOUTH TWICE DAILY FOR leg swelling  Qty: 60 Tab, Refills: 11      oxyCODONE IR (OXY-IR) 15 mg immediate release tablet Take 15 mg by mouth every eight (8) hours as needed. amLODIPine (NORVASC) 10 mg tablet Take 1 Tab by mouth daily. Qty: 30 Tab, Refills: 11      aspirin 81 mg chewable tablet Take 81 mg by mouth daily. amitriptyline (ELAVIL) 50 mg tablet TAKE ONE TABLET BY MOUTH EVERY EVENING  Qty: 30 Tab, Refills: 11      albuterol (PROVENTIL HFA, VENTOLIN HFA, PROAIR HFA) 90 mcg/actuation inhaler Take 2 Puffs by inhalation daily. pregabalin (LYRICA) 75 mg capsule Take 75 mg by mouth three (3) times daily. albuterol (PROVENTIL VENTOLIN) 2.5 mg /3 mL (0.083 %) nebulizer solution 3 mL by Nebulization route every four (4) hours as needed for Wheezing. Qty: 1 Package, Refills: 12               NOTIFY YOUR PHYSICIAN FOR ANY OF THE FOLLOWING:   Fever over 101 degrees for 24 hours. Chest pain, shortness of breath, fever, chills, nausea, vomiting, diarrhea, change in mentation, falling, weakness, bleeding. Severe pain or pain not relieved by medications. Or, any other signs or symptoms that you may have questions about.     DISPOSITION:   x Home With:   OT  PT  HH  RN       Long term SNF/Inpatient Rehab    Independent/assisted living    Hospice    Other:       PATIENT CONDITION AT DISCHARGE:     Functional status    Poor     Deconditioned    x Independent      Cognition    x Lucid     Forgetful     Dementia      Catheters/lines (plus indication)    Sellers     PICC     PEG    x None      Code status    x Full code     DNR      PHYSICAL EXAMINATION AT DISCHARGE:  PLease see progress note      CHRONIC MEDICAL DIAGNOSES:  Problem List as of 10/22/2017  Date Reviewed: 10/22/2017          Codes Class Noted - Resolved    * (Principal)Hypoxia ICD-10-CM: R09.02  ICD-9-CM: 799.02  10/21/2017 - Present        Acute pulmonary edema ([place] [address].) ICD-10-CM: J81.0  ICD-9-CM: 518.4  9/27/2017 - Present        Anemia ICD-10-CM: D64.9  ICD-9-CM: 285.9  9/27/2017 - Present        Acute on chronic diastolic CHF (congestive heart failure) ([place] [address].) ICD-10-CM: I50.33  ICD-9-CM: 428.33, 428.0  9/27/2017 - Present        Chronic renal failure, stage 4 (severe) ([place] [address].) ICD-10-CM: N18.4  ICD-9-CM: 585.4  7/4/2017 - Present        CHF (congestive heart failure) ([place] [address].) ICD-10-CM: I50.9  ICD-9-CM: 428.0  3/26/2017 - Present        Hypertensive urgency ICD-10-CM: I16.0  ICD-9-CM: 401.9  3/26/2017 - Present        Long term current use of anticoagulant ICD-10-CM: Z79.01  ICD-9-CM: V58.61  12/19/2016 - Present        CKD (chronic kidney disease) stage 3, GFR 30-59 ml/min ICD-10-CM: N18.3  ICD-9-CM: 585.3  12/18/2016 - Present        Accelerated hypertension with diastolic congestive heart failure, NYHA class 3 (HCC) (Chronic) ICD-10-CM: I11.0, I50.30  ICD-9-CM: 402.01, 428.30, 428.0  11/29/2016 - Present        Pulmonary edema ICD-10-CM: J81.1  ICD-9-CM: 711  11/7/2016 - Present        Heart failure ([facility] ICD-10-CM: I50.9  ICD-9-CM: 428.9  11/7/2016 - Present        Diabetic foot ulcer ([facility] ICD-10-CM: E11.621, L97.509  ICD-9-CM: 250.80, 707.15  3/15/2016 - Present        Septic arthritis of foot ([facility] ICD-10-CM: M00.9  ICD-9-CM: 711.07  3/5/2016 - Present        Osteomyelitis ([facility] ICD-10-CM: M86.9  ICD-9-CM: 730.20  3/4/2016 - Present        Acute diastolic CHF (congestive heart failure) ([facility]) ICD-10-CM: I50.31  ICD-9-CM: 428.31, 428.0  3/2/2016 - Present        Pneumonia ICD-10-CM: J18.9  ICD-9-CM: 486  3/2/2016 - Present        Cellulitis and abscess of leg ICD-10-CM: L03.119, L02.419  ICD-9-CM: 682.6  3/2/2016 - Present Atrial fibrillation ([facility] ICD-10-CM: I48.91  ICD-9-CM: 427.31  3/2/2016 - Present        HTN (hypertension) ICD-10-CM: I10  ICD-9-CM: 401.9  3/2/2016 - Present        Diabetic foot ulcer with osteomyelitis ([facility] ICD-10-CM: E11.621, E11.69, L97.509, M86.9  ICD-9-CM: 250.80, 707.15, 730.27, 731.8  2/20/2016 - Present        Symptomatic anemia ICD-10-CM: D64.9  ICD-9-CM: 285.9  1/9/2016 - Present        Elevated serum alkaline phosphatase level ICD-10-CM: R74.8  ICD-9-CM: 790.5  11/8/2015 - Present        Hyponatremia ICD-10-CM: E87.1  ICD-9-CM: 276.1  11/8/2015 - Present        Acute renal failure (ARF) ([place] [address].) ICD-10-CM: N17.9  ICD-9-CM: 584.9  11/8/2015 - Present        Type 2 diabetes mellitus with right diabetic foot ulcer ([facility] ICD-10-CM: E11.621, L97.519  ICD-9-CM: 250.80, 707.15  11/8/2015 - Present        Iron deficiency anemia ICD-10-CM: D50.9  ICD-9-CM: 280.9  11/8/2015 - Present        Diabetic foot ulcer ([place] [address].) ICD-10-CM: E11.621, L97.509  ICD-9-CM: 250.80, 707.15  7/19/2015 - Present        Acute diastolic heart failure ([facility] ICD-10-CM: I50.31  ICD-9-CM: 428.31  6/25/2015 - Present        Atrial fibrillation and flutter ([facility] ICD-10-CM: I48.91, I48.92  ICD-9-CM: 427.31, 427.32  6/24/2015 - Present        Hx of BKA ([facility] ICD-10-CM: Z89.519  ICD-9-CM: V49.75  10/23/2014 - Present        Streptococcal infection(041.00) ICD-10-CM: A49.1  ICD-9-CM: 041.00  10/22/2014 - Present        Proteus infection ICD-10-CM: A49.8  ICD-9-CM: 041.6  10/22/2014 - Present        SIRS with acute organ dysfunction due to infectious process [facility]) ICD-10-CM: A41.9, R65.20  ICD-9-CM: 038.9, 995.92 Present on Admission 10/21/2014 - Present        Open wound of toe ICD-10-CM: S91.109A  ICD-9-CM: 893.0 Present on Admission 10/21/2014 - Present        Aortic regurgitation ICD-10-CM: I35.1  ICD-9-CM: 424.1 Chronic 10/21/2014 - Present    Overview Signed 10/21/2014 11:30 AM by Darlene García MD     Echo 2013              Renal failure, acute [facility]) ICD-10-CM: N17.9  ICD-9-CM: 584.9 Present on Admission 10/21/2014 - Present        Gram-positive cocci bacteremia ICD-10-CM: R78.81  ICD-9-CM: 790.7, 041.89  10/21/2014 - Present        Cellulitis and abscess of leg ICD-10-CM: L03.119, L02.419  ICD-9-CM: 682.6  10/21/2014 - Present        History of positive PPD ICD-10-CM: R76.11  ICD-9-CM: 795.51  2/17/2013 - Present        Acute hypoxemic respiratory failure ([facility] ICD-10-CM: J96.01  ICD-9-CM: 518.81  2/16/2013 - Present        Malignant hypertension ICD-10-CM: I10  ICD-9-CM: 401.0  2/16/2013 - Present        Uncontrolled type II diabetes mellitus ([facility] ICD-10-CM: E11.65  ICD-9-CM: 250.02  2/16/2013 - Present        HTN (hypertension) ICD-10-CM: I10  ICD-9-CM: 401.9  1/19/2012 - Present        Morbid obesity with BMI of 40.0-44.9, adult [facility]) ICD-10-CM: E66.01, Z68.41  ICD-9-CM: 278.01, V85.41 Present on Admission 1/19/2012 - Present    Overview Addendum 10/21/2014 11:29 AM by Michoacano Handy MD     Body mass index is 40.04 kg/(m^2).               RESOLVED: Leg ulcer ([facility] ICD-10-CM: L97.909  ICD-9-CM: 707.10  3/5/2016 - 4/19/2016        RESOLVED: Respiratory failure with hypoxia ([facility] ICD-10-CM: J96.91  ICD-9-CM: 518.81  2/14/2016 - 2/26/2016        RESOLVED: Acute respiratory failure with hypoxemia (HCC) ICD-10-CM: J96.01  ICD-9-CM: 518.81  11/8/2015 - 11/18/2015        RESOLVED: Severe sepsis ([facility] ICD-10-CM: A41.9, R65.20  ICD-9-CM: 038.9, 995.92  11/8/2015 - 11/18/2015        RESOLVED: Obstructive uropathy ICD-10-CM: N13.9  ICD-9-CM: 599.60  11/8/2015 - 11/18/2015        RESOLVED: Pneumonia ICD-10-CM: J18.9  ICD-9-CM: 752  11/5/2015 - 11/18/2015        RESOLVED: A-fib ([facility] ICD-10-CM: I48.91  ICD-9-CM: 427.31  6/25/2015 - 4/19/2016        RESOLVED: Gangrene ([facility] ICD-10-CM: M67  ICD-9-CM: 785.4  10/23/2014 - 4/19/2016        RESOLVED: Sepsis ([facility] ICD-10-CM: A41.9  ICD-9-CM: 038.9, 995.91  10/23/2014 - 4/19/2016        RESOLVED: Osteomyelitis of foot [facility]) ICD-10-CM: M86.9  ICD-9-CM: 730.27  10/22/2014 - 4/19/2016    Overview Signed 10/22/2014  1:29 PM by Yissel Cobos MD     IMPRESSION:   1. Osteomyelitis of the first and second distal phalanges. 2. Cellulitis and myositis. 3. Phlegmon deep to the dorsal skin breakdown superficial to the distal   second metatarsal. No drainable abscess.                RESOLVED: Type 2 diabetes mellitus with left diabetic foot ulcer ([facility] ICD-10-CM: E11.621, L97.529  ICD-9-CM: 250.80, 707.15 Present on Admission 10/21/2014 - 4/19/2016        RESOLVED: Sepsis ([facility] ICD-10-CM: A41.9  ICD-9-CM: 038.9, 995.91  10/21/2014 - 10/22/2014        RESOLVED: DKA (diabetic ketoacidoses) ([facility] ICD-10-CM: E13.10  ICD-9-CM: 250.10  10/20/2014 - 4/19/2016        RESOLVED: Pneumonia ICD-10-CM: J18.9  ICD-9-CM: 486  8/7/2013 - 10/21/2014        RESOLVED: Sepsis ICD-10-CM: A41.9  ICD-9-CM: 995.91  2/17/2013 - 2/26/2016        RESOLVED: Rhabdomyolysis ICD-10-CM: M62.82  ICD-9-CM: 728.88  2/17/2013 - 10/21/2014        RESOLVED: Pneumonia, organism unspecified(486) ICD-10-CM: J18.9  ICD-9-CM: 778  2/16/2013 - 10/21/2014              Greater than 39 minutes were spent with the patient on counseling and coordination of care    Signed:   Dayami Persaud MD  10/22/2017  2:34 PM

## 2017-10-22 NOTE — PROGRESS NOTES
Bedside and Verbal shift change report given to Ml Galeas RN (oncoming nurse) by Afsaneh Medina RN (offgoing nurse). Report included the following information SBAR, Kardex, ED Summary, Intake/Output and Recent Results.

## 2017-10-22 NOTE — DISCHARGE INSTRUCTIONS
Discharge Instructions       PATIENT ID: David Proctor  MRN: [id number]   YOB: 1975    DATE OF ADMISSION: 10/21/2017  8:52 PM    DATE OF DISCHARGE: 10/22/2017    PRIMARY CARE PROVIDER: Brenda Monroe MD     ATTENDING PHYSICIAN: Jh Morales MD  DISCHARGING PROVIDER: Jh Morales MD    To contact this individual call [phone] and ask the  to page. If unavailable ask to be transferred the Adult Hospitalist Department. DISCHARGE DIAGNOSES   SOB    CONSULTATIONS: IP CONSULT TO HOSPITALIST  IP CONSULT TO CARDIOLOGY    PROCEDURES/SURGERIES: * No surgery found *    PENDING TEST RESULTS:   At the time of discharge the following test results are still pending: none    FOLLOW UP APPOINTMENTS:   Follow-up Information     Follow up With Details Comments Contact Info    Brenda Monroe MD In 1 week  [address] [facility] [address] [facility]      Yvette Germain MD On 10/23/2017  [address]      Yvonne Patel MD In 1 week  [address] [address] [phone]             ADDITIONAL CARE RECOMMENDATIONS:   Follow up with PMD, cardiologist, Nephrologist  Please expect some tests to be done at their clinic    DIET: Renal Diet    ACTIVITY: Activity as tolerated      DISCHARGE MEDICATIONS:   See Medication Reconciliation Form    · It is important that you take the medication exactly as they are prescribed. · Keep your medication in the bottles provided by the pharmacist and keep a list of the medication names, dosages, and times to be taken in your wallet. · Do not take other medications without consulting your doctor. NOTIFY YOUR PHYSICIAN FOR ANY OF THE FOLLOWING:   Fever over 101 degrees for 24 hours. Chest pain, shortness of breath, fever, chills, nausea, vomiting, diarrhea, change in mentation, falling, weakness, bleeding. Severe pain or pain not relieved by medications.   Or, any other signs or symptoms that you may have questions about.       DISPOSITION:   x Home With:   OT  PT  HH  RN       SNF/Inpatient Rehab/LTAC    Independent/assisted living    Hospice    Other:     CDMP Checked:   Yes x     PROBLEM LIST Updated:  Yes x       Signed:   Tiki Aviles MD  10/22/2017  2:27 PM

## 2017-10-22 NOTE — PROGRESS NOTES
Problem: Falls - Risk of  Goal: *Absence of Falls  Document Tonia Fall Risk and appropriate interventions in the flowsheet.    Outcome: Progressing Towards Goal  Fall Risk Interventions:  Mobility Interventions: Patient to call before getting OOB         Medication Interventions: Patient to call before getting OOB

## 2017-10-22 NOTE — PROGRESS NOTES
Primary Nurse Elza Felix.  LEELEE Caceres and Sridhar Mccallum RN performed a dual skin assessment on this patient Impairment noted- see wound doc flow sheet  Jacob score is 20

## 2017-10-22 NOTE — PROGRESS NOTES
Hospitalist Progress Note  Frandy Olivares MD  Answering service: [phone] from in house phone  Cell: [phone]      Date of Service:  10/22/2017  NAME:  Blanca Gonzalez  :  1975  MRN:  [id number]      Admission Summary:   67-year-old -American male with multiple comorbidities including bilateral knee amputation, diabetes mellitus type II, essential hypertension, chronic kidney disease stage IV-V. Patient also has anemia of chronic disease, and has required multiple transfusions over the past year, the most recent being on 2017. He states that he has had G. I. workup for the anemia which was negative. He has been receiving epidural injections subcutaneously. Patient states that he can usually sense when his hemoglobin is dropping, as he starts having increasing dyspnea on exertion  Patient states that he was recently started on home oxygen at night    Interval history / Subjective:     F/u anemia/SOB     Assessment & Plan:     Severe acute on chronic anemia  -sec to CKD  -recently admitted under similar circumstances  -s/p blood transfusion 1 unit PRBC 10/21  -Repeat labs this am, improved    CKD stage IV/V  -sec to diabetic nephropathy  -outpatient follow up with Nephrology, Dr Matthew Burgos.  Already has an appointment this week    Secondary hyperparathyroidism  -Outpatient follow up with Dr Nestor Dominguez    DM type 2  -On SSI/humalog  -monitor    Hypoxia with acute on chronic hypoxic respiratory failure  -On 2-3 l of oxygen at home, currently reported 5l 87%  -history of diastolic heart failure, reviewed echo from 2016  -pro   -CXR 10/21 Cardiomegaly and pulmonary edema  -now claims that he is at his baseline, wean off oxygen as able, if able to decrease it to 2l will discharge the patient today    Chronic diastolic CHF  -Wanted to get an echo and be seen by Dr Clayton but the patient already has an appointment with him tomorrow. He claims that his girlfriend has already taken off to take him to the clinic    HTN  -now improved but still uncontrolled    PVD  -s/p bilateral BKA    Non compliance  -reviewed from past notes  -counseled. He claims that the main reason he has missed appointments is because of nathalie bailey. Diabetic peripheral neuropathy  -on Lyrica    Will start on Renal diet    Code status: FULL CODE  DVT prophylaxis: scd  PTA: home    Plan: Probable discharge today vs tomorrow    Care Plan discussed with: Patient/Family  Disposition: as above     Hospital Problems  Date Reviewed: 9/27/2017          Codes Class Noted POA    Hypoxia ICD-10-CM: R09.02  ICD-9-CM: 799.02  10/21/2017 Unknown                Review of Systems:   A comprehensive review of systems was negative except for that written in the HPI. Vital Signs:    Last 24hrs VS reviewed since prior progress note. Most recent are:  Visit Vitals    /88 (BP 1 Location: Right arm, BP Patient Position: At rest)    Pulse 74    Temp 98.1 °F (36.7 °C)    Resp 18    Wt 152.4 kg (335 lb 15.7 oz)    SpO2 (!) 87%    BMI 43.14 kg/m2         Intake/Output Summary (Last 24 hours) at 10/22/17 4780  Last data filed at 10/22/17 8690   Gross per 24 hour   Intake                0 ml   Output             1925 ml   Net            -1925 ml        Physical Examination:             Constitutional:  No acute distress, cooperative, pleasant    ENT:  Oral mucous moist, oropharynx benign. Neck supple,    Resp:  CTA bilaterally. No wheezing/rhonchi/rales. No accessory muscle use   CV:  Regular rhythm, normal rate, no murmurs, gallops, rubs    GI:  Soft, non distended, non tender. normoactive bowel sounds, no hepatosplenomegaly     Musculoskeletal:  No edema, warm, 2+ pulses throughout    Neurologic:  Moves all extremities.   AAOx3, CN II-XII reviewed     Skin:  Good turgor, no rashes or ulcers       Data Review:    Review and/or order of clinical lab test      Labs: Recent Labs      10/21/17   2124   WBC  10.7   HGB  6.8*   HCT  22.8*   PLT  190     Recent Labs      10/21/17   2124   NA  141   K  4.3   CL  108   CO2  24   BUN  65*   CREA  4.51*   GLU  250*   CA  8.0*     Recent Labs      10/21/17   2124   SGOT  12*   ALT  17   AP  100   TBILI  0.8   TP  7.1   ALB  3.6   GLOB  3.5     No results for input(s): INR, PTP, APTT in the last 72 hours. No lab exists for component: INREXT   No results for input(s): FE, TIBC, PSAT, FERR in the last 72 hours. Lab Results   Component Value Date/Time    Folate 4.7 11/10/2015 03:40 AM      No results for input(s): PH, PCO2, PO2 in the last 72 hours.   Recent Labs      10/21/17   2124   TROIQ  <0.04     Lab Results   Component Value Date/Time    Cholesterol, total 218 11/09/2016 04:48 AM    HDL Cholesterol 39 11/09/2016 04:48 AM    LDL, calculated 150.4 11/09/2016 04:48 AM    Triglyceride 143 11/09/2016 04:48 AM    CHOL/HDL Ratio 5.6 11/09/2016 04:48 AM     Lab Results   Component Value Date/Time    Glucose (POC) 176 10/22/2017 07:19 AM    Glucose (POC) 97 09/28/2017 05:08 PM    Glucose (POC) 98 09/28/2017 12:44 PM    Glucose (POC) 219 09/28/2017 07:17 AM    Glucose (POC) 279 09/27/2017 09:57 PM     Lab Results   Component Value Date/Time    Color YELLOW/STRAW 09/26/2017 06:16 PM    Appearance CLEAR 09/26/2017 06:16 PM    Specific gravity 1.015 09/26/2017 06:16 PM    pH (UA) 5.5 09/26/2017 06:16 PM    Protein >300 09/26/2017 06:16 PM    Glucose NEGATIVE  09/26/2017 06:16 PM    Ketone NEGATIVE  09/26/2017 06:16 PM    Bilirubin NEGATIVE  09/26/2017 06:16 PM    Urobilinogen 0.2 09/26/2017 06:16 PM    Nitrites NEGATIVE  09/26/2017 06:16 PM    Leukocyte Esterase NEGATIVE  09/26/2017 06:16 PM    Epithelial cells FEW 09/26/2017 06:16 PM    Bacteria 1+ 09/26/2017 06:16 PM    WBC 0-4 09/26/2017 06:16 PM    RBC 0-5 09/26/2017 06:16 PM         Medications Reviewed:     Current Facility-Administered Medications   Medication Dose Route Frequency  methocarbamol (ROBAXIN) tablet 750 mg  750 mg Oral TID PRN    sevelamer carbonate (RENVELA) tab 800 mg  800 mg Oral TID WITH MEALS    atorvastatin (LIPITOR) tablet 40 mg  40 mg Oral QHS    . PHARMACY TO SUBSTITUTE PER PROTOCOL    Per Protocol    cloNIDine HCl (CATAPRES) tablet 0.3 mg  0.3 mg Oral TID    insulin lispro protamine/insulin lispro (HUMALOG MIX 75/25) injection 30 Units  30 Units SubCUTAneous ACB    multivitamin, tx-iron-ca-min (THERA-M w/ IRON) tablet 1 Tab  1 Tab Oral DAILY    labetalol (NORMODYNE) tablet 300 mg  300 mg Oral Q8H    sodium bicarbonate tablet 650 mg  650 mg Oral BID    albuterol (PROVENTIL VENTOLIN) nebulizer solution 2.5 mg  2.5 mg Nebulization Q4H PRN    aspirin chewable tablet 81 mg  81 mg Oral DAILY    amitriptyline (ELAVIL) tablet 50 mg  50 mg Oral QHS    hydrALAZINE (APRESOLINE) tablet 100 mg  100 mg Oral Q8H    furosemide (LASIX) tablet 40 mg  40 mg Oral BID    oxyCODONE IR (ROXICODONE) tablet 15 mg  15 mg Oral Q8H PRN    amLODIPine (NORVASC) tablet 10 mg  10 mg Oral DAILY    insulin lispro protamine/insulin lispro (HUMALOG MIX 75/25) injection 10 Units  10 Units SubCUTAneous ACD    pantoprazole (PROTONIX) tablet 40 mg  40 mg Oral ACB    ondansetron (ZOFRAN) injection 4 mg  4 mg IntraVENous Q4H PRN    pregabalin (LYRICA) capsule 75 mg  75 mg Oral TID    0.9% sodium chloride infusion 250 mL  250 mL IntraVENous PRN    arformoterol (BROVANA) neb solution 15 mcg  15 mcg Nebulization BID RT    And    budesonide (PULMICORT) 500 mcg/2 ml nebulizer suspension  500 mcg Nebulization BID RT    sodium chloride (NS) flush 5-10 mL  5-10 mL IntraVENous Q8H    sodium chloride (NS) flush 5-10 mL  5-10 mL IntraVENous PRN     ______________________________________________________________________  EXPECTED LENGTH OF STAY: - - -  ACTUAL LENGTH OF STAY:          Paz Mishra MD

## 2017-10-22 NOTE — ED TRIAGE NOTES
Triage: Patient arrives from home with c/o feeling he has a low hemoglobin. He has been battling this for months and received several transfusions. Denies blood in stool,  And has been told its d/t his CKD. Recently placed on procrit but reports feeling lightheaded and exertional dyspnea. Sats 74% in triage, patient roomed and placed on 3L.  Dr. Shasta Soulier and primary RN aware

## 2017-10-22 NOTE — PROGRESS NOTES
Spoke with Dr. Cece Kelly about consult for cardiology. Dr. Cece Kelly stated he was on call at [facility] today and would not be coming to see the patient. Dr. Cece Kelly also stated his frustrations that the patient is not compliant with his treatments and will soon be terminating his care for this patient due to the patients noncompliance. Followed up with Dr. Gabriela Cabot regarding the conversation I had with Dr. Cece Kelly. Dr. Gabriela Cabot stated that as long as the patients 2D echo was read today that would be fine.

## 2017-10-23 NOTE — PROGRESS NOTES
1210 Discharge instructions reviewed in detail with pt. All opportunity for clarification and questions provided. Pt verbalized understanding of discharge instructions. 1350 IV removed by charge RN without complications. Pt transported to discharge area via wheelchair by volunteer. Pt stable and in no acute distress at time of discharge.

## 2017-10-23 NOTE — PROGRESS NOTES
Hospitalist Progress Note  Scott Knight MD  Answering service: [phone] OR [phone] from in house phone  Cell: [phone]      Date of Service:  10/23/2017  NAME:  Gregoria Arana  :  1975  MRN:  [id number]      Admission Summary:   67-year-old -American male with multiple comorbidities including bilateral knee amputation, diabetes mellitus type II, essential hypertension, chronic kidney disease stage IV-V. Patient also has anemia of chronic disease, and has required multiple transfusions over the past year, the most recent being on 2017. He states that he has had G. I. workup for the anemia which was negative. He has been receiving epidural injections subcutaneously. Patient states that he can usually sense when his hemoglobin is dropping, as he starts having increasing dyspnea on exertion  Patient states that he was recently started on home oxygen at night    Interval history / Subjective:     F/u anemia/SOB     Assessment & Plan:     Severe acute on chronic anemia  -sec to CKD  -recently admitted under similar circumstances  -s/p blood transfusion 1 unit PRBC 10/21  -Repeat labs this am, improved    CKD stage IV/V  -sec to diabetic nephropathy  -outpatient follow up with Nephrology, Dr Marta Conrad.  Already has an appointment this week    Secondary hyperparathyroidism  -Outpatient follow up with Dr Gifty Louise    DM type 2  -On SSI/humalog  -monitor    Hypoxia with acute on chronic hypoxic respiratory failure  -On 2-3 l of oxygen at home, currently reported 5l 87%  -history of diastolic heart failure, reviewed echo from 2016  -pro   -CXR 10/21 Cardiomegaly and pulmonary edema  -now claims that he is at his baseline, wean off oxygen as able, if able to decrease it to 2l will discharge the patient today    Chronic diastolic CHF  -Wanted to get an echo and be seen by Dr Celeste No but the patient already has an appointment with him tomorrow. He claims that his girlfriend has already taken off to take him to the clinic    HTN  -now improved but still uncontrolled    PVD  -s/p bilateral BKA    Non compliance  -reviewed from past notes  -counseled. He claims that the main reason he has missed appointments is because of nathalie bailey. Diabetic peripheral neuropathy  -on Lyrica    Will start on Renal diet    Code status: FULL CODE  DVT prophylaxis: scd  PTA: home    Plan: Probable discharge today vs tomorrow    Care Plan discussed with: Patient/Family  Disposition: as above     Hospital Problems  Date Reviewed: 10/22/2017          Codes Class Noted POA    * (Principal)Hypoxia ICD-10-CM: R09.02  ICD-9-CM: 799.02  10/21/2017 Yes                Review of Systems:   A comprehensive review of systems was negative except for that written in the HPI. Vital Signs:    Last 24hrs VS reviewed since prior progress note. Most recent are:  Visit Vitals    /81 (BP 1 Location: Left arm, BP Patient Position: At rest)    Pulse 68    Temp 98.4 °F (36.9 °C)    Resp 18    Wt 144 kg (317 lb 7.4 oz)    SpO2 94%    BMI 40.76 kg/m2         Intake/Output Summary (Last 24 hours) at 10/23/17 0858  Last data filed at 10/23/17 5070   Gross per 24 hour   Intake                0 ml   Output             1800 ml   Net            -1800 ml        Physical Examination:             Constitutional:  No acute distress, cooperative, pleasant    ENT:  Oral mucous moist, oropharynx benign. Neck supple,    Resp:  CTA bilaterally. No wheezing/rhonchi/rales. No accessory muscle use   CV:  Regular rhythm, normal rate, no murmurs, gallops, rubs    GI:  Soft, non distended, non tender. normoactive bowel sounds, no hepatosplenomegaly     Musculoskeletal:  No edema, warm, 2+ pulses throughout    Neurologic:  Moves all extremities.   AAOx3, CN II-XII reviewed     Skin:  Good turgor, no rashes or ulcers       Data Review:    Review and/or order of clinical lab test      Labs: Recent Labs      10/22/17   1023  10/21/17   2124   WBC  10.5  10.7   HGB  7.5*  6.8*   HCT  24.5*  22.8*   PLT  205  190     Recent Labs      10/22/17   1023  10/21/17   2124   NA  142  141   K  4.3  4.3   CL  109*  108   CO2  24  24   BUN  62*  65*   CREA  4.20*  4.51*   GLU  185*  250*   CA  8.4*  8.0*     Recent Labs      10/21/17   2124   SGOT  12*   ALT  17   AP  100   TBILI  0.8   TP  7.1   ALB  3.6   GLOB  3.5     No results for input(s): INR, PTP, APTT in the last 72 hours. No lab exists for component: INREXT, INREXT   No results for input(s): FE, TIBC, PSAT, FERR in the last 72 hours. Lab Results   Component Value Date/Time    Folate 4.7 11/10/2015 03:40 AM      No results for input(s): PH, PCO2, PO2 in the last 72 hours.   Recent Labs      10/21/17   2124   TROIQ  <0.04     Lab Results   Component Value Date/Time    Cholesterol, total 218 11/09/2016 04:48 AM    HDL Cholesterol 39 11/09/2016 04:48 AM    LDL, calculated 150.4 11/09/2016 04:48 AM    Triglyceride 143 11/09/2016 04:48 AM    CHOL/HDL Ratio 5.6 11/09/2016 04:48 AM     Lab Results   Component Value Date/Time    Glucose (POC) 133 10/23/2017 06:58 AM    Glucose (POC) 149 10/22/2017 04:18 PM    Glucose (POC) 175 10/22/2017 11:29 AM    Glucose (POC) 176 10/22/2017 07:19 AM    Glucose (POC) 97 09/28/2017 05:08 PM     Lab Results   Component Value Date/Time    Color YELLOW/STRAW 09/26/2017 06:16 PM    Appearance CLEAR 09/26/2017 06:16 PM    Specific gravity 1.015 09/26/2017 06:16 PM    pH (UA) 5.5 09/26/2017 06:16 PM    Protein >300 09/26/2017 06:16 PM    Glucose NEGATIVE  09/26/2017 06:16 PM    Ketone NEGATIVE  09/26/2017 06:16 PM    Bilirubin NEGATIVE  09/26/2017 06:16 PM    Urobilinogen 0.2 09/26/2017 06:16 PM    Nitrites NEGATIVE  09/26/2017 06:16 PM    Leukocyte Esterase NEGATIVE  09/26/2017 06:16 PM    Epithelial cells FEW 09/26/2017 06:16 PM    Bacteria 1+ 09/26/2017 06:16 PM    WBC 0-4 09/26/2017 06:16 PM    RBC 0-5 09/26/2017 06:16 PM Medications Reviewed:     Current Facility-Administered Medications   Medication Dose Route Frequency    methocarbamol (ROBAXIN) tablet 750 mg  750 mg Oral TID PRN    sevelamer carbonate (RENVELA) tab 800 mg  800 mg Oral TID WITH MEALS    atorvastatin (LIPITOR) tablet 40 mg  40 mg Oral QHS    . PHARMACY TO SUBSTITUTE PER PROTOCOL    Per Protocol    cloNIDine HCl (CATAPRES) tablet 0.3 mg  0.3 mg Oral TID    insulin lispro protamine/insulin lispro (HUMALOG MIX 75/25) injection 30 Units  30 Units SubCUTAneous ACB    multivitamin, tx-iron-ca-min (THERA-M w/ IRON) tablet 1 Tab  1 Tab Oral DAILY    labetalol (NORMODYNE) tablet 300 mg  300 mg Oral Q8H    sodium bicarbonate tablet 650 mg  650 mg Oral BID    albuterol (PROVENTIL VENTOLIN) nebulizer solution 2.5 mg  2.5 mg Nebulization Q4H PRN    aspirin chewable tablet 81 mg  81 mg Oral DAILY    amitriptyline (ELAVIL) tablet 50 mg  50 mg Oral QHS    hydrALAZINE (APRESOLINE) tablet 100 mg  100 mg Oral Q8H    furosemide (LASIX) tablet 40 mg  40 mg Oral BID    oxyCODONE IR (ROXICODONE) tablet 15 mg  15 mg Oral Q8H PRN    amLODIPine (NORVASC) tablet 10 mg  10 mg Oral DAILY    insulin lispro protamine/insulin lispro (HUMALOG MIX 75/25) injection 10 Units  10 Units SubCUTAneous ACD    pantoprazole (PROTONIX) tablet 40 mg  40 mg Oral ACB    ondansetron (ZOFRAN) injection 4 mg  4 mg IntraVENous Q4H PRN    pregabalin (LYRICA) capsule 75 mg  75 mg Oral TID    0.9% sodium chloride infusion 250 mL  250 mL IntraVENous PRN    arformoterol (BROVANA) neb solution 15 mcg  15 mcg Nebulization BID RT    And    budesonide (PULMICORT) 500 mcg/2 ml nebulizer suspension  500 mcg Nebulization BID RT    sodium chloride (NS) flush 5-10 mL  5-10 mL IntraVENous Q8H    sodium chloride (NS) flush 5-10 mL  5-10 mL IntraVENous PRN     ______________________________________________________________________  EXPECTED LENGTH OF STAY: - - -  ACTUAL LENGTH OF STAY: Lorena Masters MD

## 2017-10-23 NOTE — Clinical Note
He remains severely anemic. Because of poor adherence to a Procrit schedule or because of a separate problem. He has a fixed cardiac output due to diastolic heart failure and poor control of her plasma volume because of kidney disease.   I have asked him to call for 2 pound weight fluctuations

## 2017-10-23 NOTE — PROGRESS NOTES
Reviewed medical chart; met with the patient at the bedside. Patient is being seen for hypoxia. Patient states that he lives alone. He has two prosthetic legs as a result of bilateral below the knee amputations. Patient also uses a walker for ambulation. Patient has a PCP - Dr. Huey Cueto. and gets his prescriptions at [facility]. He has home oxygen tanks and a concentrator through [employer]. He has a nephrologist, Dr. Saul Jones and a cardiologist, Dr. Bibi Fragoso. Patient states that his girlfriend, Wilfrido, will drive him home. Care Management will continue to follow his disposition. HANG Serrano     Current Discharge Plan:  Patient will discharge home today with [facility] Follow Up Appointment on 11/1/17 with Dr. Roman Estevez. Handoff was sent via [email or web address] messaging to Trino Leigh and Conchita Blue, Nurse Navigators for that practice. Care Management Interventions  PCP Verified by CM:  Yes  Palliative Care Criteria Met (RRAT>21 & CHF Dx)?: No  Mode of Transport at Discharge:  (Patient will travel via car at discharge.  )  MyChart Signup: No  Discharge Durable Medical Equipment: No  Physical Therapy Consult: No  Occupational Therapy Consult: No  Speech Therapy Consult: No  Current Support Network: Lives Alone  Confirm Follow Up Transport:  (Patient will travel via car at discharge.  )  Plan discussed with Pt/Family/Caregiver: Yes  Freedom of Choice Offered: Yes  Discharge Location  Discharge Placement: Home

## 2017-10-23 NOTE — PROGRESS NOTES
Physical Therapy Screening:  Services are not indicated at this time. An InBasket screening referral was triggered for physical therapy based on results obtained during the nursing admission assessment. The patients chart was reviewed and the patient is not appropriate for a skilled therapy evaluation at this time. Please consult physical therapy if any therapy needs arise. Thank you.     Elmo Mcardle, PT

## 2017-10-23 NOTE — PATIENT INSTRUCTIONS
Your stable from a cardiac standpoint. A great deal of your shortness of breath and weakness is coming from the anemia. Dr. Gogo Mtz needs to manage this but if you do not respond to standard measures we can arrange a hematology consultation. Please report gain greater than 2 pounds in 1 day to this office, and please continue daily weights. You do not have heart failure in the traditional sense but you are capable of fluid accumulation.         Please always contact the office if you are unable to keep appointment

## 2017-10-23 NOTE — PROGRESS NOTES
Bedside shift change report given to [id number] (oncoming nurse) by Ovi Dwyer RN (offgoing nurse). Report included the following information SBAR, Kardex, Intake/Output, MAR, Accordion and Recent Results.

## 2017-10-23 NOTE — PROGRESS NOTES
[facility]   [address] [facility], [address] [place] [address]                                          NEW PATIENT HPI/FOLLOW-UP      NAME:  Elia Holt   :   1975   MRN:   [id number]   PCP:  Karen Doshi MD           Subjective: The patient is a 36y.o. year old male  h/o HTN, morbid obesity, T2DM, CKD stage 3, atrial flutter, diastolic congestive heart failure, NYHA Class 3, and anemia of chronic disease who returns for a post-hospitalizationfollow-up. Since the last visit, patient was admitted overnight at [facility] for severe acute on chronic anemia. Hgb was 6.8. He receive one unit of PRBCs. Pt states he doesn't feel that much better. He has been receiving procrit and iron infusions since his last visit with us. Still dyspneic on exertion, and fatigued. No chest pain. Denies chest pain, edema, medication intolerance, palpitations, PND/orthopnea, wheezing, sputum, syncope, dizziness or light headedness. Doing satisfactorily. Review of Systems  General: Pt denies excessive weight gain or loss. Pt is able to conduct ADL's. Respiratory: +shortness of breath, TAMEZ, Denies wheezing or stridor.   Cardiovascular: Denies precordial pain, palpitations, edema or PND  Gastrointestinal: Denies poor appetite, indigestion, abdominal pain or blood in stool  Peripheral vascular: Denies claudication, leg cramps  Neuropsychiatric: +fatigue Denies paresthesias,tingling,numbness,anxiety,depression,  Musculoskeletal: Denies pain,tenderness, soreness,swelling      Past Medical History:   Diagnosis Date    Arrhythmia     afib    Asthma     3/2013 last attack    CKD (chronic kidney disease), stage III 2013    Nephrology: Christopher Ames MD    Diabetes [facility])     Gout     Heart failure ([facility]     Hyperlipidemia     Hypertension     Ill-defined condition     Positive PPD, treated     treated 9 months     Patient Active Problem List    Diagnosis Date Noted    Hypoxia 10/21/2017    Acute pulmonary edema (HCC) 09/27/2017    Anemia 09/27/2017    Acute on chronic diastolic CHF (congestive heart failure) ([facility] 09/27/2017    Chronic renal failure, stage 4 (severe) ([facility] 07/04/2017    CHF (congestive heart failure) ([facility] 03/26/2017    Hypertensive urgency 03/26/2017    Long term current use of anticoagulant 12/19/2016    CKD (chronic kidney disease) stage 3, GFR 30-59 ml/min 12/18/2016    Accelerated hypertension with diastolic congestive heart failure, NYHA class 3 ([address] 11/29/2016    Pulmonary edema 11/07/2016    Heart failure ([address] 11/07/2016    Diabetic foot ulcer ([facility] 03/15/2016    Septic arthritis of foot ([facility] 03/05/2016    Osteomyelitis ([facility] 03/04/2016    Acute diastolic CHF (congestive heart failure) ([facility] 03/02/2016    Pneumonia 03/02/2016    Cellulitis and abscess of leg 03/02/2016    Atrial fibrillation ([address] 03/02/2016    HTN (hypertension) 03/02/2016    Diabetic foot ulcer with osteomyelitis ([address] 02/20/2016    Symptomatic anemia 01/09/2016    Elevated serum alkaline phosphatase level 11/08/2015    Hyponatremia 11/08/2015    Acute renal failure (ARF) ([address] 11/08/2015    Type 2 diabetes mellitus with right diabetic foot ulcer ([address] 11/08/2015    Iron deficiency anemia 11/08/2015    Diabetic foot ulcer ([address] 07/19/2015    Acute diastolic heart failure ([address] 06/25/2015    Atrial fibrillation and flutter ([address] 06/24/2015    Hx of BKA ([address] 10/23/2014    Streptococcal infection(041.00) 10/22/2014    Proteus infection 10/22/2014    SIRS with acute organ dysfunction due to infectious process ([address] 10/21/2014     Class: Present on Admission    Open wound of toe 10/21/2014     Class: Present on Admission    Aortic regurgitation 10/21/2014     Class: Chronic    Renal failure, acute ([address] 10/21/2014     Class: Present on Admission    Gram-positive cocci bacteremia 10/21/2014    Cellulitis and abscess of leg 10/21/2014    History of positive PPD 02/17/2013    Acute hypoxemic respiratory failure ([facility] 02/16/2013    Malignant hypertension 02/16/2013    Uncontrolled type II diabetes mellitus ([facility] 02/16/2013    HTN (hypertension) 01/19/2012    Morbid obesity with BMI of 40.0-44.9, adult ([facility] 01/19/2012     Class: Present on Admission      Past Surgical History:   Procedure Laterality Date    HX BELOW KNEE AMPUTATION Left     due to osteomyelitis    HX HEENT      tonsilectomy    HX HERNIA REPAIR      HX TONSILLECTOMY       Allergies   Allergen Reactions    Ace Inhibitors Cough      Family History   Problem Relation Age of Onset    Diabetes Mother     Hypertension Mother     Hypertension Brother     Diabetes Brother     Diabetes Maternal Grandmother     Hypertension Maternal Grandmother     Diabetes Other     Hypertension Other     Heart Disease Other       Social History     Social History    Marital status:      Spouse name: N/A    Number of children: N/A    Years of education: N/A     Occupational History    Not on file. Social History Main Topics    Smoking status: Never Smoker    Smokeless tobacco: Never Used    Alcohol use No    Drug use: No    Sexual activity: Not Currently     Partners: Male     Other Topics Concern    Not on file     Social History Narrative         Per conversation 03/26/17    He would like his new Medical  power of  to be his Brother Michael Atkins. (Previously was his Mother Jackie Patel)        Never smoker      Current Outpatient Prescriptions   Medication Sig    methocarbamol (ROBAXIN) 750 mg tablet Take 750 mg by mouth.  sevelamer carbonate (RENVELA) 800 mg tab tab TAKE ONE TABLET BY MOUTH THREE TIMES DAILY WITH MEALS    omeprazole (PRILOSEC) 40 mg capsule Take 1 Cap by mouth daily.  cloNIDine HCl (CATAPRES) 0.3 mg tablet Take 1 Tab by mouth three (3) times daily.  sodium bicarbonate 650 mg tablet Take 650 mg by mouth two (2) times a day.     albuterol (PROVENTIL HFA, VENTOLIN HFA, PROAIR HFA) 90 mcg/actuation inhaler Take 2 Puffs by inhalation daily.  ondansetron (ZOFRAN ODT) 8 mg disintegrating tablet Take 8 mg by mouth three (3) times daily.  atorvastatin (LIPITOR) 40 mg tablet Take 40 mg by mouth nightly.  pregabalin (LYRICA) 75 mg capsule Take 75 mg by mouth three (3) times daily.  calcifediol (RAYALDEE) 30 mcg Cs24 Take 30 mcg by mouth nightly.  Iron Fum & P-FA-Vit B & C No.9 (INTEGRA PLUS) 125 mg iron- 1 mg cap Take 1 Cap by mouth daily.  fluticasone-salmeterol (ADVAIR) 250-50 mcg/dose diskus inhaler Take 1 Puff by inhalation daily.  insulin lispro protamine/insulin lispro (HUMALOG MIX 75-25) 100 unit/mL (75-25) injection Take 30 units in the morning and 10 units at night.  labetalol (NORMODYNE) 300 mg tablet Take 300 mg by mouth every eight (8) hours.  hydrALAZINE (APRESOLINE) 100 mg tablet TAKE ONE TABLET BY MOUTH THREE TIMES DAILY    furosemide (LASIX) 40 mg tablet TAKE ONE TABLET BY MOUTH TWICE DAILY FOR leg swelling    albuterol (PROVENTIL VENTOLIN) 2.5 mg /3 mL (0.083 %) nebulizer solution 3 mL by Nebulization route every four (4) hours as needed for Wheezing.  oxyCODONE IR (OXY-IR) 15 mg immediate release tablet Take 15 mg by mouth every eight (8) hours as needed.  amLODIPine (NORVASC) 10 mg tablet Take 1 Tab by mouth daily.  aspirin 81 mg chewable tablet Take 81 mg by mouth daily.  amitriptyline (ELAVIL) 50 mg tablet TAKE ONE TABLET BY MOUTH EVERY EVENING     No current facility-administered medications for this visit. I have reviewed the MAs notes, vitals, problem list, allergy list, medical history, family medical, social history and medications. Objective:     Physical Exam:     Vitals:    10/23/17 1413   BP: 134/69   Pulse: 72   SpO2: 91%   Weight: 327 lb (148.3 kg)   Height: 6' 2\" (1.88 m)    Body mass index is 41.98 kg/(m^2). General: WDWN adult male, in no acute distress.  +fatigued appearing  HEENT: No carotid bruits, no JVD, trach is midline. Heart:  Normal S1/S2 negative S3 or S4. Regular, no murmur, gallop or rub.   Respiratory: Clear bilaterally, no wheezing or rales  Abdomen:   Soft, non-tender, bowel sounds are active.   Extremities:  No edema, normal cap refill, no cyanosis. Neuro: A&Ox3, speech clear, gait unassessed. Pt is wheelchair. Skin: +pallorl. No rashes or lesions. No diaphoresis. Vascular: 2+ pulses symmetric in all extremities        Data Review:       Cardiographics:    EKG: None today     ECHO 10/21/2017: SUMMARY:  Left ventricle: Systolic function was vigorous. Ejection fraction was  estimated in the range of 70 % to 75 %. There were no regional wall motion  abnormalities. Doppler parameters were consistent with restrictive  physiology, indicative of decreased left ventricular diastolic compliance  and/or increased left atrial pressure. Left atrium: The atrium was mildly dilated. Pericardium: A trivial pericardial effusion was identified. There was no  evidence of hemodynamic compromise.     Cardiology Labs:    Results for orders placed or performed during the hospital encounter of 09/26/17   EKG, 12 LEAD, INITIAL   Result Value Ref Range    Ventricular Rate 75 BPM    Atrial Rate 75 BPM    P-R Interval 158 ms    QRS Duration 92 ms    Q-T Interval 390 ms    QTC Calculation (Bezet) 435 ms    Calculated P Axis 51 degrees    Calculated R Axis 65 degrees    Calculated T Axis 90 degrees    Diagnosis       Normal sinus rhythm  RSR' or QR pattern in V1 suggests right ventricular conduction delay  Nonspecific T wave abnormality      Confirmed by Richard Yeh ([id number]) on 9/27/2017 10:48:11 PM         Lab Results   Component Value Date/Time    Cholesterol, total 218 11/09/2016 04:48 AM    HDL Cholesterol 39 11/09/2016 04:48 AM    LDL, calculated 150.4 11/09/2016 04:48 AM    Triglyceride 143 11/09/2016 04:48 AM    CHOL/HDL Ratio 5.6 11/09/2016 04:48 AM       Lab Results   Component Value Date/Time    Sodium 141 10/23/2017 08:38 AM    Potassium 4.2 10/23/2017 08:38 AM    Chloride 107 10/23/2017 08:38 AM    CO2 24 10/23/2017 08:38 AM    Anion gap 10 10/23/2017 08:38 AM    Glucose 180 10/23/2017 08:38 AM    BUN 59 10/23/2017 08:38 AM    Creatinine 3.74 10/23/2017 08:38 AM    BUN/Creatinine ratio 16 10/23/2017 08:38 AM    GFR est AA 22 10/23/2017 08:38 AM    GFR est non-AA 18 10/23/2017 08:38 AM    Calcium 8.4 10/23/2017 08:38 AM    Bilirubin, total 0.8 10/21/2017 09:24 PM    AST (SGOT) 12 10/21/2017 09:24 PM    Alk. phosphatase 100 10/21/2017 09:24 PM    Protein, total 7.1 10/21/2017 09:24 PM    Albumin 3.6 10/21/2017 09:24 PM    Globulin 3.5 10/21/2017 09:24 PM    A-G Ratio 1.0 10/21/2017 09:24 PM    ALT (SGPT) 17 10/21/2017 09:24 PM          Assessment:       ICD-10-CM ICD-9-CM    1. Essential hypertension I10 401.9    2. Acute on chronic diastolic CHF (congestive heart failure) (HCC) I50.33 428.33      428.0    3. Chronic renal failure, stage 4 (severe) (HCC) N18.4 585.4    4. Anemia in chronic kidney disease, unspecified CKD stage N18.9 285.21     D63.1           Discussion: Patient presents at this time stable from a cardiac perspective. SOB/TAMEZ likely related to anemia. ECHO shows HF well compensated. Recent GI w/u negative for bleed contributing to anemia. Consider: anemia of chronic disease v bone marrow disorder v vitamin deficiency. Plan:   Seen with Dr. Gloria Chacko     1. Continue same meds. Lipid profile and labs followed by PCP    2. Encouraged to exercise to tolerance, lose weight, stop smoking and follow low fat, low cholesterol, low sodium predominantly Plant-based (consider Mediterranean) diet. Call with questions or concerns. Will follow up any test results by phone and/or f/u here in office if needed. Maria Lutz 3.Follow up: 1 month    I have discussed the diagnosis with the patient and the intended plan as seen in the above orders.   The patient has received an after-visit summary and questions were answered concerning future plans. I have discussed any concerning medication side effects and warnings with the patient as well.     Anyi Gotti PA-C  10/23/2017

## 2017-10-23 NOTE — MR AVS SNAPSHOT
Visit Information Date & Time Provider Department Dept. Phone Encounter #  
 10/23/2017  1:20 PM MD Vannesa [facility] - PSYCHIATRIC SUPPORT [facility] [phone] Your Appointments 11/1/2017  2:30 PM  
ACUTE CARE with Wing Marsha MD  
[facility] [address] [facility] ([address]) Appt Note: hosp f/u/ Hypoxia  
 [address] [facility] [facility] [address]  
[phone]  
  
   
 [address] [facility],[address] [facility] [address] [phone]  
  
    
 1/24/2018  2:20 PM  
ESTABLISHED PATIENT with MD Vannesa Webb [facility]) Appt Note: $50CP 10/23/17ksr -  3 month follow up [address] [place] [address]  
[phone] [address] Upcoming Health Maintenance Date Due MICROALBUMIN Q1 8/28/2016 Pneumococcal 19-64 Highest Risk (2 of 3 - PCV13) 10/14/2016 EYE EXAM RETINAL OR DILATED Q1 4/13/2017 LIPID PANEL Q1 11/9/2017 DTaP/Tdap/Td series (1 - Tdap) 8/23/2018* FOOT EXAM Q1 12/6/2017 HEMOGLOBIN A1C Q6M 3/27/2018 *Topic was postponed. The date shown is not the original due date. Allergies as of 10/23/2017  Review Complete On: 10/23/2017 By: Yue Snider PA-C Severity Noted Reaction Type Reactions Ace Inhibitors  03/02/2016    Cough Current Immunizations  Reviewed on 10/11/2017 Name Date Influenza Vaccine 8/31/2017, 11/6/2014  4:23 PM  
 Influenza Vaccine (Quad) PF 12/19/2016 11:33 AM  
 Influenza Vaccine Intradermal PF 10/14/2015 Influenza Vaccine PF 2/17/2013  1:11 PM  
 Pneumococcal Polysaccharide (PPSV-23) 10/14/2015,  Deferred (Patient Refused) Not reviewed this visit You Were Diagnosed With   
  
 Codes Comments Essential hypertension    -  Primary ICD-10-CM: I10 
ICD-9-CM: 401.9 Acute on chronic diastolic CHF (congestive heart failure) ([facility])     ICD-10-CM: I50.33 ICD-9-CM: 428.33, 428.0 Chronic renal failure, stage 4 (severe) ([facility])     ICD-10-CM: N18.4 ICD-9-CM: 041. 4 Anemia in chronic kidney disease, unspecified CKD stage     ICD-10-CM: N18.9, D63.1 ICD-9-CM: 285.21 Vitals BP Pulse Height(growth percentile) Weight(growth percentile) SpO2 BMI  
 134/69 72 6' 2\" (1.88 m) 327 lb (148.3 kg) 91% 41.98 kg/m2 Smoking Status Never Smoker Vitals History BMI and BSA Data Body Mass Index Body Surface Area 41.98 kg/m 2 2.78 m 2 Preferred Pharmacy Pharmacy Name [id number] [facility],[address] [phone] Your Updated Medication List  
  
   
This list is accurate as of: 10/23/17  3:19 PM.  Always use your most recent med list.  
  
  
  
  
 * albuterol 90 mcg/actuation inhaler Commonly known as:  PROVENTIL HFA, VENTOLIN HFA, PROAIR HFA Take 2 Puffs by inhalation daily. * albuterol 2.5 mg /3 mL (0.083 %) nebulizer solution Commonly known as:  PROVENTIL VENTOLIN  
3 mL by Nebulization route every four (4) hours as needed for Wheezing. amitriptyline 50 mg tablet Commonly known as:  ELAVIL TAKE ONE TABLET BY MOUTH EVERY EVENING  
  
 amLODIPine 10 mg tablet Commonly known as:  Barton Fanti Take 1 Tab by mouth daily. aspirin 81 mg chewable tablet Take 81 mg by mouth daily. cloNIDine HCl 0.3 mg tablet Commonly known as:  CATAPRES Take 1 Tab by mouth three (3) times daily. fluticasone-salmeterol 250-50 mcg/dose diskus inhaler Commonly known as:  ADVAIR Take 1 Puff by inhalation daily. furosemide 40 mg tablet Commonly known as:  LASIX TAKE ONE TABLET BY MOUTH TWICE DAILY FOR leg swelling HumaLOG Mix 75-25 100 unit/mL (75-25) injection Generic drug:  insulin lispro protamine/insulin lispro Take 30 units in the morning and 10 units at night.  
  
 hydrALAZINE 100 mg tablet Commonly known as:  APRESOLINE  
TAKE ONE TABLET BY MOUTH THREE TIMES DAILY INTEGRA PLUS 125 mg iron- 1 mg Cap Generic drug:  Iron Fum & P-FA-Vit B & C No.9 Take 1 Cap by mouth daily. labetalol 300 mg tablet Commonly known as:  Niles Brownington Take 300 mg by mouth every eight (8) hours. LIPITOR 40 mg tablet Generic drug:  atorvastatin Take 40 mg by mouth nightly. LYRICA 75 mg capsule Generic drug:  pregabalin Take 75 mg by mouth three (3) times daily. methocarbamol 750 mg tablet Commonly known as:  ROBAXIN Take 750 mg by mouth. omeprazole 40 mg capsule Commonly known as:  PRILOSEC Take 1 Cap by mouth daily. oxyCODONE IR 15 mg immediate release tablet Commonly known as:  OXY-IR Take 15 mg by mouth every eight (8) hours as needed. RAYALDEE 30 mcg Cs24 Generic drug:  calcifediol Take 30 mcg by mouth nightly. sevelamer carbonate 800 mg Tab tab Commonly known as:  Manjeet Wright TAKE ONE TABLET BY MOUTH THREE TIMES DAILY WITH MEALS  
  
 sodium bicarbonate 650 mg tablet Take 650 mg by mouth two (2) times a day. ZOFRAN ODT 8 mg disintegrating tablet Generic drug:  ondansetron Take 8 mg by mouth three (3) times daily. * Notice: This list has 2 medication(s) that are the same as other medications prescribed for you. Read the directions carefully, and ask your doctor or other care provider to review them with you. Patient Instructions Your stable from a cardiac standpoint. A great deal of your shortness of breath and weakness is coming from the anemia. Dr. Yvan Guevara needs to manage this but if you do not respond to standard measures we can arrange a hematology consultation. Please report gain greater than 2 pounds in 1 day to this office, and please continue daily weights.   You do not have heart failure in the traditional sense but you are capable of fluid accumulation. Please always contact the office if you are unable to keep appointment Introducing [address]! [facility] introduces [employer] patient portal. Now you can access parts of your medical record, email your doctor's office, and request medication refills online. 1. In your internet browser, go to [email or web address] 2. Click on the First Time User? Click Here link in the Sign In box. You will see the New Member Sign Up page. 3. Enter your [employer] Access Code exactly as it appears below. You will not need to use this code after youve completed the sign-up process. If you do not sign up before the expiration date, you must request a new code. · [employer] Access Code: [email or web address] Expires: 1/2/2018 12:45 PM 
 
4. Enter the last four digits of your Social Security Number (xxxx) and Date of Birth (mm/dd/yyyy) as indicated and click Submit. You will be taken to the next sign-up page. 5. Create a [employer] ID. This will be your [employer] login ID and cannot be changed, so think of one that is secure and easy to remember. 6. Create a [employer] password. You can change your password at any time. 7. Enter your Password Reset Question and Answer. This can be used at a later time if you forget your password. 8. Enter your e-mail address. You will receive e-mail notification when new information is available in [address] 9. Click Sign Up. You can now view and download portions of your medical record. 10. Click the Download Summary menu link to download a portable copy of your medical information. If you have questions, please visit the Frequently Asked Questions section of the [employer] website. Remember, [employer] is NOT to be used for urgent needs. For medical emergencies, dial 911. Now available from your iPhone and Android! Please provide this summary of care documentation to your next provider. Your primary care clinician is listed as Shubham Garcia. If you have any questions after today's visit, please call [phone].

## 2017-10-29 NOTE — CONSULTS
Cardiology consultation:    I was asked by Dr. Fernanda Segovia to assess this 36year old male who came to the ED stating \"I'm short of breath, my anemia is acting up again\". Mr. Paxton Alvarez is well known in [facility] and in [facility] because of recurrent admissions for dyspnea and heart failure. He is an insulin dependent diabetic with CKD IV. He has a fixed cardiac output due to concentric type hypertrophic cardiomyopathy with 75% LVEF and severe diastolic dysfunction. 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 He has nephrogenic anemia, and although he is followed by nephrology, he has lately not been out of the hospital enough to keep many ambulatory appointments. He depends on patient transport services most of the time, and he misses numerous appointments because of gaps in that system as well. He is on Procrit with his nephrologist Dr. Tana Adam, with the next dose expected November 2. On exam, his BP is 171/77. Pulse is 84 and regular. Lungs actually sound clear and don't match the chest X ray findings. Radial pulses are intact. I did not examine his stumps. 12 lead EKG is normal.     Labs; Results for Pati Ching (MRN [id number]) as of 10/29/2017 19:36   Ref.  Range 10/29/2017 16:54   Sodium Latest Ref Range: 136 - 145 mmol/L 140   Potassium Latest Ref Range: 3.5 - 5.1 mmol/L 4.1   Chloride Latest Ref Range: 97 - 108 mmol/L 105   CO2 Latest Ref Range: 21 - 32 mmol/L 24   Anion gap Latest Ref Range: 5 - 15 mmol/L 11   Glucose Latest Ref Range: 65 - 100 mg/dL 183 (H)   BUN Latest Ref Range: 6 - 20 MG/DL 52 (H)   Creatinine Latest Ref Range: 0.70 - 1.30 MG/DL 3.76 (H)   BUN/Creatinine ratio Latest Ref Range: 12 - 20   14   Calcium Latest Ref Range: 8.5 - 10.1 MG/DL 8.6   GFR est non-AA Latest Ref Range: >60 ml/min/1.73m2 18 (L)   GFR est AA Latest Ref Range: >60 ml/min/1.73m2 21 (L)   Bilirubin, total Latest Ref Range: 0.2 - 1.0 MG/DL 0.5   Protein, total Latest Ref Range: 6.4 - 8.2 g/dL 7.5   Albumin Latest Ref Range: 3.5 - 5.0 g/dL 3.7   Globulin Latest Ref Range: 2.0 - 4.0 g/dL 3.8   A-G Ratio Latest Ref Range: 1.1 - 2.2   1.0 (L)   ALT (SGPT) Latest Ref Range: 12 - 78 U/L 17   AST Latest Ref Range: 15 - 37 U/L 14 (L)   Alk. phosphatase Latest Ref Range: 45 - 117 U/L 99   CK Latest Ref Range: 39 - 308 U/L 396 (H)   CK - MB Latest Ref Range: <3.6 NG/ML 2.0   CK-MB Index Latest Ref Range: 0 - 2.5   0.5   Troponin-I, Qt. Latest Ref Range: <0.05 ng/mL <0.04   NT pro-BNP Latest Ref Range: 0 - 125 PG/ (H)           Chest X ray this evening shows cardiac enlargement as usual but he also appears to be in pulmonary edema.             Impression:  Severe renal dysfunction     Nephrogenic anemia       Insulin dependent diabetes     Hypertrophic cardiomyopathy with fixed CO due to diastolic dysfunction     Fluid overload, no change in baseline anemia    Suggest:  We may be able to avoid admission     Try IV Lasix to diurese     If he voids adequately and labs do not change he can probably go home to be followed up by his nephrologist ASAP    Thank you for this consultation    Liliam Ames MD [facility]

## 2017-10-29 NOTE — ED NOTES
Bedside and Verbal shift change report given to Renée (oncoming nurse) by SN Suzi (offgoing nurse). Report included the following information SBAR and ED Summary.

## 2017-10-29 NOTE — ED NOTES
Emergency Department Nursing Plan of Care       The Nursing Plan of Care is developed from the Nursing assessment and Emergency Department Attending provider initial evaluation. The plan of care may be reviewed in the ED Provider note.     The Plan of Care was developed with the following considerations:   Patient / Family readiness to learn indicated by:verbalized understanding and appropriate questions asked  Persons(s) to be included in education: patient  Barriers to Learning/Limitations:No    Signed     Hayes Guzman    10/29/2017   5:02 PM

## 2017-10-29 NOTE — ED NOTES
Bedside and Verbal shift change report given to Jordon Gallo RN (oncoming nurse) by Shweta Beth RN (offgoing nurse). Report included the following information SBAR, ED Summary, MAR and Recent Results.

## 2017-10-29 NOTE — ED PROVIDER NOTES
[address] [place] [address]  EMERGENCY DEPARTMENT HISTORY AND PHYSICAL EXAM         Date of Service: 10/29/2017   Patient Name: Kobe Feliciano   YOB: 1975  Medical Record Number: [id number]    History of Presenting Illness     Chief Complaint   Patient presents with    Shortness of Breath     hx of anemia with transfusions        History Provided By:  patient    Additional History:   Kobe Feliciano is a 36 y.o. male with PMhx significant for anemia, HTN, DM, CKD, HLD, asthma, arrhythmia/A-fib, and heart failure, who presents via EMS to the ED with cc of SOB. Pt's SpO2 is 88-89% on RA, and he typically uses O2 just for his chronic intractable anemia from CKD. He reports he feels symptomatic of anemia, and he saw his cardiologist, Dr. Doris Jackman, who told him his symptoms were due to anemia; he thinks he may need another unit of blood. Per pt, he had 1 unit of blood 1 week ago at [address] [place] [address], an iron transfusion 2 weeks ago, and is scheduled for a Procrit shot in a couple of days. Pt had a GI workup showing no source of bleeding. Pt has not seen his nephrologist in \"a long time. \" He has chronic pain to his stumps but denies new pain. He also denies fever and chills. Social Hx: - Tobacco, - EtOH, - Illicit Drugs    There are no other complaints, changes or physical findings at this time.     Primary Care Provider: Shannen Zhang MD   Neprhology: Cristi Gloria MD  Cardiology: Kenyatta Weber MD    Past History     Past Medical History:   Past Medical History:   Diagnosis Date    Arrhythmia     afib    Asthma     3/2013 last attack    CKD (chronic kidney disease), stage III 2/12/2013    Nephrology: Cristi Gloria MD    Diabetes [facility])     Gout     Heart failure ([facility] [address]     Hyperlipidemia     Hypertension     Ill-defined condition     Positive PPD, treated 2001    treated 9 months        Past Surgical History:   Past Surgical History:   Procedure Laterality Date    HX BELOW KNEE AMPUTATION Left     due to osteomyelitis    HX HEENT      tonsilectomy    HX HERNIA REPAIR      HX OTHER SURGICAL      below the knee right amputation    HX TONSILLECTOMY          Family History:   Family History   Problem Relation Age of Onset    Diabetes Mother     Hypertension Mother     Hypertension Brother     Diabetes Brother     Diabetes Maternal Grandmother     Hypertension Maternal Grandmother     Diabetes Other     Hypertension Other     Heart Disease Other         Social History:   Social History   Substance Use Topics    Smoking status: Never Smoker    Smokeless tobacco: Never Used    Alcohol use No        Allergies: Allergies   Allergen Reactions    Ace Inhibitors Cough        Review of Systems   Review of Systems   Constitutional: Negative for chills and fever. HENT: Negative for congestion, rhinorrhea, sneezing and sore throat. Eyes: Negative for redness and visual disturbance. Respiratory: Positive for shortness of breath. Cardiovascular: Negative for chest pain and leg swelling. Gastrointestinal: Negative for abdominal pain, nausea and vomiting. Genitourinary: Negative for difficulty urinating and frequency. Musculoskeletal: Negative for back pain, myalgias and neck stiffness. Skin: Negative for rash. Neurological: Negative for dizziness, syncope, weakness and headaches. Hematological: Negative for adenopathy. Physical Exam  Physical Exam   Constitutional: He is oriented to person, place, and time. He appears well-developed and well-nourished. HENT:   Head: Normocephalic and atraumatic. Mouth/Throat: Oropharynx is clear and moist and mucous membranes are normal.   Eyes: EOM are normal.   Neck: Normal range of motion and full passive range of motion without pain. Neck supple. Cardiovascular: Normal rate, regular rhythm, normal heart sounds, intact distal pulses and normal pulses. No murmur heard.   Pulmonary/Chest: Effort normal and breath sounds normal. No respiratory distress. He exhibits no tenderness. Abdominal: Soft. Normal appearance and bowel sounds are normal. There is no tenderness. There is no rebound and no guarding. Obese abdomen   Neurological: He is alert and oriented to person, place, and time. He has normal strength. Skin: Skin is warm, dry and intact. No rash noted. No erythema. There is pallor. With pale nail beds and mucosa   Psychiatric: He has a normal mood and affect. His speech is normal and behavior is normal. Judgment and thought content normal.   Nursing note and vitals reviewed. Medical Decision Making   I am the first provider for this patient. I reviewed the vital signs, available nursing notes, past medical history, past surgical history, family history and social history. Provider Notes:   DDx: CHF, anemia, worsening renal failure. ED Course:  4:30 PM   Initial assessment performed. The patients presenting problems have been discussed, and they are in agreement with the care plan formulated and outlined with them. I have encouraged them to ask questions as they arise throughout their visit. Progress Notes:    CONSULT NOTE:  6:20 PM  Jordi Avitia MD spoke with Dr. Senthil Jacinto,  Specialty: Cardiology  Discussed patient's hx, disposition, and available diagnostic and imaging results. Reviewed care plans. Consultant agrees to evaluate pt. SIGN OUT:  7:20 PM  Patient's presentation, labs/imaging and plan of care was reviewed with Bartolome Fernandez MD as part of sign out. They will complete pt care as part of the plan discussed with the patient. Bartolome Fernandez MD's assistance in completion of this plan is greatly appreciated but it should be noted that I will be the provider of record for this patient.     Jordi Avitia MD    Attestation Note:  This note is prepared by Marilee Dixon, acting as Scribe for Jordi Avitia MD.    Jordi Avitia MD: The scribe's documentation has been prepared under my direction and personally reviewed by me in its entirety. I confirm that the note above accurately reflects all work, treatment, procedures, and medical decision making performed by me. CONSULT NOTE:  8:27 PM  Kyrie Mcgee MD spoke with Dr. Azam Constantino,  Specialty: Cardiology  Discussed patient's hx, disposition, and available diagnostic and imaging results. Reviewed care plans. Consultant agrees with plans as outlined. Recommends and close follow up with Dr. Neli Gonzalez, patient's nephrologist.     PROGRESS NOTE:  9:20 PM  Repeat creatinine was unchanged. Pt to be discharged as per Dr. Melton. Written by Alexandra Evans, ED Scribe as dictated by Javy Klein MD    Diagnostic Study Results   Labs -      Recent Results (from the past 12 hour(s))   EKG, 12 LEAD, INITIAL    Collection Time: 10/29/17  4:49 PM   Result Value Ref Range    Ventricular Rate 82 BPM    Atrial Rate 82 BPM    P-R Interval 174 ms    QRS Duration 90 ms    Q-T Interval 392 ms    QTC Calculation (Bezet) 457 ms    Calculated P Axis 52 degrees    Calculated R Axis 64 degrees    Calculated T Axis 74 degrees    Diagnosis       Normal sinus rhythm  left atrial conduction abnormality  baseline artifact in V3  otherwise normal EKG  Confirmed by Azam Constantino MD, Veronica Brandi ([id number]) on 10/29/2017 8:58:08 PM     CBC WITH AUTOMATED DIFF    Collection Time: 10/29/17  4:54 PM   Result Value Ref Range    WBC 10.4 4.1 - 11.1 K/uL    RBC 3.12 (L) 4.10 - 5.70 M/uL    HGB 8.2 (L) 12.1 - 17.0 g/dL    HCT 27.4 (L) 36.6 - 50.3 %    MCV 87.8 80.0 - 99.0 FL    MCH 26.3 26.0 - 34.0 PG    MCHC 29.9 (L) 30.0 - 36.5 g/dL    RDW 17.8 (H) 11.5 - 14.5 %    PLATELET 017 051 - 429 K/uL    NEUTROPHILS 83 (H) 32 - 75 %    LYMPHOCYTES 8 (L) 12 - 49 %    MONOCYTES 6 5 - 13 %    EOSINOPHILS 3 0 - 7 %    BASOPHILS 0 0 - 1 %    ABS. NEUTROPHILS 8.7 (H) 1.8 - 8.0 K/UL    ABS. LYMPHOCYTES 0.8 0.8 - 3.5 K/UL    ABS. MONOCYTES 0.6 0.0 - 1.0 K/UL    ABS. EOSINOPHILS 0.3 0.0 - 0.4 K/UL    ABS. BASOPHILS 0.0 0.0 - 0.1 K/UL   METABOLIC PANEL, COMPREHENSIVE    Collection Time: 10/29/17  4:54 PM   Result Value Ref Range    Sodium 140 136 - 145 mmol/L    Potassium 4.1 3.5 - 5.1 mmol/L    Chloride 105 97 - 108 mmol/L    CO2 24 21 - 32 mmol/L    Anion gap 11 5 - 15 mmol/L    Glucose 183 (H) 65 - 100 mg/dL    BUN 52 (H) 6 - 20 MG/DL    Creatinine 3.76 (H) 0.70 - 1.30 MG/DL    BUN/Creatinine ratio 14 12 - 20      GFR est AA 21 (L) >60 ml/min/1.73m2    GFR est non-AA 18 (L) >60 ml/min/1.73m2    Calcium 8.6 8.5 - 10.1 MG/DL    Bilirubin, total 0.5 0.2 - 1.0 MG/DL    ALT (SGPT) 17 12 - 78 U/L    AST (SGOT) 14 (L) 15 - 37 U/L    Alk.  phosphatase 99 45 - 117 U/L    Protein, total 7.5 6.4 - 8.2 g/dL    Albumin 3.7 3.5 - 5.0 g/dL    Globulin 3.8 2.0 - 4.0 g/dL    A-G Ratio 1.0 (L) 1.1 - 2.2     CK W/ CKMB & INDEX    Collection Time: 10/29/17  4:54 PM   Result Value Ref Range     (H) 39 - 308 U/L    CK - MB 2.0 <3.6 NG/ML    CK-MB Index 0.5 0 - 2.5     TROPONIN I    Collection Time: 10/29/17  4:54 PM   Result Value Ref Range    Troponin-I, Qt. <0.04 <0.05 ng/mL   NT-PRO BNP    Collection Time: 10/29/17  4:54 PM   Result Value Ref Range    NT pro- (H) 0 - 125 PG/ML   TYPE & SCREEN    Collection Time: 10/29/17  4:54 PM   Result Value Ref Range    Crossmatch Expiration 11/01/2017     ABO/Rh(D) [facility] NEGATIVE     Antibody screen NEG    POC CHEM8    Collection Time: 10/29/17  9:14 PM   Result Value Ref Range    Calcium, ionized (POC) 1.14 1.12 - 1.32 MMOL/L    Sodium (POC) 143 136 - 145 MMOL/L    Potassium (POC) 4.3 3.5 - 5.1 MMOL/L    Chloride (POC) 108 (H) 98 - 107 MMOL/L    CO2 (POC) 24 21 - 32 MMOL/L    Anion gap (POC) 16 (H) 5 - 15 mmol/L    Glucose (POC) 176 (H) 65 - 100 MG/DL    BUN (POC) 56 (H) 9 - 20 MG/DL    Creatinine (POC) 3.7 (H) 0.6 - 1.3 MG/DL    GFRAA, POC 22 (L) >60 ml/min/1.73m2    GFRNA, POC 18 (L) >60 ml/min/1.73m2    Hemoglobin (POC) 9.9 (L) 12.1 - 17.0 GM/DL    Hematocrit (POC) 29 (L) 36.6 - 50.3 %    Comment Comment Not Indicated. Radiologic Studies -  The following have been ordered and reviewed:  CXR Results  (Last 48 hours)               10/29/17 1721  XR CHEST SNGL V Final result    Impression:  IMPRESSION: No acute findings. Narrative:  EXAM:  XR CHEST SNGL V       INDICATION:  chest pain       COMPARISON: 10/21/2017. FINDINGS: A single frontal view of the chest demonstrates underexposure due to   the patient's large size. There is no consolidation nor evidence for   pneumothorax or pleural effusion. Mild cardiac contour enlargement is again   shown. No mediastinal or hilar enlargement is evident. Bony mineralization is   normal.                Vital Signs-Reviewed the patient's vital signs. Patient Vitals for the past 12 hrs:   Temp Pulse Resp BP SpO2   10/29/17 1954 - 85 - 179/76 -   10/29/17 1629 98.5 °F (36.9 °C) 84 16 171/77 90 %       Medications Given in the ED:  Medications   sodium chloride (NS) flush 5-10 mL (not administered)   sodium chloride (NS) flush 5-10 mL (not administered)   furosemide (LASIX) injection 60 mg (60 mg IntraVENous Given 10/29/17 1954)       Diagnosis:  Clinical Impression:   1. Anemia in chronic kidney disease, unspecified CKD stage    2. Acute on chronic diastolic CHF (congestive heart failure) ([facility] [address]         Plan:  1:   Follow-up Information     Follow up With Details Comments Contact Dee Dubon MD Schedule an appointment as soon as possible for a visit in 2 days  [address] [phone]            2:   Current Discharge Medication List        Return to ED if worse. Disposition:  DISCHARGE NOTE  9:20 PM  The patient has been re-evaluated and is ready for discharge. Reviewed available results with patient. Counseled patient on diagnosis and care plan. Patient has expressed understanding, and all questions have been answered.  Patient agrees with plan and agrees to follow up as recommended, or return to the ED if their symptoms worsen. Discharge instructions have been provided and explained to the patient, along with reasons to return to the ED.  _______________________________   Attestations: This note is prepared by Mariely Butt, acting as Scribe for Kalani Wiley MD.      The scribe's documentation has been prepared under my direction and personally reviewed by me in its entirety. I confirm that the note above accurately reflects all work, treatment, procedures, and medical decision making performed by me.   Kalani Wiley MD  _______________________________

## 2017-10-30 NOTE — DISCHARGE INSTRUCTIONS
Anemia From Chronic Disease: Care Instructions  Your Care Instructions    Anemia is a low level of red blood cells. Red blood cells carry oxygen from your lungs to the rest of your body. Sometimes when you have a long-term (chronic) disease, such as kidney disease, arthritis, diabetes, cancer, or an infection, your body does not make enough red blood cells. Follow-up care is a key part of your treatment and safety. Be sure to make and go to all appointments, and call your doctor if you are having problems. It's also a good idea to know your test results and keep a list of the medicines you take. How can you care for yourself at home? · Follow your doctor's instructions to treat the chronic condition that is causing the anemia. · Be safe with medicines. Take your medicine to treat your chronic condition exactly as prescribed. Call your doctor if you think you are having a problem with your medicine. · Take your medicine for anemia exactly as prescribed. Call your doctor if you think you are having a problem with your medicine. Medicines to increase the number of red blood cells (such as epoetin or darbepoetin) may be given as an injection. ¨ If you miss a dose, take it as soon as you can, unless it is almost time for your next dose. In that case, get back on your regular schedule and take only one dose. ¨ Do not freeze this medicine. Store it in the refrigerator. Do not shake the bottle before you prepare the shot. · Keep all your appointments for blood tests to check on your hemoglobin levels. When should you call for help? Call 911 anytime you think you may need emergency care. For example, call if:  ? · You passed out (lost consciousness). ?Call your doctor now or seek immediate medical care if:  ? · You are short of breath. ? · You are dizzy or light-headed, or you feel like you may faint. ? · You have new or worse bleeding. ? Watch closely for changes in your health, and be sure to contact your doctor if:  ? · You feel weaker or more tired than usual.   ? · You do not get better as expected. Where can you learn more? Go to http://amparo-ramon.info/. Enter E502 in the search box to learn more about \"Anemia From Chronic Disease: Care Instructions. \"  Current as of: October 13, 2016  Content Version: 11.4  © 5589-0951 [employer]. Care instructions adapted under license by [employer] (which disclaims liability or warranty for this information). If you have questions about a medical condition or this instruction, always ask your healthcare professional. [place] [address] any warranty or liability for your use of this information.

## 2017-10-30 NOTE — ED NOTES
Assumed pt care for task only. Patient discharged to home at this time with self and family. Patient provided with written instructions and 0 prescriptions. All questions answered. Pt awaiting in room for ride to arrive.

## 2017-12-12 NOTE — IP AVS SNAPSHOT
[address] 
[phone] Patient: Ronnell Singh MRN: [id number] QAW:[id number] You are allergic to the following Allergen Reactions Ace Inhibitors Cough AND CKD/SD Arb-Angiotensin Receptor Antagonist Other (comments) CKD/SD, Recent Documentation Height Weight BMI Smoking Status 1.88 m 142.7 kg 40.39 kg/m2 Never Smoker Emergency Contacts  (Rel.) Home Phone Work Phone [place] Phone Sanna Cuba (Other Relative) [phone] -- -- About your hospitalization You were admitted on:  December 13, 2017 You last received care in the:  [facility] You were discharged on:  December 15, 2017 Why you were hospitalized Your primary diagnosis was:  Chf (Congestive Heart Failure) ([facility]) Providers Seen During Your Hospitalization Provider Specialty Primary office phone Henrik MendozaBranch Members, [address] [place] [address] Emergency Medicine [phone] Low Hamilton MD Internal Medicine [phone] Nia Pang MD Internal Medicine [phone] Your Primary Care Physician (PCP) Primary Care Physician Office Phone Office Fax [address] [facility], [address] [place] [address] [phone] Follow-up Information Follow up With Details Comments Contact Info Brandon Wright MD On 1/2/2018 For discharge follow up at 2:45PM  [address] [place] [address] 
[phone] [address] On 12/16/2017 Home Health RN (complex medical regimen) and SW (Please follow-up with home health agency if you have not heard from them by 12:00 pm the following day post discharge.) [address] [place], [place] [address]y [facility] [address] 
[phone] Minnie Sepulveda MD On 12/20/2017 Appointment time 1:20 PM [address] [place] [address] 
[phone] Appointments for Next 14 days 12/18/2017  1:00 PM  DIABETES CLASS 3HR [facility] DIABETIC TREATMENT  
 12/20/2017  1:20 PM  ESTABLISHED PATIENT [facility] at [address] My Medications STOP taking these medications INTEGRA PLUS 125 mg iron- 1 mg Cap Generic drug:  Iron Fum & P-FA-Vit B & C No.9 Replaced by:  multivitamin, tx-iron-ca-min 9 mg iron-400 mcg Tab tablet RAYALDEE 30 mcg Cs24 Generic drug:  calcifediol Replaced by:  Doxercalciferol 1 mcg Cap TAKE these medications as instructed Instructions Each Dose to Equal  
 Morning Noon Evening Bedtime * albuterol 90 mcg/actuation inhaler Commonly known as:  PROVENTIL HFA, VENTOLIN HFA, PROAIR HFA Your last dose was: Your next dose is: Take 2 Puffs by inhalation daily. 2 Puff * albuterol 2.5 mg /3 mL (0.083 %) nebulizer solution Commonly known as:  PROVENTIL VENTOLIN Your last dose was: Your next dose is:    
   
   
 3 mL by Nebulization route every four (4) hours as needed for Wheezing. 2.5 mg  
    
   
   
   
  
 amitriptyline 50 mg tablet Commonly known as:  ELAVIL Your last dose was: Your next dose is: TAKE ONE TABLET BY MOUTH EVERY EVENING  
     
   
   
   
  
 amLODIPine 10 mg tablet Commonly known as:  Priscilla Pace Your last dose was: Your next dose is: Take 1 Tab by mouth daily. 10 mg  
    
   
   
   
  
 aspirin 81 mg chewable tablet Your last dose was: Your next dose is: Take 1 Tab by mouth daily. 81 mg  
    
   
   
   
  
 atorvastatin 40 mg tablet Commonly known as:  LIPITOR Your last dose was: Your next dose is: Take 1 Tab by mouth nightly. 40 mg  
    
   
   
   
  
 cloNIDine HCl 0.3 mg tablet Commonly known as:  CATAPRES Your last dose was: Your next dose is: Take 1 Tab by mouth three (3) times daily. 0.3 mg  
    
   
   
   
  
 Doxercalciferol 1 mcg Cap Your last dose was: Your next dose is: Take 1 mcg by mouth daily. 1 mcg  
    
   
   
   
  
 fluticasone-salmeterol 250-50 mcg/dose diskus inhaler Commonly known as:  ADVAIR Your last dose was: Your next dose is: Take 1 Puff by inhalation daily. 1 Puff  
    
   
   
   
  
 furosemide 40 mg tablet Commonly known as:  LASIX Your last dose was: Your next dose is: Take 1.5 Tabs by mouth two (2) times a day. TAKE ONE TABLET BY MOUTH TWICE DAILY 60 mg HumaLOG Mix 75-25 100 unit/mL (75-25) injection Generic drug:  insulin lispro protamine/insulin lispro Your last dose was: Your next dose is: Take 30 units in the morning and 10 units at night.  
     
   
   
   
  
 hydrALAZINE 100 mg tablet Commonly known as:  APRESOLINE Your last dose was: Your next dose is: TAKE ONE TABLET BY MOUTH THREE TIMES DAILY  
     
   
   
   
  
 labetalol 300 mg tablet Commonly known as:  Lanicharly Dumont Your last dose was: Your next dose is: Take 1 Tab by mouth every eight (8) hours. 300 mg  
    
   
   
   
  
 methocarbamol 750 mg tablet Commonly known as:  ROBAXIN Your last dose was: Your next dose is: Take 750 mg by mouth. 750 mg  
    
   
   
   
  
 multivitamin, tx-iron-ca-min 9 mg iron-400 mcg Tab tablet Commonly known as:  THERA-M w/ IRON Your last dose was: Your next dose is: Take 1 Tab by mouth daily. 1 Tab  
    
   
   
   
  
 omeprazole 40 mg capsule Commonly known as:  PRILOSEC Your last dose was: Your next dose is: Take 1 Cap by mouth daily. 40 mg  
    
   
   
   
  
 ondansetron 8 mg disintegrating tablet Commonly known as:  ZOFRAN ODT Your last dose was: Your next dose is: Take 1 Tab by mouth three (3) times daily. 8 mg  
    
   
   
   
  
 oxyCODONE IR 15 mg immediate release tablet Commonly known as:  OXY-IR Your last dose was: Your next dose is: Take 15 mg by mouth every eight (8) hours as needed. 15 mg  
    
   
   
   
  
 sevelamer carbonate 800 mg Tab tab Commonly known as:  Savilla Rubinstein Your last dose was: Your next dose is: TAKE ONE TABLET BY MOUTH THREE TIMES DAILY WITH MEALS  
     
   
   
   
  
 sodium bicarbonate 650 mg tablet Your last dose was: Your next dose is: Take 1 Tab by mouth two (2) times a day. 650 mg  
    
   
   
   
  
 * Notice: This list has 2 medication(s) that are the same as other medications prescribed for you. Read the directions carefully, and ask your doctor or other care provider to review them with you. Where to Get Your Medications Information on where to get these meds will be given to you by the nurse or doctor. ! Ask your nurse or doctor about these medications  
  albuterol 2.5 mg /3 mL (0.083 %) nebulizer solution  
 albuterol 90 mcg/actuation inhaler  
 amitriptyline 50 mg tablet  
 amLODIPine 10 mg tablet  
 aspirin 81 mg chewable tablet  
 atorvastatin 40 mg tablet  
 cloNIDine HCl 0.3 mg tablet Doxercalciferol 1 mcg Cap  
 fluticasone-salmeterol 250-50 mcg/dose diskus inhaler  
 furosemide 40 mg tablet  
 hydrALAZINE 100 mg tablet  
 labetalol 300 mg tablet  
 multivitamin, tx-iron-ca-min 9 mg iron-400 mcg Tab tablet  
 ondansetron 8 mg disintegrating tablet  
 sevelamer carbonate 800 mg Tab tab  
 sodium bicarbonate 650 mg tablet Discharge Instructions Discharge Instructions PATIENT ID: Paco Sanchez MRN: [id number] YOB: 1975 DATE OF ADMISSION: 12/12/2017  8:17 PM   
 DATE OF DISCHARGE: 12/15/2017 PRIMARY CARE PROVIDER: Everardo Mckinney MD  
 
ATTENDING PHYSICIAN: Steven Castaneda MD 
DISCHARGING PROVIDER: Steven Castaneda MD   
To contact this individual call [phone] and ask the  to page. If unavailable ask to be transferred the Adult Hospitalist Department. DISCHARGE DIAGNOSES vol overload CONSULTATIONS: IP CONSULT TO CARDIOLOGY 
IP CONSULT TO NEPHROLOGY PROCEDURES/SURGERIES: * No surgery found * PENDING TEST RESULTS:  
At the time of discharge the following test results are still pending: na 
 
FOLLOW UP APPOINTMENTS:  
Follow-up Information Follow up With Details Comments Contact Info Everardo Mckinney MD In 1 week  [address] 
[phone] ADDITIONAL CARE RECOMMENDATIONS: na 
 
DIET: Cardiac Diet and Diabetic Diet ACTIVITY: Activity as tolerated WOUND CARE: na 
 
EQUIPMENT needed: na 
 
 
  
 SNF/Inpatient Rehab/LTAC  
x Independent/assisted living Hospice Other:  
 
  
 
 
Signed:  
Steven Castaneda MD 
12/15/2017 
8:00 AM 
 
. Heart Failure: Care Instructions Your Care Instructions Heart failure occurs when your heart does not pump as much blood as the body needs. Failure does not mean that the heart has stopped pumping but rather that it is not pumping as well as it should. Over time, this causes fluid buildup in your lungs and other parts of your body. Fluid buildup can cause shortness of breath, fatigue, swollen ankles, and other problems. By taking medicines regularly, reducing sodium (salt) in your diet, checking your weight every day, and making lifestyle changes, you can feel better and live longer. Follow-up care is a key part of your treatment and safety. Be sure to make and go to all appointments, and call your doctor if you are having problems. It's also a good idea to know your test results and keep a list of the medicines you take. How can you care for yourself at home? Medicines ? · Be safe with medicines. Take your medicines exactly as prescribed. Call your doctor if you think you are having a problem with your medicine. ? · Do not take any vitamins, over-the-counter medicine, or herbal products without talking to your doctor first. Mary Olvera not take ibuprofen (Advil or Motrin) and naproxen (Aleve) without talking to your doctor first. They could make your heart failure worse. ? · You may be taking some of the following medicine. ¨ Beta-blockers can slow heart rate, decrease blood pressure, and improve your condition. Taking a beta-blocker may lower your chance of needing to be hospitalized. ¨ Angiotensin-converting enzyme inhibitors (ACEIs) reduce the heart's workload, lower blood pressure, and reduce swelling. Taking an ACEI may lower your chance of needing to be hospitalized again. ¨ Angiotensin II receptor blockers (ARBs) work like ACEIs. Your doctor may prescribe them instead of ACEIs. ¨ Diuretics, also called water pills, reduce swelling. ¨ Potassium supplements replace this important mineral, which is sometimes lost with diuretics. ¨ Aspirin and other blood thinners prevent blood clots, which can cause a stroke or heart attack. ? You will get more details on the specific medicines your doctor prescribes. Diet ? · Your doctor may suggest that you limit sodium to 2,000 milligrams (mg) a day or less. That is less than 1 teaspoon of salt a day, including all the salt you eat in cooking or in packaged foods. People get most of their sodium from processed foods. Fast food and restaurant meals also tend to be very high in sodium. ? · Ask your doctor how much liquid you can drink each day. You may have to limit liquids. ?Weight ? · Weigh yourself without clothing at the same time each day. Record your weight. Call your doctor if you have a sudden weight gain, such as more than 2 to 3 pounds in a day or 5 pounds in a week. (Your doctor may suggest a different range of weight gain.) A sudden weight gain may mean that your heart failure is getting worse. ? Activity level ? · Start light exercise (if your doctor says it is okay). Even if you can only do a small amount, exercise will help you get stronger, have more energy, and manage your weight and your stress. Walking is an easy way to get exercise. Start out by walking a little more than you did before. Bit by bit, increase the amount you walk. ? · When you exercise, watch for signs that your heart is working too hard. You are pushing yourself too hard if you cannot talk while you are exercising. If you become short of breath or dizzy or have chest pain, stop, sit down, and rest.  
? · If you feel \"wiped out\" the day after you exercise, walk slower or for a shorter distance until you can work up to a better pace. ? · Get enough rest at night. Sleeping with 1 or 2 pillows under your upper body and head may help you breathe easier. ? Lifestyle changes ? · Do not smoke. Smoking can make a heart condition worse.  If you need help quitting, talk to your doctor about stop-smoking programs and medicines. These can increase your chances of quitting for good. Quitting smoking may be the most important step you can take to protect your heart. ? · Limit alcohol to 2 drinks a day for men and 1 drink a day for women. Too much alcohol can cause health problems. ? · Avoid getting sick from colds and the flu. Get a pneumococcal vaccine shot. If you have had one before, ask your doctor whether you need another dose. Get a flu shot each year. If you must be around people with colds or the flu, wash your hands often. When should you call for help? Call 911 if you have symptoms of sudden heart failure such as: 
? · You have severe trouble breathing. ? · You cough up pink, foamy mucus. ? · You have a new irregular or rapid heartbeat. ?Call your doctor now or seek immediate medical care if: 
? · You have new or increased shortness of breath. ? · You are dizzy or lightheaded, or you feel like you may faint. ? · You have sudden weight gain, such as more than 2 to 3 pounds in a day or 5 pounds in a week. (Your doctor may suggest a different range of weight gain.) ? · You have increased swelling in your legs, ankles, or feet. ? · You are suddenly so tired or weak that you cannot do your usual activities. ? Watch closely for changes in your health, and be sure to contact your doctor if you develop new symptoms. Where can you learn more? Go to http://amparo-ramon.info/. Enter C351 in the search box to learn more about \"Heart Failure: Care Instructions. \" Current as of: September 21, 2016 Content Version: 11.4 © 0848-0296 [employer]. Care instructions adapted under license by [employer] (which disclaims liability or warranty for this information). If you have questions about a medical condition or this instruction, always ask your healthcare professional. [address] any warranty or liability for your use of this information. Discharge Instructions Attachments/References HEART FAILURE: AVOIDING TRIGGERS (ENGLISH) Discharge Orders None nubelo Announcement We are excited to announce that we are making your provider's discharge notes available to you in nubelo. You will see these notes when they are completed and signed by the physician that discharged you from your recent hospital stay. If you have any questions or concerns about any information you see in nubelo, please call the Health Information Department where you were seen or reach out to your Primary Care Provider for more information about your plan of care. Introducing [facility]! María Beth introduces nubelo patient portal. Now you can access parts of your medical record, email your doctor's office, and request medication refills online. 1. In your internet browser, go to [email or web address] Sound 2. Click on the First Time User? Click Here link in the Sign In box. You will see the New Member Sign Up page. 3. Enter your nubelo Access Code exactly as it appears below. You will not need to use this code after youve completed the sign-up process. If you do not sign up before the expiration date, you must request a new code. · nubelo Access Code: [email or web address] Expires: 1/2/2018 11:45 AM 
 
4. Enter the last four digits of your Social Security Number (xxxx) and Date of Birth (mm/dd/yyyy) as indicated and click Submit. You will be taken to the next sign-up page. 5. Create a nubelo ID. This will be your nubelo login ID and cannot be changed, so think of one that is secure and easy to remember. 6. Create a nubelo password. You can change your password at any time. 7. Enter your Password Reset Question and Answer. This can be used at a later time if you forget your password. 8. Enter your e-mail address. You will receive e-mail notification when new information is available in [address] 9. Click Sign Up. You can now view and download portions of your medical record. 10. Click the Download Summary menu link to download a portable copy of your medical information. If you have questions, please visit the Frequently Asked Questions section of the [employer] website. Remember, KipCall is NOT to be used for urgent needs. For medical emergencies, dial 911. Now available from your iPhone and Android! General Information Please provide this summary of care documentation to your next provider. Patient Signature:  ____________________________________________________________ Date:  ____________________________________________________________  
  
Abdirizak Summers Provider Signature:  ____________________________________________________________ Date:  ____________________________________________________________

## 2017-12-13 NOTE — H&P
History & Physical    Primary Care Provider: Miguel Vo MD  Source of Information: Patient     History of Presenting Illness:   Gerhardt Holder is a 36year old man with past medical hx noted for HTN, CKD, Asthma, CHF, DM came to the ED because of shortness of breath. This started about three days ago. Patient was very reluctant to come to the ED at the onset of his symptoms, he was advised to go to the ED today by his nephrologist. The shortness of breath is progressive worse with mild exertion, slight improvement with rest. He was last admitted here from 10/21/17 to 10/22/17 for evaluation and treatment of similar symptoms. Patient has transfusion dependent anemia, received blood during the last hospitalization. Chest X-ray done on arrival at the ED showed pulmonary edema, patient received Lasix, was started on blood transfusion by ED provider. The shortness of breath is not associated with fever, rigors or chills. Patient was referred to hospitalist service for admission. Review of Systems:  HEENT: No headache, no dizziness, no photophobia    Respiratory: Positive for shortness of breath, no cough, no hemoptysis    Cardiovascular: No  chest pain, positive for orthopnea, no palpitaions   Gastrointestinal: No  abdominal pain, nausea and vomiting. Genitourinary: No  Dysuria, no urgency and frequency .    All other systems reviewed and are negative.       Past Medical History:   Diagnosis Date    Arrhythmia     afib    Asthma     3/2013 last attack    CKD (chronic kidney disease), stage III 2/12/2013    Nephrology: Martina Pat MD    Diabetes [facility])     Gout     Heart failure ([facility] [address]     Hyperlipidemia     Hypertension     Ill-defined condition     Positive PPD, treated 2001    treated 9 months      Past Surgical History:   Procedure Laterality Date    HX BELOW KNEE AMPUTATION Left     due to osteomyelitis    HX HEENT      tonsilectomy    HX HERNIA REPAIR      HX OTHER SURGICAL      below the knee right amputation    HX TONSILLECTOMY       Prior to Admission medications    Medication Sig Start Date End Date Taking? Authorizing Provider   omeprazole (PRILOSEC) 40 mg capsule Take 1 Cap by mouth daily. 11/6/17   Milo Kocher., MD   ondansetron (ZOFRAN ODT) 8 mg disintegrating tablet Take 1 Tab by mouth three (3) times daily. 10/31/17   Milo Kocher., MD   methocarbamol (ROBAXIN) 750 mg tablet Take 750 mg by mouth. 7/13/17   Historical Provider   sevelamer carbonate (RENVELA) 800 mg tab tab TAKE ONE TABLET BY MOUTH THREE TIMES DAILY WITH MEALS 10/13/17   Milo Kocher., MD   cloNIDine HCl (CATAPRES) 0.3 mg tablet Take 1 Tab by mouth three (3) times daily. 9/28/17   Pauline Ribera MD   sodium bicarbonate 650 mg tablet Take 650 mg by mouth two (2) times a day. Historical Provider   albuterol (PROVENTIL HFA, VENTOLIN HFA, PROAIR HFA) 90 mcg/actuation inhaler Take 2 Puffs by inhalation daily. Historical Provider   atorvastatin (LIPITOR) 40 mg tablet Take 40 mg by mouth nightly. Historical Provider   calcifediol (RAYALDEE) 30 mcg Cs24 Take 30 mcg by mouth nightly. Historical Provider   Iron Fum & P-FA-Vit B & C No.9 (INTEGRA PLUS) 125 mg iron- 1 mg cap Take 1 Cap by mouth daily. Historical Provider   fluticasone-salmeterol (ADVAIR) 250-50 mcg/dose diskus inhaler Take 1 Puff by inhalation daily. Historical Provider   insulin lispro protamine/insulin lispro (HUMALOG MIX 75-25) 100 unit/mL (75-25) injection Take 30 units in the morning and 10 units at night. Historical Provider   labetalol (NORMODYNE) 300 mg tablet Take 300 mg by mouth every eight (8) hours.     Historical Provider   hydrALAZINE (APRESOLINE) 100 mg tablet TAKE ONE TABLET BY MOUTH THREE TIMES DAILY 9/15/17   Milo Kocher., MD   furosemide (LASIX) 40 mg tablet TAKE ONE TABLET BY MOUTH TWICE DAILY FOR leg swelling 9/14/17   Milo Kocher., MD albuterol (PROVENTIL VENTOLIN) 2.5 mg /3 mL (0.083 %) nebulizer solution 3 mL by Nebulization route every four (4) hours as needed for Wheezing. 9/13/17   FRANCE Agrawal   oxyCODONE IR (OXY-IR) 15 mg immediate release tablet Take 15 mg by mouth every eight (8) hours as needed. Historical Provider   amLODIPine (NORVASC) 10 mg tablet Take 1 Tab by mouth daily. 4/7/17   Abebe Gómez MD   aspirin 81 mg chewable tablet Take 81 mg by mouth daily. Historical Provider   amitriptyline (ELAVIL) 50 mg tablet TAKE ONE TABLET BY MOUTH EVERY EVENING 6/24/16   Abebe Gómez MD     Allergies   Allergen Reactions    Ace Inhibitors Cough      Family History   Problem Relation Age of Onset    Diabetes Mother     Hypertension Mother     Hypertension Brother     Diabetes Brother     Diabetes Maternal Grandmother     Hypertension Maternal Grandmother     Diabetes Other     Hypertension Other     Heart Disease Other         SOCIAL HISTORY:  Patient resides:  Independently x   Assisted Living    SNF    With family care       Smoking history:   None x   Former    Chronic      Alcohol history:   None x   Social    Chronic      Ambulates:   Independently    w/cane    w/walker x   w/wc    CODE STATUS:  DNR    Full x   Other      Objective:     Physical Exam:     Visit Vitals    /82 (BP 1 Location: Left arm)  Comment: 4 hour transfusion vitals    Pulse 76  Comment: 4 hour transfusion vitals    Temp 98.3 °F (36.8 °C)  Comment: 4 hour transfusion vitals      Resp 22  Comment: 4 hour transfusion vitals    Ht 6' 2\" (1.88 m)    Wt 148.3 kg (327 lb)    SpO2 92%    BMI 41.98 kg/m2    O2 Flow Rate (L/min): 5 l/min O2 Device: Nasal cannula    General:  Not in  Distress. Head:  Normocephalic,  atraumatic. Eyes:  Normal eye movement, no drainage, no discharge. Nose: No deformity, no drainage    Throat: No visible oral lesions. Neck: Supple, no thyromegaly,  no JVD.    Back:   Non tender    Lungs:   Clear to auscultation bilaterally. Chest wall:  No tenderness or deformity. Heart:  Regular rate and rhythm, S1, S2 normal   Abdomen:   Soft, non-tender. Bowel sounds normal. No masses,  No organomegaly. Extremities: Bilateral BKA   Pulses: 2+   Skin: No active skin  lesions   Neurologic: Alert, oriented X3         EKG:  NSR      Data Review: Chest X-ray showed pulmonary edema     Recent Days:  Recent Labs      12/12/17 2032   WBC  8.9   HGB  8.1*   HCT  26.5*   PLT  258     Recent Labs      12/12/17 2032   NA  138   K  4.4   CL  105   CO2  23   GLU  298*   BUN  67*   CREA  4.52*   CA  8.6   ALB  3.6   TBILI  0.7   SGOT  11*   ALT  18     No results for input(s): PH, PCO2, PO2, HCO3, FIO2 in the last 72 hours. 24 Hour Results:  Recent Results (from the past 24 hour(s))   EKG, 12 LEAD, INITIAL    Collection Time: 12/12/17  8:30 PM   Result Value Ref Range    Ventricular Rate 71 BPM    Atrial Rate 71 BPM    P-R Interval 182 ms    QRS Duration 80 ms    Q-T Interval 394 ms    QTC Calculation (Bezet) 428 ms    Calculated P Axis 53 degrees    Calculated R Axis 55 degrees    Calculated T Axis 53 degrees    Diagnosis       Normal sinus rhythm  When compared with ECG of 29-OCT-2017 16:49,  No significant change was found     CBC WITH AUTOMATED DIFF    Collection Time: 12/12/17  8:32 PM   Result Value Ref Range    WBC 8.9 4.1 - 11.1 K/uL    RBC 3.21 (L) 4.10 - 5.70 M/uL    HGB 8.1 (L) 12.1 - 17.0 g/dL    HCT 26.5 (L) 36.6 - 50.3 %    MCV 82.6 80.0 - 99.0 FL    MCH 25.2 (L) 26.0 - 34.0 PG    MCHC 30.6 30.0 - 36.5 g/dL    RDW 17.6 (H) 11.5 - 14.5 %    PLATELET 560 977 - 850 K/uL    NEUTROPHILS 90 (H) 32 - 75 %    LYMPHOCYTES 5 (L) 12 - 49 %    MONOCYTES 4 (L) 5 - 13 %    EOSINOPHILS 1 0 - 7 %    BASOPHILS 0 0 - 1 %    ABS. NEUTROPHILS 8.0 1.8 - 8.0 K/UL    ABS. LYMPHOCYTES 0.4 (L) 0.8 - 3.5 K/UL    ABS. MONOCYTES 0.4 0.0 - 1.0 K/UL    ABS. EOSINOPHILS 0.1 0.0 - 0.4 K/UL    ABS.  BASOPHILS 0.0 0.0 - 0.1 K/UL    DF MANUAL RBC COMMENTS ANISOCYTOSIS  1+        RBC COMMENTS MICROCYTOSIS  PRESENT        RBC COMMENTS OVALOCYTES  PRESENT        RBC COMMENTS HYPOCHROMIA  PRESENT        RBC COMMENTS POLYCHROMASIA  PRESENT       METABOLIC PANEL, COMPREHENSIVE    Collection Time: 12/12/17  8:32 PM   Result Value Ref Range    Sodium 138 136 - 145 mmol/L    Potassium 4.4 3.5 - 5.1 mmol/L    Chloride 105 97 - 108 mmol/L    CO2 23 21 - 32 mmol/L    Anion gap 10 5 - 15 mmol/L    Glucose 298 (H) 65 - 100 mg/dL    BUN 67 (H) 6 - 20 MG/DL    Creatinine 4.52 (H) 0.70 - 1.30 MG/DL    BUN/Creatinine ratio 15 12 - 20      GFR est AA 17 (L) >60 ml/min/1.73m2    GFR est non-AA 14 (L) >60 ml/min/1.73m2    Calcium 8.6 8.5 - 10.1 MG/DL    Bilirubin, total 0.7 0.2 - 1.0 MG/DL    ALT (SGPT) 18 12 - 78 U/L    AST (SGOT) 11 (L) 15 - 37 U/L    Alk. phosphatase 111 45 - 117 U/L    Protein, total 7.6 6.4 - 8.2 g/dL    Albumin 3.6 3.5 - 5.0 g/dL    Globulin 4.0 2.0 - 4.0 g/dL    A-G Ratio 0.9 (L) 1.1 - 2.2     TROPONIN I    Collection Time: 12/12/17  8:32 PM   Result Value Ref Range    Troponin-I, Qt. <0.04 <0.05 ng/mL   TYPE + CROSSMATCH    Collection Time: 12/12/17  8:32 PM   Result Value Ref Range    Crossmatch Expiration 12/15/2017     ABO/Rh(D) Ro Garcia NEGATIVE     Antibody screen NEG     Unit number [id number]     Blood component type RC LR AS3,1     Unit division 00     Status of unit ISSUED     Crossmatch result Compatible          Imaging:     Assessment:     1: Acute on Chronic diastolic CHF  2: CKD stage 4  3: Anemia due to CKD  4: type 2 DM  5: HTN  6: S/p Bilateral BKA  7: Asthma        Plan:  1: Acute on Chronic diastolic CHF: Will place patient on Lasix, check cardiac makers, BNP, D-dimer, may require cardiology consult   2: CKD stage 4. Will monitor renal function, may require nephrology consult   3: Anemia due to CKD. Patient was started on transfusion in the ED, will monitor H&H  4: type 2 DM. Will place on SSI, Check A1c level   5: HTN.  Will resume home medications  6: S/p Bilateral BKA. Supportive therapy   7: Asthma.  Will continue home medications                  Signed By: Marcial Breaux MD     December 13, 2017 No

## 2017-12-13 NOTE — CONSULTS
Consult Note    Date of  Admission: 12/12/2017  8:17 PM     Aura Fernandes is a 36 y.o. male admitted for CHF (congestive heart failure) ([facility] [address] Consult requested by Randi Olivo MD    Assessment  · Pulmonary edema due to volume overload and diastolic dysfunction and advanced CKD  · Mr Meagn Solis takes daily diuretics and indicates that he has a good diuretic response  · Long standing DM and HTN. Had a normal pharmacologic nuclear stress test in June 2016  · EF by echo in October 2017 was 70%-75%  · Anemia with 15 -20 blood transfusion episodes  · Foul smell suggestive of wound infection    Recommendations:  1. If he has really been compliant with his diuretics he will need an increase in dose or we will need to consider starting hemodialysis. 2. Update his stress test  3. Check iron stores since he has been transfused so much. 4. Have the wound care service assess the patient   5. Further recommendations to follow  Thank you for this referral  Dami Carlson MD  Subjective:  Gradual onset of shortness of breath over several days  HPI:  Mr Megan Solis has a long history of DM, HTN and CKD. Also has bilateral LE amputations. He has been getting more SOB over the past several weeks. Recently his nephrologist started him of NaHCO3 tabs. He denies chest pain or any personal history of myocardial infarction, stroke or rheumatic fever. He also has significant anemia attributed to his kidney disease and has been transfused by his estimate, 15-20 times. He denies any visible GI blood loss. He takes furosemide daily and indicates that he has a good response. His last stress test was done in June 2016 and was negative for ischemia or infarction with a normal ejection fraction. His last echo was 10/22/2017 and indicated an EF 49-13% with diastolic parameters consistent with severe diastolic dysfunction.     Patient Active Problem List    Diagnosis Date Noted    Hypoxia 10/21/2017    Acute pulmonary edema ([facility] [address] 09/27/2017  Anemia 09/27/2017    Acute on chronic diastolic [facility]congestive heart failure) [facility] 09/27/2017    Chronic renal failure, stage 4 (severe) ([facility] [address] 07/04/2017    CHF (congestive heart failure) ([facility] [address] 03/26/2017    Hypertensive urgency 03/26/2017    Long term current use of anticoagulant 12/19/2016    CKD (chronic kidney disease) stage 3, GFR 30-59 ml/min 12/18/2016    Accelerated hypertension with diastolic congestive heart failure, NYHA class 3 ([address] 11/29/2016    Pulmonary edema 11/07/2016    Heart failure ([address] 11/07/2016    Diabetic foot ulcer ([facility] [address] 03/15/2016    Septic arthritis of foot ([facility] [address] 03/05/2016    Osteomyelitis ([facility] [address] 03/04/2016    Acute diastolic CHF (congestive heart failure) ([facility] [address] 03/02/2016    Pneumonia 03/02/2016    Cellulitis and abscess of leg 03/02/2016    Atrial fibrillation ([address] 03/02/2016    HTN (hypertension) 03/02/2016    Diabetic foot ulcer with osteomyelitis ([address] 02/20/2016    Symptomatic anemia 01/09/2016    Elevated serum alkaline phosphatase level 11/08/2015    Hyponatremia 11/08/2015    Acute renal failure (ARF) ([address] 11/08/2015    Type 2 diabetes mellitus with right diabetic foot ulcer ([address] 11/08/2015    Iron deficiency anemia 11/08/2015    Diabetic foot ulcer ([facility] [address] 07/19/2015    Acute diastolic heart failure ([address] 06/25/2015    Atrial fibrillation and flutter ([address] 06/24/2015    Hx of BKA ([address] 10/23/2014    Streptococcal infection(041.00) 10/22/2014    Proteus infection 10/22/2014    SIRS with acute organ dysfunction due to infectious process ([address] 10/21/2014     Class: Present on Admission    Open wound of toe 10/21/2014     Class: Present on Admission    Aortic regurgitation 10/21/2014     Class: Chronic    Renal failure, acute ([address] 10/21/2014     Class: Present on Admission    Gram-positive cocci bacteremia 10/21/2014    Cellulitis and abscess of leg 10/21/2014    History of positive PPD 02/17/2013    Acute hypoxemic respiratory failure ([facility] 02/16/2013    Malignant hypertension 02/16/2013    Uncontrolled type II diabetes mellitus ([facility] 02/16/2013    HTN (hypertension) 01/19/2012    Morbid obesity with BMI of 40.0-44.9, adult ([facility] 01/19/2012     Class: Present on Admission      Past Medical History:   Diagnosis Date    Arrhythmia     afib    Asthma     3/2013 last attack    CKD (chronic kidney disease), stage III 2/12/2013    Nephrology: Cece Thompson MD    Diabetes [facility])     Gout     Heart failure ([facility]     Hyperlipidemia     Hypertension     Ill-defined condition     Positive PPD, treated 2001    treated 9 months      Past Surgical History:   Procedure Laterality Date    HX BELOW KNEE AMPUTATION Left     due to osteomyelitis    HX HEENT      tonsilectomy    HX HERNIA REPAIR      HX OTHER SURGICAL      below the knee right amputation    HX TONSILLECTOMY       Allergies   Allergen Reactions    Ace Inhibitors Cough             Review of Symptoms:  Constitutional: negative for fevers, chills, sweats, fatigue, malaise, anorexia and weight loss  Cardiac: increasingly severe dyspnea on exertion, paroxysmal nocturnal dyspnea, orthopnea, ; pertinent negatives - chest pain, chest pressure/discomfort, syncope  Respiratory: positive for wheezing or dyspnea on exertion, negative for cough, sputum or hemoptysis  Gastrointestinal: negative for nausea, vomiting, diarrhea and abdominal pain  Musculoskeletal:negative  Neurological: negative  Other systems reviewed and negative except as above.     Physical Exam    Visit Vitals    /77 (BP 1 Location: Right arm, BP Patient Position: Sitting)    Pulse 80    Temp 98.2 °F (36.8 °C)    Resp 22    Ht 6' 2\" (1.88 m)    Wt 148.3 kg (327 lb)    SpO2 91%    BMI 41.98 kg/m2     Neck: supple, symmetrical, trachea midline, no adenopathy, thyroid: not enlarged, symmetric, no tenderness/mass/nodules, no carotid bruit and no JVD  Heart: regular rate and rhythm, S1, S2 normal, S4 present, no rub  Lungs: normal percussion bilaterally, diminished breath sounds R base, L base  Abdomen: soft, non-tender. Bowel sounds normal. No masses,  no organomegaly  Extremities: extremities normal, atraumatic, no cyanosis or edema, no edema  Pulses: bilateral LE amputations  Neurologic: Grossly normal    Cardiographics    Telemetry: normal sinus rhythm  ECG: normal sinus rhythm, nonspecific ST and T waves changes  Echocardiogram: Not done    Recent radiology, intake/output and wt reviewed    Lab Results  Component Value Date/Time   WBC 9.4 12/13/2017 07:29 AM   HGB 8.5 12/13/2017 07:29 AM   Hemoglobin (POC) 9.9 10/29/2017 09:14 PM   HCT 27.3 12/13/2017 07:29 AM   Hematocrit (POC) 29 10/29/2017 09:14 PM   PLATELET 062 83/15/4817 07:29 AM   MCV 81.7 12/13/2017 07:29 AM     Lab Results   Component Value Date/Time     12/13/2017 07:29 AM    CK - MB 2.7 12/13/2017 07:29 AM    CK-MB Index 0.6 12/13/2017 07:29 AM    Troponin-I, Qt. <0.04 12/13/2017 07:29 AM     02/18/2016 04:35 AM      Lab Results   Component Value Date/Time    Sodium 141 12/13/2017 07:29 AM    Potassium 4.0 12/13/2017 07:29 AM    Chloride 108 12/13/2017 07:29 AM    CO2 24 12/13/2017 07:29 AM    Anion gap 9 12/13/2017 07:29 AM    Glucose 180 12/13/2017 07:29 AM    BUN 61 12/13/2017 07:29 AM    Creatinine 4.15 12/13/2017 07:29 AM    BUN/Creatinine ratio 15 12/13/2017 07:29 AM    GFR est AA 19 12/13/2017 07:29 AM    GFR est non-AA 16 12/13/2017 07:29 AM    Calcium 8.7 12/13/2017 07:29 AM    Bilirubin, total 0.9 12/13/2017 07:29 AM    ALT (SGPT) 16 12/13/2017 07:29 AM    AST (SGOT) 7 12/13/2017 07:29 AM    Alk.  phosphatase 108 12/13/2017 07:29 AM    Protein, total 7.5 12/13/2017 07:29 AM    Albumin 3.5 12/13/2017 07:29 AM    Globulin 4.0 12/13/2017 07:29 AM    A-G Ratio 0.9 12/13/2017 07:29 AM

## 2017-12-13 NOTE — PROGRESS NOTES
Problem: Discharge Planning  Goal: *Discharge to safe environment  Outcome: Progressing Towards Goal  Disposition Needs: Patient to discharge home. Dispositions TBD pending recommendations. There are no current CM consults or needs at this time. Patient expressed that he may need assistance with prescriptions at discharge. CM will continue to follow and assist with disposition needs as they arise. Mode of transport at time of discharge TBD at discharge.     HANG Romero/YAYO  3:58 PM

## 2017-12-13 NOTE — PROGRESS NOTES
TRANSFER - IN REPORT:    Verbal report received from T RN (name) on Clifton Colbert  being received from ED (unit) for urgent transfer      Report consisted of patients Situation, Background, Assessment and   Recommendations(SBAR). Information from the following report(s) SBAR, Intake/Output, MAR, Recent Results, Med Rec Status and Cardiac Rhythm NSR was reviewed with the receiving nurse. Opportunity for questions and clarification was provided. Assessment completed upon patients arrival to unit and care assumed.

## 2017-12-13 NOTE — NURSE NAVIGATOR
Chart reviewed by Heart Failure Nurse Navigator. Heart Failure database completed. EF 70/75%. ACEi/ARB: documented allergy. BB: not currently indicated. CRT not currently indicated. NYHA Functional Class documentation requested via Provider Message on [address]. Heart Failure Teach Back in Patient Education. Heart Failure Avoiding Triggers on Discharge Instructions. Cardiologist:  Dr. Sarbjit Rosario ([address] [place] [address]). Cardiology is consulted, most likely will be CAV to see patient. NATALIE Medrano/JAE Canada, OP NNs (PCP) notified of admission.

## 2017-12-13 NOTE — ED TRIAGE NOTES
Arrived via EMS from home. Exertion SOB x 3 days along with fatigue. Per patient normal sx when hemoglobin is low. Dr Bartolome Jj nephrologist called patient today and told patient to come to ED due to abnormal labs. Hemoglobin 8.8. Creatinine 3. 5. Per patient on oxygen 2L via NC prn when Hemoglobin gets low. Patient SAT at 88% 2L NC  increased to 3L NC. Patient POX 91%.

## 2017-12-13 NOTE — PROGRESS NOTES
Spiritual Care Assessment/Progress Notes    David Ibrahim [id number]  [id number]    1975  36 y.o.  male    Patient Telephone Number: [phone] (home)   Yazidi Affiliation: Elizabet Grant   Language: English   Extended Emergency Contact Information  Primary Emergency Contact: [address] [place] [address] Street Phone: [phone]  Relation: Other Relative   Patient Active Problem List    Diagnosis Date Noted    Hypoxia 10/21/2017    Acute pulmonary edema ([address] 09/27/2017    Anemia 09/27/2017    Acute on chronic diastolic CHF (congestive heart failure) ([address] 09/27/2017    Chronic renal failure, stage 4 (severe) ([address] 07/04/2017    CHF (congestive heart failure) ([address] 03/26/2017    Hypertensive urgency 03/26/2017    Long term current use of anticoagulant 12/19/2016    CKD (chronic kidney disease) stage 3, GFR 30-59 ml/min 12/18/2016    Accelerated hypertension with diastolic congestive heart failure, NYHA class 3 ([address] 11/29/2016    Pulmonary edema 11/07/2016    Heart failure ([address] 11/07/2016    Diabetic foot ulcer ([address] 03/15/2016    Septic arthritis of foot ([address] 03/05/2016    Osteomyelitis ([address] 03/04/2016    Acute diastolic CHF (congestive heart failure) ([address] 03/02/2016    Pneumonia 03/02/2016    Cellulitis and abscess of leg 03/02/2016    Atrial fibrillation ([address] 03/02/2016    HTN (hypertension) 03/02/2016    Diabetic foot ulcer with osteomyelitis ([address] 02/20/2016    Symptomatic anemia 01/09/2016    Elevated serum alkaline phosphatase level 11/08/2015    Hyponatremia 11/08/2015    Acute renal failure (ARF) ([address] 11/08/2015    Type 2 diabetes mellitus with right diabetic foot ulcer ([address] 11/08/2015    Iron deficiency anemia 11/08/2015    Diabetic foot ulcer ([address] 07/19/2015    Acute diastolic heart failure ([address] 06/25/2015    Atrial fibrillation and flutter ([address] 06/24/2015    Hx of BKA ([facility] 10/23/2014    Streptococcal infection(041.00) 10/22/2014    Proteus infection 10/22/2014    SIRS with acute organ dysfunction due to infectious process ([facility] 10/21/2014     Class: Present on Admission    Open wound of toe 10/21/2014     Class: Present on Admission    Aortic regurgitation 10/21/2014     Class: Chronic    Renal failure, acute ([facility] 10/21/2014     Class: Present on Admission    Gram-positive cocci bacteremia 10/21/2014    Cellulitis and abscess of leg 10/21/2014    History of positive PPD 02/17/2013    Acute hypoxemic respiratory failure ([facility] 02/16/2013    Malignant hypertension 02/16/2013    Uncontrolled type II diabetes mellitus ([facility] 02/16/2013    HTN (hypertension) 01/19/2012    Morbid obesity with BMI of 40.0-44.9, adult ([facility] 01/19/2012     Class: Present on Admission        Date: 12/13/2017       Level of Gnosticism/Spiritual Activity:  []         Involved in casimiro tradition/spiritual practice    []         Not involved in casimiro tradition/spiritual practice  []         Spiritually oriented    []         Claims no spiritual orientation    []         seeking spiritual identity  []         Feels alienated from Muslim practice/tradition  []         Feels angry about Muslim practice/tradition  [x]         Spirituality/Muslim tradition IS a resource for coping at this time.   []         Not able to assess due to medical condition    Services Provided Today:  []         crisis intervention    []         reading Scriptures  [x]         spiritual assessment    []         prayer  []         empathic listening/emotional support  []         rites and rituals (cite in comments)  []         life review     []         Muslim support  []         theological development   []         advocacy  []         ethical dialog     []         blessing  []         bereavement support    []         support to family  []         anticipatory grief support   []         help with AMD  []         spiritual guidance    []         meditation      Spiritual Care Needs  [] Emotional Support  []         Spiritual/Taoist Care  []         Loss/Adjustment  []         Advocacy/Referral                /Ethics  [x]         No needs expressed at               this time  []         Other: (note in               comments)  [address]  []         Follow up visits with               pt/family  []         Provide materials  []         Schedule sacraments  []         Contact Community               Clergy  [x]         Follow up as needed  []         Other: (note in               comments)     Comments: Visited Mr Shawna Sanchez in ED-20 for initial spiritual assessment. Mr Shawna Sanchez was sitting on side of the stretcher looking at his laptop. Introduced self to patient and provided active listening as he shared that the was doing well and had no needs or concerns at that time. Patient gave his permission to be kept in prayer by . Assured him of ongoing  availability for support. : Rev. Thais Montgomery.  Ricci Moreau; [facility], to contact [address] call: [phone]

## 2017-12-13 NOTE — CDMP QUERY
Patient is 6'2\", 327 lbs, and calculates to have a BMI of 44.5 kg/m2 (corrected for L BKA). Please clarify if this patient is:     =>Morbidly obese (BMI >/= 40)  =>Obese (BMI 30 - 39.9)  =>Overweight (BMI 25 - 29.9)  =>Other explanation of clinical findings  =>Unable to determine (no explanation for clinical findings)    Please clarify and document your clinical opinion in the progress notes and discharge summary, including the definitive and or presumptive diagnosis, (suspected or probable), related to the above clinical findings. Please include clinical findings supporting your diagnosis. REFERENCE: Per the [facility] Association, \"Individuals who are overweight, obese, or morbidly obese are at an increased risk for certain medical conditions when compared to persons of normal weight. Therefore, these conditions are always clinically significant and reportable. \"    Bebeto oYung, [facility], [facility]  [email or web address]

## 2017-12-13 NOTE — ED NOTES
Notified Dr Talon Pettit of POX 90-92 on 5L and dropping to 89%. New order for Lasix 40mg after first unit of blood.

## 2017-12-13 NOTE — ED PROVIDER NOTES
HPI Comments: 36 y.o. male with past medical history significant for HTN, gout, chronic kidney disease stage III, hyperlipidemia, asthma, atrial fibrillation, and heart failure who presents from home with chief complaint of SOB. Patient states that he has become increasingly SOB and fatigued for the past couple days. He had his hemoglobin levels checked on 12/7/17 (5 days ago) and was told it was 8.8. However, since then, patient states that his symptoms have worsened. He is on 2L of oxygen at home which he uses \"whenever (his) hemoglobin is low\". Pt has hx of blood transfusions. Patient denies having any blood in his stool or melena. He denies having any fever, chills, cough, or body aches. There are no other acute medical concerns at this time. Social hx: nonsmoker, no EtOH use, no drug use  PCP: Lew Ayala MD  Nephrology: Devin Bermeo MD    Note written by Eva Becerra, as dictated by Patricia King. Evalina Castleman, MD 9:13 PM      The history is provided by the patient. No  was used.         Past Medical History:   Diagnosis Date    Arrhythmia     afib    Asthma     3/2013 last attack    CKD (chronic kidney disease), stage III 2/12/2013    Nephrology: Devin Bermeo MD    [facility])     Gout     [facility]     Hyperlipidemia     Hypertension     Ill-defined condition     Positive PPD, treated 2001    treated 9 months       Past Surgical History:   Procedure Laterality Date    HX BELOW KNEE AMPUTATION Left     due to osteomyelitis    HX HEENT      tonsilectomy    HX HERNIA REPAIR      HX OTHER SURGICAL      below the knee right amputation    HX TONSILLECTOMY           Family History:   Problem Relation Age of Onset    Diabetes Mother     Hypertension Mother     Hypertension Brother     Diabetes Brother     Diabetes Maternal Grandmother     Hypertension Maternal Grandmother     Diabetes Other     Hypertension Other     Heart Disease Other Social History     Social History    Marital status:      Spouse name: N/A    Number of children: N/A    Years of education: N/A     Occupational History    Not on file. Social History Main Topics    Smoking status: Never Smoker    Smokeless tobacco: Never Used    Alcohol use No    Drug use: No    Sexual activity: Not Currently     Partners: Male     Other Topics Concern    Not on file     Social History Narrative         Per conversation 03/26/17    He would like his new Medical  power of  to be his Brother Michael Atkins. (Previously was his Mother Jackie Patel)        Never smoker         ALLERGIES: Ace inhibitors    Review of Systems   Constitutional: Positive for fatigue. Negative for chills and fever. HENT: Negative for ear pain and sore throat. Eyes: Negative for pain. Respiratory: Positive for shortness of breath. Negative for chest tightness. Cardiovascular: Negative for chest pain and leg swelling. Gastrointestinal: Negative for abdominal pain, nausea and vomiting. Genitourinary: Negative for dysuria and flank pain. Musculoskeletal: Negative for back pain. Skin: Negative for rash. Neurological: Negative for headaches. All other systems reviewed and are negative. Vitals:    12/12/17 2020   BP: 162/86   Pulse: 72   Resp: 28   Temp: 97.6 °F (36.4 °C)   SpO2: 91%   Weight: 148.3 kg (327 lb)   Height: 6' 2\" (1.88 m)            Physical Exam   Constitutional: He appears well-developed and well-nourished. No distress. HENT:   Head: Normocephalic and atraumatic. Eyes: Pupils are equal, round, and reactive to light. No scleral icterus. Neck: Normal range of motion. Neck supple. No tracheal deviation present. Cardiovascular: Normal rate, regular rhythm, normal heart sounds and intact distal pulses. Exam reveals no gallop and no friction rub. No murmur heard. Pulmonary/Chest: Effort normal and breath sounds normal. No respiratory distress.  He has no wheezes. He has no rales. Abdominal: Soft. He exhibits no distension. There is no tenderness. There is no rebound and no guarding. Musculoskeletal: He exhibits no edema. Neurological: He is alert. Skin: Skin is warm and dry. Psychiatric: He has a normal mood and affect. Nursing note and vitals reviewed. Note written by Eva Mckeon, as dictated by Khadra Hernandez. Jodene Siemens, MD 9:13 PM    MDM  Number of Diagnoses or Management Options  Acute pulmonary edema ([address]): Anemia in stage 4 chronic kidney disease [facility]):   Diagnosis management comments: 43-year-old male with history of CKD, anemia presents with shortness of breath. Differential diagnosis anemia, CHF. Plan: lasix, transfusion, admit    Patient Progress  Patient progress: (Total critical care time spent exclusive of procedures: 37 minutes  )    ED Course       Procedures  ED EKG interpretation:  Rhythm: normal sinus rhythm; and regular . Rate (approx.): 71; Axis: normal; ST/T wave: normal.  Note written by Eva Mckeon, as dictated by Khadra Hernandez.  Jodene Siemens, MD 10:51 PM

## 2017-12-13 NOTE — PROGRESS NOTES
Chart Reviewed: CM met with patient at bedside. Patient alert and oriented. CM introduced self and explained transition of care role. CM verified PCP,  demographics and updated patient contact number on file. Patient has no insurance coverage. CM provided education on care card and provided patient with application to complete and submit. Patient expressed that he has applied for Medicaid and awaiting to hear from agency. Patient is a 36year old male with an admitting diagnosis of CHF/ acute pulmonary. Patient's medical history consist of the following: HTN, CKD, Asthma, and CHF. Current Living Situation: Patient resides home alone in his own apartment with no steps to enter. Patient reports that he is independent with ADL's and IADL's. DME in the home utilized consist of walker, wheelchair, and O2. [facility] is patient's O2 provider. Patient reported that he only utilizes his home O2 when hemoglobin is low. Patient receives disability as source of income. Patient expressed that he would need assistance with paying for prescriptions. Pharmacy utilized for prescriptions is [facility] located off of [address] [place] [address]. Discharge Planning: Patient to discharge home. Dispositions TBD pending recommendations. There are no current CM consults or needs at this time. CM will continue to follow and assist with disposition needs as they arise. Mode of transport at time of discharge TBD at discharge. Care Management Interventions  PCP Verified by CM:  Yes (Patient reported that he last saw PCP in November)  Palliative Care Criteria Met (RRAT>21 & CHF Dx)?: No  Mode of Transport at Discharge: BLS (TBD )  Transition of Care Consult (CM Consult):  (There are no CM consults or needs)  Discharge Durable Medical Equipment: No  Physical Therapy Consult: No  Occupational Therapy Consult: No  Speech Therapy Consult: No  Current Support Network: Own Home, Lives Alone  Confirm Follow Up Transport: Other (see comment) (TBD)  Plan discussed with Pt/Family/Caregiver: Yes  Freedom of Choice Offered: Yes  Discharge Location  Discharge Placement: Home (TBD pending recommendations.)    HANG Jc/YAYO  3:56 PM

## 2017-12-13 NOTE — PROGRESS NOTES
Clinical Care Lead Arrival Summary        Name: Saundra Watkins  MRN: [id number]  Date: 2017 1:19 PM  Admission Date: 2017  : 1975  Situation:  17 ER ADMISSION TO SURGICAL TELEMETRY UNIT FOR CHF EXACERBATION BY DR. [id number].  ABRAMSON    Background:     Chief Complaint        Respiratory Distress [[id number]]        Medical History      Past Medical History Date Comments   Arrhythmia [I49.9]  afib   Asthma [J45.909]  3/2013 last attack   CKD (chronic kidney disease), stage III [N18.3] 2013 Nephrology: Lisette Andino MD   Diabetes [facility]) [E11.9]     Gout [M10.9]     Heart failure ([address] [I50.9]     Hyperlipidemia [E78.5]     Hypertension [I10]     Ill-defined condition [R69]     Positive PPD, treated [R76.11]  treated 9 months      Surgical History      Past Surgical History Laterality Date Comments   HX HEENT[UPQ203]   tonsilectomy   HX TONSILLECTOMY[SHX27]      HX HERNIA REPAIR[SHX51]      HX BELOW KNEE AMPUTATION[TRK410] Left  due to osteomyelitis   HX OTHER SURGICAL[EBF541]   below the knee right amputation          The Beta blocker the patient is taking is [unfilled], NORVASC 10 GM AT 0917        STAND:   Voice quality/secretions: Vocal Quality/Secretions: Normal  Hx of dysphagia: History of Dysphagia: No  O2 sat: O2 Saturation: Normal  Alertness: Alertness: Normal    Flu vaccination received for current season: SCREEN    VTE Documentation-CHEMICAL  VTE Risk Assessment    Mechanical VTE orders: Mechanical VTE Orders: No  Venous Foot Pump:    Graduated Compression Stockings:    Sequential Compression Devices:    Patient Refused VTE:        Diet: DIET DIABETIC CONSISTENT CARB Regular; 2 GM NA (House Low NA)  DIET ONE TIME MESSAGE              Assessment:    Lines/Drains/Airways:   Peripheral IV 17 Left Arm (Active)   Site Assessment Clean, dry, & intact 2017  8:59 AM   Phlebitis Assessment 0 2017  8:59 AM   Infiltration Assessment 0 2017 8:59 AM   Dressing Status Clean, dry, & intact 12/13/2017  8:59 AM   Dressing Type Transparent;Tape 12/13/2017  8:59 AM   Hub Color/Line Status Pink;Flushed 12/13/2017  8:59 AM   Action Taken Blood drawn 12/12/2017  8:36 PM       Last 3 Weights:  Last 3 Recorded Weights in this Encounter    12/12/17 2020   Weight: 148.3 kg (327 lb)     DAILY STANDING WEIGHT ON TELEMETRY BETWEEN 12 MIDNIGHT AND 0700    Recommendations: CHF PATHWAY  Consult Orders: )IP CONSULT TO CARDIOLOGY  IP CONSULT TO NEPHROLOGY  Recent orders:  XR CHEST PA LAT  NM CARDIAC SPECT W STRS/REST MULT  ACTIVITY AS TOLERATED W/ASSIST  CARDIAC MONITORING  VITAL SIGNS PER UNIT ROUTINE  NOTIFY PROVIDER: VITAL SIGNS CHANGES  POC GLUCOSE  POC GLUCOSE  POC GLUCOSE  POC GLUCOSE  INITIAL PHYSICIAN ORDER: INPATIENT  DIET DIABETIC CONSISTENT CARB  FULL CODE  DIET ONE TIME MESSAGE  IP CONSULT TO CARDIOLOGY  IP CONSULT TO NEPHROLOGY    Core Measures Compliant   CHF:YES   Pneumonia:NA   Yusuf Corrigan   SCIP:NA   Sellers:NA   AMI:NA

## 2017-12-14 NOTE — PROGRESS NOTES
Hospitalist Progress Note  Jhonny Betancourt MD  Answering service: [phone] from in house phone         Date of Service:  2017  NAME:  Damaso Otero  :  1975  MRN:  [id number]      Admission Summary:     History of Presenting Illness:   Damaso Otero is a 36year old man with past medical hx noted for HTN, CKD, Asthma, CHF, DM came to the ED because of shortness of breath. This started about three days ago. Patient was very reluctant to come to the ED at the onset of his symptoms, he was advised to go to the ED today by his nephrologist. The shortness of breath is progressive worse with mild exertion, slight improvement with rest. He was last admitted here from 10/21/17 to 10/22/17 for evaluation and treatment of similar symptoms. Patient has transfusion dependent anemia, received blood during the last hospitalization. Chest X-ray done on arrival at the ED showed pulmonary edema, patient received Lasix, was started on blood transfusion by ED provider. The shortness of breath is not associated with fever, rigors or chills. Patient was referred to hospitalist service for admission.               Interval history / Subjective:   :  Feels better,   No new issues  Transitioning to po lasix 2017   CM to assess financial and assist pt getting the [facility]   Anticipate dc in am ; pt feels this is appropriated. Assessment & Plan:     Vol overload in setting of diastolic hf, improved overnight. SD stable followed by nephrology  DM   Lab Results   Component Value Date/Time    Glucose 253 2017 03:22 AM    Glucose 248 2017 03:22 AM    Glucose (POC) 346 2017 12:00 PM    asdjusts upward the insulins  Acute pul edema 2n2 sd, acute on chronic diastolic hf. EF of Systolic function was vigorous.  Ejection fraction was  estimated in the range of 70 % to 75 %  Changing to po lasix , cont on BB, hydralazine, , not a ace/arb candidate       Code status: full   DVT prophylaxis: [address] discussed with: Patient/Family, Nurse and   Disposition: Home w/Family and TBD     Hospital Problems  Date Reviewed: 12/13/2017          Codes Class Noted POA    * (Principal)CHF (congestive heart failure) ([facility] [address] ICD-10-CM: I50.9  ICD-9-CM: 428.0  3/26/2017 Yes                Review of Systems:   Pertinent items are noted in HPI. No sob no edema issues, no n/v/d/f/chills, anxious for discharge in am   Vital Signs:    Last 24hrs VS reviewed since prior progress note. Most recent are:  Visit Vitals    /83 (BP 1 Location: Right arm, BP Patient Position: At rest;Sitting)    Pulse 67    Temp 96.2 °F (35.7 °C)    Resp 20    Ht 6' 2\" (1.88 m)    Wt 142.1 kg (313 lb 4.4 oz)    SpO2 99%    BMI 40.22 kg/m2         Intake/Output Summary (Last 24 hours) at 12/14/17 1331  Last data filed at 12/14/17 0803   Gross per 24 hour   Intake                0 ml   Output             1200 ml   Net            -1200 ml        Physical Examination:             Constitutional:  No acute distress, cooperative, pleasant    ENT:  Oral mucous moist, oropharynx benign. Neck supple,    Resp:  CTA bilaterally. No wheezing/rhonchi/rales. No accessory muscle use   CV:  Regular rhythm, normal rate, no murmurs, gallops, rubs    GI:  Soft, non distended, non tender. normoactive bowel sounds, no hepatosplenomegaly     Musculoskeletal:  No edema, warm, 1+ pulses throughout zara stumps stable     Neurologic:  Moves all extremities. AAOx3, CN II-XII reviewed     Psych:  Good insight, Not anxious nor agitated.        Data Review:    Review and/or order of clinical lab test      Labs:     Recent Labs      12/14/17 0322 12/13/17   0729   WBC  8.6  9.4   HGB  8.3*  8.5*   HCT  26.2*  27.3*   PLT  261  248     Recent Labs      12/14/17   0322  12/13/17   0729  12/12/17   2032   NA  139  140  141  138   K 4.1  4.1  4.0  4.4   CL  107  107  108  105   CO2  25  26  24  23   BUN  60*  61*  61*  67*   CREA  4.11*  4.07*  4.15*  4.52*   GLU  248*  253*  180*  298*   CA  8.8  8.8  8.7  8.7  8.6   MG   --   1.6   --    PHOS  2.9  2.9   --      Recent Labs      12/14/17 0322  12/13/17 0729 12/12/17 2032   SGOT   --   7*  11*   ALT   --   16  18   AP   --   108  111   TBILI   --   0.9  0.7   TP   --   7.5  7.6   ALB  3.3*  3.5  3.6   GLOB   --   4.0  4.0     No results for input(s): INR, PTP, APTT in the last 72 hours. No lab exists for component: INREXT   Recent Labs      12/14/17 0322   TIBC  172*   PSAT  24      Lab Results   Component Value Date/Time    Folate 4.7 11/10/2015 03:40 AM      No results for input(s): PH, PCO2, PO2 in the last 72 hours.   Recent Labs      12/13/17 0729 12/12/17 2032   CPK  427*   --    CKNDX  0.6   --    TROIQ  <0.04  <0.04     Lab Results   Component Value Date/Time    Cholesterol, total 218 11/09/2016 04:48 AM    HDL Cholesterol 39 11/09/2016 04:48 AM    LDL, calculated 150.4 11/09/2016 04:48 AM    Triglyceride 143 11/09/2016 04:48 AM    CHOL/HDL Ratio 5.6 11/09/2016 04:48 AM     Lab Results   Component Value Date/Time    Glucose (POC) 346 12/14/2017 12:00 PM    Glucose (POC) 231 12/14/2017 07:23 AM    Glucose (POC) 313 12/13/2017 10:05 PM    Glucose (POC) 299 12/13/2017 04:19 PM    Glucose (POC) 279 12/13/2017 12:47 PM     Lab Results   Component Value Date/Time    Color YELLOW/STRAW 09/26/2017 06:16 PM    Appearance CLEAR 09/26/2017 06:16 PM    Specific gravity 1.015 09/26/2017 06:16 PM    pH (UA) 5.5 09/26/2017 06:16 PM    Protein >300 09/26/2017 06:16 PM    Glucose NEGATIVE  09/26/2017 06:16 PM    Ketone NEGATIVE  09/26/2017 06:16 PM    Bilirubin NEGATIVE  09/26/2017 06:16 PM    Urobilinogen 0.2 09/26/2017 06:16 PM    Nitrites NEGATIVE  09/26/2017 06:16 PM    Leukocyte Esterase NEGATIVE  09/26/2017 06:16 PM    Epithelial cells FEW 09/26/2017 06:16 PM    Bacteria 1+ 09/26/2017 06:16 PM    WBC 0-4 09/26/2017 06:16 PM    RBC 0-5 09/26/2017 06:16 PM         Medications Reviewed:     Current Facility-Administered Medications   Medication Dose Route Frequency    acetaminophen (TYLENOL) tablet 650 mg  650 mg Oral Q4H PRN    albuterol (PROVENTIL VENTOLIN) nebulizer solution 2.5 mg  2.5 mg Nebulization Q4H PRN    amitriptyline (ELAVIL) tablet 50 mg  50 mg Oral QHS    amLODIPine (NORVASC) tablet 10 mg  10 mg Oral DAILY    aspirin chewable tablet 81 mg  81 mg Oral DAILY    atorvastatin (LIPITOR) tablet 40 mg  40 mg Oral QHS    doxercalciferol (HECTOROL) capsule 1 mcg  1 mcg Oral DAILY    cloNIDine HCl (CATAPRES) tablet 0.3 mg  0.3 mg Oral TID    hydrALAZINE (APRESOLINE) tablet 100 mg  100 mg Oral Q8H    [START ON 12/15/2017] multivitamin, tx-iron-ca-min (THERA-M w/ IRON) tablet 1 Tab  1 Tab Oral DAILY    labetalol (NORMODYNE) tablet 300 mg  300 mg Oral Q8H    methocarbamol (ROBAXIN) tablet 750 mg  750 mg Oral TID    oxyCODONE IR (ROXICODONE) tablet 15 mg  15 mg Oral Q8H PRN    sevelamer carbonate (RENVELA) tab 800 mg  800 mg Oral TID WITH MEALS    sodium bicarbonate tablet 650 mg  650 mg Oral BID    ondansetron (ZOFRAN) injection 4 mg  4 mg IntraVENous Q4H PRN    docusate sodium (COLACE) capsule 100 mg  100 mg Oral BID    heparin (porcine) injection 5,000 Units  5,000 Units SubCUTAneous Q8H    insulin lispro (HUMALOG) injection   SubCUTAneous AC&HS    glucose chewable tablet 16 g  4 Tab Oral PRN    dextrose (D50W) injection syrg 12.5-25 g  12.5-25 g IntraVENous PRN    glucagon (GLUCAGEN) injection 1 mg  1 mg IntraMUSCular PRN    albuterol (PROVENTIL VENTOLIN) nebulizer solution 2.5 mg  2.5 mg Nebulization Q4H PRN    pantoprazole (PROTONIX) tablet 40 mg  40 mg Oral ACB    arformoterol (BROVANA) neb solution 15 mcg  15 mcg Nebulization BID RT    And    budesonide (PULMICORT) 500 mcg/2 ml nebulizer suspension  500 mcg Nebulization BID RT    regadenoson (LEXISCAN) injection 0.4 mg  0.4 mg IntraVENous RAD ONCE    furosemide (LASIX) injection 60 mg  60 mg IntraVENous Q12H    epoetin craig (EPOGEN;PROCRIT) injection 20,000 Units  20,000 Units SubCUTAneous Q7D    0.9% sodium chloride infusion 250 mL  250 mL IntraVENous PRN     ______________________________________________________________________  EXPECTED LENGTH OF STAY: 4d 12h  ACTUAL LENGTH OF STAY:          1                 Julia May MD

## 2017-12-14 NOTE — PROGRESS NOTES
Bedside and Verbal shift change report given to Makayla Henderson RN (oncoming nurse) by Henrik Flores RN (offgoing nurse). Report included the following information SBAR, Intake/Output, MAR, Recent Results, Med Rec Status and Cardiac Rhythm NSR.

## 2017-12-14 NOTE — CONSULTS
[address]    Anyi Monge  MR#: [id number]  : 1975  ACCOUNT #: [de-identified]   DATE OF SERVICE: 2017    REFERRING PHYSICIAN: Dr. Irish Javier. REASON FOR CONSULTATION: Management of a patient with chronic kidney disease with fluid overload. HISTORY OF PRESENT ILLNESS: The patient is a 40-year-old [facility] man who is very well known to our service. He has chronic kidney disease on diabetic nephropathy. He is at stage IV with creatinine around 3.5. The patient is being followed in our office by my partner, Dr. Marlene Leal. The patient had blood work done last week and was advised to come to the emergency room for further evaluation since his blood work was abnormal for his date. The patient presented to the emergency room with shortness of breath and swelling of his amputation stumps. He apparently used to do very well on Lasix 60 mg twice a day at home, but the dose was changed by some other physician to 40 mg to \"protect his kidneys. \" Gradually, the patient became short of breath and uncomfortable. The patient has nocturia, but not burning to urination. He does not have chest pain, chills, nausea, vomiting. The patient is on anemia management which was started by his primary care physician, Dr. Jonah Gordon. He already received 1 dose of erythropoietin in the form of Procrit, although he does not remember the exact dose. In the emergency room, the patient had a chest x-ray which shows pulmonary edema. When I saw him, he felt slightly better. PAST MEDICAL HISTORY: Arrhythmia with atrial fibrillation, asthma, CKD stage IV, diabetes mellitus, gout, heart failure, hyperlipidemia, hypertension. 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 PAST SURGICAL HISTORY: Below the knee amputation of the right and left legs. Tonsillectomy, hernia repair. ALLERGIES: ACE INHIBITOR. MEDICATIONS LIST PRIOR TO THIS ADMISSION:   1.  Omeprazole 40 once a day. 2. Zofran when needed, 8 mg. 3. Robaxin 750 once a day. 4. Renvela 800 mg 3 times a day. 5. Clonidine 0.3, 3 times a day. 6. Sodium bicarbonate 650 twice a day. 7. Albuterol inhaler. 8. Calcifediol which is Rayaldee 30 mcg nightly. 9. Iron in the form of Integra. 10. Advair Diskus. 11. Insulin Humalog. 12. Normodyne 300 mg q.8h.   13. Hydralazine 100 mg 3 times a day. 14. Lasix 40 twice a day. 15. Oxycodone. 16.  Amlodipine 10. 17. Aspirin 81. 18. Amitriptyline 50 at bedtime. FAMILY HISTORY: Mother with hypertension and diabetes. Brother with hypertension and diabetes. Multiple other family members with the same condition. SOCIAL HISTORY: The patient lives independently. He denies alcohol, tobacco, or illicit drug abuse. He walks with a walker using prostheses. REVIEW OF SYSTEMS:   CONSTITUTIONAL: No chills, no fever. INTEGUMENTARY: No pruritus. HEENT: No visual or auditory disturbances. RESPIRATORY: Positive for shortness of breath and cough. No sputum production. No hemoptysis. CARDIOVASCULAR: Chest pain. GASTROINTESTINAL: No nausea, vomiting, diarrhea, constipation. MUSCULOSKELETAL: The patient reports that he has been treated for lymphedema of lower extremities. PHYSICAL EXAMINATION:   GENERAL:  Middle-aged black male who is awake, alert, oriented. He is conversant. He does not appear to be in acute distress. The patient is able to provide adequate history. VITAL SIGNS: Blood pressure is 182/79, heart rate is 86, respirations 20, oxygen saturation 92% with nasal cannula. HEAD: Normocephalic. EYES: Anicteric sclerae. MOUTH: Moist oral mucosa. EARS AND NOSE: Without abnormal discharge. NECK: Supple with no increased JVP, carotid bruits, or thyromegaly. LUNGS: With diminished breath sounds with few bibasilar rales. HEART: S1 and S2. Regular rate and rhythm. ABDOMEN: Soft, obese, nontender, nondistended.    EXTREMITIES: Bilateral below the knee amputations and amputation stumps are edematous. NEUROLOGIC: Nonfocal. The patient is awake, alert, oriented to self, time and place. LABORATORY DATA: Sodium is 141, potassium is 4, CO2 is 24, BUN is 61, creatinine is 4.15. Hemoglobin is 8.5. Chest x-ray shows significant edema. IMPRESSION:   1. Chronic kidney disease stage IV. The patient most probably progressed to stage IV and this is his new baseline. His electrolytes are normal.   2. Volume status is expanded with hypervolemia with peripheral edema and pulmonary congestion. The patient did very well on 60 mg twice a day and the dose did not need to be adjusted to protect the kidneys since the patient already has established chronic kidney disease diagnosed. 3. Anemia of chronic kidney disease, his hemoglobin is below target. 4. Uncontrolled hypertension, most probably due to hypervolemia. RECOMMENDATIONS: Increase the dose of Lasix for now to 60 mg twice a day in injectable form. Monitor urinary output carefully. The patient needs to be on a negative fluid balance, at least 3-4 kg before he feels better. Anemia management. Continue Procrit. Assess iron profile. The goal of patient's hemoglobin is above 10. Screen for secondary parathyroidism which is expected at this level of the patient's renal function with PTH, phosphorus, and calcium level. Monitor renal function carefully. Right now, the patient does not require renal replacement therapy, but his condition may deteriorate further since he is already in advanced stage. Avoid any unnecessary nephrotoxic agents. Thank you very much for the opportunity to be part of this patient's care.       Ta Morales MD       LTD / DN  D: 12/13/2017 18:01     T: 12/14/2017 08:59  JOB #: [id number]  CC: Tim Singh MD  CC: Joselito Vu MD

## 2017-12-14 NOTE — PROGRESS NOTES
TRANSFER - OUT REPORT:    Verbal report given to Karen Hirsch RN (name) on Julián Oneil  being transferred to [address] (unit) for routine progression of care       Report consisted of patients Situation, Background, Assessment and   Recommendations(SBAR). Information from the following report(s) SBAR, MAR, Recent Results and Cardiac Rhythm NSR was reviewed with the receiving nurse. Lines:   Peripheral IV 12/12/17 Left Arm (Active)   Site Assessment Clean, dry, & intact 12/14/2017 12:09 PM   Phlebitis Assessment 0 12/14/2017 12:09 PM   Infiltration Assessment 0 12/14/2017 12:09 PM   Dressing Status Clean, dry, & intact 12/14/2017 12:09 PM   Dressing Type Transparent;Tape 12/14/2017 12:09 PM   Hub Color/Line Status Pink;Capped 12/14/2017 12:09 PM   Action Taken Open ports on tubing capped 12/14/2017 12:09 PM   Alcohol Cap Used Yes 12/14/2017 12:09 PM        Opportunity for questions and clarification was provided.       Patient transported with:   O2 @ 5 liters

## 2017-12-14 NOTE — WOUND CARE
Wound Care Note:     New consult placed by physician request for f/u wound    Chart shows:  Admitted for CHF  Past Medical History:   Diagnosis Date    Arrhythmia     afib    Asthma     3/2013 last attack    CKD (chronic kidney disease), stage III 2/12/2013    Nephrology: Panda Peralta MD    Diabetes [facility])     Gout     Heart failure ([facility] [address]     Hyperlipidemia     Hypertension     Ill-defined condition     Positive PPD, treated 2001    treated 9 months     - bilateral BKA    WBC = 8.6 on 12/14/17  Admitted from home    Assessment:   Patient is A&O x 4, communicative, continent with no assistance needed in repositioning. Bed: Total Care  Diet: Diabetic consistent carb  Patient reports no pain    Bilateral heels, buttocks, and sacral skin intact and without erythema. 1. No wounds noted on patient. Hyperpigmentation noted on lower abdomen and christi-anal skin. Patient repositioned supine. Patient has bilateral BKA's     Recommendations:    Turn approximately every 2 hours    Skin Care & Pressure Prevention:  Minimize layers of linen/pads under patient to optimize support surface. Turn/reposition approximately every 2 hours and offload heels. Promote continence     Discussed above plan with patient & Aravind Rubalcava RN    Transition of Care: Wound care will sign off.       Cj Nicole" Ana Cristina Rodriguez RN, BSN, Wound Care Nurse  office [phone]  pager [phone] or call  to page

## 2017-12-14 NOTE — PROGRESS NOTES
TRANSFER - IN REPORT:    Verbal report received from Wendi(name) on Anusha Tesfaye  being received from [facility](unit) for routine progression of care      Report consisted of patients Situation, Background, Assessment and   Recommendations(SBAR). Information from the following report(s) SBAR, Intake/Output, MAR, Recent Results and Med Rec Status was reviewed with the receiving nurse. Opportunity for questions and clarification was provided. Assessment completed upon patients arrival to unit and care assumed.

## 2017-12-14 NOTE — DIABETES MGMT
DTC Consult Note    Recommendations/ Comments:  Please obtain PO intake    If appropriate, consider:    - Adding mealtime insulin, Humalog 5 units AC     Consult received for:      [x]             Outpatient education - Scheduled for DSMT class #1 on 17 at 1pm          Chart reviewed and initial evaluation complete on  [address] Patient is a 36 y.o. male with hx Type 2 Diabetes on Humalog 75/25 30 units AM and 10 units PM at home. BG monitoring at home 4 times per day. Patient reports variable BG levels at home    Assessed and instructed patient on the following:   ·  interpretation of lab results, blood sugar goals, hypoglycemia prevention and treatment, nutrition and referred to Diabetes Educator    Encouraged the following:   · dietary modifications: eat regular meals, add a bedtime snack to prevent low blood sugars , regular blood sugar monitorin-3 times daily    Provided patient with the following: [x]             Survival skills education materials               [x]             Insulin education materials                          [x]             Outpatient DTC contact number                          Discussed with patient need for follow up appointment for diabetes management after discharge. A1c:   Lab Results   Component Value Date/Time    Hemoglobin A1c 7.4 2017 07:29 AM       Recent Glucose Results: Lab Results   Component Value Date/Time     (H) 2017 03:22 AM     (H) 2017 03:22 AM    GLUCPOC 271 (H) 2017 03:57 PM    GLUCPOC 346 (H) 2017 12:00 PM    GLUCPOC 231 (H) 2017 07:23 AM        Lab Results   Component Value Date/Time    Creatinine 4.07 2017 03:22 AM    Creatinine 4.11 2017 03:22 AM     Estimated Creatinine Clearance: 35.2 mL/min (based on Cr of 4.07).     Active Orders   Diet    DIET DIABETIC CONSISTENT CARB Regular; 2 GM NA (House Low NA)        PO intake: Patient Vitals for the past 72 hrs:   % Diet Diann   12/14/17 1457 100 %       Current hospital DM medication: Lantus 24 units, Humalog for correction, normal sensitivity scale     Will continue to follow as needed. Thank you.     Elba Levi RD

## 2017-12-14 NOTE — CARDIO/PULMONARY
Cardiac Rehab: Heart Failure education folder, with Low Sodium brochure, is at the bedside of [address]. Added Krames Stress testing education to folder. Educated using teach back method. Discussed diagnosis definition and assessed patient understanding. Reviewed importance of daily weight monitoring and Low Sodium diet (0313-9147 mg. Daily). Stressed the importance of reading food labels. Encouraged activity and rest periods within symptom limitations and as ordered by physician. Reviewed lasix, purpose of medication, potential side effects, compliance, and what to do if dose is missed. Discussed importance of reporting signs and symptoms of exacerbation, and when to report them to the doctor, to prevent re-hospitalization. [address] was encouraged to keep all appointments with doctor. Did not refer to the Cardiac Rehab Program since DHF is not a covered diagnosis.   Thais Wilson RN

## 2017-12-14 NOTE — PROGRESS NOTES
1:43 PM  Patient reviewed in rounds. Nephrology has been consulted and currently following. Discharge anticipated for tomorrow. Patient will need assistance with obtaining prescriptions at discharge. CM to assist.  Patient does have oxygen tank with him for transport home. CM to verify mode of transport for discharge. 2:06 PM  CM met with patient to review and discuss transport for discharge. Patient reported that family is able to assist with transport home. CM will continue to follow and assist with disposition needs as they arise.     HANG Serrano/CRM

## 2017-12-14 NOTE — DIABETES MGMT
DTC Progress Note    Recommendations/ Comments:  Chart reviewed on Anusha Galeas due to hyperglycemia, likely due to steroids and increased insulin needs, pt takes Humalog 75/25 30 units AM and 10 units PM at home. He received 17 units of correction insulin in the past 24 hours. If appropriate, consider:  - Adding Lantus 18 units daily  - please obtain PO intake  - Once pt eating >50% of tray, consider adding mealtime insulin, Humalog 4 units AC     Patient is a 36 y.o. male with hx Type 2 Diabetes on Humalog 75/25 30 units AM and 10 units PM at home. A1c:   Lab Results   Component Value Date/Time    Hemoglobin A1c 7.4 12/13/2017 07:29 AM    Hemoglobin A1c 8.0 09/27/2017 07:55 AM       Recent Glucose Results:   Lab Results   Component Value Date/Time     (H) 12/14/2017 03:22 AM     (H) 12/14/2017 03:22 AM    GLUCPOC 346 (H) 12/14/2017 12:00 PM    GLUCPOC 231 (H) 12/14/2017 07:23 AM    GLUCPOC 313 (H) 12/13/2017 10:05 PM        Lab Results   Component Value Date/Time    Creatinine 4.07 12/14/2017 03:22 AM    Creatinine 4.11 12/14/2017 03:22 AM     Estimated Creatinine Clearance: 35.2 mL/min (based on Cr of 4.07). Active Orders   Diet    DIET DIABETIC CONSISTENT CARB Regular; 2 GM NA (House Low NA)        PO intake:   Patient Vitals for the past 72 hrs:   % Diet Eaten   12/14/17 1457 100 %       Current hospital DM medication: Humalog for correction, normal sensitivity scale     Will continue to follow as needed.     Thank you  Rosenda Hinojosa RD  [facility]  Pager: [phone]

## 2017-12-14 NOTE — PROGRESS NOTES
Primary Nurse Saul Estrella, RN and cesia Hardyrn, RN performed a dual skin assessment on this patient No impairment noted  Jacob score is 21

## 2017-12-14 NOTE — PROGRESS NOTES
Primary Nurse Joseph Jackson RN and LEELEE Deng performed a dual skin assessment on this patient No impairment noted  Jacob score is 20

## 2017-12-14 NOTE — PROGRESS NOTES
Bedside shift change report given to Eloisa Ordoñez RN (oncoming nurse) by Josh Segal RN (offgoing nurse). Report included the following information SBAR.

## 2017-12-14 NOTE — PROGRESS NOTES
Bedside shift change report given to [employer] (oncoming nurse) by Sonya Bach (offgoing nurse). Report included the following information SBAR, Kardex, Intake/Output and MAR.

## 2017-12-14 NOTE — PROGRESS NOTES
Name: Vidya Henry MRN: [id number]   : 1975 Hospital: [address]   Date: 2017        IMPRESSION:   · CKD stage 4 with gradual loss of GFR compatible with diabetic nephropathy. · Hypervolemia after change of diuretic dose. Patient responded well to parenteral Lasix. Currently asymptomatic. · Anemia of CKD- on Procrit. · Secondary hyperparathyroidism- PTH is above 200. PLAN:   · No need of RRT at this time. · Continue management of advanced CKD. · May transition the diuretics to po. · Start Vit D analog d/w pharmacy. · Avoid nephrotoxic agents. Subjective/Interval History:   I have reviewed the flowsheet and previous days notes. ROS:Pertinent items are noted in HPI. Patient feels better. Objective:   Vital Signs:    Visit Vitals    /78 (BP 1 Location: Right arm, BP Patient Position: At rest)    Pulse 70    Temp 97.8 °F (36.6 °C)    Resp 18    Ht 6' 2\" (1.88 m)    Wt 142.1 kg (313 lb 4.4 oz)    SpO2 96%    BMI 40.22 kg/m2       O2 Device: Nasal cannula   O2 Flow Rate (L/min): 5 l/min   Temp (24hrs), Av.1 °F (36.7 °C), Min:97.8 °F (36.6 °C), Max:98.4 °F (36.9 °C)       Intake/Output:   Last shift:         Last 3 shifts:  1901 -  0700  In: 228   Out: 1931 [Urine:4125]    Intake/Output Summary (Last 24 hours) at 17 1228  Last data filed at 17 0803   Gross per 24 hour   Intake                0 ml   Output             1200 ml   Net            -1200 ml        Physical Exam:  General:    Alert, cooperative, no distress, appears stated age. Head:   Normocephalic, without obvious abnormality, atraumatic. Eyes:   Conjunctivae/corneas clear. Neck:  Supple, symmetrical,  no adenopathy, thyroid: non tender    no carotid bruit and no JVD. Lungs:   Clear to auscultation bilaterally. No Wheezing or Rhonchi. No rales. Chest wall:  No tenderness or deformity. No Accessory muscle use.   Heart:   Regular rate and rhythm,  no murmur, rub or gallop. Abdomen:   Soft, non-tender. Not distended. Bowel sounds normal. No masses  Extremities: B/L BKA with less edema. Skin:     Texture, turgor normal. No rashes or lesions. Not Jaundiced  Lymph nodes: Cervical, supraclavicular normal.  Psych:  Good insight. Not depressed. Not anxious or agitated. Neurologic: Alert and oriented X 3. DATA:  Labs:  Recent Labs      12/14/17   0322  12/13/17   0729 12/12/17 2032   NA  139  140  141  138   K  4.1  4.1  4.0  4.4   CL  107  107  108  105   CO2  25  26  24  23   BUN  60*  61*  61*  67*   CREA  4.11*  4.07*  4.15*  4.52*   CA  8.8  8.8  8.7  8.7  8.6   ALB  3.3*  3.5  3.6   PHOS  2.9  2.9   --    MG   --   1.6   --      Recent Labs      12/14/17 0322  12/13/17 0729 12/12/17 2032   WBC  8.6  9.4  8.9   HGB  8.3*  8.5*  8.1*   HCT  26.2*  27.3*  26.5*   PLT  261  248  258     No results for input(s): VIOLA, KU, CLU, CREAU in the last 72 hours.     No lab exists for component: PROU    Total time spent with patient:  35 minutes    [] Critical Care Provided    Care Plan discussed with:   Staff, Medical Team    Carolyn Rao MD

## 2017-12-15 PROBLEM — I50.9 CHF (CONGESTIVE HEART FAILURE) (HCC): Status: RESOLVED | Noted: 2017-03-26 | Resolved: 2017-01-01

## 2017-12-15 NOTE — PROGRESS NOTES
Bedside and Verbal shift change report given to Quinn (oncoming nurse) by Martha Abrams (offgoing nurse). Report included the following information SBAR and Kardex.

## 2017-12-15 NOTE — NURSE NAVIGATOR
HF NN spoke with Dr. Nieves Child office and scheduled patient for cardiology follow up on Wednesday, 12/20/17 at 1:20 PM.  Appointment details placed on AVS.

## 2017-12-15 NOTE — PROGRESS NOTES
9: 07 AM  CM notified that patient has orders for discharge. CM received a consult for home health RN for complex medical regimen, and follow-up PCP appointment. Patient has no insurance coverage. CM submitted a referral via [facility] to [facility] for review. CM currently awaiting receipt of referral.  CM contacted CM Specialist, Lord Delgado via email requesting a PCP appointment be scheduled. CM Specialist to follow-up and place appointment on AVS.    Patient is requesting assistance with discharge medications due to limited funds and no insurance coverage. CM faxed [address] [facility] [phone] and requested to be contacted for total amount. CM will continue to follow and assist with disposition needs as they arise. 10:42 AM  CM received notification from [facility] that they are able to accept and accommodate patient for services. Patient will discharge with home health for RN and SW.  CM placed follow-up on AVS.    CM received update from Pharmacy that medications came to a total of $1019.23. CM met with supervisor to review. Supervisor assisting CM with review of medications and what will be covered. CM will continue to follow and assist with disposition needs as they arise. 12:04 PM  CM received notification that the [facility] and [address] [place] [address] to cover prescriptions for patient. Patient responsible for over the counter prescriptions and doxercalciferol 1 mcg cap which is currently not in stock at [facility]patient [facility]. Pharmacy to deliver medications bedside. CM informed expectancy of medication delivery is between 1:00-1:30 PM.  CM to notify RN. Mode of transport at time of discharge to be provided by patient's friend.     HANG Escalante/YAYO

## 2017-12-15 NOTE — DISCHARGE INSTRUCTIONS
Discharge Instructions       PATIENT ID: Gerhardt Holder  MRN: [id number]   YOB: 1975    DATE OF ADMISSION: 12/12/2017  8:17 PM    DATE OF DISCHARGE: 12/15/2017    PRIMARY CARE PROVIDER: Miguel Vo MD     ATTENDING PHYSICIAN: Dimitrios Guzman MD  DISCHARGING PROVIDER: Dimitrios Guzman MD    To contact this individual call [phone] and ask the  to page. If unavailable ask to be transferred the Adult Hospitalist Department. DISCHARGE DIAGNOSES vol overload     CONSULTATIONS: IP CONSULT TO CARDIOLOGY  IP CONSULT TO NEPHROLOGY    PROCEDURES/SURGERIES: * No surgery found *    PENDING TEST RESULTS:   At the time of discharge the following test results are still pending: na    FOLLOW UP APPOINTMENTS:   Follow-up Information     Follow up With Details Comments Contact Info    Miguel Vo MD In 1 week  [address] [facility] [address] [phone]             ADDITIONAL CARE RECOMMENDATIONS: na    DIET: Cardiac Diet and Diabetic Diet     ACTIVITY: Activity as tolerated    WOUND CARE: na    EQUIPMENT needed: na      DISCHARGE MEDICATIONS:   See Medication Reconciliation Form    · It is important that you take the medication exactly as they are prescribed. · Keep your medication in the bottles provided by the pharmacist and keep a list of the medication names, dosages, and times to be taken in your wallet. · Do not take other medications without consulting your doctor. NOTIFY YOUR PHYSICIAN FOR ANY OF THE FOLLOWING:   Fever over 101 degrees for 24 hours. Chest pain, shortness of breath, fever, chills, nausea, vomiting, diarrhea, change in mentation, falling, weakness, bleeding. Severe pain or pain not relieved by medications. Or, any other signs or symptoms that you may have questions about.       DISPOSITION:    Home With:   OT  PT  [facility]  RN       SNF/Inpatient Rehab/LTAC   x Independent/assisted living    Hospice    Other:            Signed:   Dimitrios Guzman MD  12/15/2017  8:00 AM    .     Heart Failure: Care Instructions  Your Care Instructions    Heart failure occurs when your heart does not pump as much blood as the body needs. Failure does not mean that the heart has stopped pumping but rather that it is not pumping as well as it should. Over time, this causes fluid buildup in your lungs and other parts of your body. Fluid buildup can cause shortness of breath, fatigue, swollen ankles, and other problems. By taking medicines regularly, reducing sodium (salt) in your diet, checking your weight every day, and making lifestyle changes, you can feel better and live longer. Follow-up care is a key part of your treatment and safety. Be sure to make and go to all appointments, and call your doctor if you are having problems. It's also a good idea to know your test results and keep a list of the medicines you take. How can you care for yourself at home? Medicines  ? · Be safe with medicines. Take your medicines exactly as prescribed. Call your doctor if you think you are having a problem with your medicine. ? · Do not take any vitamins, over-the-counter medicine, or herbal products without talking to your doctor first. Link Sindi not take ibuprofen (Advil or Motrin) and naproxen (Aleve) without talking to your doctor first. They could make your heart failure worse. ? · You may be taking some of the following medicine. ¨ Beta-blockers can slow heart rate, decrease blood pressure, and improve your condition. Taking a beta-blocker may lower your chance of needing to be hospitalized. ¨ Angiotensin-converting enzyme inhibitors (ACEIs) reduce the heart's workload, lower blood pressure, and reduce swelling. Taking an ACEI may lower your chance of needing to be hospitalized again. ¨ Angiotensin II receptor blockers (ARBs) work like ACEIs. Your doctor may prescribe them instead of ACEIs. ¨ Diuretics, also called water pills, reduce swelling.   ¨ Potassium supplements replace this important mineral, which is sometimes lost with diuretics. ¨ Aspirin and other blood thinners prevent blood clots, which can cause a stroke or heart attack. ? You will get more details on the specific medicines your doctor prescribes. Diet  ? · Your doctor may suggest that you limit sodium to 2,000 milligrams (mg) a day or less. That is less than 1 teaspoon of salt a day, including all the salt you eat in cooking or in packaged foods. People get most of their sodium from processed foods. Fast food and restaurant meals also tend to be very high in sodium. ? · Ask your doctor how much liquid you can drink each day. You may have to limit liquids. ?Weight  ? · Weigh yourself without clothing at the same time each day. Record your weight. Call your doctor if you have a sudden weight gain, such as more than 2 to 3 pounds in a day or 5 pounds in a week. (Your doctor may suggest a different range of weight gain.) A sudden weight gain may mean that your heart failure is getting worse. ? Activity level  ? · Start light exercise (if your doctor says it is okay). Even if you can only do a small amount, exercise will help you get stronger, have more energy, and manage your weight and your stress. Walking is an easy way to get exercise. Start out by walking a little more than you did before. Bit by bit, increase the amount you walk. ? · When you exercise, watch for signs that your heart is working too hard. You are pushing yourself too hard if you cannot talk while you are exercising. If you become short of breath or dizzy or have chest pain, stop, sit down, and rest.   ? · If you feel \"wiped out\" the day after you exercise, walk slower or for a shorter distance until you can work up to a better pace. ? · Get enough rest at night. Sleeping with 1 or 2 pillows under your upper body and head may help you breathe easier. ? Lifestyle changes  ? · Do not smoke. Smoking can make a heart condition worse.  If you need help quitting, talk to your doctor about stop-smoking programs and medicines. These can increase your chances of quitting for good. Quitting smoking may be the most important step you can take to protect your heart. ? · Limit alcohol to 2 drinks a day for men and 1 drink a day for women. Too much alcohol can cause health problems. ? · Avoid getting sick from colds and the flu. Get a pneumococcal vaccine shot. If you have had one before, ask your doctor whether you need another dose. Get a flu shot each year. If you must be around people with colds or the flu, wash your hands often. When should you call for help? Call 911 if you have symptoms of sudden heart failure such as:  ? · You have severe trouble breathing. ? · You cough up pink, foamy mucus. ? · You have a new irregular or rapid heartbeat. ?Call your doctor now or seek immediate medical care if:  ? · You have new or increased shortness of breath. ? · You are dizzy or lightheaded, or you feel like you may faint. ? · You have sudden weight gain, such as more than 2 to 3 pounds in a day or 5 pounds in a week. (Your doctor may suggest a different range of weight gain.)   ? · You have increased swelling in your legs, ankles, or feet. ? · You are suddenly so tired or weak that you cannot do your usual activities. ? Watch closely for changes in your health, and be sure to contact your doctor if you develop new symptoms. Where can you learn more? Go to http://amparo-ramon.info/. Enter O260 in the search box to learn more about \"Heart Failure: Care Instructions. \"  Current as of: September 21, 2016  Content Version: 11.4  © 6246-8623 [employer]. Care instructions adapted under license by [employer] (which disclaims liability or warranty for this information).  If you have questions about a medical condition or this instruction, always ask your healthcare professional. Tiffanie Rg any warranty or liability for your use of this information.

## 2017-12-15 NOTE — PROGRESS NOTES
Name: Ana Azevedo MRN: [id number]   : 1975 Hospital: [address]   Date: 12/15/2017        IMPRESSION:   · CKD stage 4 with gradual loss of GFR compatible with diabetic nephropathy. · Hypervolemia after change of diuretic dose. Patient responded well to parenteral Lasix. Currently asymptomatic. Now on P.O Lasix  · Anemia of CKD- on Procrit. · Secondary hyperparathyroidism- PTH is above 200. PLAN:   · No need of RRT at this time. · Continue management of advanced CKD. · Home with Lasix 60 mg BID  · Start Vit D analog d/w pharmacy. · F/U with Dr. Amena Mccallum within a month. Subjective/Interval History:   I have reviewed the flowsheet and previous days notes. ROS:Pertinent items are noted in HPI. Patient feels better. Objective:   Vital Signs:    Visit Vitals    /86 (BP 1 Location: Right arm, BP Patient Position: At rest)    Pulse 66    Temp 97.9 °F (36.6 °C)    Resp 18    Ht 6' 2\" (1.88 m)    Wt 142.7 kg (314 lb 9.5 oz)    SpO2 96%    BMI 40.39 kg/m2       O2 Device: Nasal cannula   O2 Flow Rate (L/min): 4 l/min   Temp (24hrs), Av.7 °F (36.5 °C), Min:97.4 °F (36.3 °C), Max:98 °F (36.7 °C)       Intake/Output:   Last shift:      12/15 07 - 12/15 190  In: 240 [P.O.:240]  Out: 800 [Urine:800]  Last 3 shifts: 1901 - 12/15 0700  In: 480 [P.O.:480]  Out: 3700 [Urine:3700]    Intake/Output Summary (Last 24 hours) at 12/15/17 1345  Last data filed at 12/15/17 0954   Gross per 24 hour   Intake              720 ml   Output             3300 ml   Net            -2580 ml        Physical Exam:  General:    Alert, cooperative, no distress, appears stated age. Head:   Normocephalic, without obvious abnormality, atraumatic. Eyes:   Conjunctivae/corneas clear. Neck:  Supple, symmetrical,  no adenopathy, thyroid: non tender    no carotid bruit and no JVD. Lungs:   Clear to auscultation bilaterally. No Wheezing or Rhonchi. No rales.   Chest wall:  No tenderness or deformity. No Accessory muscle use. Heart:   Regular rate and rhythm,  no murmur, rub or gallop. Abdomen:   Soft, non-tender. Not distended. Bowel sounds normal. No masses  Extremities: B/L BKA with less edema. Skin:     Texture, turgor normal. No rashes or lesions. Not Jaundiced  Lymph nodes: Cervical, supraclavicular normal.  Psych:  Good insight. Not depressed. Not anxious or agitated. Neurologic: Alert and oriented X 3. DATA:  Labs:  Recent Labs      12/14/17 0322 12/13/17 0729 12/12/17 2032   NA  139  140  141  138   K  4.1  4.1  4.0  4.4   CL  107  107  108  105   CO2  25  26  24  23   BUN  60*  61*  61*  67*   CREA  4.11*  4.07*  4.15*  4.52*   CA  8.8  8.8  8.7  8.7  8.6   ALB  3.3*  3.5  3.6   PHOS  2.9  2.9   --    MG   --   1.6   --      Recent Labs      12/14/17 0322  12/13/17 0729 12/12/17 2032   WBC  8.6  9.4  8.9   HGB  8.3*  8.5*  8.1*   HCT  26.2*  27.3*  26.5*   PLT  261  248  258     No results for input(s): VIOLA, KU, CLU, CREAU in the last 72 hours.     No lab exists for component: PROU    Total time spent with patient:  35 minutes    [] Critical Care Provided    Care Plan discussed with:   Staff, Medical Team    Juan Gonzalez MD

## 2017-12-15 NOTE — PROGRESS NOTES
Problem: Falls - Risk of  Goal: *Absence of Falls  Document Tonia Fall Risk and appropriate interventions in the flowsheet.    Outcome: Progressing Towards Goal  Fall Risk Interventions:  Mobility Interventions: Communicate number of staff needed for ambulation/transfer         Medication Interventions: Bed/chair exit alarm, Patient to call before getting OOB

## 2017-12-15 NOTE — DISCHARGE SUMMARY
Discharge Summary       PATIENT ID: Ana Azevedo  MRN: [id number]   YOB: 1975    DATE OF ADMISSION: 12/12/2017  8:17 PM    DATE OF DISCHARGE: 12/15/2017    PRIMARY CARE PROVIDER: Morgan Noland MD     ATTENDING PHYSICIAN: Pauline Hackett MD   DISCHARGING PROVIDER: Pauline Hackett MD    To contact this individual call [phone] and ask the  to page. If unavailable ask to be transferred the Adult Hospitalist Department. CONSULTATIONS: IP CONSULT TO CARDIOLOGY  IP CONSULT TO NEPHROLOGY    PROCEDURES/SURGERIES: * No surgery found *    ADMITTING DIAGNOSES & HOSPITAL COURSE:          Admission Summary:          History of Presenting Illness:   Veronica Magana a 36year old man with past medical hx noted for HTN, CKD, Asthma, CHF, DM came to the ED because of shortness of breath. This started about three days ago. Patient was very reluctant to come to the ED at the onset of his symptoms, he was advised to go to the ED today by his nephrologist. The shortness of breath is progressive worse with mild exertion, slight improvement with rest. He was last admitted here from 10/21/17 to 10/22/17 for evaluation and treatment of similar symptoms. Patient has transfusion dependent anemia, received blood during the last hospitalization. Chest X-ray done on arrival at the ED showed pulmonary edema, patient received Lasix, was started on blood transfusion by ED provider. The shortness of breath is not associated with fever, rigors or chills. Patient was referred to hospitalist service for admission.                    Interval history / Subjective:   12/14:  Feels better,   No new issues  Transitioning to po lasix 12/14/2017   CM to assess financial and assist pt [facility] the [facility]   Anticipate dc in am ; pt feels this is appropriated.       Assessment & Plan:      Vol overload in setting of diastolic hf, improved overnight.    SD stable followed by nephrology  Morbidly obese (BMI >/= 40)  DM         Lab Results   Component Value Date/Time     Glucose 253 12/14/2017 03:22 AM     Glucose 248 12/14/2017 03:22 AM     Glucose (POC) 346 12/14/2017 12:00 PM    asdjusts upward the insulins  Acute pul edema 2n2 yoni, acute on chronic diastolic hf. EF of Systolic function was vigorous. Ejection fraction was  estimated in the range of 70 % to 75 %  Changing to po lasix , cont on BB, hydralazine, , not a ace/arb candidate         Code status: full   DVT prophylaxis: [address] discussed with: Patient/Family, Nurse and   Disposition: Home w/Family and TBD      Hospital Problems  Date Reviewed: 12/13/2017             Codes Class Noted POA     * (Principal)CHF (congestive heart failure) (HCC) ICD-10-CM: I50.9  ICD-9-CM: 428.0   3/26/2017 Yes                     Review of Systems:   Pertinent items are noted in HPI.      No sob no edema issues, no n/v/d/f/chills, anxious for discharge in am   Vital Signs:    Last 24hrs VS reviewed since prior progress note. Most recent are:       Visit Vitals    /83 (BP 1 Location: Right arm, BP Patient Position: At rest;Sitting)    Pulse 67    Temp 96.2 °F (35.7 °C)    Resp 20    Ht 6' 2\" (1.88 m)    Wt 142.1 kg (313 lb 4.4 oz)    SpO2 99%    BMI 40.22 kg/m2            Intake/Output Summary (Last 24 hours) at 12/14/17 1331  Last data filed at 12/14/17 0803    Gross per 24 hour   Intake                0 ml   Output             1200 ml   Net            -1200 ml         Physical Examination:             Constitutional:  No acute distress, cooperative, pleasant    ENT:  Oral mucous moist, oropharynx benign. Neck supple,    Resp:  CTA bilaterally. No wheezing/rhonchi/rales. No accessory muscle use   CV:  Regular rhythm, normal rate, no murmurs, gallops, rubs    GI:  Soft, non distended, non tender.  normoactive bowel sounds, no hepatosplenomegaly     Musculoskeletal:  No edema, warm, 1+ pulses throughout zara stumps stable     Neurologic:  Moves all extremities. AAOx3, CN II-XII reviewed                                             Psych:  Good insight, Not anxious nor agitated.         Data Review:    Review and/or order of clinical lab test        Labs:           Recent Labs   [facility]  12/14/17 0322 12/13/17 0729   WBC  8.6  9.4   HGB  8.3*  8.5*   HCT  26.2*  27.3*   PLT  261  248            Recent Labs       12/14/17 0322 12/13/17 0729 12/12/17 2032   NA  139  140  141  138   K  4.1  4.1  4.0  4.4   CL  107  107  108  105   CO2  25  26  24  23   BUN  60*  61*  61*  67*   CREA  4.11*  4.07*  4.15*  4.52*   GLU  248*  253*  180*  298*   CA  8.8  8.8  8.7  8.7  8.6   MG   --   1.6   --    PHOS  2.9  2.9   --             Recent Labs       12/14/17 0322 12/13/17 0729 12/12/17 2032   SGOT   --   7*  11*   ALT   --   16  18   AP   --   108  111   TBILI   --   0.9  0.7   TP   --   7.5  7.6   ALB  3.3*  3.5  3.6   GLOB   --   4.0  4.0      No results for input(s): INR, PTP, APTT in the last 72 hours.     No lab exists for component: INREXT       Recent Labs       12/14/17 0322   TIBC  172*   PSAT  24            Lab Results   Component Value Date/Time     Folate 4.7 11/10/2015 03:40 AM      No results for input(s): PH, PCO2, PO2 in the last 72 hours.        Recent Labs       12/13/17 0729 12/12/17 2032   CPK  427*   --    CKNDX  0.6   --    TROIQ  <0.04  <0.04            Lab Results   Component Value Date/Time     Cholesterol, total 218 11/09/2016 04:48 AM     HDL Cholesterol 39 11/09/2016 04:48 AM     LDL, calculated 150.4 11/09/2016 04:48 AM     Triglyceride 143 11/09/2016 04:48 AM     CHOL/HDL Ratio 5.6 11/09/2016 04:48 AM            Lab Results   Component Value Date/Time     Glucose (POC) 346 12/14/2017 12:00 PM     Glucose (POC) 231 12/14/2017 07:23 AM     Glucose (POC) 313 12/13/2017 10:05 PM     Glucose (POC) 299 12/13/2017 04:19 PM     Glucose (POC) 279 12/13/2017 12:47 PM            Lab Results   Component Value Date/Time     Color YELLOW/STRAW 09/26/2017 06:16 PM     Appearance CLEAR 09/26/2017 06:16 PM     Specific gravity 1.015 09/26/2017 06:16 PM     pH (UA) 5.5 09/26/2017 06:16 PM     Protein >300 09/26/2017 06:16 PM     Glucose NEGATIVE  09/26/2017 06:16 PM     Ketone NEGATIVE  09/26/2017 06:16 PM     Bilirubin NEGATIVE  09/26/2017 06:16 PM     Urobilinogen 0.2 09/26/2017 06:16 PM     Nitrites NEGATIVE  09/26/2017 06:16 PM     Leukocyte Esterase NEGATIVE  09/26/2017 06:16 PM     Epithelial cells FEW 09/26/2017 06:16 PM     Bacteria 1+ 09/26/2017 06:16 PM     WBC 0-4 09/26/2017 06:16 PM     RBC 0-5 09/26/2017 06:16 PM            Medications Reviewed:             Current Facility-Administered Medications   Medication Dose Route Frequency    acetaminophen (TYLENOL) tablet 650 mg  650 mg Oral Q4H PRN    albuterol (PROVENTIL VENTOLIN) nebulizer solution 2.5 mg  2.5 mg Nebulization Q4H PRN    amitriptyline (ELAVIL) tablet 50 mg  50 mg Oral QHS    amLODIPine (NORVASC) tablet 10 mg  10 mg Oral DAILY    aspirin chewable tablet 81 mg  81 mg Oral DAILY    atorvastatin (LIPITOR) tablet 40 mg  40 mg Oral QHS    doxercalciferol (HECTOROL) capsule 1 mcg  1 mcg Oral DAILY    cloNIDine HCl (CATAPRES) tablet 0.3 mg  0.3 mg Oral TID    hydrALAZINE (APRESOLINE) tablet 100 mg  100 mg Oral Q8H    [START ON 12/15/2017] multivitamin, tx-iron-ca-min (THERA-M w/ IRON) tablet 1 Tab  1 Tab Oral DAILY    labetalol (NORMODYNE) tablet 300 mg  300 mg Oral Q8H    methocarbamol (ROBAXIN) tablet 750 mg  750 mg Oral TID    oxyCODONE IR (ROXICODONE) tablet 15 mg  15 mg Oral Q8H PRN    sevelamer carbonate (RENVELA) tab 800 mg  800 mg Oral TID WITH MEALS    sodium bicarbonate tablet 650 mg  650 mg Oral BID    ondansetron (ZOFRAN) injection 4 mg  4 mg IntraVENous Q4H PRN    docusate sodium (COLACE) capsule 100 mg  100 mg Oral BID    heparin (porcine) injection 5,000 Units  5,000 Units SubCUTAneous Q8H    insulin lispro (HUMALOG) injection    SubCUTAneous AC&HS    glucose chewable tablet 16 g  4 Tab Oral PRN    dextrose (D50W) injection syrg 12.5-25 g  12.5-25 g IntraVENous PRN    glucagon (GLUCAGEN) injection 1 mg  1 mg IntraMUSCular PRN    albuterol (PROVENTIL VENTOLIN) nebulizer solution 2.5 mg  2.5 mg Nebulization Q4H PRN    pantoprazole (PROTONIX) tablet 40 mg  40 mg Oral ACB    arformoterol (BROVANA) neb solution 15 mcg  15 mcg Nebulization BID RT     And    budesonide (PULMICORT) 500 mcg/2 ml nebulizer suspension  500 mcg Nebulization BID RT    regadenoson (LEXISCAN) injection 0.4 mg  0.4 mg IntraVENous RAD ONCE    furosemide (LASIX) injection 60 mg  60 mg IntraVENous Q12H    epoetin craig (EPOGEN;PROCRIT) injection 20,000 Units  20,000 Units SubCUTAneous Q7D    0.9% sodium chloride infusion 250 mL  250 mL IntraVENous PRN             DISCHARGE DIAGNOSES / PLAN:      1.  as above        PENDING TEST RESULTS:   At the time of discharge the following test results are still pending: na    FOLLOW UP APPOINTMENTS:    Follow-up Information     Follow up With Details Comments Contact Info    Miguel Vo MD In 1 week  [address] [id number] [phone]             ADDITIONAL CARE RECOMMENDATIONS: na    DIET: Cardiac Diet and Diabetic Diet     ACTIVITY: Activity as tolerated    WOUND CARE: na    EQUIPMENT needed: na      DISCHARGE MEDICATIONS:  Current Discharge Medication List      START taking these medications    Details   doxercalciferol 1 mcg cap Take 1 mcg by mouth daily. Qty: 30 Cap, Refills: 0      multivitamin, tx-iron-ca-min (THERA-M W/ IRON) 9 mg iron-400 mcg tab tablet Take 1 Tab by mouth daily. Qty: 30 Tab, Refills: 0         CONTINUE these medications which have CHANGED    Details   albuterol (PROVENTIL HFA, VENTOLIN HFA, PROAIR HFA) 90 mcg/actuation inhaler Take 2 Puffs by inhalation daily.   Qty: 1 Inhaler, Refills: 0      albuterol (PROVENTIL VENTOLIN) 2.5 mg /3 mL (0.083 %) nebulizer solution 3 mL by Nebulization route every four (4) hours as needed for Wheezing. Qty: 120 Package, Refills: 12      amitriptyline (ELAVIL) 50 mg tablet TAKE ONE TABLET BY MOUTH EVERY EVENING  Qty: 30 Tab, Refills: 0      amLODIPine (NORVASC) 10 mg tablet Take 1 Tab by mouth daily. Qty: 30 Tab, Refills: 0      aspirin 81 mg chewable tablet Take 1 Tab by mouth daily. Qty: 30 Tab, Refills: 0      atorvastatin (LIPITOR) 40 mg tablet Take 1 Tab by mouth nightly. Qty: 30 Tab, Refills: 0      cloNIDine HCl (CATAPRES) 0.3 mg tablet Take 1 Tab by mouth three (3) times daily. Qty: 90 Tab, Refills: 2    Associated Diagnoses: Essential hypertension, benign      fluticasone-salmeterol (ADVAIR) 250-50 mcg/dose diskus inhaler Take 1 Puff by inhalation daily. Qty: 1 Each, Refills: 0      furosemide (LASIX) 40 mg tablet Take 1.5 Tabs by mouth two (2) times a day. TAKE ONE TABLET BY MOUTH TWICE DAILY  Qty: 60 Tab, Refills: 0      hydrALAZINE (APRESOLINE) 100 mg tablet TAKE ONE TABLET BY MOUTH THREE TIMES DAILY  Qty: 90 Tab, Refills: 0      labetalol (NORMODYNE) 300 mg tablet Take 1 Tab by mouth every eight (8) hours. Qty: 90 Tab, Refills: 0      ondansetron (ZOFRAN ODT) 8 mg disintegrating tablet Take 1 Tab by mouth three (3) times daily. Qty: 15 Tab, Refills: 0      sevelamer carbonate (RENVELA) 800 mg tab tab TAKE ONE TABLET BY MOUTH THREE TIMES DAILY WITH MEALS  Qty: 90 Tab, Refills: 0      sodium bicarbonate 650 mg tablet Take 1 Tab by mouth two (2) times a day. Qty: 60 Tab, Refills: 0         CONTINUE these medications which have NOT CHANGED    Details   omeprazole (PRILOSEC) 40 mg capsule Take 1 Cap by mouth daily. Qty: 90 Cap, Refills: 3      methocarbamol (ROBAXIN) 750 mg tablet Take 750 mg by mouth. insulin lispro protamine/insulin lispro (HUMALOG MIX 75-25) 100 unit/mL (75-25) injection Take 30 units in the morning and 10 units at night.       oxyCODONE IR (OXY-IR) 15 mg immediate release tablet Take 15 mg by mouth every eight (8) hours as needed. STOP taking these medications       calcifediol (RAYALDEE) 30 mcg Cs24 Comments:   Reason for Stopping:         Iron Fum & P-FA-Vit B & C No.9 (INTEGRA PLUS) 125 mg iron- 1 mg cap Comments:   Reason for Stopping:                 NOTIFY YOUR PHYSICIAN FOR ANY OF THE FOLLOWING:   Fever over 101 degrees for 24 hours. Chest pain, shortness of breath, fever, chills, nausea, vomiting, diarrhea, change in mentation, falling, weakness, bleeding. Severe pain or pain not relieved by medications. Or, any other signs or symptoms that you may have questions about.     DISPOSITION:    Home With:   OT  PT  HH  RN       Long term SNF/Inpatient Rehab   x Independent/assisted living    Hospice    Other:       PATIENT CONDITION AT DISCHARGE:     Functional status    Poor     Deconditioned    x Independent      Cognition    x Lucid     Forgetful     Dementia      Catheters/lines (plus indication)    Sellers     PICC     PEG    x None      Code status   x  Full code     DNR      PHYSICAL EXAMINATION AT DISCHARGE:   Refer to Progress Note  Pul: clear  CV: rr no m/g/r  Abd: supple non tender  Skin w and d  Visit Vitals    BP (!) 180/94    Pulse 67    Temp 98 °F (36.7 °C)    Resp 17    Ht 6' 2\" (1.88 m)    Wt 142.7 kg (314 lb 9.5 oz)    SpO2 94%    BMI 40.39 kg/m2          CHRONIC MEDICAL DIAGNOSES:  Problem List as of 12/15/2017  Date Reviewed: 12/15/2017          Codes Class Noted - Resolved    Hypoxia ICD-10-CM: R09.02  ICD-9-CM: 799.02  10/21/2017 - Present        Acute pulmonary edema ([facility] ICD-10-CM: J81.0  ICD-9-CM: 518.4  9/27/2017 - Present        Anemia ICD-10-CM: D64.9  ICD-9-CM: 285.9  9/27/2017 - Present        Acute on chronic diastolic CHF (congestive heart failure) ([facility].) ICD-10-CM: I50.33  ICD-9-CM: 428.33, 428.0  9/27/2017 - Present        Chronic renal failure, stage 4 (severe) ([facility].) ICD-10-CM: N18.4  ICD-9-CM: 585.4  7/4/2017 - Present Hypertensive urgency ICD-10-CM: I16.0  ICD-9-CM: 401.9  3/26/2017 - Present        Long term current use of anticoagulant ICD-10-CM: Z79.01  ICD-9-CM: V58.61  12/19/2016 - Present        CKD (chronic kidney disease) stage 3, GFR 30-59 ml/min ICD-10-CM: N18.3  ICD-9-CM: 585.3  12/18/2016 - Present        Accelerated hypertension with diastolic congestive heart failure, NYHA class 3 (HCC) (Chronic) ICD-10-CM: I11.0, I50.30  ICD-9-CM: 402.01, 428.30, 428.0  11/29/2016 - Present        Pulmonary edema ICD-10-CM: J81.1  ICD-9-CM: 782  11/7/2016 - Present        Heart failure ([facility].) ICD-10-CM: I50.9  ICD-9-CM: 428.9  11/7/2016 - Present        Diabetic foot ulcer ([facility].) ICD-10-CM: E11.621, L97.509  ICD-9-CM: 250.80, 707.15  3/15/2016 - Present        Septic arthritis of foot ([facility].) ICD-10-CM: M00.9  ICD-9-CM: 711.07  3/5/2016 - Present        Osteomyelitis ([facility].) ICD-10-CM: M86.9  ICD-9-CM: 730.20  3/4/2016 - Present        Acute diastolic CHF (congestive heart failure) (HCC) ICD-10-CM: I50.31  ICD-9-CM: 428.31, 428.0  3/2/2016 - Present        Pneumonia ICD-10-CM: J18.9  ICD-9-CM: 200  3/2/2016 - Present        Cellulitis and abscess of leg ICD-10-CM: L03.119, L02.419  ICD-9-CM: 682.6  3/2/2016 - Present        Atrial fibrillation ([facility].) ICD-10-CM: I48.91  ICD-9-CM: 427.31  3/2/2016 - Present        HTN (hypertension) ICD-10-CM: I10  ICD-9-CM: 401.9  3/2/2016 - Present        Diabetic foot ulcer with osteomyelitis ([facility] ICD-10-CM: E11.621, E11.69, L97.509, M86.9  ICD-9-CM: 250.80, 707.15, 730.27, 731.8  2/20/2016 - Present        Symptomatic anemia ICD-10-CM: D64.9  ICD-9-CM: 285.9  1/9/2016 - Present        Elevated serum alkaline phosphatase level ICD-10-CM: R74.8  ICD-9-CM: 790.5  11/8/2015 - Present        Hyponatremia ICD-10-CM: E87.1  ICD-9-CM: 276.1  11/8/2015 - Present        Acute renal failure (ARF) (HCC) ICD-10-CM: N17.9  ICD-9-CM: 584.9  11/8/2015 - Present        Type 2 diabetes mellitus with right diabetic foot ulcer ([address] [address] ICD-10-CM: E11.621, L97.519  ICD-9-CM: 250.80, 707.15  11/8/2015 - Present        Iron deficiency anemia ICD-10-CM: D50.9  ICD-9-CM: 280.9  11/8/2015 - Present        Diabetic foot ulcer ([place] [address] ICD-10-CM: E11.621, L97.509  ICD-9-CM: 250.80, 707.15  7/19/2015 - Present        Acute diastolic heart failure ([place] [address] ICD-10-CM: I50.31  ICD-9-CM: 428.31  6/25/2015 - Present        Atrial fibrillation and flutter ([place] [address] ICD-10-CM: I48.91, I48.92  ICD-9-CM: 427.31, 427.32  6/24/2015 - Present        Hx of BKA ([place] [address] ICD-10-CM: Z89.519  ICD-9-CM: V49.75  10/23/2014 - Present        Streptococcal infection(041.00) ICD-10-CM: A49.1  ICD-9-CM: 041.00  10/22/2014 - Present        Proteus infection ICD-10-CM: A49.8  ICD-9-CM: 041.6  10/22/2014 - Present        SIRS with acute organ dysfunction due to infectious process [facility]) ICD-10-CM: A41.9, R65.20  ICD-9-CM: 038.9, 995.92 Present on Admission 10/21/2014 - Present        Open wound of toe ICD-10-CM: S91.109A  ICD-9-CM: 893.0 Present on Admission 10/21/2014 - Present        Aortic regurgitation ICD-10-CM: I35.1  ICD-9-CM: 424.1 Chronic 10/21/2014 - Present    Overview Signed 10/21/2014 11:30 AM by Schuyler Bence, MD     Echo 2013              Renal failure, acute [facility]) ICD-10-CM: N17.9  ICD-9-CM: 584.9 Present on Admission 10/21/2014 - Present        Gram-positive cocci bacteremia ICD-10-CM: R78.81  ICD-9-CM: 790.7, 041.89  10/21/2014 - Present        Cellulitis and abscess of leg ICD-10-CM: L03.119, L02.419  ICD-9-CM: 682.6  10/21/2014 - Present        History of positive PPD ICD-10-CM: R76.11  ICD-9-CM: 795.51  2/17/2013 - Present        Acute hypoxemic respiratory failure (HCC) ICD-10-CM: J96.01  ICD-9-CM: 518.81  2/16/2013 - Present        Malignant hypertension ICD-10-CM: I10  ICD-9-CM: 401.0  2/16/2013 - Present        Uncontrolled type II diabetes mellitus ([facility] ICD-10-CM: E11.65  ICD-9-CM: 250.02  2/16/2013 - Present        HTN (hypertension) ICD-10-CM: I10  ICD-9-CM: 401.9  1/19/2012 - Present        Morbid obesity with BMI of 40.0-44.9, adult [facility]) ICD-10-CM: E66.01, Z68.41  ICD-9-CM: 278.01, V85.41 Present on Admission 1/19/2012 - Present    Overview Addendum 10/21/2014 11:29 AM by Becca Archer MD     Body mass index is 40.04 kg/(m^2). * (Principal)RESOLVED: CHF (congestive heart failure) ([facility] ICD-10-CM: I50.9  ICD-9-CM: 428.0  3/26/2017 - 12/15/2017        RESOLVED: Leg ulcer ([facility] 75.) ICD-10-CM: L97.909  ICD-9-CM: 707.10  3/5/2016 - 4/19/2016        RESOLVED: Respiratory failure with hypoxia ([facility].) ICD-10-CM: J96.91  ICD-9-CM: 518.81  2/14/2016 - 2/26/2016        RESOLVED: Acute respiratory failure with hypoxemia (HCC) ICD-10-CM: J96.01  ICD-9-CM: 518.81  11/8/2015 - 11/18/2015        RESOLVED: Severe sepsis ([facility].) ICD-10-CM: A41.9, R65.20  ICD-9-CM: 038.9, 995.92  11/8/2015 - 11/18/2015        RESOLVED: Obstructive uropathy ICD-10-CM: N13.9  ICD-9-CM: 599.60  11/8/2015 - 11/18/2015        RESOLVED: Pneumonia ICD-10-CM: J18.9  ICD-9-CM: 939  11/5/2015 - 11/18/2015        RESOLVED: A-fib ([facility].) ICD-10-CM: I48.91  ICD-9-CM: 427.31  6/25/2015 - 4/19/2016        RESOLVED: Gangrene ([facility].) ICD-10-CM: D09  ICD-9-CM: 785.4  10/23/2014 - 4/19/2016        RESOLVED: Sepsis ([facility].) ICD-10-CM: A41.9  ICD-9-CM: 038.9, 995.91  10/23/2014 - 4/19/2016        RESOLVED: Osteomyelitis of foot ([facility].) ICD-10-CM: M86.9  ICD-9-CM: 730.27  10/22/2014 - 4/19/2016    Overview Signed 10/22/2014  1:29 PM by Becca Archer MD     IMPRESSION:   1. Osteomyelitis of the first and second distal phalanges. 2. Cellulitis and myositis. 3. Phlegmon deep to the dorsal skin breakdown superficial to the distal   second metatarsal. No drainable abscess.                RESOLVED: Type 2 diabetes mellitus with left diabetic foot ulcer ([facility].) ICD-10-CM: E11.621, P93.045  ICD-9-CM: 250.80, 707.15 Present on Admission 10/21/2014 - 4/19/2016        RESOLVED: Sepsis ([facility].) ICD-10-CM: A41.9  ICD-9-CM: 038.9, 995.91  10/21/2014 - 10/22/2014        RESOLVED: DKA (diabetic ketoacidoses) ([facility] ICD-10-CM: E13.10  ICD-9-CM: 250.10  10/20/2014 - 4/19/2016        RESOLVED: Pneumonia ICD-10-CM: J18.9  ICD-9-CM: 486  8/7/2013 - 10/21/2014        RESOLVED: Sepsis ICD-10-CM: A41.9  ICD-9-CM: 995.91  2/17/2013 - 2/26/2016        RESOLVED: Rhabdomyolysis ICD-10-CM: M62.82  ICD-9-CM: 728.88  2/17/2013 - 10/21/2014        RESOLVED: Pneumonia, organism unspecified(486) ICD-10-CM: J18.9  ICD-9-CM: 150  2/16/2013 - 10/21/2014              Greater than  30  minutes were spent with the patient on counseling and coordination of care    Signed:   Olivia Hall MD  12/15/2017  8:01 AM

## 2017-12-15 NOTE — PROGRESS NOTES
Physical Therapy Screening:  Services are not indicated at this time. An InBasket screening referral was triggered for physical therapy based on results obtained during the nursing admission assessment. The patients chart was reviewed and the patient is not appropriate for a skilled therapy evaluation at this time. Please consult physical therapy if any therapy needs arise. Thank you.     Erich Hillman, PT

## 2017-12-20 NOTE — TELEPHONE ENCOUNTER
Hammad Francois from [facility] with patient update on visits schedule  3 times a week starting this week, then 2 times a week for 2 weeks and finally 1 time a week for 2 weeks. Medication doxercalciferol, zofran, methocarbamol   Patient should have picked up from pharmacy yesterday.   Hammad Francois can be reached @[phone]

## 2017-12-28 NOTE — TELEPHONE ENCOUNTER
Returning [place] message regarding PT/OT, [address] [facility]   [phone]

## 2018-01-01 ENCOUNTER — OFFICE VISIT (OUTPATIENT)
Dept: CARDIOLOGY CLINIC | Age: 43
End: 2018-01-01

## 2018-01-01 ENCOUNTER — HOME CARE VISIT (OUTPATIENT)
Dept: HOME HEALTH SERVICES | Facility: HOME HEALTH | Age: 43
End: 2018-01-01
Payer: COMMERCIAL

## 2018-01-01 ENCOUNTER — HOSPITAL ENCOUNTER (INPATIENT)
Age: 43
LOS: 2 days | Discharge: HOME HEALTH | DRG: 194 | End: 2018-03-11
Attending: EMERGENCY MEDICINE | Admitting: INTERNAL MEDICINE
Payer: MEDICAID

## 2018-01-01 ENCOUNTER — TELEPHONE (OUTPATIENT)
Dept: INTERNAL MEDICINE CLINIC | Age: 43
End: 2018-01-01

## 2018-01-01 ENCOUNTER — HOME CARE VISIT (OUTPATIENT)
Dept: HOME HEALTH SERVICES | Facility: HOME HEALTH | Age: 43
End: 2018-01-01

## 2018-01-01 ENCOUNTER — TELEPHONE (OUTPATIENT)
Dept: CARDIOLOGY CLINIC | Age: 43
End: 2018-01-01

## 2018-01-01 ENCOUNTER — HOME CARE VISIT (OUTPATIENT)
Dept: SCHEDULING | Facility: HOME HEALTH | Age: 43
End: 2018-01-01

## 2018-01-01 ENCOUNTER — APPOINTMENT (OUTPATIENT)
Dept: GENERAL RADIOLOGY | Age: 43
End: 2018-01-01
Attending: HOSPITALIST
Payer: MEDICAID

## 2018-01-01 ENCOUNTER — APPOINTMENT (OUTPATIENT)
Dept: GENERAL RADIOLOGY | Age: 43
DRG: 194 | End: 2018-01-01
Attending: EMERGENCY MEDICINE
Payer: MEDICAID

## 2018-01-01 ENCOUNTER — HOME HEALTH ADMISSION (OUTPATIENT)
Dept: HOME HEALTH SERVICES | Facility: HOME HEALTH | Age: 43
End: 2018-01-01

## 2018-01-01 ENCOUNTER — HOSPITAL ENCOUNTER (INPATIENT)
Age: 43
LOS: 3 days | Discharge: HOME | DRG: 194 | End: 2018-04-27
Attending: EMERGENCY MEDICINE | Admitting: FAMILY MEDICINE
Payer: MEDICAID

## 2018-01-01 ENCOUNTER — OFFICE VISIT (OUTPATIENT)
Dept: INTERNAL MEDICINE CLINIC | Age: 43
End: 2018-01-01

## 2018-01-01 ENCOUNTER — APPOINTMENT (OUTPATIENT)
Dept: ULTRASOUND IMAGING | Age: 43
End: 2018-01-01
Attending: EMERGENCY MEDICINE
Payer: MEDICAID

## 2018-01-01 ENCOUNTER — HOME CARE VISIT (OUTPATIENT)
Dept: SCHEDULING | Facility: HOME HEALTH | Age: 43
End: 2018-01-01
Payer: COMMERCIAL

## 2018-01-01 ENCOUNTER — APPOINTMENT (OUTPATIENT)
Dept: GENERAL RADIOLOGY | Age: 43
End: 2018-01-01
Attending: STUDENT IN AN ORGANIZED HEALTH CARE EDUCATION/TRAINING PROGRAM
Payer: MEDICAID

## 2018-01-01 ENCOUNTER — HOSPITAL ENCOUNTER (OUTPATIENT)
Age: 43
Setting detail: OBSERVATION
Discharge: HOME | End: 2018-07-20
Attending: STUDENT IN AN ORGANIZED HEALTH CARE EDUCATION/TRAINING PROGRAM | Admitting: HOSPITALIST
Payer: MEDICAID

## 2018-01-01 VITALS
TEMPERATURE: 98.2 F | SYSTOLIC BLOOD PRESSURE: 128 MMHG | HEART RATE: 75 BPM | OXYGEN SATURATION: 92 % | WEIGHT: 315 LBS | HEIGHT: 74 IN | BODY MASS INDEX: 40.43 KG/M2 | DIASTOLIC BLOOD PRESSURE: 74 MMHG | RESPIRATION RATE: 22 BRPM

## 2018-01-01 VITALS
DIASTOLIC BLOOD PRESSURE: 94 MMHG | WEIGHT: 307 LBS | RESPIRATION RATE: 16 BRPM | OXYGEN SATURATION: 99 % | BODY MASS INDEX: 39.42 KG/M2 | HEART RATE: 72 BPM | SYSTOLIC BLOOD PRESSURE: 160 MMHG | TEMPERATURE: 99 F

## 2018-01-01 VITALS
RESPIRATION RATE: 20 BRPM | SYSTOLIC BLOOD PRESSURE: 154 MMHG | BODY MASS INDEX: 39.41 KG/M2 | TEMPERATURE: 98.4 F | WEIGHT: 307.1 LBS | OXYGEN SATURATION: 94 % | DIASTOLIC BLOOD PRESSURE: 78 MMHG | HEART RATE: 88 BPM | HEIGHT: 74 IN

## 2018-01-01 VITALS
RESPIRATION RATE: 20 BRPM | HEIGHT: 74 IN | SYSTOLIC BLOOD PRESSURE: 180 MMHG | TEMPERATURE: 98.6 F | DIASTOLIC BLOOD PRESSURE: 90 MMHG | HEART RATE: 100 BPM | WEIGHT: 315 LBS | BODY MASS INDEX: 40.43 KG/M2 | OXYGEN SATURATION: 92 %

## 2018-01-01 VITALS
BODY MASS INDEX: 36.78 KG/M2 | WEIGHT: 286.5 LBS | OXYGEN SATURATION: 91 % | TEMPERATURE: 97.4 F | SYSTOLIC BLOOD PRESSURE: 153 MMHG | HEART RATE: 70 BPM | RESPIRATION RATE: 20 BRPM | DIASTOLIC BLOOD PRESSURE: 77 MMHG

## 2018-01-01 VITALS
OXYGEN SATURATION: 96 % | DIASTOLIC BLOOD PRESSURE: 90 MMHG | BODY MASS INDEX: 37.86 KG/M2 | HEART RATE: 72 BPM | WEIGHT: 295 LBS | TEMPERATURE: 98.2 F | SYSTOLIC BLOOD PRESSURE: 160 MMHG | RESPIRATION RATE: 16 BRPM | HEIGHT: 74 IN

## 2018-01-01 VITALS
SYSTOLIC BLOOD PRESSURE: 140 MMHG | TEMPERATURE: 98 F | HEIGHT: 74 IN | BODY MASS INDEX: 40.43 KG/M2 | OXYGEN SATURATION: 92 % | HEART RATE: 79 BPM | WEIGHT: 315 LBS | RESPIRATION RATE: 18 BRPM | DIASTOLIC BLOOD PRESSURE: 76 MMHG

## 2018-01-01 VITALS
SYSTOLIC BLOOD PRESSURE: 144 MMHG | HEIGHT: 74 IN | BODY MASS INDEX: 40.17 KG/M2 | DIASTOLIC BLOOD PRESSURE: 80 MMHG | WEIGHT: 313 LBS | TEMPERATURE: 98.6 F | OXYGEN SATURATION: 93 % | HEART RATE: 84 BPM | RESPIRATION RATE: 18 BRPM

## 2018-01-01 VITALS
HEART RATE: 70 BPM | SYSTOLIC BLOOD PRESSURE: 174 MMHG | BODY MASS INDEX: 39.91 KG/M2 | WEIGHT: 310.85 LBS | OXYGEN SATURATION: 95 % | DIASTOLIC BLOOD PRESSURE: 74 MMHG | TEMPERATURE: 97.9 F | RESPIRATION RATE: 18 BRPM

## 2018-01-01 DIAGNOSIS — M86.9 DIABETIC FOOT ULCER WITH OSTEOMYELITIS (HCC): ICD-10-CM

## 2018-01-01 DIAGNOSIS — I10 ESSENTIAL HYPERTENSION, BENIGN: ICD-10-CM

## 2018-01-01 DIAGNOSIS — J81.0 ACUTE PULMONARY EDEMA (HCC): Primary | ICD-10-CM

## 2018-01-01 DIAGNOSIS — R11.0 NAUSEA: ICD-10-CM

## 2018-01-01 DIAGNOSIS — E11.621 DIABETIC FOOT ULCER WITH OSTEOMYELITIS (HCC): ICD-10-CM

## 2018-01-01 DIAGNOSIS — D63.1 ANEMIA IN CHRONIC KIDNEY DISEASE, UNSPECIFIED CKD STAGE: ICD-10-CM

## 2018-01-01 DIAGNOSIS — N18.4 CKD (CHRONIC KIDNEY DISEASE) STAGE 4, GFR 15-29 ML/MIN (HCC): ICD-10-CM

## 2018-01-01 DIAGNOSIS — N18.6 STAGE 5 CHRONIC KIDNEY DISEASE ON CHRONIC DIALYSIS (HCC): ICD-10-CM

## 2018-01-01 DIAGNOSIS — R73.9 HYPERGLYCEMIA: Primary | ICD-10-CM

## 2018-01-01 DIAGNOSIS — I50.31 ACUTE DIASTOLIC HEART FAILURE (HCC): Primary | ICD-10-CM

## 2018-01-01 DIAGNOSIS — I50.33 ACUTE ON CHRONIC DIASTOLIC CHF (CONGESTIVE HEART FAILURE) (HCC): ICD-10-CM

## 2018-01-01 DIAGNOSIS — L97.509 DIABETIC FOOT ULCER WITH OSTEOMYELITIS (HCC): ICD-10-CM

## 2018-01-01 DIAGNOSIS — E11.21 TYPE 2 DIABETES MELLITUS WITH NEPHROPATHY (HCC): Primary | ICD-10-CM

## 2018-01-01 DIAGNOSIS — I10 ESSENTIAL HYPERTENSION: ICD-10-CM

## 2018-01-01 DIAGNOSIS — E66.01 MORBID OBESITY WITH BMI OF 40.0-44.9, ADULT (HCC): ICD-10-CM

## 2018-01-01 DIAGNOSIS — Z99.2 STAGE 5 CHRONIC KIDNEY DISEASE ON CHRONIC DIALYSIS (HCC): ICD-10-CM

## 2018-01-01 DIAGNOSIS — D63.8 ANEMIA, CHRONIC DISEASE: Primary | ICD-10-CM

## 2018-01-01 DIAGNOSIS — I50.33 ACUTE ON CHRONIC DIASTOLIC CHF (CONGESTIVE HEART FAILURE) (HCC): Primary | ICD-10-CM

## 2018-01-01 DIAGNOSIS — Z89.519 HX OF BKA, UNSPECIFIED LATERALITY (HCC): ICD-10-CM

## 2018-01-01 DIAGNOSIS — Z79.4 TYPE 2 DIABETES MELLITUS WITH DIABETIC NEUROPATHY, WITH LONG-TERM CURRENT USE OF INSULIN (HCC): ICD-10-CM

## 2018-01-01 DIAGNOSIS — E11.69 DIABETIC FOOT ULCER WITH OSTEOMYELITIS (HCC): ICD-10-CM

## 2018-01-01 DIAGNOSIS — I10 ESSENTIAL HYPERTENSION: Primary | ICD-10-CM

## 2018-01-01 DIAGNOSIS — N18.4 CHRONIC RENAL FAILURE, STAGE 4 (SEVERE) (HCC): ICD-10-CM

## 2018-01-01 DIAGNOSIS — D64.9 ANEMIA, UNSPECIFIED TYPE: ICD-10-CM

## 2018-01-01 DIAGNOSIS — D63.8 ANEMIA, CHRONIC DISEASE: ICD-10-CM

## 2018-01-01 DIAGNOSIS — Z89.519 HISTORY OF AMPUTATION OF LOWER EXTREMITY THROUGH TIBIA AND FIBULA, UNSPECIFIED LATERALITY (HCC): ICD-10-CM

## 2018-01-01 DIAGNOSIS — E11.40 TYPE 2 DIABETES MELLITUS WITH DIABETIC NEUROPATHY, WITH LONG-TERM CURRENT USE OF INSULIN (HCC): ICD-10-CM

## 2018-01-01 DIAGNOSIS — R60.0 LOCALIZED EDEMA: ICD-10-CM

## 2018-01-01 DIAGNOSIS — Z79.4 TYPE 2 DIABETES MELLITUS WITH DIABETIC NEUROPATHY, WITH LONG-TERM CURRENT USE OF INSULIN (HCC): Primary | ICD-10-CM

## 2018-01-01 DIAGNOSIS — N18.9 ANEMIA IN CHRONIC KIDNEY DISEASE, UNSPECIFIED CKD STAGE: ICD-10-CM

## 2018-01-01 DIAGNOSIS — E11.40 TYPE 2 DIABETES MELLITUS WITH DIABETIC NEUROPATHY, WITH LONG-TERM CURRENT USE OF INSULIN (HCC): Primary | ICD-10-CM

## 2018-01-01 DIAGNOSIS — E11.21 TYPE 2 DIABETES MELLITUS WITH NEPHROPATHY (HCC): ICD-10-CM

## 2018-01-01 LAB
ABO + RH BLD: NORMAL
ABO + RH BLD: NORMAL
ALBUMIN SERPL-MCNC: 2.9 G/DL (ref 3.5–5)
ALBUMIN SERPL-MCNC: 3.1 G/DL (ref 3.5–5)
ALBUMIN SERPL-MCNC: 3.1 G/DL (ref 3.5–5)
ALBUMIN SERPL-MCNC: 3.3 G/DL (ref 3.5–5)
ALBUMIN SERPL-MCNC: 3.3 G/DL (ref 3.5–5)
ALBUMIN/GLOB SERPL: 0.7 {RATIO} (ref 1.1–2.2)
ALBUMIN/GLOB SERPL: 0.8 {RATIO} (ref 1.1–2.2)
ALP SERPL-CCNC: 102 U/L (ref 45–117)
ALP SERPL-CCNC: 103 U/L (ref 45–117)
ALP SERPL-CCNC: 87 U/L (ref 45–117)
ALP SERPL-CCNC: 97 U/L (ref 45–117)
ALT SERPL-CCNC: 10 U/L (ref 12–78)
ALT SERPL-CCNC: 11 U/L (ref 12–78)
ALT SERPL-CCNC: 20 U/L (ref 12–78)
ALT SERPL-CCNC: 23 U/L (ref 12–78)
AMPHET UR QL SCN: NEGATIVE
ANION GAP SERPL CALC-SCNC: 11 MMOL/L (ref 5–15)
ANION GAP SERPL CALC-SCNC: 13 MMOL/L (ref 5–15)
ANION GAP SERPL CALC-SCNC: 8 MMOL/L (ref 5–15)
ANION GAP SERPL CALC-SCNC: 8 MMOL/L (ref 5–15)
ANION GAP SERPL CALC-SCNC: 9 MMOL/L (ref 5–15)
APPEARANCE UR: CLEAR
AST SERPL-CCNC: 10 U/L (ref 15–37)
AST SERPL-CCNC: 19 U/L (ref 15–37)
AST SERPL-CCNC: 6 U/L (ref 15–37)
AST SERPL-CCNC: 8 U/L (ref 15–37)
ATRIAL RATE: 69 BPM
ATRIAL RATE: 72 BPM
ATRIAL RATE: 74 BPM
BACTERIA URNS QL MICRO: NEGATIVE /HPF
BARBITURATES UR QL SCN: NEGATIVE
BASOPHILS # BLD: 0 K/UL (ref 0–0.1)
BASOPHILS NFR BLD: 0 % (ref 0–1)
BENZODIAZ UR QL: NEGATIVE
BILIRUB SERPL-MCNC: 0.6 MG/DL (ref 0.2–1)
BILIRUB SERPL-MCNC: 0.7 MG/DL (ref 0.2–1)
BILIRUB SERPL-MCNC: 0.8 MG/DL (ref 0.2–1)
BILIRUB SERPL-MCNC: 0.9 MG/DL (ref 0.2–1)
BILIRUB UR QL: NEGATIVE
BLD PROD TYP BPU: NORMAL
BLD PROD TYP BPU: NORMAL
BLOOD GROUP ANTIBODIES SERPL: NORMAL
BLOOD GROUP ANTIBODIES SERPL: NORMAL
BNP SERPL-MCNC: 1612 PG/ML (ref 0–125)
BNP SERPL-MCNC: 955 PG/ML (ref 0–125)
BPU ID: NORMAL
BPU ID: NORMAL
BUN SERPL-MCNC: 54 MG/DL (ref 6–20)
BUN SERPL-MCNC: 64 MG/DL (ref 6–20)
BUN SERPL-MCNC: 66 MG/DL (ref 6–20)
BUN SERPL-MCNC: 66 MG/DL (ref 6–20)
BUN SERPL-MCNC: 67 MG/DL (ref 6–20)
BUN SERPL-MCNC: 69 MG/DL (ref 6–20)
BUN SERPL-MCNC: 71 MG/DL (ref 6–20)
BUN SERPL-MCNC: 81 MG/DL (ref 6–20)
BUN SERPL-MCNC: 82 MG/DL (ref 6–20)
BUN/CREAT SERPL: 12 (ref 12–20)
BUN/CREAT SERPL: 13 (ref 12–20)
BUN/CREAT SERPL: 14 (ref 12–20)
BUN/CREAT SERPL: 15 (ref 12–20)
BUN/CREAT SERPL: 16 (ref 12–20)
CALCIUM SERPL-MCNC: 6.6 MG/DL (ref 8.5–10.1)
CALCIUM SERPL-MCNC: 6.9 MG/DL (ref 8.5–10.1)
CALCIUM SERPL-MCNC: 7 MG/DL (ref 8.5–10.1)
CALCIUM SERPL-MCNC: 8.1 MG/DL (ref 8.5–10.1)
CALCIUM SERPL-MCNC: 8.3 MG/DL (ref 8.5–10.1)
CALCIUM SERPL-MCNC: 8.4 MG/DL (ref 8.5–10.1)
CALCIUM SERPL-MCNC: 8.7 MG/DL (ref 8.5–10.1)
CALCIUM SERPL-MCNC: 8.8 MG/DL (ref 8.5–10.1)
CALCIUM SERPL-MCNC: 8.9 MG/DL (ref 8.5–10.1)
CALCIUM SERPL-MCNC: 8.9 MG/DL (ref 8.5–10.1)
CALCULATED P AXIS, ECG09: 45 DEGREES
CALCULATED P AXIS, ECG09: 52 DEGREES
CALCULATED R AXIS, ECG10: 101 DEGREES
CALCULATED R AXIS, ECG10: 56 DEGREES
CALCULATED R AXIS, ECG10: 61 DEGREES
CALCULATED T AXIS, ECG11: 116 DEGREES
CALCULATED T AXIS, ECG11: 38 DEGREES
CALCULATED T AXIS, ECG11: 65 DEGREES
CANNABINOIDS UR QL SCN: NEGATIVE
CHLORIDE SERPL-SCNC: 106 MMOL/L (ref 97–108)
CHLORIDE SERPL-SCNC: 106 MMOL/L (ref 97–108)
CHLORIDE SERPL-SCNC: 108 MMOL/L (ref 97–108)
CHLORIDE SERPL-SCNC: 108 MMOL/L (ref 97–108)
CHLORIDE SERPL-SCNC: 109 MMOL/L (ref 97–108)
CHLORIDE SERPL-SCNC: 110 MMOL/L (ref 97–108)
CHLORIDE SERPL-SCNC: 111 MMOL/L (ref 97–108)
CHLORIDE SERPL-SCNC: 115 MMOL/L (ref 97–108)
CHLORIDE SERPL-SCNC: 117 MMOL/L (ref 97–108)
CHOLEST SERPL-MCNC: 134 MG/DL
CHOLEST SERPL-MCNC: 88 MG/DL
CHOLEST SERPL-MCNC: 88 MG/DL
CK MB CFR SERPL CALC: 0.7 % (ref 0–2.5)
CK MB SERPL-MCNC: 1.9 NG/ML (ref 5–25)
CK MB SERPL-MCNC: 2.7 NG/ML (ref 5–25)
CK MB SERPL-MCNC: 5.9 NG/ML (ref 5–25)
CK SERPL-CCNC: 290 U/L (ref 39–308)
CK SERPL-CCNC: 316 U/L (ref 39–308)
CK SERPL-CCNC: 341 U/L (ref 39–308)
CK SERPL-CCNC: 369 U/L (ref 39–308)
CK SERPL-CCNC: 412 U/L (ref 39–308)
CK SERPL-CCNC: 502 U/L (ref 39–308)
CK SERPL-CCNC: 509 U/L (ref 39–308)
CK SERPL-CCNC: 692 U/L (ref 39–308)
CK SERPL-CCNC: 799 U/L (ref 39–308)
CO2 SERPL-SCNC: 17 MMOL/L (ref 21–32)
CO2 SERPL-SCNC: 17 MMOL/L (ref 21–32)
CO2 SERPL-SCNC: 18 MMOL/L (ref 21–32)
CO2 SERPL-SCNC: 19 MMOL/L (ref 21–32)
CO2 SERPL-SCNC: 20 MMOL/L (ref 21–32)
CO2 SERPL-SCNC: 21 MMOL/L (ref 21–32)
CO2 SERPL-SCNC: 23 MMOL/L (ref 21–32)
CO2 SERPL-SCNC: 23 MMOL/L (ref 21–32)
CO2 SERPL-SCNC: 25 MMOL/L (ref 21–32)
COCAINE UR QL SCN: NEGATIVE
COLOR UR: ABNORMAL
CREAT SERPL-MCNC: 4.45 MG/DL (ref 0.7–1.3)
CREAT SERPL-MCNC: 4.5 MG/DL (ref 0.7–1.3)
CREAT SERPL-MCNC: 4.53 MG/DL (ref 0.7–1.3)
CREAT SERPL-MCNC: 4.77 MG/DL (ref 0.7–1.3)
CREAT SERPL-MCNC: 4.78 MG/DL (ref 0.7–1.3)
CREAT SERPL-MCNC: 5.01 MG/DL (ref 0.7–1.3)
CREAT SERPL-MCNC: 5.02 MG/DL (ref 0.7–1.3)
CREAT SERPL-MCNC: 5.77 MG/DL (ref 0.7–1.3)
CREAT SERPL-MCNC: 5.84 MG/DL (ref 0.7–1.3)
CROSSMATCH RESULT,%XM: NORMAL
CROSSMATCH RESULT,%XM: NORMAL
DIAGNOSIS, 93000: NORMAL
DIFFERENTIAL METHOD BLD: ABNORMAL
DRUG SCRN COMMENT,DRGCM: NORMAL
EOSINOPHIL # BLD: 0.2 K/UL (ref 0–0.4)
EOSINOPHIL # BLD: 0.3 K/UL (ref 0–0.4)
EOSINOPHIL # BLD: 0.3 K/UL (ref 0–0.4)
EOSINOPHIL NFR BLD: 2 % (ref 0–7)
EOSINOPHIL NFR BLD: 3 % (ref 0–7)
EOSINOPHIL NFR BLD: 3 % (ref 0–7)
EOSINOPHIL NFR BLD: 4 % (ref 0–7)
EPITH CASTS URNS QL MICRO: ABNORMAL /LPF
ERYTHROCYTE [DISTWIDTH] IN BLOOD BY AUTOMATED COUNT: 17.2 % (ref 11.5–14.5)
ERYTHROCYTE [DISTWIDTH] IN BLOOD BY AUTOMATED COUNT: 17.5 % (ref 11.5–14.5)
ERYTHROCYTE [DISTWIDTH] IN BLOOD BY AUTOMATED COUNT: 17.6 % (ref 11.5–14.5)
ERYTHROCYTE [DISTWIDTH] IN BLOOD BY AUTOMATED COUNT: 18.4 % (ref 11.5–14.5)
ERYTHROCYTE [DISTWIDTH] IN BLOOD BY AUTOMATED COUNT: 18.4 % (ref 11.5–14.5)
ERYTHROCYTE [DISTWIDTH] IN BLOOD BY AUTOMATED COUNT: 18.4 % (ref 12.3–15.4)
ERYTHROCYTE [DISTWIDTH] IN BLOOD BY AUTOMATED COUNT: 18.6 % (ref 11.5–14.5)
EST. AVERAGE GLUCOSE BLD GHB EST-MCNC: 166 MG/DL
EST. AVERAGE GLUCOSE BLD GHB EST-MCNC: 252 MG/DL
EST. AVERAGE GLUCOSE BLD GHB EST-MCNC: 258 MG/DL
ETHANOL SERPL-MCNC: <10 MG/DL
FERRITIN SERPL-MCNC: 622 NG/ML (ref 26–388)
FERRITIN SERPL-MCNC: 861 NG/ML (ref 26–388)
FOLATE SERPL-MCNC: 5.9 NG/ML (ref 5–21)
GLOBULIN SER CALC-MCNC: 3.7 G/DL (ref 2–4)
GLOBULIN SER CALC-MCNC: 3.9 G/DL (ref 2–4)
GLOBULIN SER CALC-MCNC: 4.3 G/DL (ref 2–4)
GLOBULIN SER CALC-MCNC: 4.5 G/DL (ref 2–4)
GLUCOSE BLD STRIP.AUTO-MCNC: 105 MG/DL (ref 65–100)
GLUCOSE BLD STRIP.AUTO-MCNC: 106 MG/DL (ref 65–100)
GLUCOSE BLD STRIP.AUTO-MCNC: 107 MG/DL (ref 65–100)
GLUCOSE BLD STRIP.AUTO-MCNC: 111 MG/DL (ref 65–100)
GLUCOSE BLD STRIP.AUTO-MCNC: 122 MG/DL (ref 65–100)
GLUCOSE BLD STRIP.AUTO-MCNC: 152 MG/DL (ref 65–100)
GLUCOSE BLD STRIP.AUTO-MCNC: 165 MG/DL (ref 65–100)
GLUCOSE BLD STRIP.AUTO-MCNC: 165 MG/DL (ref 65–100)
GLUCOSE BLD STRIP.AUTO-MCNC: 169 MG/DL (ref 65–100)
GLUCOSE BLD STRIP.AUTO-MCNC: 178 MG/DL (ref 65–100)
GLUCOSE BLD STRIP.AUTO-MCNC: 181 MG/DL (ref 65–100)
GLUCOSE BLD STRIP.AUTO-MCNC: 187 MG/DL (ref 65–100)
GLUCOSE BLD STRIP.AUTO-MCNC: 194 MG/DL (ref 65–100)
GLUCOSE BLD STRIP.AUTO-MCNC: 218 MG/DL (ref 65–100)
GLUCOSE BLD STRIP.AUTO-MCNC: 218 MG/DL (ref 65–100)
GLUCOSE BLD STRIP.AUTO-MCNC: 222 MG/DL (ref 65–100)
GLUCOSE BLD STRIP.AUTO-MCNC: 228 MG/DL (ref 65–100)
GLUCOSE BLD STRIP.AUTO-MCNC: 234 MG/DL (ref 65–100)
GLUCOSE BLD STRIP.AUTO-MCNC: 254 MG/DL (ref 65–100)
GLUCOSE BLD STRIP.AUTO-MCNC: 255 MG/DL (ref 65–100)
GLUCOSE BLD STRIP.AUTO-MCNC: 269 MG/DL (ref 65–100)
GLUCOSE BLD STRIP.AUTO-MCNC: 280 MG/DL (ref 65–100)
GLUCOSE BLD STRIP.AUTO-MCNC: 288 MG/DL (ref 65–100)
GLUCOSE BLD STRIP.AUTO-MCNC: 323 MG/DL (ref 65–100)
GLUCOSE BLD STRIP.AUTO-MCNC: 366 MG/DL (ref 65–100)
GLUCOSE BLD STRIP.AUTO-MCNC: 385 MG/DL (ref 65–100)
GLUCOSE BLD STRIP.AUTO-MCNC: 402 MG/DL (ref 65–100)
GLUCOSE POC: 115 MG/DL
GLUCOSE POC: 335 MG/DL
GLUCOSE POC: 443 MG/DL
GLUCOSE POC: 79 MG/DL
GLUCOSE SERPL-MCNC: 105 MG/DL (ref 65–100)
GLUCOSE SERPL-MCNC: 141 MG/DL (ref 65–100)
GLUCOSE SERPL-MCNC: 161 MG/DL (ref 65–100)
GLUCOSE SERPL-MCNC: 170 MG/DL (ref 65–100)
GLUCOSE SERPL-MCNC: 180 MG/DL (ref 65–100)
GLUCOSE SERPL-MCNC: 182 MG/DL (ref 65–100)
GLUCOSE SERPL-MCNC: 190 MG/DL (ref 65–100)
GLUCOSE SERPL-MCNC: 346 MG/DL (ref 65–100)
GLUCOSE SERPL-MCNC: 370 MG/DL (ref 65–100)
GLUCOSE UR STRIP.AUTO-MCNC: 250 MG/DL
HBA1C MFR BLD HPLC: 10.6 %
HBA1C MFR BLD HPLC: 7.4 %
HBA1C MFR BLD: 10.4 % (ref 4.2–6.3)
HBA1C MFR BLD: 10.6 % (ref 4.2–6.3)
HBA1C MFR BLD: 7.4 % (ref 4.2–6.3)
HCT VFR BLD AUTO: 23.6 % (ref 36.6–50.3)
HCT VFR BLD AUTO: 24 % (ref 36.6–50.3)
HCT VFR BLD AUTO: 25.1 % (ref 36.6–50.3)
HCT VFR BLD AUTO: 25.6 % (ref 36.6–50.3)
HCT VFR BLD AUTO: 25.7 % (ref 36.6–50.3)
HCT VFR BLD AUTO: 26 % (ref 36.6–50.3)
HCT VFR BLD AUTO: 26.5 % (ref 36.6–50.3)
HCT VFR BLD AUTO: 26.9 % (ref 36.6–50.3)
HCT VFR BLD AUTO: 27.4 % (ref 36.6–50.3)
HCT VFR BLD AUTO: 31 % (ref 37.5–51)
HDLC SERPL-MCNC: 30 MG/DL
HDLC SERPL-MCNC: 33 MG/DL
HDLC SERPL-MCNC: 39 MG/DL
HDLC SERPL: 2.7 {RATIO} (ref 0–5)
HDLC SERPL: 2.9 {RATIO} (ref 0–5)
HGB BLD-MCNC: 7.1 G/DL (ref 12.1–17)
HGB BLD-MCNC: 7.4 G/DL (ref 12.1–17)
HGB BLD-MCNC: 7.7 G/DL (ref 12.1–17)
HGB BLD-MCNC: 7.7 G/DL (ref 12.1–17)
HGB BLD-MCNC: 7.8 G/DL (ref 12.1–17)
HGB BLD-MCNC: 7.9 G/DL (ref 12.1–17)
HGB BLD-MCNC: 8 G/DL (ref 12.1–17)
HGB BLD-MCNC: 8 G/DL (ref 12.1–17)
HGB BLD-MCNC: 8.1 G/DL (ref 12.1–17)
HGB BLD-MCNC: 9 G/DL (ref 13–17.7)
HGB UR QL STRIP: NEGATIVE
HYALINE CASTS URNS QL MICRO: ABNORMAL /LPF (ref 0–5)
IMM GRANULOCYTES # BLD: 0 K/UL (ref 0–0.04)
IMM GRANULOCYTES # BLD: 0.1 K/UL (ref 0–0.04)
IMM GRANULOCYTES # BLD: 0.1 K/UL (ref 0–0.04)
IMM GRANULOCYTES NFR BLD AUTO: 0 % (ref 0–0.5)
IMM GRANULOCYTES NFR BLD AUTO: 1 % (ref 0–0.5)
IRON SATN MFR SERPL: 11 % (ref 20–50)
IRON SATN MFR SERPL: 15 % (ref 20–50)
IRON SATN MFR SERPL: 9 % (ref 20–50)
IRON SERPL-MCNC: 16 UG/DL (ref 35–150)
IRON SERPL-MCNC: 22 UG/DL (ref 35–150)
IRON SERPL-MCNC: 26 UG/DL (ref 35–150)
KETONES UR QL STRIP.AUTO: NEGATIVE MG/DL
LDL CHOLESTEROL POC: 75 MG/DL
LDLC SERPL CALC-MCNC: 35.8 MG/DL (ref 0–100)
LDLC SERPL CALC-MCNC: 41.6 MG/DL (ref 0–100)
LEUKOCYTE ESTERASE UR QL STRIP.AUTO: NEGATIVE
LIPID PROFILE,FLP: NORMAL
LIPID PROFILE,FLP: NORMAL
LYMPHOCYTES # BLD: 0.4 K/UL (ref 0.8–3.5)
LYMPHOCYTES # BLD: 0.5 K/UL (ref 0.8–3.5)
LYMPHOCYTES # BLD: 0.6 K/UL (ref 0.8–3.5)
LYMPHOCYTES # BLD: 0.7 K/UL (ref 0.8–3.5)
LYMPHOCYTES # BLD: 0.7 K/UL (ref 0.8–3.5)
LYMPHOCYTES # BLD: 0.8 K/UL (ref 0.8–3.5)
LYMPHOCYTES NFR BLD: 10 % (ref 12–49)
LYMPHOCYTES NFR BLD: 5 % (ref 12–49)
LYMPHOCYTES NFR BLD: 6 % (ref 12–49)
LYMPHOCYTES NFR BLD: 7 % (ref 12–49)
LYMPHOCYTES NFR BLD: 8 % (ref 12–49)
LYMPHOCYTES NFR BLD: 8 % (ref 12–49)
MAGNESIUM SERPL-MCNC: 1.4 MG/DL (ref 1.6–2.4)
MAGNESIUM SERPL-MCNC: 1.7 MG/DL (ref 1.6–2.4)
MCH RBC QN AUTO: 24.5 PG (ref 26–34)
MCH RBC QN AUTO: 24.7 PG (ref 26–34)
MCH RBC QN AUTO: 25.1 PG (ref 26.6–33)
MCH RBC QN AUTO: 25.6 PG (ref 26–34)
MCH RBC QN AUTO: 25.9 PG (ref 26–34)
MCH RBC QN AUTO: 26 PG (ref 26–34)
MCH RBC QN AUTO: 26.1 PG (ref 26–34)
MCH RBC QN AUTO: 26.1 PG (ref 26–34)
MCH RBC QN AUTO: 26.4 PG (ref 26–34)
MCHC RBC AUTO-ENTMCNC: 29 G/DL (ref 31.5–35.7)
MCHC RBC AUTO-ENTMCNC: 29.2 G/DL (ref 30–36.5)
MCHC RBC AUTO-ENTMCNC: 29.6 G/DL (ref 30–36.5)
MCHC RBC AUTO-ENTMCNC: 30 G/DL (ref 30–36.5)
MCHC RBC AUTO-ENTMCNC: 30 G/DL (ref 30–36.5)
MCHC RBC AUTO-ENTMCNC: 30.1 G/DL (ref 30–36.5)
MCHC RBC AUTO-ENTMCNC: 30.2 G/DL (ref 30–36.5)
MCHC RBC AUTO-ENTMCNC: 30.9 G/DL (ref 30–36.5)
MCHC RBC AUTO-ENTMCNC: 31.4 G/DL (ref 30–36.5)
MCV RBC AUTO: 82.4 FL (ref 80–99)
MCV RBC AUTO: 82.5 FL (ref 80–99)
MCV RBC AUTO: 82.8 FL (ref 80–99)
MCV RBC AUTO: 84 FL (ref 80–99)
MCV RBC AUTO: 86.2 FL (ref 80–99)
MCV RBC AUTO: 86.3 FL (ref 80–99)
MCV RBC AUTO: 87 FL (ref 79–97)
MCV RBC AUTO: 87.8 FL (ref 80–99)
MCV RBC AUTO: 88.2 FL (ref 80–99)
METHADONE UR QL: NEGATIVE
MONOCYTES # BLD: 0.4 K/UL (ref 0–1)
MONOCYTES # BLD: 0.5 K/UL (ref 0–1)
MONOCYTES # BLD: 0.7 K/UL (ref 0–1)
MONOCYTES # BLD: 0.7 K/UL (ref 0–1)
MONOCYTES NFR BLD: 4 % (ref 5–13)
MONOCYTES NFR BLD: 6 % (ref 5–13)
MONOCYTES NFR BLD: 7 % (ref 5–13)
MONOCYTES NFR BLD: 9 % (ref 5–13)
NEUTS SEG # BLD: 6.4 K/UL (ref 1.8–8)
NEUTS SEG # BLD: 6.5 K/UL (ref 1.8–8)
NEUTS SEG # BLD: 7 K/UL (ref 1.8–8)
NEUTS SEG # BLD: 7.7 K/UL (ref 1.8–8)
NEUTS SEG # BLD: 7.9 K/UL (ref 1.8–8)
NEUTS SEG # BLD: 8.1 K/UL (ref 1.8–8)
NEUTS SEG NFR BLD: 80 % (ref 32–75)
NEUTS SEG NFR BLD: 81 % (ref 32–75)
NEUTS SEG NFR BLD: 83 % (ref 32–75)
NEUTS SEG NFR BLD: 83 % (ref 32–75)
NEUTS SEG NFR BLD: 86 % (ref 32–75)
NEUTS SEG NFR BLD: 87 % (ref 32–75)
NITRITE UR QL STRIP.AUTO: NEGATIVE
NON-HDL CHOLESTEROL, 011976: 95
NRBC # BLD: 0 K/UL (ref 0–0.01)
NRBC BLD-RTO: 0 PER 100 WBC
OPIATES UR QL: NEGATIVE
P-R INTERVAL, ECG05: 170 MS
P-R INTERVAL, ECG05: 182 MS
P-R INTERVAL, ECG05: 264 MS
PCP UR QL: NEGATIVE
PH UR STRIP: 5.5 [PH] (ref 5–8)
PHOSPHATE SERPL-MCNC: 4.1 MG/DL (ref 2.6–4.7)
PHOSPHATE SERPL-MCNC: 4.4 MG/DL (ref 2.6–4.7)
PHOSPHATE SERPL-MCNC: 5 MG/DL (ref 2.6–4.7)
PHOSPHATE SERPL-MCNC: 5.1 MG/DL (ref 2.6–4.7)
PLATELET # BLD AUTO: 172 K/UL (ref 150–400)
PLATELET # BLD AUTO: 176 K/UL (ref 150–400)
PLATELET # BLD AUTO: 196 K/UL (ref 150–400)
PLATELET # BLD AUTO: 213 K/UL (ref 150–400)
PLATELET # BLD AUTO: 213 K/UL (ref 150–400)
PLATELET # BLD AUTO: 224 K/UL (ref 150–400)
PLATELET # BLD AUTO: 225 K/UL (ref 150–400)
PLATELET # BLD AUTO: 235 K/UL (ref 150–400)
PLATELET # BLD AUTO: 257 X10E3/UL (ref 150–379)
PLATELET COMMENTS,PCOM: ABNORMAL
PMV BLD AUTO: 10.3 FL (ref 8.9–12.9)
PMV BLD AUTO: 10.8 FL (ref 8.9–12.9)
PMV BLD AUTO: 11.4 FL (ref 8.9–12.9)
PMV BLD AUTO: 11.4 FL (ref 8.9–12.9)
PMV BLD AUTO: 11.5 FL (ref 8.9–12.9)
PMV BLD AUTO: 11.8 FL (ref 8.9–12.9)
POTASSIUM SERPL-SCNC: 4.3 MMOL/L (ref 3.5–5.1)
POTASSIUM SERPL-SCNC: 4.4 MMOL/L (ref 3.5–5.1)
POTASSIUM SERPL-SCNC: 4.4 MMOL/L (ref 3.5–5.1)
POTASSIUM SERPL-SCNC: 4.5 MMOL/L (ref 3.5–5.1)
POTASSIUM SERPL-SCNC: 4.8 MMOL/L (ref 3.5–5.1)
POTASSIUM SERPL-SCNC: 4.9 MMOL/L (ref 3.5–5.1)
POTASSIUM SERPL-SCNC: 5 MMOL/L (ref 3.5–5.1)
POTASSIUM SERPL-SCNC: 5 MMOL/L (ref 3.5–5.1)
POTASSIUM SERPL-SCNC: 5.1 MMOL/L (ref 3.5–5.1)
PROT SERPL-MCNC: 6.8 G/DL (ref 6.4–8.2)
PROT SERPL-MCNC: 7.2 G/DL (ref 6.4–8.2)
PROT SERPL-MCNC: 7.6 G/DL (ref 6.4–8.2)
PROT SERPL-MCNC: 7.6 G/DL (ref 6.4–8.2)
PROT UR STRIP-MCNC: 300 MG/DL
PTH-INTACT SERPL-MCNC: 329.4 PG/ML (ref 18.4–88)
PTH-INTACT SERPL-MCNC: 423.2 PG/ML (ref 18.4–88)
PTH-INTACT SERPL-MCNC: 714.6 PG/ML (ref 18.4–88)
Q-T INTERVAL, ECG07: 386 MS
Q-T INTERVAL, ECG07: 396 MS
Q-T INTERVAL, ECG07: 420 MS
QRS DURATION, ECG06: 88 MS
QRS DURATION, ECG06: 94 MS
QRS DURATION, ECG06: 96 MS
QTC CALCULATION (BEZET), ECG08: 428 MS
QTC CALCULATION (BEZET), ECG08: 433 MS
QTC CALCULATION (BEZET), ECG08: 450 MS
RBC # BLD AUTO: 2.72 M/UL (ref 4.1–5.7)
RBC # BLD AUTO: 2.86 M/UL (ref 4.1–5.7)
RBC # BLD AUTO: 2.96 M/UL (ref 4.1–5.7)
RBC # BLD AUTO: 3.07 M/UL (ref 4.1–5.7)
RBC # BLD AUTO: 3.09 M/UL (ref 4.1–5.7)
RBC # BLD AUTO: 3.12 M/UL (ref 4.1–5.7)
RBC # BLD AUTO: 3.12 M/UL (ref 4.1–5.7)
RBC # BLD AUTO: 3.26 M/UL (ref 4.1–5.7)
RBC # BLD AUTO: 3.58 X10E6/UL (ref 4.14–5.8)
RBC #/AREA URNS HPF: ABNORMAL /HPF (ref 0–5)
RBC MORPH BLD: ABNORMAL
SERVICE CMNT-IMP: ABNORMAL
SODIUM SERPL-SCNC: 137 MMOL/L (ref 136–145)
SODIUM SERPL-SCNC: 139 MMOL/L (ref 136–145)
SODIUM SERPL-SCNC: 139 MMOL/L (ref 136–145)
SODIUM SERPL-SCNC: 140 MMOL/L (ref 136–145)
SODIUM SERPL-SCNC: 140 MMOL/L (ref 136–145)
SODIUM SERPL-SCNC: 141 MMOL/L (ref 136–145)
SODIUM SERPL-SCNC: 141 MMOL/L (ref 136–145)
SODIUM SERPL-SCNC: 142 MMOL/L (ref 136–145)
SODIUM SERPL-SCNC: 143 MMOL/L (ref 136–145)
SP GR UR REFRACTOMETRY: 1.01 (ref 1–1.03)
SPECIMEN EXP DATE BLD: NORMAL
SPECIMEN EXP DATE BLD: NORMAL
STATUS OF UNIT,%ST: NORMAL
STATUS OF UNIT,%ST: NORMAL
TCHOL/HDL RATIO (POC): 3.4
TIBC SERPL-MCNC: 178 UG/DL (ref 250–450)
TIBC SERPL-MCNC: 188 UG/DL (ref 250–450)
TIBC SERPL-MCNC: 208 UG/DL (ref 250–450)
TRIGL SERPL-MCNC: 82 MG/DL (ref ?–150)
TRIGL SERPL-MCNC: 96 MG/DL (ref ?–150)
TRIGL SERPL-MCNC: 99 MG/DL
TROPONIN I SERPL-MCNC: <0.04 NG/ML
TSH SERPL DL<=0.05 MIU/L-ACNC: 1.51 UIU/ML (ref 0.36–3.74)
UNIT DIVISION, %UDIV: 0
UNIT DIVISION, %UDIV: 0
UR CULT HOLD, URHOLD: NORMAL
UROBILINOGEN UR QL STRIP.AUTO: 0.2 EU/DL (ref 0.2–1)
VENTRICULAR RATE, ECG03: 69 BPM
VENTRICULAR RATE, ECG03: 72 BPM
VENTRICULAR RATE, ECG03: 74 BPM
VIT B12 SERPL-MCNC: 272 PG/ML (ref 211–911)
VLDLC SERPL CALC-MCNC: 16.4 MG/DL
VLDLC SERPL CALC-MCNC: 19.2 MG/DL
WBC # BLD AUTO: 8 K/UL (ref 4.1–11.1)
WBC # BLD AUTO: 8.1 K/UL (ref 4.1–11.1)
WBC # BLD AUTO: 8.4 X10E3/UL (ref 3.4–10.8)
WBC # BLD AUTO: 8.5 K/UL (ref 4.1–11.1)
WBC # BLD AUTO: 8.9 K/UL (ref 4.1–11.1)
WBC # BLD AUTO: 9.1 K/UL (ref 4.1–11.1)
WBC # BLD AUTO: 9.3 K/UL (ref 4.1–11.1)
WBC # BLD AUTO: 9.3 K/UL (ref 4.1–11.1)
WBC # BLD AUTO: 9.8 K/UL (ref 4.1–11.1)
WBC URNS QL MICRO: ABNORMAL /HPF (ref 0–4)

## 2018-01-01 PROCEDURE — 74011250637 HC RX REV CODE- 250/637: Performed by: INTERNAL MEDICINE

## 2018-01-01 PROCEDURE — 83970 ASSAY OF PARATHORMONE: CPT | Performed by: INTERNAL MEDICINE

## 2018-01-01 PROCEDURE — 74011250637 HC RX REV CODE- 250/637: Performed by: FAMILY MEDICINE

## 2018-01-01 PROCEDURE — 85025 COMPLETE CBC W/AUTO DIFF WBC: CPT | Performed by: INTERNAL MEDICINE

## 2018-01-01 PROCEDURE — 82550 ASSAY OF CK (CPK): CPT | Performed by: FAMILY MEDICINE

## 2018-01-01 PROCEDURE — 84484 ASSAY OF TROPONIN QUANT: CPT | Performed by: FAMILY MEDICINE

## 2018-01-01 PROCEDURE — 96375 TX/PRO/DX INJ NEW DRUG ADDON: CPT

## 2018-01-01 PROCEDURE — 76770 US EXAM ABDO BACK WALL COMP: CPT

## 2018-01-01 PROCEDURE — 80069 RENAL FUNCTION PANEL: CPT | Performed by: INTERNAL MEDICINE

## 2018-01-01 PROCEDURE — 36415 COLL VENOUS BLD VENIPUNCTURE: CPT | Performed by: STUDENT IN AN ORGANIZED HEALTH CARE EDUCATION/TRAINING PROGRAM

## 2018-01-01 PROCEDURE — 74011250636 HC RX REV CODE- 250/636: Performed by: INTERNAL MEDICINE

## 2018-01-01 PROCEDURE — 94760 N-INVAS EAR/PLS OXIMETRY 1: CPT

## 2018-01-01 PROCEDURE — G0495 RN CARE TRAIN/EDU IN HH: HCPCS

## 2018-01-01 PROCEDURE — 36430 TRANSFUSION BLD/BLD COMPNT: CPT

## 2018-01-01 PROCEDURE — 94640 AIRWAY INHALATION TREATMENT: CPT

## 2018-01-01 PROCEDURE — 82962 GLUCOSE BLOOD TEST: CPT

## 2018-01-01 PROCEDURE — 30233N1 TRANSFUSION OF NONAUTOLOGOUS RED BLOOD CELLS INTO PERIPHERAL VEIN, PERCUTANEOUS APPROACH: ICD-10-PCS | Performed by: HOSPITALIST

## 2018-01-01 PROCEDURE — 83540 ASSAY OF IRON: CPT | Performed by: INTERNAL MEDICINE

## 2018-01-01 PROCEDURE — 74011250636 HC RX REV CODE- 250/636: Performed by: HOSPITALIST

## 2018-01-01 PROCEDURE — 74011000250 HC RX REV CODE- 250: Performed by: HOSPITALIST

## 2018-01-01 PROCEDURE — 74011636637 HC RX REV CODE- 636/637: Performed by: INTERNAL MEDICINE

## 2018-01-01 PROCEDURE — 77030011943

## 2018-01-01 PROCEDURE — 74011250637 HC RX REV CODE- 250/637: Performed by: HOSPITALIST

## 2018-01-01 PROCEDURE — 74011000250 HC RX REV CODE- 250: Performed by: INTERNAL MEDICINE

## 2018-01-01 PROCEDURE — 96372 THER/PROPH/DIAG INJ SC/IM: CPT

## 2018-01-01 PROCEDURE — 74011250636 HC RX REV CODE- 250/636: Performed by: EMERGENCY MEDICINE

## 2018-01-01 PROCEDURE — 81001 URINALYSIS AUTO W/SCOPE: CPT | Performed by: STUDENT IN AN ORGANIZED HEALTH CARE EDUCATION/TRAINING PROGRAM

## 2018-01-01 PROCEDURE — 36415 COLL VENOUS BLD VENIPUNCTURE: CPT | Performed by: FAMILY MEDICINE

## 2018-01-01 PROCEDURE — 96374 THER/PROPH/DIAG INJ IV PUSH: CPT

## 2018-01-01 PROCEDURE — 80053 COMPREHEN METABOLIC PANEL: CPT | Performed by: INTERNAL MEDICINE

## 2018-01-01 PROCEDURE — 84100 ASSAY OF PHOSPHORUS: CPT | Performed by: INTERNAL MEDICINE

## 2018-01-01 PROCEDURE — 80061 LIPID PANEL: CPT | Performed by: FAMILY MEDICINE

## 2018-01-01 PROCEDURE — 83036 HEMOGLOBIN GLYCOSYLATED A1C: CPT | Performed by: INTERNAL MEDICINE

## 2018-01-01 PROCEDURE — 84443 ASSAY THYROID STIM HORMONE: CPT | Performed by: INTERNAL MEDICINE

## 2018-01-01 PROCEDURE — 96360 HYDRATION IV INFUSION INIT: CPT

## 2018-01-01 PROCEDURE — 83036 HEMOGLOBIN GLYCOSYLATED A1C: CPT | Performed by: HOSPITALIST

## 2018-01-01 PROCEDURE — 96376 TX/PRO/DX INJ SAME DRUG ADON: CPT

## 2018-01-01 PROCEDURE — 65660000000 HC RM CCU STEPDOWN

## 2018-01-01 PROCEDURE — 77010033678 HC OXYGEN DAILY

## 2018-01-01 PROCEDURE — 71046 X-RAY EXAM CHEST 2 VIEWS: CPT

## 2018-01-01 PROCEDURE — 71045 X-RAY EXAM CHEST 1 VIEW: CPT

## 2018-01-01 PROCEDURE — 99218 HC RM OBSERVATION: CPT

## 2018-01-01 PROCEDURE — 85025 COMPLETE CBC W/AUTO DIFF WBC: CPT | Performed by: FAMILY MEDICINE

## 2018-01-01 PROCEDURE — 82728 ASSAY OF FERRITIN: CPT | Performed by: HOSPITALIST

## 2018-01-01 PROCEDURE — 80307 DRUG TEST PRSMV CHEM ANLYZR: CPT | Performed by: STUDENT IN AN ORGANIZED HEALTH CARE EDUCATION/TRAINING PROGRAM

## 2018-01-01 PROCEDURE — 80048 BASIC METABOLIC PNL TOTAL CA: CPT | Performed by: STUDENT IN AN ORGANIZED HEALTH CARE EDUCATION/TRAINING PROGRAM

## 2018-01-01 PROCEDURE — 36415 COLL VENOUS BLD VENIPUNCTURE: CPT | Performed by: HOSPITALIST

## 2018-01-01 PROCEDURE — 97161 PT EVAL LOW COMPLEX 20 MIN: CPT

## 2018-01-01 PROCEDURE — 74011250636 HC RX REV CODE- 250/636: Performed by: FAMILY MEDICINE

## 2018-01-01 PROCEDURE — 74011636637 HC RX REV CODE- 636/637: Performed by: HOSPITALIST

## 2018-01-01 PROCEDURE — P9016 RBC LEUKOCYTES REDUCED: HCPCS | Performed by: FAMILY MEDICINE

## 2018-01-01 PROCEDURE — 82553 CREATINE MB FRACTION: CPT | Performed by: EMERGENCY MEDICINE

## 2018-01-01 PROCEDURE — 93306 TTE W/DOPPLER COMPLETE: CPT

## 2018-01-01 PROCEDURE — 36415 COLL VENOUS BLD VENIPUNCTURE: CPT | Performed by: INTERNAL MEDICINE

## 2018-01-01 PROCEDURE — 85027 COMPLETE CBC AUTOMATED: CPT | Performed by: INTERNAL MEDICINE

## 2018-01-01 PROCEDURE — 74011250636 HC RX REV CODE- 250/636: Performed by: STUDENT IN AN ORGANIZED HEALTH CARE EDUCATION/TRAINING PROGRAM

## 2018-01-01 PROCEDURE — 83036 HEMOGLOBIN GLYCOSYLATED A1C: CPT | Performed by: FAMILY MEDICINE

## 2018-01-01 PROCEDURE — 74011636637 HC RX REV CODE- 636/637: Performed by: FAMILY MEDICINE

## 2018-01-01 PROCEDURE — 85025 COMPLETE CBC W/AUTO DIFF WBC: CPT | Performed by: EMERGENCY MEDICINE

## 2018-01-01 PROCEDURE — 99285 EMERGENCY DEPT VISIT HI MDM: CPT

## 2018-01-01 PROCEDURE — 51798 US URINE CAPACITY MEASURE: CPT

## 2018-01-01 PROCEDURE — 73030 X-RAY EXAM OF SHOULDER: CPT

## 2018-01-01 PROCEDURE — 83880 ASSAY OF NATRIURETIC PEPTIDE: CPT | Performed by: EMERGENCY MEDICINE

## 2018-01-01 PROCEDURE — 82550 ASSAY OF CK (CPK): CPT | Performed by: EMERGENCY MEDICINE

## 2018-01-01 PROCEDURE — 80053 COMPREHEN METABOLIC PANEL: CPT | Performed by: EMERGENCY MEDICINE

## 2018-01-01 PROCEDURE — 84100 ASSAY OF PHOSPHORUS: CPT | Performed by: HOSPITALIST

## 2018-01-01 PROCEDURE — 83735 ASSAY OF MAGNESIUM: CPT | Performed by: HOSPITALIST

## 2018-01-01 PROCEDURE — 51701 INSERT BLADDER CATHETER: CPT

## 2018-01-01 PROCEDURE — 82728 ASSAY OF FERRITIN: CPT | Performed by: INTERNAL MEDICINE

## 2018-01-01 PROCEDURE — G0300 HHS/HOSPICE OF LPN EA 15 MIN: HCPCS

## 2018-01-01 PROCEDURE — 96361 HYDRATE IV INFUSION ADD-ON: CPT

## 2018-01-01 PROCEDURE — 93005 ELECTROCARDIOGRAM TRACING: CPT

## 2018-01-01 PROCEDURE — 97165 OT EVAL LOW COMPLEX 30 MIN: CPT

## 2018-01-01 PROCEDURE — P9016 RBC LEUKOCYTES REDUCED: HCPCS | Performed by: EMERGENCY MEDICINE

## 2018-01-01 PROCEDURE — 82746 ASSAY OF FOLIC ACID SERUM: CPT | Performed by: INTERNAL MEDICINE

## 2018-01-01 PROCEDURE — 83735 ASSAY OF MAGNESIUM: CPT | Performed by: INTERNAL MEDICINE

## 2018-01-01 PROCEDURE — 86923 COMPATIBILITY TEST ELECTRIC: CPT | Performed by: EMERGENCY MEDICINE

## 2018-01-01 PROCEDURE — 97535 SELF CARE MNGMENT TRAINING: CPT

## 2018-01-01 PROCEDURE — 85018 HEMOGLOBIN: CPT | Performed by: INTERNAL MEDICINE

## 2018-01-01 PROCEDURE — 80048 BASIC METABOLIC PNL TOTAL CA: CPT | Performed by: HOSPITALIST

## 2018-01-01 PROCEDURE — 86900 BLOOD TYPING SEROLOGIC ABO: CPT | Performed by: FAMILY MEDICINE

## 2018-01-01 PROCEDURE — 80053 COMPREHEN METABOLIC PANEL: CPT | Performed by: FAMILY MEDICINE

## 2018-01-01 PROCEDURE — 84484 ASSAY OF TROPONIN QUANT: CPT | Performed by: EMERGENCY MEDICINE

## 2018-01-01 PROCEDURE — 82550 ASSAY OF CK (CPK): CPT | Performed by: INTERNAL MEDICINE

## 2018-01-01 PROCEDURE — 80048 BASIC METABOLIC PNL TOTAL CA: CPT | Performed by: INTERNAL MEDICINE

## 2018-01-01 PROCEDURE — 86923 COMPATIBILITY TEST ELECTRIC: CPT | Performed by: FAMILY MEDICINE

## 2018-01-01 PROCEDURE — 80061 LIPID PANEL: CPT | Performed by: INTERNAL MEDICINE

## 2018-01-01 PROCEDURE — 85025 COMPLETE CBC W/AUTO DIFF WBC: CPT | Performed by: STUDENT IN AN ORGANIZED HEALTH CARE EDUCATION/TRAINING PROGRAM

## 2018-01-01 PROCEDURE — 86900 BLOOD TYPING SEROLOGIC ABO: CPT | Performed by: EMERGENCY MEDICINE

## 2018-01-01 PROCEDURE — 99284 EMERGENCY DEPT VISIT MOD MDM: CPT

## 2018-01-01 PROCEDURE — 74011000258 HC RX REV CODE- 258: Performed by: INTERNAL MEDICINE

## 2018-01-01 PROCEDURE — 82607 VITAMIN B-12: CPT | Performed by: INTERNAL MEDICINE

## 2018-01-01 PROCEDURE — 65270000029 HC RM PRIVATE

## 2018-01-01 RX ORDER — MAGNESIUM SULFATE 100 %
4 CRYSTALS MISCELLANEOUS AS NEEDED
Status: DISCONTINUED | OUTPATIENT
Start: 2018-01-01 | End: 2018-01-01 | Stop reason: HOSPADM

## 2018-01-01 RX ORDER — LABETALOL 300 MG/1
600 TABLET, FILM COATED ORAL EVERY 8 HOURS
COMMUNITY

## 2018-01-01 RX ORDER — INSULIN LISPRO 100 [IU]/ML
INJECTION, SOLUTION INTRAVENOUS; SUBCUTANEOUS
Status: DISCONTINUED | OUTPATIENT
Start: 2018-01-01 | End: 2018-01-01 | Stop reason: HOSPADM

## 2018-01-01 RX ORDER — OMEPRAZOLE 40 MG/1
40 CAPSULE, DELAYED RELEASE ORAL DAILY
Status: DISCONTINUED | OUTPATIENT
Start: 2018-01-01 | End: 2018-01-01 | Stop reason: CLARIF

## 2018-01-01 RX ORDER — GUAIFENESIN 100 MG/5ML
81 LIQUID (ML) ORAL DAILY
Status: DISCONTINUED | OUTPATIENT
Start: 2018-01-01 | End: 2018-01-01 | Stop reason: HOSPADM

## 2018-01-01 RX ORDER — LABETALOL 300 MG/1
300 TABLET, FILM COATED ORAL EVERY 8 HOURS
Qty: 90 TAB | Refills: 0 | Status: SHIPPED | OUTPATIENT
Start: 2018-01-01 | End: 2018-01-01 | Stop reason: DRUGHIGH

## 2018-01-01 RX ORDER — OXYCODONE HYDROCHLORIDE 5 MG/1
15 TABLET ORAL
Status: DISCONTINUED | OUTPATIENT
Start: 2018-01-01 | End: 2018-01-01 | Stop reason: HOSPADM

## 2018-01-01 RX ORDER — SODIUM CHLORIDE 0.9 % (FLUSH) 0.9 %
5-10 SYRINGE (ML) INJECTION AS NEEDED
Status: DISCONTINUED | OUTPATIENT
Start: 2018-01-01 | End: 2018-01-01 | Stop reason: HOSPADM

## 2018-01-01 RX ORDER — ACETAMINOPHEN 325 MG/1
650 TABLET ORAL
Status: DISCONTINUED | OUTPATIENT
Start: 2018-01-01 | End: 2018-01-01 | Stop reason: HOSPADM

## 2018-01-01 RX ORDER — PREGABALIN 75 MG/1
75 CAPSULE ORAL 3 TIMES DAILY
COMMUNITY

## 2018-01-01 RX ORDER — FUROSEMIDE 10 MG/ML
40 INJECTION INTRAMUSCULAR; INTRAVENOUS ONCE
Status: DISCONTINUED | OUTPATIENT
Start: 2018-01-01 | End: 2018-01-01

## 2018-01-01 RX ORDER — HEPARIN SODIUM 5000 [USP'U]/ML
5000 INJECTION, SOLUTION INTRAVENOUS; SUBCUTANEOUS EVERY 8 HOURS
Status: DISCONTINUED | OUTPATIENT
Start: 2018-01-01 | End: 2018-01-01 | Stop reason: HOSPADM

## 2018-01-01 RX ORDER — FUROSEMIDE 10 MG/ML
80 INJECTION INTRAMUSCULAR; INTRAVENOUS
Status: COMPLETED | OUTPATIENT
Start: 2018-01-01 | End: 2018-01-01

## 2018-01-01 RX ORDER — ONDANSETRON 2 MG/ML
8 INJECTION INTRAMUSCULAR; INTRAVENOUS
Status: COMPLETED | OUTPATIENT
Start: 2018-01-01 | End: 2018-01-01

## 2018-01-01 RX ORDER — SODIUM BICARBONATE 650 MG/1
650 TABLET ORAL 2 TIMES DAILY
Status: DISCONTINUED | OUTPATIENT
Start: 2018-01-01 | End: 2018-01-01 | Stop reason: HOSPADM

## 2018-01-01 RX ORDER — ERYTHROPOIETIN 40000 [IU]/ML
INJECTION, SOLUTION INTRAVENOUS; SUBCUTANEOUS
COMMUNITY
Start: 2018-01-01 | End: 2018-01-01

## 2018-01-01 RX ORDER — AMITRIPTYLINE HYDROCHLORIDE 25 MG/1
TABLET, FILM COATED ORAL
COMMUNITY
Start: 2018-01-01 | End: 2018-01-01 | Stop reason: DRUGHIGH

## 2018-01-01 RX ORDER — IPRATROPIUM BROMIDE AND ALBUTEROL SULFATE 2.5; .5 MG/3ML; MG/3ML
3 SOLUTION RESPIRATORY (INHALATION)
Status: DISCONTINUED | OUTPATIENT
Start: 2018-01-01 | End: 2018-01-01 | Stop reason: HOSPADM

## 2018-01-01 RX ORDER — FUROSEMIDE 10 MG/ML
80 INJECTION INTRAMUSCULAR; INTRAVENOUS 2 TIMES DAILY
Status: DISCONTINUED | OUTPATIENT
Start: 2018-01-01 | End: 2018-01-01 | Stop reason: HOSPADM

## 2018-01-01 RX ORDER — AMLODIPINE BESYLATE 10 MG/1
TABLET ORAL
Qty: 30 TAB | Refills: 0 | Status: SHIPPED | OUTPATIENT
Start: 2018-01-01

## 2018-01-01 RX ORDER — AMLODIPINE BESYLATE 5 MG/1
10 TABLET ORAL DAILY
Status: DISCONTINUED | OUTPATIENT
Start: 2018-01-01 | End: 2018-01-01 | Stop reason: HOSPADM

## 2018-01-01 RX ORDER — SODIUM CHLORIDE 9 MG/ML
250 INJECTION, SOLUTION INTRAVENOUS AS NEEDED
Status: DISCONTINUED | OUTPATIENT
Start: 2018-01-01 | End: 2018-01-01 | Stop reason: HOSPADM

## 2018-01-01 RX ORDER — METHOCARBAMOL 750 MG/1
750 TABLET, FILM COATED ORAL
COMMUNITY
End: 2018-01-01

## 2018-01-01 RX ORDER — METHOCARBAMOL 500 MG/1
750 TABLET, FILM COATED ORAL 3 TIMES DAILY
Status: DISCONTINUED | OUTPATIENT
Start: 2018-01-01 | End: 2018-01-01 | Stop reason: HOSPADM

## 2018-01-01 RX ORDER — BUDESONIDE 0.5 MG/2ML
500 INHALANT ORAL
Status: DISCONTINUED | OUTPATIENT
Start: 2018-01-01 | End: 2018-01-01 | Stop reason: HOSPADM

## 2018-01-01 RX ORDER — SEVELAMER CARBONATE 800 MG/1
800 TABLET, FILM COATED ORAL
Status: DISCONTINUED | OUTPATIENT
Start: 2018-01-01 | End: 2018-01-01 | Stop reason: HOSPADM

## 2018-01-01 RX ORDER — SODIUM CHLORIDE 0.9 % (FLUSH) 0.9 %
5-10 SYRINGE (ML) INJECTION EVERY 8 HOURS
Status: DISCONTINUED | OUTPATIENT
Start: 2018-01-01 | End: 2018-01-01 | Stop reason: HOSPADM

## 2018-01-01 RX ORDER — FLUTICASONE PROPIONATE AND SALMETEROL 250; 50 UG/1; UG/1
1 POWDER RESPIRATORY (INHALATION) DAILY
Status: DISCONTINUED | OUTPATIENT
Start: 2018-01-01 | End: 2018-01-01 | Stop reason: SDUPTHER

## 2018-01-01 RX ORDER — AMITRIPTYLINE HYDROCHLORIDE 50 MG/1
50 TABLET, FILM COATED ORAL
Status: DISCONTINUED | OUTPATIENT
Start: 2018-01-01 | End: 2018-01-01 | Stop reason: HOSPADM

## 2018-01-01 RX ORDER — INSULIN LISPRO 100 [IU]/ML
15 INJECTION, SOLUTION INTRAVENOUS; SUBCUTANEOUS ONCE
Status: COMPLETED | OUTPATIENT
Start: 2018-01-01 | End: 2018-01-01

## 2018-01-01 RX ORDER — OXYCODONE HYDROCHLORIDE 5 MG/1
5 TABLET ORAL
Status: DISCONTINUED | OUTPATIENT
Start: 2018-01-01 | End: 2018-01-01

## 2018-01-01 RX ORDER — ONDANSETRON 8 MG/1
8 TABLET, ORALLY DISINTEGRATING ORAL 3 TIMES DAILY
Qty: 15 TAB | Refills: 3 | Status: SHIPPED | OUTPATIENT
Start: 2018-01-01 | End: 2018-01-01 | Stop reason: DRUGHIGH

## 2018-01-01 RX ORDER — FERROUS SULFATE 325(65) MG
325 TABLET, DELAYED RELEASE (ENTERIC COATED) ORAL
Qty: 90 TAB | Refills: 0 | Status: SHIPPED | OUTPATIENT
Start: 2018-01-01

## 2018-01-01 RX ORDER — PANTOPRAZOLE SODIUM 40 MG/1
40 TABLET, DELAYED RELEASE ORAL DAILY
Status: DISCONTINUED | OUTPATIENT
Start: 2018-01-01 | End: 2018-01-01 | Stop reason: HOSPADM

## 2018-01-01 RX ORDER — FLUTICASONE PROPIONATE AND SALMETEROL 50; 250 UG/1; UG/1
POWDER RESPIRATORY (INHALATION)
Qty: 1 INHALER | Refills: 6 | Status: SHIPPED | OUTPATIENT
Start: 2018-01-01

## 2018-01-01 RX ORDER — PEPPERMINT OIL
SPIRIT ORAL ONCE
Status: COMPLETED | OUTPATIENT
Start: 2018-01-01 | End: 2018-01-01

## 2018-01-01 RX ORDER — ALBUTEROL SULFATE 0.83 MG/ML
2.5 SOLUTION RESPIRATORY (INHALATION) 3 TIMES DAILY
COMMUNITY
Start: 2018-01-01

## 2018-01-01 RX ORDER — ONDANSETRON 8 MG/1
8 TABLET, ORALLY DISINTEGRATING ORAL
COMMUNITY
End: 2018-01-01 | Stop reason: SDUPTHER

## 2018-01-01 RX ORDER — ATORVASTATIN CALCIUM 40 MG/1
40 TABLET, FILM COATED ORAL
Status: DISCONTINUED | OUTPATIENT
Start: 2018-01-01 | End: 2018-01-01 | Stop reason: HOSPADM

## 2018-01-01 RX ORDER — AMITRIPTYLINE HYDROCHLORIDE 25 MG/1
50 TABLET, FILM COATED ORAL
Status: DISCONTINUED | OUTPATIENT
Start: 2018-01-01 | End: 2018-01-01 | Stop reason: HOSPADM

## 2018-01-01 RX ORDER — DOCUSATE SODIUM 100 MG/1
100 CAPSULE, LIQUID FILLED ORAL 2 TIMES DAILY
Status: DISCONTINUED | OUTPATIENT
Start: 2018-01-01 | End: 2018-01-01 | Stop reason: HOSPADM

## 2018-01-01 RX ORDER — ONDANSETRON 2 MG/ML
4 INJECTION INTRAMUSCULAR; INTRAVENOUS
Status: COMPLETED | OUTPATIENT
Start: 2018-01-01 | End: 2018-01-01

## 2018-01-01 RX ORDER — FUROSEMIDE 40 MG/1
80 TABLET ORAL 2 TIMES DAILY
Qty: 60 TAB | Refills: 12 | Status: SHIPPED | OUTPATIENT
Start: 2018-01-01

## 2018-01-01 RX ORDER — ALBUTEROL SULFATE 90 UG/1
2 AEROSOL, METERED RESPIRATORY (INHALATION)
COMMUNITY

## 2018-01-01 RX ORDER — AMLODIPINE BESYLATE 10 MG/1
10 TABLET ORAL DAILY
Qty: 90 TAB | Refills: 0 | Status: SHIPPED | OUTPATIENT
Start: 2018-01-01 | End: 2018-01-01 | Stop reason: SDUPTHER

## 2018-01-01 RX ORDER — INSULIN GLARGINE 100 [IU]/ML
25 INJECTION, SOLUTION SUBCUTANEOUS
Status: DISCONTINUED | OUTPATIENT
Start: 2018-01-01 | End: 2018-01-01 | Stop reason: HOSPADM

## 2018-01-01 RX ORDER — ALBUTEROL SULFATE 0.83 MG/ML
2.5 SOLUTION RESPIRATORY (INHALATION) 3 TIMES DAILY
Status: DISCONTINUED | OUTPATIENT
Start: 2018-01-01 | End: 2018-01-01 | Stop reason: HOSPADM

## 2018-01-01 RX ORDER — DEXTROSE 50 % IN WATER (D50W) INTRAVENOUS SYRINGE
12.5-25 AS NEEDED
Status: DISCONTINUED | OUTPATIENT
Start: 2018-01-01 | End: 2018-01-01 | Stop reason: HOSPADM

## 2018-01-01 RX ORDER — HYDRALAZINE HYDROCHLORIDE 20 MG/ML
10 INJECTION INTRAMUSCULAR; INTRAVENOUS
Status: DISCONTINUED | OUTPATIENT
Start: 2018-01-01 | End: 2018-01-01 | Stop reason: HOSPADM

## 2018-01-01 RX ORDER — LEVOFLOXACIN 500 MG/1
500 TABLET, FILM COATED ORAL DAILY
Qty: 14 TAB | Refills: 0 | Status: SHIPPED | OUTPATIENT
Start: 2018-01-01

## 2018-01-01 RX ORDER — INSULIN GLARGINE 100 [IU]/ML
15 INJECTION, SOLUTION SUBCUTANEOUS DAILY
Status: DISCONTINUED | OUTPATIENT
Start: 2018-01-01 | End: 2018-01-01

## 2018-01-01 RX ORDER — CLONIDINE HYDROCHLORIDE 0.3 MG/1
TABLET ORAL
Qty: 90 TAB | Refills: 0 | Status: SHIPPED | OUTPATIENT
Start: 2018-01-01

## 2018-01-01 RX ORDER — ACETAMINOPHEN 325 MG/1
650 TABLET ORAL
Status: DISCONTINUED | OUTPATIENT
Start: 2018-01-01 | End: 2018-01-01 | Stop reason: SDUPTHER

## 2018-01-01 RX ORDER — CALCITRIOL 0.25 UG/1
0.25 CAPSULE ORAL DAILY
Status: DISCONTINUED | OUTPATIENT
Start: 2018-01-01 | End: 2018-01-01 | Stop reason: HOSPADM

## 2018-01-01 RX ORDER — INSULIN GLARGINE 100 [IU]/ML
25 INJECTION, SOLUTION SUBCUTANEOUS
Status: DISCONTINUED | OUTPATIENT
Start: 2018-01-01 | End: 2018-01-01

## 2018-01-01 RX ORDER — FUROSEMIDE 40 MG/1
60 TABLET ORAL 2 TIMES DAILY
COMMUNITY
End: 2018-01-01 | Stop reason: SDUPTHER

## 2018-01-01 RX ORDER — SODIUM CHLORIDE 9 MG/ML
75 INJECTION, SOLUTION INTRAVENOUS CONTINUOUS
Status: DISCONTINUED | OUTPATIENT
Start: 2018-01-01 | End: 2018-01-01

## 2018-01-01 RX ORDER — HYDRALAZINE HYDROCHLORIDE 50 MG/1
100 TABLET, FILM COATED ORAL EVERY 8 HOURS
Status: DISCONTINUED | OUTPATIENT
Start: 2018-01-01 | End: 2018-01-01 | Stop reason: HOSPADM

## 2018-01-01 RX ORDER — GUAIFENESIN 100 MG/5ML
81 LIQUID (ML) ORAL DAILY
Status: DISCONTINUED | OUTPATIENT
Start: 2018-01-01 | End: 2018-01-01 | Stop reason: SDUPTHER

## 2018-01-01 RX ORDER — CLONIDINE HYDROCHLORIDE 0.3 MG/1
TABLET ORAL
Qty: 90 TAB | Refills: 0 | Status: SHIPPED | OUTPATIENT
Start: 2018-01-01 | End: 2018-01-01 | Stop reason: SDUPTHER

## 2018-01-01 RX ORDER — METHOCARBAMOL 750 MG/1
750 TABLET, FILM COATED ORAL
COMMUNITY
Start: 2018-01-01 | End: 2018-01-01 | Stop reason: SDUPTHER

## 2018-01-01 RX ORDER — FUROSEMIDE 40 MG/1
TABLET ORAL
Qty: 90 TAB | Refills: 12 | Status: SHIPPED | OUTPATIENT
Start: 2018-01-01 | End: 2018-01-01 | Stop reason: DRUGHIGH

## 2018-01-01 RX ORDER — PREGABALIN 75 MG/1
75 CAPSULE ORAL 3 TIMES DAILY
Status: DISCONTINUED | OUTPATIENT
Start: 2018-01-01 | End: 2018-01-01 | Stop reason: HOSPADM

## 2018-01-01 RX ORDER — CLONIDINE HYDROCHLORIDE 0.3 MG/1
0.3 TABLET ORAL 3 TIMES DAILY
Qty: 90 TAB | Refills: 0 | Status: SHIPPED | OUTPATIENT
Start: 2018-01-01 | End: 2018-01-01 | Stop reason: SDUPTHER

## 2018-01-01 RX ORDER — ONDANSETRON 4 MG/1
4 TABLET, ORALLY DISINTEGRATING ORAL
Status: DISCONTINUED | OUTPATIENT
Start: 2018-01-01 | End: 2018-01-01 | Stop reason: HOSPADM

## 2018-01-01 RX ORDER — ASPIRIN 81 MG/1
81 TABLET ORAL DAILY
Status: DISCONTINUED | OUTPATIENT
Start: 2018-01-01 | End: 2018-01-01 | Stop reason: HOSPADM

## 2018-01-01 RX ORDER — CALCIFEDIOL 30 UG/1
1 CAPSULE, EXTENDED RELEASE ORAL
COMMUNITY
Start: 2018-01-01

## 2018-01-01 RX ORDER — FUROSEMIDE 40 MG/1
60 TABLET ORAL 2 TIMES DAILY
Qty: 60 TAB | Refills: 12 | Status: ON HOLD | OUTPATIENT
Start: 2018-01-01 | End: 2018-01-01

## 2018-01-01 RX ORDER — INSULIN LISPRO 100 [IU]/ML
INJECTION, SOLUTION INTRAVENOUS; SUBCUTANEOUS EVERY 6 HOURS
Status: DISCONTINUED | OUTPATIENT
Start: 2018-01-01 | End: 2018-01-01

## 2018-01-01 RX ORDER — ONDANSETRON 8 MG/1
8 TABLET, ORALLY DISINTEGRATING ORAL
Qty: 30 TAB | Refills: 3 | Status: SHIPPED | OUTPATIENT
Start: 2018-01-01

## 2018-01-01 RX ORDER — HEPARIN SODIUM 5000 [USP'U]/ML
5000 INJECTION, SOLUTION INTRAVENOUS; SUBCUTANEOUS EVERY 12 HOURS
Status: DISCONTINUED | OUTPATIENT
Start: 2018-01-01 | End: 2018-01-01 | Stop reason: HOSPADM

## 2018-01-01 RX ORDER — FUROSEMIDE 10 MG/ML
60 INJECTION INTRAMUSCULAR; INTRAVENOUS 2 TIMES DAILY
Status: DISCONTINUED | OUTPATIENT
Start: 2018-01-01 | End: 2018-01-01 | Stop reason: HOSPADM

## 2018-01-01 RX ORDER — PANTOPRAZOLE SODIUM 40 MG/1
40 TABLET, DELAYED RELEASE ORAL
Status: DISCONTINUED | OUTPATIENT
Start: 2018-01-01 | End: 2018-01-01 | Stop reason: HOSPADM

## 2018-01-01 RX ORDER — INSULIN GLARGINE 100 [IU]/ML
20 INJECTION, SOLUTION SUBCUTANEOUS DAILY
Status: DISCONTINUED | OUTPATIENT
Start: 2018-01-01 | End: 2018-01-01 | Stop reason: HOSPADM

## 2018-01-01 RX ORDER — ARFORMOTEROL TARTRATE 15 UG/2ML
15 SOLUTION RESPIRATORY (INHALATION)
Status: DISCONTINUED | OUTPATIENT
Start: 2018-01-01 | End: 2018-01-01 | Stop reason: HOSPADM

## 2018-01-01 RX ORDER — CLONIDINE HYDROCHLORIDE 0.3 MG/1
0.3 TABLET ORAL 3 TIMES DAILY
Qty: 90 TAB | Refills: 1 | Status: SHIPPED | OUTPATIENT
Start: 2018-01-01 | End: 2018-01-01 | Stop reason: SDUPTHER

## 2018-01-01 RX ORDER — LABETALOL 200 MG/1
600 TABLET, FILM COATED ORAL EVERY 8 HOURS
Status: DISCONTINUED | OUTPATIENT
Start: 2018-01-01 | End: 2018-01-01 | Stop reason: HOSPADM

## 2018-01-01 RX ADMIN — METHOCARBAMOL 750 MG: 500 TABLET ORAL at 09:32

## 2018-01-01 RX ADMIN — LABETALOL HYDROCHLORIDE 300 MG: 200 TABLET, FILM COATED ORAL at 13:20

## 2018-01-01 RX ADMIN — INSULIN LISPRO 2 UNITS: 100 INJECTION, SOLUTION INTRAVENOUS; SUBCUTANEOUS at 12:02

## 2018-01-01 RX ADMIN — INSULIN GLARGINE 25 UNITS: 100 INJECTION, SOLUTION SUBCUTANEOUS at 18:29

## 2018-01-01 RX ADMIN — CLONIDINE HYDROCHLORIDE 0.3 MG: 0.2 TABLET ORAL at 09:38

## 2018-01-01 RX ADMIN — BUDESONIDE 500 MCG: 0.5 INHALANT RESPIRATORY (INHALATION) at 08:17

## 2018-01-01 RX ADMIN — ASPIRIN 81 MG 81 MG: 81 TABLET ORAL at 09:38

## 2018-01-01 RX ADMIN — HYDRALAZINE HYDROCHLORIDE 100 MG: 50 TABLET, FILM COATED ORAL at 05:56

## 2018-01-01 RX ADMIN — INSULIN LISPRO 5 UNITS: 100 INJECTION, SOLUTION INTRAVENOUS; SUBCUTANEOUS at 11:48

## 2018-01-01 RX ADMIN — HEPARIN SODIUM 5000 UNITS: 5000 INJECTION, SOLUTION INTRAVENOUS; SUBCUTANEOUS at 12:17

## 2018-01-01 RX ADMIN — SEVELAMER CARBONATE 800 MG: 800 TABLET, FILM COATED ORAL at 09:21

## 2018-01-01 RX ADMIN — FUROSEMIDE 60 MG: 10 INJECTION, SOLUTION INTRAMUSCULAR; INTRAVENOUS at 09:05

## 2018-01-01 RX ADMIN — BUDESONIDE 500 MCG: 0.5 INHALANT RESPIRATORY (INHALATION) at 09:04

## 2018-01-01 RX ADMIN — CLONIDINE HYDROCHLORIDE 0.3 MG: 0.2 TABLET ORAL at 11:51

## 2018-01-01 RX ADMIN — SODIUM BICARBONATE 650 MG: 650 TABLET ORAL at 09:20

## 2018-01-01 RX ADMIN — HEPARIN SODIUM 5000 UNITS: 5000 INJECTION, SOLUTION INTRAVENOUS; SUBCUTANEOUS at 12:28

## 2018-01-01 RX ADMIN — SEVELAMER CARBONATE 800 MG: 800 TABLET, FILM COATED ORAL at 07:02

## 2018-01-01 RX ADMIN — SODIUM BICARBONATE 650 MG: 650 TABLET ORAL at 17:27

## 2018-01-01 RX ADMIN — ARFORMOTEROL TARTRATE 15 MCG: 15 SOLUTION RESPIRATORY (INHALATION) at 09:04

## 2018-01-01 RX ADMIN — SODIUM CHLORIDE 1000 ML: 900 INJECTION, SOLUTION INTRAVENOUS at 21:50

## 2018-01-01 RX ADMIN — HYDRALAZINE HYDROCHLORIDE 100 MG: 50 TABLET, FILM COATED ORAL at 06:23

## 2018-01-01 RX ADMIN — SEVELAMER CARBONATE 800 MG: 800 TABLET, FILM COATED ORAL at 08:44

## 2018-01-01 RX ADMIN — INSULIN GLARGINE 20 UNITS: 100 INJECTION, SOLUTION SUBCUTANEOUS at 09:58

## 2018-01-01 RX ADMIN — FUROSEMIDE 60 MG: 10 INJECTION, SOLUTION INTRAMUSCULAR; INTRAVENOUS at 17:17

## 2018-01-01 RX ADMIN — ATORVASTATIN CALCIUM 40 MG: 40 TABLET, FILM COATED ORAL at 21:54

## 2018-01-01 RX ADMIN — PREGABALIN 75 MG: 50 CAPSULE ORAL at 21:23

## 2018-01-01 RX ADMIN — SEVELAMER CARBONATE 800 MG: 800 TABLET, FILM COATED ORAL at 11:53

## 2018-01-01 RX ADMIN — Medication 10 ML: at 21:22

## 2018-01-01 RX ADMIN — CLONIDINE HYDROCHLORIDE 0.3 MG: 0.2 TABLET ORAL at 21:24

## 2018-01-01 RX ADMIN — AMLODIPINE BESYLATE 10 MG: 5 TABLET ORAL at 09:38

## 2018-01-01 RX ADMIN — PREGABALIN 75 MG: 75 CAPSULE ORAL at 21:25

## 2018-01-01 RX ADMIN — ATORVASTATIN CALCIUM 40 MG: 40 TABLET, FILM COATED ORAL at 04:09

## 2018-01-01 RX ADMIN — INSULIN LISPRO 3 UNITS: 100 INJECTION, SOLUTION INTRAVENOUS; SUBCUTANEOUS at 12:17

## 2018-01-01 RX ADMIN — INSULIN GLARGINE 25 UNITS: 100 INJECTION, SOLUTION SUBCUTANEOUS at 17:29

## 2018-01-01 RX ADMIN — SEVELAMER CARBONATE 800 MG: 800 TABLET, FILM COATED ORAL at 17:17

## 2018-01-01 RX ADMIN — LABETALOL HCL 600 MG: 200 TABLET, FILM COATED ORAL at 21:57

## 2018-01-01 RX ADMIN — BUDESONIDE 500 MCG: 0.5 INHALANT RESPIRATORY (INHALATION) at 21:01

## 2018-01-01 RX ADMIN — INSULIN LISPRO 3 UNITS: 100 INJECTION, SOLUTION INTRAVENOUS; SUBCUTANEOUS at 07:13

## 2018-01-01 RX ADMIN — Medication 10 ML: at 21:53

## 2018-01-01 RX ADMIN — Medication 10 ML: at 06:25

## 2018-01-01 RX ADMIN — HEPARIN SODIUM 5000 UNITS: 5000 INJECTION, SOLUTION INTRAVENOUS; SUBCUTANEOUS at 06:01

## 2018-01-01 RX ADMIN — PREGABALIN 75 MG: 75 CAPSULE ORAL at 17:20

## 2018-01-01 RX ADMIN — FUROSEMIDE 80 MG: 10 INJECTION, SOLUTION INTRAMUSCULAR; INTRAVENOUS at 08:27

## 2018-01-01 RX ADMIN — NITROGLYCERIN 0.5 INCH: 20 OINTMENT TOPICAL at 22:50

## 2018-01-01 RX ADMIN — ARFORMOTEROL TARTRATE 15 MCG: 15 SOLUTION RESPIRATORY (INHALATION) at 10:54

## 2018-01-01 RX ADMIN — PREGABALIN 75 MG: 75 CAPSULE ORAL at 08:27

## 2018-01-01 RX ADMIN — HYDRALAZINE HYDROCHLORIDE 100 MG: 50 TABLET, FILM COATED ORAL at 07:02

## 2018-01-01 RX ADMIN — HEPARIN SODIUM 5000 UNITS: 5000 INJECTION, SOLUTION INTRAVENOUS; SUBCUTANEOUS at 22:50

## 2018-01-01 RX ADMIN — Medication 10 ML: at 05:56

## 2018-01-01 RX ADMIN — MULTIPLE VITAMINS W/ MINERALS TAB 1 TABLET: TAB at 11:04

## 2018-01-01 RX ADMIN — INSULIN GLARGINE 20 UNITS: 100 INJECTION, SOLUTION SUBCUTANEOUS at 09:38

## 2018-01-01 RX ADMIN — PREGABALIN 75 MG: 75 CAPSULE ORAL at 00:35

## 2018-01-01 RX ADMIN — ASPIRIN 81 MG: 81 TABLET, COATED ORAL at 08:44

## 2018-01-01 RX ADMIN — LABETALOL HYDROCHLORIDE 300 MG: 200 TABLET, FILM COATED ORAL at 13:49

## 2018-01-01 RX ADMIN — METHOCARBAMOL 750 MG: 500 TABLET ORAL at 17:16

## 2018-01-01 RX ADMIN — CLONIDINE HYDROCHLORIDE 0.3 MG: 0.2 TABLET ORAL at 17:16

## 2018-01-01 RX ADMIN — IRON SUCROSE 100 MG: 20 INJECTION, SOLUTION INTRAVENOUS at 12:01

## 2018-01-01 RX ADMIN — FUROSEMIDE 80 MG: 10 INJECTION, SOLUTION INTRAMUSCULAR; INTRAVENOUS at 18:26

## 2018-01-01 RX ADMIN — AMLODIPINE BESYLATE 10 MG: 5 TABLET ORAL at 09:58

## 2018-01-01 RX ADMIN — INSULIN LISPRO 2 UNITS: 100 INJECTION, SOLUTION INTRAVENOUS; SUBCUTANEOUS at 11:53

## 2018-01-01 RX ADMIN — ACETAMINOPHEN 650 MG: 325 TABLET ORAL at 09:58

## 2018-01-01 RX ADMIN — Medication 10 ML: at 12:12

## 2018-01-01 RX ADMIN — INSULIN LISPRO 2 UNITS: 100 INJECTION, SOLUTION INTRAVENOUS; SUBCUTANEOUS at 21:52

## 2018-01-01 RX ADMIN — HYDRALAZINE HYDROCHLORIDE 10 MG: 20 INJECTION INTRAMUSCULAR; INTRAVENOUS at 23:07

## 2018-01-01 RX ADMIN — PANTOPRAZOLE SODIUM 40 MG: 40 TABLET, DELAYED RELEASE ORAL at 08:27

## 2018-01-01 RX ADMIN — SODIUM BICARBONATE 650 MG: 650 TABLET ORAL at 17:16

## 2018-01-01 RX ADMIN — CLONIDINE HYDROCHLORIDE 0.3 MG: 0.2 TABLET ORAL at 11:02

## 2018-01-01 RX ADMIN — SEVELAMER CARBONATE 800 MG: 800 TABLET, FILM COATED ORAL at 08:27

## 2018-01-01 RX ADMIN — CLONIDINE HYDROCHLORIDE 0.3 MG: 0.2 TABLET ORAL at 00:36

## 2018-01-01 RX ADMIN — ATORVASTATIN CALCIUM 40 MG: 40 TABLET, FILM COATED ORAL at 21:23

## 2018-01-01 RX ADMIN — ARFORMOTEROL TARTRATE 15 MCG: 15 SOLUTION RESPIRATORY (INHALATION) at 21:01

## 2018-01-01 RX ADMIN — CALCITRIOL 0.25 MCG: 0.25 CAPSULE, LIQUID FILLED ORAL at 09:31

## 2018-01-01 RX ADMIN — LABETALOL HCL 600 MG: 200 TABLET, FILM COATED ORAL at 07:40

## 2018-01-01 RX ADMIN — PREGABALIN 75 MG: 75 CAPSULE ORAL at 09:20

## 2018-01-01 RX ADMIN — Medication 10 ML: at 13:23

## 2018-01-01 RX ADMIN — SEVELAMER CARBONATE 800 MG: 800 TABLET, FILM COATED ORAL at 12:01

## 2018-01-01 RX ADMIN — LABETALOL HYDROCHLORIDE 300 MG: 200 TABLET, FILM COATED ORAL at 11:03

## 2018-01-01 RX ADMIN — LABETALOL HYDROCHLORIDE 300 MG: 200 TABLET, FILM COATED ORAL at 06:25

## 2018-01-01 RX ADMIN — FUROSEMIDE 80 MG: 10 INJECTION, SOLUTION INTRAMUSCULAR; INTRAVENOUS at 08:44

## 2018-01-01 RX ADMIN — Medication 10 ML: at 17:20

## 2018-01-01 RX ADMIN — ASPIRIN 81 MG 81 MG: 81 TABLET ORAL at 11:05

## 2018-01-01 RX ADMIN — ASPIRIN 81 MG: 81 TABLET, COATED ORAL at 09:20

## 2018-01-01 RX ADMIN — AMLODIPINE BESYLATE 10 MG: 5 TABLET ORAL at 09:20

## 2018-01-01 RX ADMIN — FUROSEMIDE 60 MG: 10 INJECTION, SOLUTION INTRAMUSCULAR; INTRAVENOUS at 18:37

## 2018-01-01 RX ADMIN — AMLODIPINE BESYLATE 10 MG: 5 TABLET ORAL at 08:27

## 2018-01-01 RX ADMIN — HYDRALAZINE HYDROCHLORIDE 100 MG: 50 TABLET, FILM COATED ORAL at 11:05

## 2018-01-01 RX ADMIN — METHOCARBAMOL 750 MG: 500 TABLET ORAL at 17:27

## 2018-01-01 RX ADMIN — LABETALOL HYDROCHLORIDE 300 MG: 200 TABLET, FILM COATED ORAL at 07:03

## 2018-01-01 RX ADMIN — SEVELAMER CARBONATE 800 MG: 800 TABLET, FILM COATED ORAL at 09:05

## 2018-01-01 RX ADMIN — HYDRALAZINE HYDROCHLORIDE 100 MG: 50 TABLET, FILM COATED ORAL at 14:17

## 2018-01-01 RX ADMIN — ASPIRIN 81 MG 81 MG: 81 TABLET ORAL at 09:58

## 2018-01-01 RX ADMIN — ARFORMOTEROL TARTRATE 15 MCG: 15 SOLUTION RESPIRATORY (INHALATION) at 20:34

## 2018-01-01 RX ADMIN — CALCITRIOL 0.25 MCG: 0.25 CAPSULE ORAL at 09:58

## 2018-01-01 RX ADMIN — SEVELAMER CARBONATE 800 MG: 800 TABLET, FILM COATED ORAL at 11:48

## 2018-01-01 RX ADMIN — LABETALOL HYDROCHLORIDE 300 MG: 200 TABLET, FILM COATED ORAL at 00:35

## 2018-01-01 RX ADMIN — METHOCARBAMOL 750 MG: 500 TABLET ORAL at 09:04

## 2018-01-01 RX ADMIN — SODIUM BICARBONATE 650 MG: 650 TABLET ORAL at 11:07

## 2018-01-01 RX ADMIN — Medication 10 ML: at 07:03

## 2018-01-01 RX ADMIN — PREGABALIN 75 MG: 50 CAPSULE ORAL at 17:16

## 2018-01-01 RX ADMIN — PREGABALIN 75 MG: 50 CAPSULE ORAL at 23:08

## 2018-01-01 RX ADMIN — INSULIN LISPRO 7 UNITS: 100 INJECTION, SOLUTION INTRAVENOUS; SUBCUTANEOUS at 11:59

## 2018-01-01 RX ADMIN — SODIUM BICARBONATE 650 MG: 650 TABLET ORAL at 08:27

## 2018-01-01 RX ADMIN — AMITRIPTYLINE HYDROCHLORIDE 50 MG: 50 TABLET, FILM COATED ORAL at 00:36

## 2018-01-01 RX ADMIN — SODIUM BICARBONATE 650 MG: 650 TABLET ORAL at 08:44

## 2018-01-01 RX ADMIN — HYDRALAZINE HYDROCHLORIDE 100 MG: 50 TABLET, FILM COATED ORAL at 22:20

## 2018-01-01 RX ADMIN — SEVELAMER CARBONATE 800 MG: 800 TABLET, FILM COATED ORAL at 07:40

## 2018-01-01 RX ADMIN — SEVELAMER CARBONATE 800 MG: 800 TABLET, FILM COATED ORAL at 11:51

## 2018-01-01 RX ADMIN — AMLODIPINE BESYLATE 10 MG: 5 TABLET ORAL at 08:44

## 2018-01-01 RX ADMIN — ALBUTEROL SULFATE 2.5 MG: 2.5 SOLUTION RESPIRATORY (INHALATION) at 21:05

## 2018-01-01 RX ADMIN — HYDRALAZINE HYDROCHLORIDE 100 MG: 50 TABLET, FILM COATED ORAL at 07:40

## 2018-01-01 RX ADMIN — HEPARIN SODIUM 5000 UNITS: 5000 INJECTION, SOLUTION INTRAVENOUS; SUBCUTANEOUS at 21:52

## 2018-01-01 RX ADMIN — HEPARIN SODIUM 5000 UNITS: 5000 INJECTION, SOLUTION INTRAVENOUS; SUBCUTANEOUS at 07:39

## 2018-01-01 RX ADMIN — SEVELAMER CARBONATE 800 MG: 800 TABLET, FILM COATED ORAL at 18:40

## 2018-01-01 RX ADMIN — IRON SUCROSE 100 MG: 20 INJECTION, SOLUTION INTRAVENOUS at 11:59

## 2018-01-01 RX ADMIN — AMLODIPINE BESYLATE 10 MG: 5 TABLET ORAL at 09:31

## 2018-01-01 RX ADMIN — SODIUM CHLORIDE 250 ML: 900 INJECTION, SOLUTION INTRAVENOUS at 05:18

## 2018-01-01 RX ADMIN — PREGABALIN 75 MG: 50 CAPSULE ORAL at 12:01

## 2018-01-01 RX ADMIN — METHOCARBAMOL 750 MG: 500 TABLET ORAL at 11:07

## 2018-01-01 RX ADMIN — CALCITRIOL 0.25 MCG: 0.25 CAPSULE, LIQUID FILLED ORAL at 14:29

## 2018-01-01 RX ADMIN — AMITRIPTYLINE HYDROCHLORIDE 50 MG: 50 TABLET, FILM COATED ORAL at 21:25

## 2018-01-01 RX ADMIN — SODIUM CHLORIDE 75 ML/HR: 900 INJECTION, SOLUTION INTRAVENOUS at 04:09

## 2018-01-01 RX ADMIN — PANTOPRAZOLE SODIUM 40 MG: 40 TABLET, DELAYED RELEASE ORAL at 08:44

## 2018-01-01 RX ADMIN — FUROSEMIDE 60 MG: 10 INJECTION, SOLUTION INTRAMUSCULAR; INTRAVENOUS at 09:58

## 2018-01-01 RX ADMIN — LABETALOL HYDROCHLORIDE 300 MG: 200 TABLET, FILM COATED ORAL at 21:24

## 2018-01-01 RX ADMIN — CLONIDINE HYDROCHLORIDE 0.3 MG: 0.2 TABLET ORAL at 15:34

## 2018-01-01 RX ADMIN — PREGABALIN 75 MG: 75 CAPSULE ORAL at 17:31

## 2018-01-01 RX ADMIN — HYDRALAZINE HYDROCHLORIDE 100 MG: 50 TABLET, FILM COATED ORAL at 21:53

## 2018-01-01 RX ADMIN — ALBUTEROL SULFATE 2.5 MG: 2.5 SOLUTION RESPIRATORY (INHALATION) at 09:27

## 2018-01-01 RX ADMIN — AMLODIPINE BESYLATE 10 MG: 5 TABLET ORAL at 11:01

## 2018-01-01 RX ADMIN — INSULIN LISPRO 2 UNITS: 100 INJECTION, SOLUTION INTRAVENOUS; SUBCUTANEOUS at 23:07

## 2018-01-01 RX ADMIN — SEVELAMER CARBONATE 800 MG: 800 TABLET, FILM COATED ORAL at 12:16

## 2018-01-01 RX ADMIN — SODIUM BICARBONATE 650 MG: 650 TABLET ORAL at 09:04

## 2018-01-01 RX ADMIN — Medication 10 ML: at 21:00

## 2018-01-01 RX ADMIN — ASPIRIN 81 MG 81 MG: 81 TABLET ORAL at 09:04

## 2018-01-01 RX ADMIN — DOCUSATE SODIUM 100 MG: 100 CAPSULE, LIQUID FILLED ORAL at 09:32

## 2018-01-01 RX ADMIN — OXYCODONE HYDROCHLORIDE 15 MG: 5 TABLET ORAL at 14:52

## 2018-01-01 RX ADMIN — INSULIN LISPRO 2 UNITS: 100 INJECTION, SOLUTION INTRAVENOUS; SUBCUTANEOUS at 21:34

## 2018-01-01 RX ADMIN — CLONIDINE HYDROCHLORIDE 0.3 MG: 0.2 TABLET ORAL at 09:58

## 2018-01-01 RX ADMIN — ONDANSETRON 4 MG: 2 INJECTION INTRAMUSCULAR; INTRAVENOUS at 18:46

## 2018-01-01 RX ADMIN — AMITRIPTYLINE HYDROCHLORIDE 50 MG: 50 TABLET, FILM COATED ORAL at 22:20

## 2018-01-01 RX ADMIN — HEPARIN SODIUM 5000 UNITS: 5000 INJECTION, SOLUTION INTRAVENOUS; SUBCUTANEOUS at 14:18

## 2018-01-01 RX ADMIN — ATORVASTATIN CALCIUM 40 MG: 40 TABLET, FILM COATED ORAL at 21:24

## 2018-01-01 RX ADMIN — CLONIDINE HYDROCHLORIDE 0.3 MG: 0.2 TABLET ORAL at 17:31

## 2018-01-01 RX ADMIN — DOCUSATE SODIUM 100 MG: 100 CAPSULE, LIQUID FILLED ORAL at 17:17

## 2018-01-01 RX ADMIN — CLONIDINE HYDROCHLORIDE 0.3 MG: 0.2 TABLET ORAL at 17:28

## 2018-01-01 RX ADMIN — PREGABALIN 75 MG: 75 CAPSULE ORAL at 21:00

## 2018-01-01 RX ADMIN — ATORVASTATIN CALCIUM 40 MG: 40 TABLET, FILM COATED ORAL at 00:35

## 2018-01-01 RX ADMIN — Medication 10 ML: at 00:37

## 2018-01-01 RX ADMIN — SODIUM BICARBONATE 650 MG: 650 TABLET ORAL at 18:40

## 2018-01-01 RX ADMIN — ERYTHROPOIETIN 10000 UNITS: 10000 INJECTION, SOLUTION INTRAVENOUS; SUBCUTANEOUS at 21:53

## 2018-01-01 RX ADMIN — INSULIN LISPRO 7 UNITS: 100 INJECTION, SOLUTION INTRAVENOUS; SUBCUTANEOUS at 17:17

## 2018-01-01 RX ADMIN — PANTOPRAZOLE SODIUM 40 MG: 40 TABLET, DELAYED RELEASE ORAL at 07:31

## 2018-01-01 RX ADMIN — CLONIDINE HYDROCHLORIDE 0.3 MG: 0.2 TABLET ORAL at 21:54

## 2018-01-01 RX ADMIN — FUROSEMIDE 80 MG: 10 INJECTION, SOLUTION INTRAMUSCULAR; INTRAVENOUS at 09:21

## 2018-01-01 RX ADMIN — HEPARIN SODIUM 5000 UNITS: 5000 INJECTION, SOLUTION INTRAVENOUS; SUBCUTANEOUS at 22:55

## 2018-01-01 RX ADMIN — SODIUM BICARBONATE 650 MG: 650 TABLET ORAL at 09:38

## 2018-01-01 RX ADMIN — Medication 10 ML: at 07:40

## 2018-01-01 RX ADMIN — SEVELAMER CARBONATE 800 MG: 800 TABLET, FILM COATED ORAL at 17:27

## 2018-01-01 RX ADMIN — CLONIDINE HYDROCHLORIDE 0.3 MG: 0.2 TABLET ORAL at 09:20

## 2018-01-01 RX ADMIN — LABETALOL HCL 600 MG: 200 TABLET, FILM COATED ORAL at 14:17

## 2018-01-01 RX ADMIN — SODIUM BICARBONATE 650 MG: 650 TABLET ORAL at 09:58

## 2018-01-01 RX ADMIN — INSULIN LISPRO 2 UNITS: 100 INJECTION, SOLUTION INTRAVENOUS; SUBCUTANEOUS at 07:31

## 2018-01-01 RX ADMIN — MULTIPLE VITAMINS W/ MINERALS TAB 1 TABLET: TAB at 09:32

## 2018-01-01 RX ADMIN — INSULIN LISPRO 3 UNITS: 100 INJECTION, SOLUTION INTRAVENOUS; SUBCUTANEOUS at 21:30

## 2018-01-01 RX ADMIN — PREGABALIN 75 MG: 50 CAPSULE ORAL at 09:32

## 2018-01-01 RX ADMIN — SEVELAMER CARBONATE 800 MG: 800 TABLET, FILM COATED ORAL at 14:10

## 2018-01-01 RX ADMIN — BUDESONIDE 500 MCG: 0.5 INHALANT RESPIRATORY (INHALATION) at 20:33

## 2018-01-01 RX ADMIN — SEVELAMER CARBONATE 800 MG: 800 TABLET, FILM COATED ORAL at 13:21

## 2018-01-01 RX ADMIN — INSULIN LISPRO 5 UNITS: 100 INJECTION, SOLUTION INTRAVENOUS; SUBCUTANEOUS at 07:02

## 2018-01-01 RX ADMIN — Medication: at 22:00

## 2018-01-01 RX ADMIN — FUROSEMIDE 60 MG: 10 INJECTION, SOLUTION INTRAMUSCULAR; INTRAVENOUS at 17:29

## 2018-01-01 RX ADMIN — ONDANSETRON 8 MG: 2 INJECTION INTRAMUSCULAR; INTRAVENOUS at 05:13

## 2018-01-01 RX ADMIN — FUROSEMIDE 80 MG: 10 INJECTION, SOLUTION INTRAMUSCULAR; INTRAVENOUS at 05:12

## 2018-01-01 RX ADMIN — HYDRALAZINE HYDROCHLORIDE 100 MG: 50 TABLET, FILM COATED ORAL at 14:29

## 2018-01-01 RX ADMIN — LABETALOL HYDROCHLORIDE 300 MG: 200 TABLET, FILM COATED ORAL at 13:06

## 2018-01-01 RX ADMIN — CLONIDINE HYDROCHLORIDE 0.3 MG: 0.2 TABLET ORAL at 08:44

## 2018-01-01 RX ADMIN — SEVELAMER CARBONATE 800 MG: 800 TABLET, FILM COATED ORAL at 09:31

## 2018-01-01 RX ADMIN — BUDESONIDE 500 MCG: 0.5 INHALANT RESPIRATORY (INHALATION) at 10:54

## 2018-01-01 RX ADMIN — LABETALOL HYDROCHLORIDE 300 MG: 200 TABLET, FILM COATED ORAL at 22:21

## 2018-01-01 RX ADMIN — SEVELAMER CARBONATE 800 MG: 800 TABLET, FILM COATED ORAL at 11:59

## 2018-01-01 RX ADMIN — METHOCARBAMOL 750 MG: 500 TABLET ORAL at 21:24

## 2018-01-01 RX ADMIN — CLONIDINE HYDROCHLORIDE 0.3 MG: 0.2 TABLET ORAL at 09:04

## 2018-01-01 RX ADMIN — SODIUM BICARBONATE 650 MG: 650 TABLET ORAL at 17:31

## 2018-01-01 RX ADMIN — LABETALOL HYDROCHLORIDE 300 MG: 200 TABLET, FILM COATED ORAL at 05:56

## 2018-01-01 RX ADMIN — AMITRIPTYLINE HYDROCHLORIDE 50 MG: 50 TABLET, FILM COATED ORAL at 21:00

## 2018-01-01 RX ADMIN — PANTOPRAZOLE SODIUM 40 MG: 40 TABLET, DELAYED RELEASE ORAL at 11:07

## 2018-01-01 RX ADMIN — HEPARIN SODIUM 5000 UNITS: 5000 INJECTION, SOLUTION INTRAVENOUS; SUBCUTANEOUS at 07:02

## 2018-01-01 RX ADMIN — Medication 10 ML: at 14:18

## 2018-01-01 RX ADMIN — HEPARIN SODIUM 5000 UNITS: 5000 INJECTION, SOLUTION INTRAVENOUS; SUBCUTANEOUS at 23:36

## 2018-01-01 RX ADMIN — LABETALOL HYDROCHLORIDE 300 MG: 200 TABLET, FILM COATED ORAL at 14:29

## 2018-01-01 RX ADMIN — SODIUM CHLORIDE 250 ML: 900 INJECTION, SOLUTION INTRAVENOUS at 22:17

## 2018-01-01 RX ADMIN — PANTOPRAZOLE SODIUM 40 MG: 40 TABLET, DELAYED RELEASE ORAL at 09:20

## 2018-01-01 RX ADMIN — HYDRALAZINE HYDROCHLORIDE 10 MG: 20 INJECTION INTRAMUSCULAR; INTRAVENOUS at 11:53

## 2018-01-01 RX ADMIN — PANTOPRAZOLE SODIUM 40 MG: 40 TABLET, DELAYED RELEASE ORAL at 07:07

## 2018-01-01 RX ADMIN — SODIUM CHLORIDE 1000 ML: 900 INJECTION, SOLUTION INTRAVENOUS at 01:26

## 2018-01-01 RX ADMIN — ATORVASTATIN CALCIUM 40 MG: 40 TABLET, FILM COATED ORAL at 21:00

## 2018-01-01 RX ADMIN — METHOCARBAMOL 750 MG: 500 TABLET ORAL at 22:18

## 2018-01-01 RX ADMIN — INSULIN LISPRO 3 UNITS: 100 INJECTION, SOLUTION INTRAVENOUS; SUBCUTANEOUS at 17:31

## 2018-01-01 RX ADMIN — LABETALOL HYDROCHLORIDE 300 MG: 200 TABLET, FILM COATED ORAL at 21:00

## 2018-01-01 RX ADMIN — HYDRALAZINE HYDROCHLORIDE 100 MG: 50 TABLET, FILM COATED ORAL at 13:21

## 2018-01-01 RX ADMIN — EPOETIN ALFA 4000 UNITS: 4000 SOLUTION INTRAVENOUS; SUBCUTANEOUS at 13:50

## 2018-01-01 RX ADMIN — ASPIRIN 81 MG 81 MG: 81 TABLET ORAL at 09:32

## 2018-01-01 RX ADMIN — CLONIDINE HYDROCHLORIDE 0.3 MG: 0.2 TABLET ORAL at 08:27

## 2018-01-01 RX ADMIN — FUROSEMIDE 80 MG: 10 INJECTION, SOLUTION INTRAMUSCULAR; INTRAVENOUS at 18:41

## 2018-01-01 RX ADMIN — FUROSEMIDE 60 MG: 10 INJECTION, SOLUTION INTRAMUSCULAR; INTRAVENOUS at 09:32

## 2018-01-01 RX ADMIN — MULTIPLE VITAMINS W/ MINERALS TAB 1 TABLET: TAB at 09:03

## 2018-01-01 RX ADMIN — SODIUM BICARBONATE 650 MG: 650 TABLET ORAL at 09:32

## 2018-01-01 RX ADMIN — FUROSEMIDE 80 MG: 10 INJECTION, SOLUTION INTRAMUSCULAR; INTRAVENOUS at 17:31

## 2018-01-01 RX ADMIN — Medication 10 ML: at 06:23

## 2018-01-01 RX ADMIN — LABETALOL HYDROCHLORIDE 300 MG: 200 TABLET, FILM COATED ORAL at 06:23

## 2018-01-01 RX ADMIN — Medication 10 ML: at 21:27

## 2018-01-01 RX ADMIN — LABETALOL HYDROCHLORIDE 300 MG: 200 TABLET, FILM COATED ORAL at 14:10

## 2018-01-01 RX ADMIN — Medication 10 ML: at 22:23

## 2018-01-01 RX ADMIN — ARFORMOTEROL TARTRATE 15 MCG: 15 SOLUTION RESPIRATORY (INHALATION) at 08:17

## 2018-01-01 RX ADMIN — INSULIN LISPRO 15 UNITS: 100 INJECTION, SOLUTION INTRAVENOUS; SUBCUTANEOUS at 08:45

## 2018-01-01 RX ADMIN — ATORVASTATIN CALCIUM 40 MG: 40 TABLET, FILM COATED ORAL at 22:21

## 2018-01-01 RX ADMIN — LABETALOL HCL 600 MG: 200 TABLET, FILM COATED ORAL at 07:02

## 2018-01-01 RX ADMIN — HEPARIN SODIUM 5000 UNITS: 5000 INJECTION, SOLUTION INTRAVENOUS; SUBCUTANEOUS at 23:03

## 2018-01-01 RX ADMIN — ASPIRIN 81 MG: 81 TABLET, COATED ORAL at 08:27

## 2018-01-01 RX ADMIN — INSULIN LISPRO 2 UNITS: 100 INJECTION, SOLUTION INTRAVENOUS; SUBCUTANEOUS at 08:26

## 2018-01-01 RX ADMIN — HYDRALAZINE HYDROCHLORIDE 100 MG: 50 TABLET, FILM COATED ORAL at 21:23

## 2018-01-01 RX ADMIN — Medication 10 ML: at 06:40

## 2018-01-01 RX ADMIN — INSULIN LISPRO 2 UNITS: 100 INJECTION, SOLUTION INTRAVENOUS; SUBCUTANEOUS at 16:58

## 2018-01-01 RX ADMIN — PREGABALIN 75 MG: 75 CAPSULE ORAL at 08:44

## 2018-01-01 RX ADMIN — SODIUM BICARBONATE 650 MG: 650 TABLET ORAL at 18:37

## 2018-01-01 RX ADMIN — CLONIDINE HYDROCHLORIDE 0.3 MG: 0.2 TABLET ORAL at 17:20

## 2018-01-01 RX ADMIN — SEVELAMER CARBONATE 800 MG: 800 TABLET, FILM COATED ORAL at 17:31

## 2018-01-01 RX ADMIN — INSULIN LISPRO 2 UNITS: 100 INJECTION, SOLUTION INTRAVENOUS; SUBCUTANEOUS at 17:29

## 2018-01-01 RX ADMIN — CLONIDINE HYDROCHLORIDE 0.3 MG: 0.2 TABLET ORAL at 22:22

## 2018-01-01 RX ADMIN — HEPARIN SODIUM 5000 UNITS: 5000 INJECTION, SOLUTION INTRAVENOUS; SUBCUTANEOUS at 18:30

## 2018-01-01 RX ADMIN — Medication 5 ML: at 14:00

## 2018-01-01 RX ADMIN — CLONIDINE HYDROCHLORIDE 0.3 MG: 0.2 TABLET ORAL at 21:00

## 2018-01-01 RX ADMIN — AMLODIPINE BESYLATE 10 MG: 5 TABLET ORAL at 09:03

## 2018-01-01 RX ADMIN — SEVELAMER CARBONATE 800 MG: 800 TABLET, FILM COATED ORAL at 18:37

## 2018-01-01 RX ADMIN — ALBUTEROL SULFATE 2.5 MG: 2.5 SOLUTION RESPIRATORY (INHALATION) at 09:32

## 2018-02-01 PROBLEM — E11.21 TYPE 2 DIABETES MELLITUS WITH NEPHROPATHY (HCC): Status: ACTIVE | Noted: 2018-01-01

## 2018-02-01 NOTE — MR AVS SNAPSHOT
[address] [place] [id number] 
[phone] Patient: Arcelia Rodriguez MRN: [id number]:[id number] Visit Information Date & Time Provider Department Dept. Phone Encounter #  
 2/1/2018  1:30 PM MD Arden Kolb[address] [phone] [id number] Follow-up Instructions Return in about 3 months (around 5/1/2018), or if symptoms worsen or fail to improve. Your Appointments 2/7/2018  2:15 PM  
ESTABLISHED PATIENT with Carlene Darnell MD  
[facility]) Appt Note: $50CP 10/23/17ksr -  3 month follow up; 3 MO. F/U  
 [address] 1400 [address] [place] [address]  
[phone] [address] Upcoming Health Maintenance Date Due MICROALBUMIN Q1 8/28/2016 Pneumococcal 19-64 Highest Risk (2 of 3 - PCV13) 10/14/2016 EYE EXAM RETINAL OR DILATED Q1 4/13/2017 LIPID PANEL Q1 11/9/2017 FOOT EXAM Q1 12/6/2017 DTaP/Tdap/Td series (1 - Tdap) 8/23/2018* HEMOGLOBIN A1C Q6M 6/13/2018 *Topic was postponed. The date shown is not the original due date. Allergies as of 2/1/2018  Review Complete On: 2/1/2018 By: Augustin Winter MD  
  
 Severity Noted Reaction Type Reactions Ace Inhibitors  03/02/2016    Cough AND CKD/SD Arb-angiotensin Receptor Antagonist  12/14/2017    Other (comments) CKD/SD, Current Immunizations  Reviewed on 12/13/2017 Name Date Influenza Vaccine 8/31/2017, 11/6/2014  4:23 PM  
 Influenza Vaccine (Quad) PF 12/19/2016 11:33 AM  
 Influenza Vaccine Intradermal PF 10/14/2015 Influenza Vaccine PF 2/17/2013  1:11 PM  
 Pneumococcal Polysaccharide (PPSV-23) 10/14/2015,  Deferred (Patient Refused) Not reviewed this visit You Were Diagnosed With   
  
 Codes Comments Acute diastolic heart failure ([facility]    -  Primary ICD-10-CM: I50.31 ICD-9-CM: 428.31 Type 2 diabetes mellitus with nephropathy (HCC)     ICD-10-CM: E11.21 
ICD-9-CM: 250.40, 583.81 Morbid obesity with BMI of 40.0-44.9, adult (HCC)     ICD-10-CM: E66.01, Z68.41 
ICD-9-CM: 278.01, V85.41 Nausea     ICD-10-CM: R11.0 ICD-9-CM: 787.02 Hx of BKA, unspecified laterality ([facility].)     ICD-10-CM: Q20.898 ICD-9-CM: V49.75 Diabetic foot ulcer with osteomyelitis ([facility].)     ICD-10-CM: E11.621, E11.69, L97.509, M86.9 ICD-9-CM: 250.80, 707.15, 730.27, 731.8 Vitals BP Pulse Temp Resp Height(growth percentile) Weight(growth percentile) 180/90 100 98.6 °F (37 °C) (Oral) 20 6' 2\" (1.88 m) 315 lb (142.9 kg) SpO2 BMI Smoking Status 92% 40.44 kg/m2 Never Smoker BMI and BSA Data Body Mass Index Body Surface Area 40.44 kg/m 2 2.73 m 2 Preferred Pharmacy Pharmacy Name Phone Francesca Diaz [address] [address] [facility] [phone] Your Updated Medication List  
  
   
This list is accurate as of: 2/1/18  2:28 PM.  Always use your most recent med list.  
  
  
  
  
 * albuterol 90 mcg/actuation inhaler Commonly known as:  PROVENTIL HFA, VENTOLIN HFA, PROAIR HFA Take 2 Puffs by inhalation daily. * albuterol 2.5 mg /3 mL (0.083 %) nebulizer solution Commonly known as:  PROVENTIL VENTOLIN  
3 mL by Nebulization route every four (4) hours as needed for Wheezing. amitriptyline 50 mg tablet Commonly known as:  ELAVIL TAKE ONE TABLET BY MOUTH EVERY EVENING  
  
 amLODIPine 10 mg tablet Commonly known as:  Cookie Boozer Take 1 Tab by mouth daily. aspirin 81 mg chewable tablet Take 1 Tab by mouth daily. atorvastatin 40 mg tablet Commonly known as:  LIPITOR Take 1 Tab by mouth nightly. cloNIDine HCl 0.3 mg tablet Commonly known as:  CATAPRES Take 1 Tab by mouth three (3) times daily. CORICIDIN HBP 2- mg Tab Generic drug:  dm-acetaminophen-chorpheniram  
Take 2 Caplet by mouth every six (6) hours as needed (COLD). Doxercalciferol 1 mcg Cap Take 1 mcg by mouth daily. fluticasone-salmeterol 250-50 mcg/dose diskus inhaler Commonly known as:  ADVAIR Take 1 Puff by inhalation daily. furosemide 40 mg tablet Commonly known as:  LASIX TAKE 1  And 1/2 tab bid HumaLOG Mix 75-25 100 unit/mL (75-25) injection Generic drug:  insulin lispro protamine/insulin lispro Take 30 units in the morning and 10 units at night.  
  
 hydrALAZINE 100 mg tablet Commonly known as:  APRESOLINE  
TAKE ONE TABLET BY MOUTH THREE TIMES DAILY  
  
 labetalol 300 mg tablet Commonly known as:  Gayl Bart Take 1 Tab by mouth every eight (8) hours. methocarbamol 750 mg tablet Commonly known as:  ROBAXIN Take 750 mg by mouth.  
  
 multivitamin, tx-iron-ca-min 9 mg iron-400 mcg Tab tablet Commonly known as:  THERA-M w/ IRON Take 1 Tab by mouth daily. omeprazole 40 mg capsule Commonly known as:  PRILOSEC Take 1 Cap by mouth daily. ondansetron 8 mg disintegrating tablet Commonly known as:  ZOFRAN ODT Take 1 Tab by mouth three (3) times daily. oxyCODONE IR 15 mg immediate release tablet Commonly known as:  OXY-IR Take 15 mg by mouth every eight (8) hours as needed for Pain. OXYGEN-AIR DELIVERY SYSTEMS Take 2 L by inhalation continuous. sevelamer carbonate 800 mg Tab tab Commonly known as:  [place] Alejandro TAKE ONE TABLET BY MOUTH THREE TIMES DAILY WITH MEALS  
  
 sodium bicarbonate 650 mg tablet Take 1 Tab by mouth two (2) times a day. * Notice: This list has 2 medication(s) that are the same as other medications prescribed for you. Read the directions carefully, and ask your doctor or other care provider to review them with you. Prescriptions Sent to Pharmacy  Refills  
 furosemide (LASIX) 40 mg tablet 12  
 Sig: TAKE 1  And 1/2 tab bid Class: Normal  
 Pharmacy: [facility] [address] Ph #: [phone]  
 ondansetron (ZOFRAN ODT) 8 mg disintegrating tablet 3 Sig: Take 1 Tab by mouth three (3) times daily. Class: Normal  
 Pharmacy: [facility] [address] [place] [address] [address] Ph #: [phone] Route: Oral  
  
We Performed the Following AMB POC GLUCOSE BLOOD, BY GLUCOSE MONITORING DEVICE [52660 CPT(R)] AMB POC HEMOGLOBIN A1C [84704 CPT(R)] AMB POC LIPID PROFILE [78795 CPT(R)] Follow-up Instructions Return in about 3 months (around 2018), or if symptoms worsen or fail to improve. Patient Instructions [employer] Activation Thank you for requesting access to [employer]. Please follow the instructions below to securely access and download your online medical record. [employer] allows you to send messages to your doctor, view your test results, renew your prescriptions, schedule appointments, and more. How Do I Sign Up? 1. In your internet browser, go to [email or web address] 
2. Click on the First Time User? Click Here link in the Sign In box. You will be redirect to the New Member Sign Up page. 3. Enter your [employer] Access Code exactly as it appears below. You will not need to use this code after youve completed the sign-up process. If you do not sign up before the expiration date, you must request a new code. [employer] Access Code: [email or web address] Expires: 2018 12:15 PM (This is the date your [employer] access code will ) 4. Enter the last four digits of your Social Security Number (xxxx) and Date of Birth (mm/dd/yyyy) as indicated and click Submit. You will be taken to the next sign-up page. 5. Create a [employer] ID. This will be your [employer] login ID and cannot be changed, so think of one that is secure and easy to remember. 6. Create a [employer] password. You can change your password at any time. 7. Enter your Password Reset Question and Answer. This can be used at a later time if you forget your password. 8. Enter your e-mail address. You will receive e-mail notification when new information is available in [address] 9. Click Sign Up. You can now view and download portions of your medical record. 10. Click the Download Summary menu link to download a portable copy of your medical information. Additional Information If you have questions, please visit the Frequently Asked Questions section of the [employer] website at [email or web address] Net Transmit & Receive/[employer]/. Remember, [employer] is NOT to be used for urgent needs. For medical emergencies, dial 911. Introducing [facility]! [facility] introduces [employer] patient portal. Now you can access parts of your medical record, email your doctor's office, and request medication refills online. 1. In your internet browser, go to [email or web address]. Net Transmit & Receive/[employer] 2. Click on the First Time User? Click Here link in the Sign In box. You will see the New Member Sign Up page. 3. Enter your [employer] Access Code exactly as it appears below. You will not need to use this code after youve completed the sign-up process. If you do not sign up before the expiration date, you must request a new code. · [employer] Access Code: [email or web address] Expires: 4/2/2018 12:15 PM 
 
4. Enter the last four digits of your Social Security Number (xxxx) and Date of Birth (mm/dd/yyyy) as indicated and click Submit. You will be taken to the next sign-up page. 5. Create a [employer] ID. This will be your [employer] login ID and cannot be changed, so think of one that is secure and easy to remember. 6. Create a [employer] password. You can change your password at any time. 7. Enter your Password Reset Question and Answer. This can be used at a later time if you forget your password. 8. Enter your e-mail address.  You will receive e-mail notification when new information is available in [employer]. 9. Click Sign Up. You can now view and download portions of your medical record. 10. Click the Download Summary menu link to download a portable copy of your medical information. If you have questions, please visit the Frequently Asked Questions section of the [employer] website. Remember, [employer] is NOT to be used for urgent needs. For medical emergencies, dial 911. Now available from your iPhone and Android! Please provide this summary of care documentation to your next provider. Your primary care clinician is listed as Jame Bosworth. If you have any questions after today's visit, please call [phone].

## 2018-02-01 NOTE — PROGRESS NOTES
Rica Burkitt is a 36 y.o. male and presents with Diabetes; Cholesterol Problem; and Hypertension  . Subjective:  Congestive Heart Failure Review:  Patient presents for evaluation of congestive heart failure. Onset was  months ago, with unstable course since that time. Patient currently complains of dyspnea, fatigue, orthopnea. Patient states they are compliant most of the time with their medications. Patient states they are compliant most of the time with their diet    . Diabetes Mellitus Review:  He has diabetes mellitus. Diabetic ROS - medication compliance: compliant all of the time, diabetic diet compliance: compliant all of the time, home glucose monitoring: is performed. Known diabetic complications: neuropathy in hands  Cardiovascular risk factors: family history, dyslipidemia, diabetes mellitus, obesity, hypertension  Current diabetic medications include insulin   Eye exam current (within one year): no  Weight trend: stable  Prior visit with dietician: no  Current diet: \"healthy\" diet  in general  Current exercise: walking  Current monitoring regimen: home blood tests - daily  Home blood sugar records: trend: stable  Any episodes of hypoglycemia? no  Is He on ACE inhibitor or angiotensin II receptor blocker? Yes     Hypertension Review:  The patient has essential hypertension  Diet and Lifestyle: generally follows a  low sodium diet, exercises sporadically  Home BP Monitoring: is not measured at home. Pertinent ROS: taking medications as instructed, no medication side effects noted, no TIA's, no chest pain on exertion, no dyspnea on exertion, no swelling of ankles. Chronic Renal Disease Review:  HPI: Rica Burkitt, ([id number]) is a 36 y.o. male who returns for follow up of   chronic renal disease caused by diabetic nephropathy. te patient  is followed by renal  The patient has experienced the following symptoms: no problems   The patient has not experienced any of the following symptoms: no problems   Prescribed diet is NICKY   The following actions have been prescribed for slowing the   progression of CRF: cardiovascular risk factor reduction including diabetes mellitus, obesity, male gender, hypertension   The patientis not  on dialysis    Asthma Review:  The patient is being seen for follow up of asthma,  currently in no distress,wheezing has been reported   Asthma symptoms have occured frequently. Wheezing when present is described as moderate and not easily relieved with rescue bronchodilator. The patient does report use of a steroid inhaler. Frequency of use of quick-relief meds: frequent   Regimen compliance: The patient reports adherence to this regimen. Review of Systems  Constitutional: negative for fevers, chills, anorexia and weight loss  Eyes:   negative for visual disturbance and irritation  ENT:   negative for tinnitus,sore throat,nasal congestion,ear pains. hoarseness  Respiratory:  cough,  dyspnea,wheezing  CV:   negative for chest pain, palpitations, lower extremity edema  GI:   negative for nausea, vomiting, diarrhea, abdominal pain,melena  Endo:               negative for polyuria,polydipsia,polyphagia,heat intolerance  Genitourinary: negative for frequency, dysuria and hematuria  Integument:  negative for rash and pruritus  Hematologic:  negative for easy bruising and gum/nose bleeding  Musculoskel:  myalgias, arthralgias, back pain, muscle weakness, joint pain  Neurological:  negative for headaches, dizziness, vertigo, memory problems and gait   Behavl/Psych: negative for feelings of anxiety, depression, mood changes    Past Medical History:   Diagnosis Date    Arrhythmia     afib    Asthma     3/2013 last attack    CKD (chronic kidney disease), stage III 2/12/2013    Nephrology: Veena Sibley MD    Diabetes [facility])     Gout     [facility]     Hyperlipidemia     Hypertension     Ill-defined condition     Positive PPD, treated 2001    treated 9 months     Past Surgical History:   Procedure Laterality Date    HX BELOW KNEE AMPUTATION Left     due to osteomyelitis    HX HEENT      tonsilectomy    HX HERNIA REPAIR      HX OTHER SURGICAL      below the knee right amputation    HX TONSILLECTOMY       Social History     Social History    Marital status:      Spouse name: N/A    Number of children: N/A    Years of education: N/A     Social History Main Topics    Smoking status: Never Smoker    Smokeless tobacco: Never Used    Alcohol use No    Drug use: No    Sexual activity: Not Currently     Partners: Male     Other Topics Concern    None     Social History Narrative         Per conversation 03/26/17    He would like his new Medical  power of  to be his Brother Michael Atkins. (Previously was his Mother Yao Root)        Never smoker     Family History   Problem Relation Age of Onset    Diabetes Mother     Hypertension Mother     Hypertension Brother     Diabetes Brother     Diabetes Maternal Grandmother     Hypertension Maternal Grandmother     Diabetes Other     Hypertension Other     Heart Disease Other      Current Outpatient Prescriptions   Medication Sig Dispense Refill    furosemide (LASIX) 40 mg tablet TAKE 1  And 1/2 tab bid 90 Tab 12    ondansetron (ZOFRAN ODT) 8 mg disintegrating tablet Take 1 Tab by mouth three (3) times daily. 15 Tab 3    dm-acetaminophen-chorpheniram (CORICIDIN HBP) 2- mg tab Take 2 Caplet by mouth every six (6) hours as needed (COLD).  oxyCODONE IR (OXY-IR) 15 mg immediate release tablet Take 15 mg by mouth every eight (8) hours as needed for Pain.  OXYGEN-AIR DELIVERY SYSTEMS Take 2 L by inhalation continuous.  albuterol (PROVENTIL HFA, VENTOLIN HFA, PROAIR HFA) 90 mcg/actuation inhaler Take 2 Puffs by inhalation daily.  1 Inhaler 0    albuterol (PROVENTIL VENTOLIN) 2.5 mg /3 mL (0.083 %) nebulizer solution 3 mL by Nebulization route every four (4) hours as needed for Wheezing. 120 Package 12    amitriptyline (ELAVIL) 50 mg tablet TAKE ONE TABLET BY MOUTH EVERY EVENING 30 Tab 0    amLODIPine (NORVASC) 10 mg tablet Take 1 Tab by mouth daily. 30 Tab 0    aspirin 81 mg chewable tablet Take 1 Tab by mouth daily. 30 Tab 0    atorvastatin (LIPITOR) 40 mg tablet Take 1 Tab by mouth nightly. 30 Tab 0    doxercalciferol 1 mcg cap Take 1 mcg by mouth daily. 30 Cap 0    cloNIDine HCl (CATAPRES) 0.3 mg tablet Take 1 Tab by mouth three (3) times daily. 90 Tab 2    fluticasone-salmeterol (ADVAIR) 250-50 mcg/dose diskus inhaler Take 1 Puff by inhalation daily. 1 Each 0    hydrALAZINE (APRESOLINE) 100 mg tablet TAKE ONE TABLET BY MOUTH THREE TIMES DAILY 90 Tab 0    multivitamin, tx-iron-ca-min (THERA-M W/ IRON) 9 mg iron-400 mcg tab tablet Take 1 Tab by mouth daily. 30 Tab 0    labetalol (NORMODYNE) 300 mg tablet Take 1 Tab by mouth every eight (8) hours. (Patient taking differently: Take 2 Tabs by mouth two (2) times a day.) 90 Tab 0    sevelamer carbonate (RENVELA) 800 mg tab tab TAKE ONE TABLET BY MOUTH THREE TIMES DAILY WITH MEALS 90 Tab 0    sodium bicarbonate 650 mg tablet Take 1 Tab by mouth two (2) times a day. 60 Tab 0    omeprazole (PRILOSEC) 40 mg capsule Take 1 Cap by mouth daily. 90 Cap 3    methocarbamol (ROBAXIN) 750 mg tablet Take 750 mg by mouth.  insulin lispro protamine/insulin lispro (HUMALOG MIX 75-25) 100 unit/mL (75-25) injection Take 30 units in the morning and 10 units at night.        Allergies   Allergen Reactions    Ace Inhibitors Cough     AND CKD/SD    Arb-Angiotensin Receptor Antagonist Other (comments)     CKD/SD,        Objective:  Visit Vitals    /90    Pulse 100    Temp 98.6 °F (37 °C) (Oral)    Resp 20    Ht 6' 2\" (1.88 m)    Wt 315 lb (142.9 kg)    SpO2 92%    BMI 40.44 kg/m2     Physical Exam:   General appearance - alert, well appearing, and in no distress  Mental status - alert, oriented to person, place, and time  EYE-DESI, EOMI, corneas normal, no foreign bodies  ENT-ENT exam normal, no neck nodes or sinus tenderness  Nose - normal and patent, no erythema, discharge or polyps  Mouth - mucous membranes moist, pharynx normal without lesions  Neck - supple, no significant adenopathy   Chest - scattered wheezes,  symmetric air entry   Heart - normal rate, regular rhythm, normal S1, S2, no murmurs, rubs, clicks or gallops   Abdomen - soft, nontender, nondistended, no masses or organomegaly  Lymph- no adenopathy palpable  Ext-bilateral aka  Skin-Warm and dry. no hyperpigmentation, vitiligo, or suspicious lesions  Neuro -alert, oriented, normal speech, no focal findings or movement disorder noted  Neck-normal C-spine, no tenderness, full ROM without pain  Feet-no nail deformities or callus formation with good pulses noted      Results for orders placed or performed during the hospital encounter of 12/12/17   CBC WITH AUTOMATED DIFF   Result Value Ref Range    WBC 8.9 4.1 - 11.1 K/uL    RBC 3.21 (L) 4.10 - 5.70 M/uL    HGB 8.1 (L) 12.1 - 17.0 g/dL    HCT 26.5 (L) 36.6 - 50.3 %    MCV 82.6 80.0 - 99.0 FL    MCH 25.2 (L) 26.0 - 34.0 PG    MCHC 30.6 30.0 - 36.5 g/dL    RDW 17.6 (H) 11.5 - 14.5 %    PLATELET 500 056 - 425 K/uL    NEUTROPHILS 90 (H) 32 - 75 %    LYMPHOCYTES 5 (L) 12 - 49 %    MONOCYTES 4 (L) 5 - 13 %    EOSINOPHILS 1 0 - 7 %    BASOPHILS 0 0 - 1 %    ABS. NEUTROPHILS 8.0 1.8 - 8.0 K/UL    ABS. LYMPHOCYTES 0.4 (L) 0.8 - 3.5 K/UL    ABS. MONOCYTES 0.4 0.0 - 1.0 K/UL    ABS. EOSINOPHILS 0.1 0.0 - 0.4 K/UL    ABS.  BASOPHILS 0.0 0.0 - 0.1 K/UL    DF MANUAL      RBC COMMENTS ANISOCYTOSIS  1+        RBC COMMENTS MICROCYTOSIS  PRESENT        RBC COMMENTS OVALOCYTES  PRESENT        RBC COMMENTS HYPOCHROMIA  PRESENT        RBC COMMENTS POLYCHROMASIA  PRESENT       METABOLIC PANEL, COMPREHENSIVE   Result Value Ref Range    Sodium 138 136 - 145 mmol/L    Potassium 4.4 3.5 - 5.1 mmol/L    Chloride 105 97 - 108 mmol/L    CO2 23 21 - 32 mmol/L    Anion gap 10 5 - 15 mmol/L    Glucose 298 (H) 65 - 100 mg/dL    BUN 67 (H) 6 - 20 MG/DL    Creatinine 4.52 (H) 0.70 - 1.30 MG/DL    BUN/Creatinine ratio 15 12 - 20      GFR est AA 17 (L) >60 ml/min/1.73m2    GFR est non-AA 14 (L) >60 ml/min/1.73m2    Calcium 8.6 8.5 - 10.1 MG/DL    Bilirubin, total 0.7 0.2 - 1.0 MG/DL    ALT (SGPT) 18 12 - 78 U/L    AST (SGOT) 11 (L) 15 - 37 U/L    Alk. phosphatase 111 45 - 117 U/L    Protein, total 7.6 6.4 - 8.2 g/dL    Albumin 3.6 3.5 - 5.0 g/dL    Globulin 4.0 2.0 - 4.0 g/dL    A-G Ratio 0.9 (L) 1.1 - 2.2     TROPONIN I   Result Value Ref Range    Troponin-I, Qt. <0.04 <7.79 ng/mL   METABOLIC PANEL, COMPREHENSIVE   Result Value Ref Range    Sodium 141 136 - 145 mmol/L    Potassium 4.0 3.5 - 5.1 mmol/L    Chloride 108 97 - 108 mmol/L    CO2 24 21 - 32 mmol/L    Anion gap 9 5 - 15 mmol/L    Glucose 180 (H) 65 - 100 mg/dL    BUN 61 (H) 6 - 20 MG/DL    Creatinine 4.15 (H) 0.70 - 1.30 MG/DL    BUN/Creatinine ratio 15 12 - 20      GFR est AA 19 (L) >60 ml/min/1.73m2    GFR est non-AA 16 (L) >60 ml/min/1.73m2    Calcium 8.7 8.5 - 10.1 MG/DL    Bilirubin, total 0.9 0.2 - 1.0 MG/DL    ALT (SGPT) 16 12 - 78 U/L    AST (SGOT) 7 (L) 15 - 37 U/L    Alk. phosphatase 108 45 - 117 U/L    Protein, total 7.5 6.4 - 8.2 g/dL    Albumin 3.5 3.5 - 5.0 g/dL    Globulin 4.0 2.0 - 4.0 g/dL    A-G Ratio 0.9 (L) 1.1 - 2.2     CBC WITH AUTOMATED DIFF   Result Value Ref Range    WBC 9.4 4.1 - 11.1 K/uL    RBC 3.34 (L) 4.10 - 5.70 M/uL    HGB 8.5 (L) 12.1 - 17.0 g/dL    HCT 27.3 (L) 36.6 - 50.3 %    MCV 81.7 80.0 - 99.0 FL    MCH 25.4 (L) 26.0 - 34.0 PG    MCHC 31.1 30.0 - 36.5 g/dL    RDW 17.4 (H) 11.5 - 14.5 %    PLATELET 865 465 - 898 K/uL    NEUTROPHILS 82 (H) 32 - 75 %    LYMPHOCYTES 9 (L) 12 - 49 %    MONOCYTES 7 5 - 13 %    EOSINOPHILS 2 0 - 7 %    BASOPHILS 0 0 - 1 %    ABS. NEUTROPHILS 7.8 1.8 - 8.0 K/UL    ABS. LYMPHOCYTES 0.8 0.8 - 3.5 K/UL    ABS.  MONOCYTES 0.6 0.0 - 1.0 K/UL ABS. EOSINOPHILS 0.2 0.0 - 0.4 K/UL    ABS. BASOPHILS 0.0 0.0 - 0.1 K/UL   MAGNESIUM   Result Value Ref Range    Magnesium 1.6 1.6 - 2.4 mg/dL   PHOSPHORUS   Result Value Ref Range    Phosphorus 2.9 2.6 - 4.7 MG/DL   CK W/ CKMB & INDEX   Result Value Ref Range     (H) 39 - 308 U/L    CK - MB 2.7 <3.6 NG/ML    CK-MB Index 0.6 0 - 2.5     TSH 3RD GENERATION   Result Value Ref Range    TSH 1.22 0.36 - 3.74 uIU/mL   HEMOGLOBIN A1C WITH EAG   Result Value Ref Range    Hemoglobin A1c 7.4 (H) 4.2 - 6.3 %    Est. average glucose 166 mg/dL   TROPONIN I   Result Value Ref Range    Troponin-I, Qt. <0.04 <0.05 ng/mL   D DIMER   Result Value Ref Range    D-dimer 1.35 (H) 0.00 - 0.65 mg/L FEU   NT-PRO BNP   Result Value Ref Range    NT pro- (H) 0 - 125 PG/ML   CBC WITH AUTOMATED DIFF   Result Value Ref Range    WBC 8.6 4.1 - 11.1 K/uL    RBC 3.22 (L) 4.10 - 5.70 M/uL    HGB 8.3 (L) 12.1 - 17.0 g/dL    HCT 26.2 (L) 36.6 - 50.3 %    MCV 81.4 80.0 - 99.0 FL    MCH 25.8 (L) 26.0 - 34.0 PG    MCHC 31.7 30.0 - 36.5 g/dL    RDW 17.6 (H) 11.5 - 14.5 %    PLATELET 003 394 - 746 K/uL    NEUTROPHILS 82 (H) 32 - 75 %    LYMPHOCYTES 10 (L) 12 - 49 %    MONOCYTES 6 5 - 13 %    EOSINOPHILS 2 0 - 7 %    BASOPHILS 0 0 - 1 %    ABS. NEUTROPHILS 7.0 1.8 - 8.0 K/UL    ABS. LYMPHOCYTES 0.9 0.8 - 3.5 K/UL    ABS. MONOCYTES 0.5 0.0 - 1.0 K/UL    ABS. EOSINOPHILS 0.2 0.0 - 0.4 K/UL    ABS.  BASOPHILS 0.0 0.0 - 0.1 K/UL   METABOLIC PANEL, BASIC   Result Value Ref Range    Sodium 140 136 - 145 mmol/L    Potassium 4.1 3.5 - 5.1 mmol/L    Chloride 107 97 - 108 mmol/L    CO2 26 21 - 32 mmol/L    Anion gap 7 5 - 15 mmol/L    Glucose 253 (H) 65 - 100 mg/dL    BUN 61 (H) 6 - 20 MG/DL    Creatinine 4.07 (H) 0.70 - 1.30 MG/DL    BUN/Creatinine ratio 15 12 - 20      GFR est AA 20 (L) >60 ml/min/1.73m2    GFR est non-AA 16 (L) >60 ml/min/1.73m2    Calcium 8.7 8.5 - 10.1 MG/DL   IRON PROFILE   Result Value Ref Range    Iron 41 35 - 150 ug/dL    TIBC 172 (L) 250 - 450 ug/dL    Iron % saturation 24 20 - 50 %   PTH INTACT   Result Value Ref Range    Calcium 8.8 8.5 - 10.1 MG/DL    PTH, Intact 295.6 (H) 14.0 - 72.0 pg/mL   RENAL FUNCTION PANEL   Result Value Ref Range    Sodium 139 136 - 145 mmol/L    Potassium 4.1 3.5 - 5.1 mmol/L    Chloride 107 97 - 108 mmol/L    CO2 25 21 - 32 mmol/L    Anion gap 7 5 - 15 mmol/L    Glucose 248 (H) 65 - 100 mg/dL    BUN 60 (H) 6 - 20 MG/DL    Creatinine 4.11 (H) 0.70 - 1.30 MG/DL    BUN/Creatinine ratio 15 12 - 20      GFR est AA 19 (L) >60 ml/min/1.73m2    GFR est non-AA 16 (L) >60 ml/min/1.73m2    Calcium 8.8 8.5 - 10.1 MG/DL    Phosphorus 2.9 2.6 - 4.7 MG/DL    Albumin 3.3 (L) 3.5 - 5.0 g/dL   GLUCOSE, POC   Result Value Ref Range    Glucose (POC) 184 (H) 65 - 100 mg/dL    Performed by Ned Conley    GLUCOSE, POC   Result Value Ref Range    Glucose (POC) 279 (H) 65 - 100 mg/dL    Performed by SYL LANGSTON    GLUCOSE, POC   Result Value Ref Range    Glucose (POC) 299 (H) 65 - 100 mg/dL    Performed by [id number], POC   Result Value Ref Range    Glucose (POC) 313 (H) 65 - 100 mg/dL    Performed by Timothy Azul    GLUCOSE, POC   Result Value Ref Range    Glucose (POC) 231 (H) 65 - 100 mg/dL    Performed by [employer]    GLUCOSE, POC   Result Value Ref Range    Glucose (POC) 346 (H) 65 - 100 mg/dL    Performed by [id number] [email or web address], POC   Result Value Ref Range    Glucose (POC) 271 (H) 65 - 100 mg/dL    Performed by Joselin Reynolds    GLUCOSE, POC   Result Value Ref Range    Glucose (POC) 280 (H) 65 - 100 mg/dL    [facility]    GLUCOSE, POC   Result Value Ref Range    Glucose (POC) 208 (H) 65 - 100 mg/dL    Performed by [facility] CJ    GLUCOSE, POC   Result Value Ref Range    Glucose (POC) 228 (H) 65 - 100 mg/dL    Performed by Evan Saravia    EKG, 12 LEAD, INITIAL   Result Value Ref Range    Ventricular Rate 71 BPM    Atrial Rate 71 BPM    P-R Interval 182 ms    QRS Duration 80 ms    Q-T Interval 394 ms    QTC Calculation (Bezet) 428 ms    Calculated P Axis 53 degrees    Calculated R Axis 55 degrees    Calculated T Axis 53 degrees    Diagnosis       Normal sinus rhythm  When compared with ECG of 29-OCT-2017 16:49,  No significant change was found  Confirmed by Jose Raul Gray M.D., Talha Benton ([id number]) on 12/13/2017 3:28:44 PM     NUCLEAR STRESS TEST   Result Value Ref Range    Diagnosis         Confirmed by Sarah Juarez MD, Kenna Gloria ([id number]),  Maru Vasquez ([id number]) on   12/13/2017 11:45:56 AM      Test indication Ischemia Evaluation     Functional capacity na     ECG Interp. Before Exercise Normal     ECG Interp. During Exercise none     Overall HR response to exercise na     Overall BP response to exercise NA     Max. Systolic  mmHg    Max. Diastolic BP 98 mmHg    Max. Heart rate 109 BPM    [facility] treadmill score      Dao TM score result      Peak Ex METs 1.0 METS    Protocol name LEXISCAN             Known cardiac condition      Attending physician Kimberlee Lockett MD    TYPE + CROSSMATCH   Result Value Ref Range    Crossmatch Expiration 12/15/2017     ABO/Rh(D) O NEGATIVE     Antibody screen NEG     Unit number [id number]     Blood component type RC LR AS3,1     Unit division 00     Status of unit TRANSFUSED     Crossmatch result Compatible        Assessment/Plan:    ICD-10-CM ICD-9-CM    1. Acute diastolic heart failure (HCC) I50.31 428.31 AMB POC LIPID PROFILE      furosemide (LASIX) 40 mg tablet   2. Type 2 diabetes mellitus with nephropathy ([facility]) E11.21 250.40 AMB POC LIPID PROFILE     583.81 AMB POC GLUCOSE BLOOD, BY GLUCOSE MONITORING DEVICE      AMB POC HEMOGLOBIN A1C   3. Morbid obesity with BMI of 40.0-44.9, adult ([facility]) E66.01 278.01 AMB POC LIPID PROFILE    Z68.41 V85.41 AMB POC HEMOGLOBIN A1C   4. Nausea R11.0 787.02 ondansetron (ZOFRAN ODT) 8 mg disintegrating tablet   5. Hx of BKA, unspecified laterality ([facility] Z89.519 V49.75    6.  Diabetic foot ulcer with osteomyelitis ([facility] H52.441 250.80     E11.69 707.15     L97.509 730.27     M86.9 731.8      Orders Placed This Encounter    AMB POC LIPID PROFILE    AMB POC GLUCOSE BLOOD, BY GLUCOSE MONITORING DEVICE    AMB POC HEMOGLOBIN A1C    furosemide (LASIX) 40 mg tablet     Sig: TAKE 1  And 1/2 tab bid     Dispense:  90 Tab     Refill:  12    ondansetron (ZOFRAN ODT) 8 mg disintegrating tablet     Sig: Take 1 Tab by mouth three (3) times daily. Dispense:  15 Tab     Refill:  3     lose weight, follow low fat diet, follow low salt diet,Take 81mg aspirin daily  Patient Instructions   MyChart Activation    Thank you for requesting access to [facility]. Please follow the instructions below to securely access and download your online medical record. [facility] allows you to send messages to your doctor, view your test results, renew your prescriptions, schedule appointments, and more. How Do I Sign Up? 1. In your internet browser, go to [email or web address]  2. Click on the First Time User? Click Here link in the Sign In box. You will be redirect to the New Member Sign Up page. 3. Enter your [facility] Access Code exactly as it appears below. You will not need to use this code after youve completed the sign-up process. If you do not sign up before the expiration date, you must request a new code. [facility] Access Code: [email or web address]  Expires: 2018 12:15 PM (This is the date your [facility] access code will )    4. Enter the last four digits of your Social Security Number (xxxx) and Date of Birth (mm/dd/yyyy) as indicated and click Submit. You will be taken to the next sign-up page. 5. Create a [facility] ID. This will be your [facility] login ID and cannot be changed, so think of one that is secure and easy to remember. 6. Create a [facility] password. You can change your password at any time. 7. Enter your Password Reset Question and Answer. This can be used at a later time if you forget your password. 8. Enter your e-mail address.  You will receive e-mail notification when new information is available in [address] 9. Click Sign Up. You can now view and download portions of your medical record. 10. Click the Download Summary menu link to download a portable copy of your medical information. Additional Information    If you have questions, please visit the Frequently Asked Questions section of the [facility] website at [email or web address] Remember, [facility] is NOT to be used for urgent needs. For medical emergencies, dial 911. Follow-up Disposition:  Return in about 3 months (around 5/1/2018), or if symptoms worsen or fail to improve. I have reviewed with the patient details of the assessment and plan and all questions were answered. Relevent patient education was performed. The most recent lab findings were reviewed with the patient. An After Visit Summary was printed and given to the patient.

## 2018-03-09 PROBLEM — E87.70 FLUID OVERLOAD: Status: ACTIVE | Noted: 2018-01-01

## 2018-03-09 NOTE — DIABETES MGMT
DTC Consult Note    Recommendations/ Comments:  Consult received for assessment of home management     Not enough blood sugars available for recommendations at this time. Reassess insulin needs during the weekend as pt may need mealtime insulin added. Chart reviewed and initial evaluation complete on Anusha Galeas. Patient is a 36 y.o. male with hx Type 2 Diabetes on Humalog 75/25 30 units AM and 10 units PM at home. He eats 4 small meals a day. BG monitoring at home 2 times per day. Patient reports variable BG levels at home and low blood sugars about once a week    Assessed and instructed patient on the following:   ·  interpretation of lab results, blood sugar goals, hypoglycemia prevention and treatment, nutrition and referred to Diabetes Educator    Encouraged the following:   · dietary modifications: eat regular meals, add a bedtime snack to prevent low blood sugars , regular blood sugar monitorin-3 times daily    Patient has materials from previous admission in 2017. Provided pt today with One Touch Ultra mini meter. Discussed with patient need for follow up appointment for diabetes management after discharge. A1c:   Lab Results   Component Value Date/Time    Hemoglobin A1c 10.4 (H) 2018 03:36 AM       Recent Glucose Results:   Lab Results   Component Value Date/Time     (H) 2018 03:36 AM    GLUCPOC 105 (H) 2018 11:24 AM        Lab Results   Component Value Date/Time    Creatinine 5.01 (H) 2018 03:36 AM     Estimated Creatinine Clearance: 28.1 mL/min (based on Cr of 5.01).     Active Orders   Diet    DIET DIABETIC CONSISTENT CARB Regular; 2 GM NA (House Low NA)        PO intake:   Patient Vitals for the past 72 hrs:   % Diet Eaten   18 1319 60 %   18 0800 100 %       Current hospital DM medication: Lantus 25 units, Humalog for correction, normal sensitivity scale     Will continue to follow as needed. Thank you.     Lydia Levi RD

## 2018-03-09 NOTE — ED PROVIDER NOTES
Patient is a 39 y.o. male presenting with shortness of breath. Shortness of Breath   Associated symptoms include leg swelling. Pertinent negatives include no fever, no headaches, no cough, no chest pain, no vomiting, no abdominal pain and no rash. 39 year male with IDDM, CKD, CHF, anemia, presents with SOB for 3 days. Gradually worsening. Is on oxgyen PRN. States he missed his Procrit due to medicaid issues, so he thinks his hemoglobin is likely low. States he went to a basketball tournament and probably over did it with drinking fluids and salt intake. States compliance with his medications. States good urine output. Does report increased swelling in his thighs. Denies fever, cough of cold symptoms. Does not feel like his asthma is acting up. Denies chest pain. Denies alcohol or smoking. Past Medical History:   Diagnosis Date    Arrhythmia     afib    Asthma     3/2013 last attack    CKD (chronic kidney disease), stage III 2/12/2013    Nephrology: Radha Crawford MD    Diabetes [facility])     Gout     Heart failure ([facility] [address]     Hyperlipidemia     Hypertension     Ill-defined condition     Positive PPD, treated 2001    treated 9 months       Past Surgical History:   Procedure Laterality Date    HX BELOW KNEE AMPUTATION Left     due to osteomyelitis    HX HEENT      tonsilectomy    HX HERNIA REPAIR      HX OTHER SURGICAL      below the knee right amputation    HX TONSILLECTOMY           Family History:   Problem Relation Age of Onset    Diabetes Mother     Hypertension Mother     Hypertension Brother     Diabetes Brother     Diabetes Maternal Grandmother     Hypertension Maternal Grandmother     Diabetes Other     Hypertension Other     Heart Disease Other        Social History     Social History    Marital status:      Spouse name: N/A    Number of children: N/A    Years of education: N/A     Occupational History    Not on file.      Social History Main Topics    Smoking status: Never Smoker    Smokeless tobacco: Never Used    Alcohol use No    Drug use: No    Sexual activity: Not Currently     Partners: Male     Other Topics Concern    Not on file     Social History Narrative         Per conversation 03/26/17    He would like his new Medical  power of  to be his Brother Michael Atkins. (Previously was his Mother Halle Kramer)        Never smoker         ALLERGIES: Ace inhibitors and Arb-angiotensin receptor antagonist    Review of Systems   Constitutional: Negative for chills and fever. HENT: Negative for congestion. Eyes: Negative for visual disturbance. Respiratory: Positive for shortness of breath. Negative for cough and chest tightness. Cardiovascular: Positive for leg swelling. Negative for chest pain. Gastrointestinal: Negative for abdominal pain, nausea and vomiting. Endocrine: Negative for polyuria. Genitourinary: Negative for dysuria. Musculoskeletal: Negative for gait problem. Skin: Negative for rash. Neurological: Negative for headaches. Psychiatric/Behavioral: Negative for dysphoric mood. Vitals:    03/09/18 0321 03/09/18 0326   BP:  157/71   Pulse:  74   Resp:  22   Temp:  98.4 °F (36.9 °C)   SpO2:  (!) 84%   Weight: 151.9 kg (334 lb 12.8 oz)    Height: 6' 2\" (1.88 m)             Physical Exam   Constitutional: He is oriented to person, place, and time. He appears well-developed and well-nourished. No distress. HENT:   Head: Normocephalic and atraumatic. Mouth/Throat: Oropharynx is clear and moist. No oropharyngeal exudate. Eyes: Conjunctivae and EOM are normal. Pupils are equal, round, and reactive to light. Right eye exhibits no discharge. Left eye exhibits no discharge. No scleral icterus. Neck: Normal range of motion. Neck supple. No JVD present. Cardiovascular: Normal rate, regular rhythm, normal heart sounds and intact distal pulses. Exam reveals no gallop and no friction rub. No murmur heard.   Pulmonary/Chest: Effort normal and breath sounds normal. No stridor. No respiratory distress. He has no wheezes. He has no rales. He exhibits no tenderness. Mild increased work of breathing  No wheeze or rales   Abdominal: Soft. Bowel sounds are normal. He exhibits no distension and no mass. There is no tenderness. There is no rebound and no guarding. Musculoskeletal: Normal range of motion. He exhibits no edema or tenderness. Bilateral BKA   Neurological: He is alert and oriented to person, place, and time. He has normal reflexes. No cranial nerve deficit. He exhibits normal muscle tone. Coordination normal.   Skin: Skin is warm and dry. No rash noted. No erythema. Psychiatric: He has a normal mood and affect. His behavior is normal. Judgment and thought content normal.        MDM  Number of Diagnoses or Management Options  Critical Care  Total time providing critical care: 30-74 minutes  45 minutes      ED Course       Procedures      ED EKG interpretation:  Rhythm: normal sinus rhythm; and regular . Rate (approx.): 69; Axis: normal; P wave: normal; QRS interval: normal ; ST/T wave: non-specific changes; This EKG was interpreted by Neymar Ewing MD,ED Provider. CXR show edema. Hemoglobin 7.4  Will order 1 unit pRBC, Lasix ordered.

## 2018-03-09 NOTE — PROGRESS NOTES
Problem: Heart Failure: Day 1  Goal: Consults, if ordered  Outcome: Progressing Towards Goal  Cardiology and diabetes educator consult called. Cardiac rehab consult done.

## 2018-03-09 NOTE — NURSE NAVIGATOR
Heart Failure Nurse Navigator met with patient, introduced self and explained role. Patient given LIVING WITH HEART FAILURE book. Patient reports that he does daily weights and knows when to call the doctor. However, patient reports he has lost weight, therefore \"didn't think it was the congestive heart failure but rather low hemoglobin causing shortness of breath\". Patient is currently receiving a unit of RBC's. When asked about following heart healthy diet, patient reports his diet \"is pretty good and I watch my salt I've lost weight so I must be doing something right\". HF NN encouraged continued low sodium heart healthy diet. HF NN informed that a post discharge follow up appointment will be scheduled for him with Dr. Chantel Pandya, his cardiologist.  Patient states he prefers afternoon appointments. HF NN encouraged patient to read the book he was given. Patient responds \"I always read all the congestive heart failure information\". Patient can benefit from continued HF education reinforcing diet, daily weights, when to call the doctor.

## 2018-03-09 NOTE — NURSE NAVIGATOR
HF NN attempted to schedule post discharge follow up appointment with Dr. Bennett Fajardo, cardiologist.  Was told a  would call back with an appointment date/time. As of this time, HF NN has not received call back. If patient is discharged over the weekend, please utilize the Weekend/After Hours appointment process for HF (Dr. Erika Evans, Wednesday, 3/15/18 at 11:30 AM). IF this process is used, please call and leave patient name, , DC date on HF NN voicemail [phone].

## 2018-03-09 NOTE — PROGRESS NOTES
TRANSFER - IN REPORT:    Verbal report received from José Manuel Parsons RN(name) on Arcelia Rodriguez  being received from ED(unit) for routine progression of care      Report consisted of patients Situation, Background, Assessment and   Recommendations(SBAR). Information from the following report(s) SBAR, Kardex, ED Summary, MAR, Accordion, Recent Results and Cardiac Rhythm NSR was reviewed with the receiving nurse. Opportunity for questions and clarification was provided. Assessment completed upon patients arrival to unit and care assumed. Primary Nurse Ellyn Mcburney and Ilya Carter RN performed a dual skin assessment on this patient Impairment noted- see wound doc flow sheet-No impairments    Bedside shift change report given to LEELEE Simon (oncoming nurse) by Troy Sanabria RN (offgoing nurse). Report included the following information SBAR, Kardex, ED Summary, MAR, Accordion, Recent Results and Cardiac Rhythm NSR.

## 2018-03-09 NOTE — IP AVS SNAPSHOT
[address] [place] [address] 
[phone] Patient: Jorgito Macedo MRN: [id number] WMB:9/12/1848 About your hospitalization You were admitted on:  March 9, 2018 You last received care in the:  [facility] 4 [facility] You were discharged on:  March 11, 2018 Why you were hospitalized Your primary diagnosis was:  Fluid Overload Follow-up Information Follow up With Details Comments Contact Info [address] [facility]  Referred for one time home health nursing visit. [address] [place] [address] 
[phone] Mitchell Cardoza MD Schedule an appointment as soon as possible for a visit in 1 week for hospital discharge follow-up [address] [place] [address] 
[phone] Jones Auguste MD Schedule an appointment as soon as possible for a visit in 1 week for follow-up of congestive heart failure [address] [place] [address] 
[phone] Brenda Riggs MD Schedule an appointment as soon as possible for a visit in 2 weeks for follow-up of kidney function 208 [facility] [address] [facility] 57 
[phone] Daly Srivastava MD Go in 3 days  [facility] [address] [place] [address] 
[phone] Discharge Orders None A check damon indicates which time of day the medication should be taken. My Medications CONTINUE taking these medications Instructions Each Dose to Equal  
 Morning Noon Evening Bedtime  
 albuterol 90 mcg/actuation inhaler Commonly known as:  PROVENTIL HFA, VENTOLIN HFA, PROAIR HFA Your last dose was: Your next dose is: Take 2 Puffs by inhalation daily. 2 Puff  
    
   
   
   
  
 amitriptyline 50 mg tablet Commonly known as:  ELAVIL Your last dose was: Your next dose is:  TAKE ONE TABLET BY MOUTH EVERY EVENING  
     
   
   
   
  
 amLODIPine 10 mg tablet Commonly known as:  Maribel Padgett Your last dose was: Your next dose is: Take 1 Tab by mouth daily. 10 mg  
    
   
   
   
  
 aspirin 81 mg chewable tablet Your last dose was: Your next dose is: Take 1 Tab by mouth daily. 81 mg  
    
   
   
   
  
 atorvastatin 40 mg tablet Commonly known as:  LIPITOR Your last dose was: Your next dose is: Take 1 Tab by mouth nightly. 40 mg  
    
   
   
   
  
 cloNIDine HCl 0.3 mg tablet Commonly known as:  CATAPRES Your last dose was: Your next dose is: Take 1 Tab by mouth three (3) times daily. 0.3 mg  
    
   
   
   
  
 fluticasone-salmeterol 250-50 mcg/dose diskus inhaler Commonly known as:  ADVAIR Your last dose was: Your next dose is: Take 1 Puff by inhalation daily. 1 Puff  
    
   
   
   
  
 furosemide 40 mg tablet Commonly known as:  LASIX Your last dose was: Your next dose is: TAKE 1  And 1/2 tab bid HumaLOG Mix 75-25(U-100)Insuln 100 unit/mL (75-25) injection Generic drug:  insulin lispro protamine/insulin lispro Your last dose was: Your next dose is: Take 30 units in the morning and 10 units at night.  
     
   
   
   
  
 hydrALAZINE 100 mg tablet Commonly known as:  APRESOLINE Your last dose was: Your next dose is: TAKE ONE TABLET BY MOUTH THREE TIMES DAILY  
     
   
   
   
  
 labetalol 300 mg tablet Commonly known as:  Camilo Shah Your last dose was: Your next dose is: Take 1 Tab by mouth every eight (8) hours. 300 mg  
    
   
   
   
  
 LYRICA 75 mg capsule Generic drug:  pregabalin Your last dose was: Your next dose is: Take 75 mg by mouth three (3) times daily.   
 75 mg  
    
   
   
   
  
 methocarbamol 750 mg tablet Commonly known as:  ROBAXIN Your last dose was: Your next dose is: Take 750 mg by mouth three (3) times daily. 750 mg  
    
   
   
   
  
 multivitamin, tx-iron-ca-min 9 mg iron-400 mcg Tab tablet Commonly known as:  THERA-M w/ IRON Your last dose was: Your next dose is: Take 1 Tab by mouth daily. 1 Tab  
    
   
   
   
  
 omeprazole 40 mg capsule Commonly known as:  PRILOSEC Your last dose was: Your next dose is: Take 1 Cap by mouth daily. 40 mg  
    
   
   
   
  
 ondansetron 8 mg disintegrating tablet Commonly known as:  ZOFRAN ODT Your last dose was: Your next dose is: Take 1 Tab by mouth three (3) times daily. 8 mg  
    
   
   
   
  
 oxyCODONE IR 15 mg immediate release tablet Commonly known as:  OXY-IR Your last dose was: Your next dose is: Take 15 mg by mouth every eight (8) hours as needed for Pain. 15 mg  
    
   
   
   
  
 sevelamer carbonate 800 mg Tab tab Commonly known as:  Anupama [place] Your last dose was: Your next dose is: TAKE ONE TABLET BY MOUTH THREE TIMES DAILY WITH MEALS  
     
   
   
   
  
 sodium bicarbonate 650 mg tablet Your last dose was: Your next dose is: Take 1 Tab by mouth two (2) times a day. 650 mg Discharge Instructions Discharge Instructions PATIENT ID: Antony Gavin MRN: [id number] YOB: 1975 DATE OF ADMISSION: 3/9/2018  3:18 AM   
DATE OF DISCHARGE: 3/11/2018 PRIMARY CARE PROVIDER: Salena Daly MD  
 
ATTENDING PHYSICIAN: Jan Nielsen MD 
DISCHARGING PROVIDER: Jan Nielsen MD   
To contact this individual call [phone] and ask the  to page. If unavailable ask to be transferred the Adult Hospitalist Department. DISCHARGE DIAGNOSES acute on chronic heart failure, chronic kidney disease CONSULTATIONS: IP CONSULT TO CARDIOLOGY 
IP CONSULT TO NEPHROLOGY PROCEDURES/SURGERIES: * No surgery found * PENDING TEST RESULTS:  
At the time of discharge the following test results are still pending: n/a FOLLOW UP APPOINTMENTS:  
Follow-up Information Follow up With Details Comments Contact Info [address] [facility]  Referred for one time home health nursing visit. [address] [place] [address] 
[phone] Fanny Burger MD Schedule an appointment as soon as possible for a visit in 1 week for hospital discharge follow-up [address] [place] [address] 
[phone] Brad Hammond MD Schedule an appointment as soon as possible for a visit in 1 week for follow-up of congestive heart failure [address] [place] [address] 
[phone] Gareth Dao MD Schedule an appointment as soon as possible for a visit in 2 weeks for follow-up of kidney function 208 [facility] [address] [place] [address] 
[phone] ADDITIONAL CARE RECOMMENDATIONS:  
You were admitted with fluid overload due to the heart and kidneys. You need to minimize your salt and fluid intake. Follow-up with your doctors as instructed. DIET: Cardiac Diet low sodium (less than 2 grams daily) ACTIVITY: Activity as tolerated DISCHARGE MEDICATIONS: 
 See Medication Reconciliation Form · It is important that you take the medication exactly as they are prescribed. · Keep your medication in the bottles provided by the pharmacist and keep a list of the medication names, dosages, and times to be taken in your wallet. · Do not take other medications without consulting your doctor. NOTIFY YOUR PHYSICIAN FOR ANY OF THE FOLLOWING:  
Fever over 101 degrees for 24 hours.   
Chest pain, shortness of breath, fever, chills, nausea, vomiting, diarrhea, change in mentation, falling, weakness, bleeding. Severe pain or pain not relieved by medications. Or, any other signs or symptoms that you may have questions about. DISPOSITION: 
 x Home With: 
 OT  PT  [facility]  RN  
  
 SNF/Inpatient Rehab/LTAC Independent/assisted living Hospice Other: CDMP Checked:  
Yes x PROBLEM LIST Updated: 
Yes x Signed:  
Vianca Schilling MD 
3/11/2018 10:25 AM 
 
 
  
  
  
Byban Announcement We are excited to announce that we are making your provider's discharge notes available to you in Byban. You will see these notes when they are completed and signed by the physician that discharged you from your recent hospital stay. If you have any questions or concerns about any information you see in Byban, please call the Health Information Department where you were seen or reach out to your Primary Care Provider for more information about your plan of care. Introducing [facility]! Bill Anaya introduces Byban patient portal. Now you can access parts of your medical record, email your doctor's office, and request medication refills online. 1. In your internet browser, go to [email or web address] 2. Click on the First Time User? Click Here link in the Sign In box. You will see the New Member Sign Up page. 3. Enter your Byban Access Code exactly as it appears below. You will not need to use this code after youve completed the sign-up process. If you do not sign up before the expiration date, you must request a new code. · Byban Access Code: [email or web address] Expires: 4/2/2018  1:15 PM 
 
4. Enter the last four digits of your Social Security Number (xxxx) and Date of Birth (mm/dd/yyyy) as indicated and click Submit. You will be taken to the next sign-up page. 5. Create a Byban ID. This will be your Byban login ID and cannot be changed, so think of one that is secure and easy to remember. 6. Create a [employer] password. You can change your password at any time. 7. Enter your Password Reset Question and Answer. This can be used at a later time if you forget your password. 8. Enter your e-mail address. You will receive e-mail notification when new information is available in [address] 9. Click Sign Up. You can now view and download portions of your medical record. 10. Click the Download Summary menu link to download a portable copy of your medical information. If you have questions, please visit the Frequently Asked Questions section of the [employer] website. Remember, [employer] is NOT to be used for urgent needs. For medical emergencies, dial 911. Now available from your iPhone and Android! Providers Seen During Your Hospitalization Provider Specialty Primary office phone Chula Woods MD Emergency Medicine [phone] Karen Rodriguez MD Internal Medicine [phone] Malick Corea MD Hospitalist [phone] Your Primary Care Physician (PCP) Primary Care Physician Office Phone Office Fax [address] [facility], [address] [phone] You are allergic to the following Allergen Reactions Ace Inhibitors Cough AND CKD/SD Arb-Angiotensin Receptor Antagonist Other (comments) CKD/SD, Recent Documentation Height Weight BMI Smoking Status 1.88 m 139.3 kg 39.43 kg/m2 Never Smoker Emergency Contacts Name Discharge Info Relation Home Work Mobile Sanna Cuba DISCHARGE CAREGIVER [3] Other Relative [6] [phone] Patient Belongings The following personal items are in your possession at time of discharge: 
  Dental Appliances: None  Visual Aid: Glasses, At bedside      Home Medications: None   Jewelry: None  Clothing: At bedside, Footwear, Jacket/Coat, Pants, Shirt, Undergarments    Other Valuables: Cell Phone Discharge Instructions Attachments/References HEART FAILURE: AVOIDING TRIGGERS (ENGLISH) Patient Handouts Avoiding Triggers With Heart Failure: Care Instructions Your Care Instructions Triggers are anything that make your heart failure flare up. A flare-up is also called \"sudden heart failure\" or \"acute heart failure. \" When you have a flare-up, fluid builds up in your lungs, and you have problems breathing. You might need to go to the hospital. By watching for changes in your condition and avoiding triggers, you can prevent heart failure flare-ups. Follow-up care is a key part of your treatment and safety. Be sure to make and go to all appointments, and call your doctor if you are having problems. It's also a good idea to know your test results and keep a list of the medicines you take. How can you care for yourself at home? Watch for changes in your weight and condition · Weigh yourself without clothing at the same time each day. Record your weight. Call your doctor if you have sudden weight gain, such as more than 2 to 3 pounds in a day or 5 pounds in a week. (Your doctor may suggest a different range of weight gain.) A sudden weight gain may mean that your heart failure is getting worse. · Keep a daily record of your symptoms. Write down any changes in how you feel, such as new shortness of breath, cough, or problems eating. Also record if your ankles are more swollen than usual and if you feel more tired than usual. Note anything that you ate or did that could have triggered these changes. Limit sodium Sodium causes your body to hold on to extra water. This may cause your heart failure symptoms to get worse. People get most of their sodium from processed foods. Fast food and restaurant meals also tend to be very high in sodium. · Your doctor may suggest that you limit sodium to 2,000 milligrams (mg) a day or less. That is less than 1 teaspoon of salt a day, including all the salt you eat in cooking or in packaged foods. · Read food labels on cans and food packages. They tell you how much sodium you get in one serving. Check the serving size. If you eat more than one serving, you are getting more sodium. · Be aware that sodium can come in forms other than salt, including monosodium glutamate (MSG), sodium citrate, and sodium bicarbonate (baking soda). MSG is often added to Asian food. You can sometimes ask for food without MSG or salt. · Slowly reducing salt will help you adjust to the taste. Take the salt shaker off the table. · Flavor your food with garlic, lemon juice, onion, vinegar, herbs, and spices instead of salt. Do not use soy sauce, steak sauce, onion salt, garlic salt, mustard, or ketchup on your food, unless it is labeled \"low-sodium\" or \"low-salt. \" 
· Make your own salad dressings, sauces, and ketchup without adding salt. · Use fresh or frozen ingredients, instead of canned ones, whenever you can. Choose low-sodium canned goods. · Eat less processed food and food from restaurants, including fast food. Exercise as directed Moderate, regular exercise is very good for your heart. It improves your blood flow and helps control your weight. But too much exercise can stress your heart and cause a heart failure flare-up. · Check with your doctor before you start an exercise program. 
· Walking is an easy way to get exercise. Start out slowly. Gradually increase the length and pace of your walk. Swimming, riding a bike, and using a treadmill are also good forms of exercise. · When you exercise, watch for signs that your heart is working too hard. You are pushing yourself too hard if you cannot talk while you are exercising. If you become short of breath or dizzy or have chest pain, stop, sit down, and rest. 
· Do not exercise when you do not feel well. Take medicines correctly · Take your medicines exactly as prescribed. Call your doctor if you think you are having a problem with your medicine. · Make a list of all the medicines you take. Include those prescribed to you by other doctors and any over-the-counter medicines, vitamins, or supplements you take. Take this list with you when you go to any doctor. · Take your medicines at the same time every day. It may help you to post a list of all the medicines you take every day and what time of day you take them. · Make taking your medicine as simple as you can. Plan times to take your medicines when you are doing other things, such as eating a meal or getting ready for bed. This will make it easier to remember to take your medicines. · Get organized. Use helpful tools, such as daily or weekly pill containers. When should you call for help? Call 911 if you have symptoms of sudden heart failure such as: 
? · You have severe trouble breathing. ? · You cough up pink, foamy mucus. ? · You have a new irregular or rapid heartbeat. ?Call your doctor now or seek immediate medical care if: 
? · You have new or increased shortness of breath. ? · You are dizzy or lightheaded, or you feel like you may faint. ? · You have sudden weight gain, such as more than 2 to 3 pounds in a day or 5 pounds in a week. (Your doctor may suggest a different range of weight gain.) ? · You have increased swelling in your legs, ankles, or feet. ? · You are suddenly so tired or weak that you cannot do your usual activities. ? Watch closely for changes in your health, and be sure to contact your doctor if you develop new symptoms. Where can you learn more? Go to http://amparo-ramon.info/. Enter B613 in the search box to learn more about \"Avoiding Triggers With Heart Failure: Care Instructions. \" Current as of: September 21, 2016 Content Version: 11.4 © 0867-9674 [employer].  Care instructions adapted under license by [employer] (which disclaims liability or warranty for this information). If you have questions about a medical condition or this instruction, always ask your healthcare professional. [address] any warranty or liability for your use of this information. Please provide this summary of care documentation to your next provider. Signatures-by signing, you are acknowledging that this After Visit Summary has been reviewed with you and you have received a copy. Patient Signature:  ____________________________________________________________ Date:  ____________________________________________________________  
  
[facility] Provider Signature:  ____________________________________________________________ Date:  ____________________________________________________________

## 2018-03-09 NOTE — CONSULTS
Cardiology Consult Note      Patient Name: Samir Mast  : 1975 MRN: [id number]  Date: 3/9/2018  Time: 1:23 PM    Admit Diagnosis: Fluid overload    Primary Cardiologist: Dr. Andrea Marks Cardiologist: Alvin Maynard. Tee Ramon M.D.    Jona Fish for Consult: CHF    Requesting MD: Michael Melissa MD    HPI:  Samir Mast is a 39 y.o. male admitted on 3/9/2018  for Fluid overload. Past medical history of Arrhythmia; Asthma; CKD (chronic kidney disease), stage III (2013); Diabetes ([facility] [address] Gout; Heart failure ([facility] [address] Hyperlipidemia; Hypertension; Ill-defined condition; and Positive PPD, treated (). He also has no past medical history of Nausea & vomiting. Presented to the ED for SOB and LE edema. Regular patient of Dr. Hiro Ruelas, he has known DHF as well as CKD stage V. He relates he is supposed to have Epogen weekly, however, Medicare refused his Rx. He became TAMEZ and began with LE edema. Presented to the ED before it got worse. Receiving [facility] currently as his Hgb is 7.4, he feels his SOB and edema are improving. Subjective:  Denies CP, SOB or palpitations. TAMEZ improved. LE edema improved as well      Assessment and Plan      1. Diastolic heart failure - Acute/Chronic      - NYHA Class - II   - EF - 70 %   - Lasix 60 mg IV BID   - proBNP - 1612   - CXR with minimal edema   - Lungs clear on exam, no crackles     Wt Readings from Last 3 Encounters:   18 310 lb 13.6 oz (141 kg)   18 315 lb (142.9 kg)   01/10/18 307 lb (139.3 kg)   2. 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 Anemia   - related to CKD   - getting PRC currently  4. HTN   - Resume home meds  5. B/L BKA   - 2/2 diabetic osteomyelitis and PVD  6. HLD   - Lipitor 40 mg daily PTA    Diastolic HF, but only mildly decompensated.   CXR with scant edema, lower proBNP, lungs clear on exam.  Suspect  of SOB is anemia related to CKD and patient unable to obtain his Epogen. Feeling better with PRC. Continue current medication for HTN, HF. Needs no cardiac testing at this time. He can follow up with  [facility] after discharge. Body mass index is 39.91 kg/(m^2). - diet and exercise discussed? Palliative care consult?- does patient wish for consult? Advance Care Planning 12/14/2017   Patient's Healthcare Decision Maker is: -   Primary Decision Maker Name -   Primary Decision Maker Phone Number -   Primary Decision Maker Relationship to Patient -   Secondary Decision Maker Name -   Secondary Decision Maker Phone Number -   Secondary Decision Maker Relationship to Patient -   Confirm Advance Directive -   Patient Would Like to Complete Advance Directive No   Does the patient have other document types -     - will need functional assessment before discharge   - [facility] ordered and will need heart failure follow up. Echo 2/16 - LVEF 65 %, no WMA, wall thickness was mildly increased, small pericardial effusion was identified without hemodynamic compromise. HERNAN 6/2015 - LVEF 60 % to 65 %, no WMA, moderate cLVH, no right-to-left shunt in atria, no thrombus noted, small pericardial effusion was identified circumferential to the heart without evidence of hemodynamic compromise.      Review of Systems:     GENERAL   Recent weight loss - no   Fever -----------------   no   Chills -----------------   no     EYES, VISION   Visual Changes - no     EARS, NOSE, THROAT   Hearing loss ----------- no   Swallowing difficulties - no     CARDIOVASCULAR   Chest pain/pressure ---- no   Arrhythmia/palpitations - no       RESPIRATORY   Cough ------------------ no   Shortness of breath - yes   Wheezing -------------- no   GASTROINTESTINAL   Abdominal pain - no   Heartburn -------- no   Bloody stool ----- no     GENITOURINARY   Frequent urination - no   Urgency -------------- no     MUSCULOSKELETAL   Joint pain/swelling ---- no   Musculoskeletal pain - no     SKIN & INTEGUMENTARY Rashes - no   Sores --- no         NEUROLOGICAL   Numbness/tingling - no   Sensation loss ------ no     PSYCHIATRIC   Nervousness/anxiety - no   Depression -------------- no     ENDOCRINE   Heat/cold intolerance - no   Excessive thirst -------- no     HEMATOLOGIC/LYMPHATIC   Abnormal bleeding - no     ALL/IMMUN   Allergic reaction ------ no   Recurrent infections - no     Previous treatment/evaluation includes echocardiogram .  Cardiac risk factors: dyslipidemia, diabetes mellitus, obesity, sedentary life style, male gender, hypertension.     Past Medical History:   Diagnosis Date    Arrhythmia     afib    Asthma     3/2013 last attack    CKD (chronic kidney disease), stage III 2/12/2013    Nephrology: Dread Wyatt MD    Diabetes [facility])     Gout     Heart failure ([facility] [address]     Hyperlipidemia     Hypertension     Ill-defined condition     Positive PPD, treated 2001    treated 9 months     Past Surgical History:   Procedure Laterality Date    HX BELOW KNEE AMPUTATION Left     due to osteomyelitis    HX HEENT      tonsilectomy    HX HERNIA REPAIR      HX OTHER SURGICAL      below the knee right amputation    HX TONSILLECTOMY       Current Facility-Administered Medications   Medication Dose Route Frequency    0.9% sodium chloride infusion 250 mL  250 mL IntraVENous PRN    acetaminophen (TYLENOL) tablet 650 mg  650 mg Oral Q4H PRN    amitriptyline (ELAVIL) tablet 50 mg  50 mg Oral QHS    amLODIPine (NORVASC) tablet 10 mg  10 mg Oral DAILY    aspirin chewable tablet 81 mg  81 mg Oral DAILY    atorvastatin (LIPITOR) tablet 40 mg  40 mg Oral QHS    cloNIDine HCl (CATAPRES) tablet 0.3 mg  0.3 mg Oral TID    hydrALAZINE (APRESOLINE) tablet 100 mg  100 mg Oral Q8H    labetalol (NORMODYNE) tablet 300 mg  300 mg Oral Q8H    methocarbamol (ROBAXIN) tablet 750 mg  750 mg Oral TID    pantoprazole (PROTONIX) tablet 40 mg  40 mg Oral ACB    oxyCODONE IR (ROXICODONE) tablet 15 mg  15 mg Oral Q8H PRN    sevelamer carbonate (RENVELA) tab 800 mg  800 mg Oral TID WITH MEALS    sodium bicarbonate tablet 650 mg  650 mg Oral BID    multivitamin, tx-iron-ca-min (THERA-M w/ IRON) tablet 1 Tab  1 Tab Oral DAILY    sodium chloride (NS) flush 5-10 mL  5-10 mL IntraVENous Q8H    sodium chloride (NS) flush 5-10 mL  5-10 mL IntraVENous PRN    ondansetron (ZOFRAN ODT) tablet 4 mg  4 mg Oral Q8H PRN    docusate sodium (COLACE) capsule 100 mg  100 mg Oral BID    albuterol-ipratropium (DUO-NEB) 2.5 MG-0.5 MG/3 ML  3 mL Nebulization Q4H PRN    insulin lispro (HUMALOG) injection   SubCUTAneous AC&HS    glucose chewable tablet 16 g  4 Tab Oral PRN    dextrose (D50W) injection syrg 12.5-25 g  12.5-25 g IntraVENous PRN    glucagon (GLUCAGEN) injection 1 mg  1 mg IntraMUSCular PRN    furosemide (LASIX) injection 60 mg  60 mg IntraVENous BID    arformoterol (BROVANA) neb solution 15 mcg  15 mcg Nebulization BID RT    And    budesonide (PULMICORT) 500 mcg/2 ml nebulizer suspension  500 mcg Nebulization BID RT    insulin glargine (LANTUS) injection 25 Units  25 Units SubCUTAneous DAILY WITH DINNER       Allergies   Allergen Reactions    Ace Inhibitors Cough     AND CKD/SD    Arb-Angiotensin Receptor Antagonist Other (comments)     CKD/SD,       Family History   Problem Relation Age of Onset    Diabetes Mother     Hypertension Mother     Hypertension Brother     Diabetes Brother     Diabetes Maternal Grandmother     Hypertension Maternal Grandmother     Diabetes Other     Hypertension Other     Heart Disease Other       Social History     Social History    Marital status:      Spouse name: N/A    Number of children: N/A    Years of education: N/A     Social History Main Topics    Smoking status: Never Smoker    Smokeless tobacco: Never Used    Alcohol use No    Drug use: No    Sexual activity: Not Currently     Partners: Male     Other Topics Concern    None     Social History Narrative         Per conversation 03/26/17    He would like his new Medical  power of  to be his Brother Michael Atkins. (Previously was his Mother Micaela Neville)        Never smoker       Objective:    Physical Exam    Vitals:   Vitals:    03/09/18 1235 03/09/18 1250 03/09/18 1252 03/09/18 1305   BP: 150/80 144/77  161/77   Pulse: 62 64  62   Resp: 24 20  24   Temp: 97.7 °F (36.5 °C) 98.1 °F (36.7 °C)  97.6 °F (36.4 °C)   SpO2: 92% 95%  96%   Weight:   310 lb 13.6 oz (141 kg)    Height:           General:    Alert, cooperative, no distress, appears stated age. Neck:   Supple, no carotid bruit and no JVD. Lungs:     Clear to auscultation bilaterally. Heart[de-identified]    Regular rate and rhythm, S1, S2 normal, no murmur, click, rub    or gallop. Abdomen:     Soft, non-tender. Bowel sounds normal. .   Extremities:   Extremities normal, atraumatic, no cyanosis or edema. B/L BKA   Vascular:   Pulses - 2+ radials   Skin:   Skin color normal. No rashes or lesions   Neurologic:   CN II-XII grossly intact.         Telemetry: normal sinus rhythm    ECG:   EKG Results     Procedure 720 Value Units Date/Time    EKG 12 LEAD INITIAL [[id number]] Collected:  03/09/18 0347    Order Status:  Completed Updated:  03/09/18 1105     Ventricular Rate 69 BPM      Atrial Rate 69 BPM      P-R Interval 170 ms      QRS Duration 96 ms      Q-T Interval 420 ms      QTC Calculation (Bezet) 450 ms      Calculated P Axis 52 degrees      Calculated R Axis 56 degrees      Calculated T Axis 65 degrees      Diagnosis --     Normal sinus rhythm  Nonspecific T wave abnormality  When compared with ECG of 09-MAR-2018 03:30,  MANUAL COMPARISON REQUIRED, DATA IS UNCONFIRMED      EKG 12 LEAD INITIAL [[id number]] Collected:  03/09/18 0330    Order Status:  Completed Updated:  03/09/18 1105     Ventricular Rate 72 BPM      Atrial Rate 72 BPM      P-R Interval 264 ms      QRS Duration 94 ms      Q-T Interval 396 ms      QTC Calculation (Bezet) 433 ms      Calculated R Axis 61 degrees      Calculated T Axis 116 degrees      Diagnosis --     Sinus rhythm with 1st degree AV block  Nonspecific ST and T wave abnormality  When compared with ECG of 12-DEC-2017 20:30,  AL interval has increased            Data Review:     Radiology:   XR Results (most recent):    Results from Hospital Encounter encounter on 03/09/18   XR CHEST PORT   Narrative EXAM:  XR CHEST PORT    INDICATION:  Shortness of breath. COMPARISON: 12/12/2017    TECHNIQUE: Portable AP upright chest view at 0350 hours    FINDINGS: Cardiac monitoring wires overlie the thorax. There is stable cardiac  silhouette enlargement. There is mild central pulmonary vascular congestion. There are mild diffuse interstitial opacities. There heart trace pleural  effusions. There is no pneumothorax. The bones and upper abdomen are stable. Impression IMPRESSION:    Findings of mild pulmonary edema and trace pleural effusions. Recent Labs      03/09/18   1039  03/09/18   0336   CPK  290   --    TROIQ  <0.04  <0.04     Recent Labs      03/09/18   1039  03/09/18   0336   NA   --   140   K   --   4.3   CL   --   106   CO2   --   23   BUN   --   66*   CREA   --   5.01*   GLU   --   370*   PHOS  4.1   --    CA   --   8.9     Recent Labs      03/09/18   0336   WBC  9.1   HGB  7.4*   HCT  23.6*   PLT  176     Recent Labs      03/09/18   0336   SGOT  6*   AP  103     No results for input(s): CHOL, LDLC in the last 72 hours. No lab exists for component: TGL, HDLC,  HBA1C  No results for input(s): CRP, TSH, TSHEXT in the last 72 hours. No lab exists for component: ESR    Maria Dolores Rico. Francisca Rao M.D.          [facility] [address] [place] [address] Rd[address] [facility], [address] [facility] [address]     Naz Granado     [phone]    Karley Alvarado MD

## 2018-03-09 NOTE — H&P
[facility]  ACUTE CARE HISTORY AND PHYSICAL    Jennifer Oscar  MR#: [id number]  : 1975  ACCOUNT #: [de-identified]   DATE OF SERVICE: 2018    PRIMARY CARE PHYSICIAN:  Jen Stover.      SOURCE OF INFORMATION:  The patient. CHIEF COMPLAINT:  Shortness of breath. HISTORY OF PRESENT ILLNESS:  This is a 69-year-old man with a past medical history significant for chronic diastolic congestive heart failure, chronic kidney disease, hypertension, dyslipidemia, asthma, anemia, type 2 diabetes, status post bilateral below knee amputations, who was in his usual state of health until a few days ago when the patient developed shortness of breath. The shortness of breath was progressive and getting worse. Patient is on 2 liters of oxygen at home. The shortness of breath is not associated with chest pain. The patient is also complaining of bilateral lower extremity swelling and weight gain. No associated fever, no rigors, no chills. The patient missed his appointment with his nephrologist the day before yesterday. The patient was encouraged to come to the emergency room for further evaluation of the shortness of breath. He was last admitted here from 2017 to 12/15/2017. The patient was admitted at that time for evaluation of shortness of breath as well. The shortness of breath was attributed to diastolic congestive heart failure, as well as the patient's chronic kidney disease. He was treated with Lasix and the patient was discharged home. He was seen by the cardiologist, as well as nephrology during that hospitalization. When the patient arrived by the emergency room today, his oxygen saturation was 84% on room air. Patient was placed on 2 liters of oxygen, was noted to have elevated proBNP level, and the chest x-ray was suggestive of vascular congestion. Patient also has a hemoglobin of 7.4. He has transfusion dependent anemia.   The patient has been transfused in the past without any problems. PAST MEDICAL HISTORY:  Chronic diastolic congestive heart failure, chronic kidney disease, hypertension, dyslipidemia, asthma, anemia, type 2 diabetes, status post bilateral below knee amputations. ALLERGIES:  PATIENT IS ALLERGIC TO ACE INHIBITOR AND ANGIOTENSIN RECEPTOR BLOCKER. MEDICATIONS:  Marc 90 mcg 2 puffs by inhalation daily, Elavil 50 mg daily, Norvasc 10 mg daily, aspirin 81 mg daily, Lipitor 40 mg daily, clonidine 0.3 mg 3 times daily, Advair 250/50 inhalation daily, Lasix 60 mg twice daily, hydralazine 100 mg 3 times daily, insulin 75/25 of 30 units daily in the morning and 10 units daily at bedtime, labetalol 300 mg every 8 hours, Robaxin 750 mg 3 times daily, Prilosec 40 mg daily, oxycodone IR 15 mg every 8 hours as needed for pain, Renvela 800 mg 3 times daily with meals, sodium bicarbonate 650 mg twice daily. FAMILY HISTORY:  This was to review, his mother had diabetes and hypertension. His brother also has diabetes and hypertension. PAST SURGICAL HISTORY:  Significant for bilateral below knee amputations, tonsillectomy, hernia repair. SOCIAL HISTORY:  No history of alcohol or tobacco abuse. REVIEW OF SYSTEMS  HEAD, EYES, EARS, NOSE, THROAT:  No headache, no dizziness, no blurring of vision, no photophobia. RESPIRATORY:  This is positive for shortness of breath, no cough, no hemoptysis. CARDIOVASCULAR:  This is positive for orthopnea. No chest pain, no palpitation. GASTROINTESTINAL:  No nausea and vomiting, no diarrhea, no constipation. GENITOURINARY:  No dysuria, no urgency and no frequency. All other systems are reviewed and they are negative. PHYSICAL EXAMINATION  GENERAL:  Patient appeared ill and in moderate distress. VITAL SIGNS:  On arrival to the emergency room, temperature 98.4, pulse 74, respiratory rate 22, blood pressure 157/71, oxygen saturation 84% on room air.   HEENT:  Head is normocephalic, atraumatic. Eyes:  Normal eye movement. No redness, no drainage, no discharge. Ears:  Normal external ears with no obvious drainage. Nose:  No deformity, no drainage. Mouth and throat:  No visible oral lesions. NECK:  Supple, mild JVD, no thyromegaly. CHEST:  Bilateral basilar crackles. Few expiratory wheezing. HEART:  Normal S1 and S2, regular. No clinically appreciable murmur. ABDOMEN:  Soft, obese, nontender, normal bowel sounds. CENTRAL NERVOUS SYSTEM:  Alert, oriented x3. No gross focal neurological deficit. EXTREMITIES:  Bilateral below knee amputations noted. MUSCULOSKELETAL:  Bilateral below knee amputations noted. SKIN:  No acute skin lesions seen in the exposed part of the body. PSYCHIATRIC:  Normal mood and affect. LYMPHATIC:  No cervical lymphadenopathy. DIAGNOSTIC DATA:  Chest x-ray shows mild pulmonary edema and trace pleural effusions. LABORATORY DATA:  Hematology:  WBC 9.1, hemoglobin 7.4, hematocrit 23.6, platelet 997. Cardiac profile:  Troponin less than 0.04, proBNP 1612. Chemistry:  Sodium 140, potassium 4.3, chloride 106, CO2 of 23, glucose 370, BUN 56, creatinine 5.01, calcium 8.9, bilirubin total 0.9, ALT 11, AST 6, alkaline phosphatase 103. Total protein 7.6, albumin level 3.3, globulin 4.3. ASSESSMENT   1. Fluid overload. 2.  Acute on chronic diastolic congestive heart failure. 3.  Chronic kidney disease stage V.  4.  Hypertension. 5.  Dyslipidemia. 6.  Asthma. 7.  Status post bilateral below knee amputations. 8.  Anemia secondary to chronic disease. 9.  Type 2 diabetes. PLAN  1. Fluid overload. We will admit the patient for further evaluation and treatment. This is most likely coming from the patient's chronic diastolic congestive heart failure, as well as the chronic kidney disease. This will be discussed below. Would treat the underlying etiological factors. 2.  Acute on chronic diastolic congestive heart failure.   This is contributing to the patient's fluid overload. We will continue with Lasix. We will check cardiac markers to rule out acute myocardial infarction. Cardiology consult will be requested to assist in evaluation and treatment of acute on chronic diastolic congestive heart failure. The patient's shortness of breath did not improve after adequate treatment for the diastolic congestive heart failure. Patient may require VQ scan of the chest to evaluate the patient for thromboembolism as a possible cause of shortness of breath. 3.  Chronic kidney disease stage V. This is also contributing to the patient's fluid overload. We will continue with Lasix. Nephrology consult will be requested to assist in evaluation and treatment. 4.  Hypertension. Will resume preadmission medications. Monitor the patient's blood pressure closely. 5.  Dyslipidemia. Will resume home medications. We will check lipid panel. 6.  Asthma. We will place the patient on DuoNeb. 7.  Status post bilateral below knee amputations. Will continue with supportive therapy. 8.  Anemia. This is most likely due to chronic kidney disease. Transfusion was started in the emergency room. We will monitor the patient's hemoglobin and hematocrit closely. We will check stool guaiac to rule out occult gastrointestinal bleed  9. Type 2 diabetes. We will resume preadmission basal insulin. Patient will also be placed on sliding scale with insulin for breakthrough. Check hemoglobin A1c levels. OTHER ISSUES  1.  CODE STATUS:  FULL CODE. 2.  The patient has bilateral below knee amputations. SCDs contraindicated.       MD CHRISTIN García / DAV  D: 03/09/2018 06:07     T: 03/09/2018 06:48  JOB #: [id number]  CC: Denise Newton MD

## 2018-03-09 NOTE — NURSE NAVIGATOR
Chart reviewed by Heart Failure Nurse Navigator. Heart Failure database completed. EF 70% (10/2017)      ACEi/ARB:     BB:          CRT not currently  indicated      NYHA Functional Class II, on admission      Heart Failure Teach Back in Patient Education. Heart Failure Avoiding Triggers on Discharge Instructions. Cardiologist:  Dr. Dipika Deleon ([facility]) has been consulted. [facility] (PCP) notified of admission. Previous admission 12/12/17 - 12/15/17, primary diagnosis HF.

## 2018-03-09 NOTE — ED TRIAGE NOTES
Pt arrives from home via EMS for SOB since Monday, noted increased generalized swelling. Wears 2LNC at home \"when needed. \" Denies chest pain.

## 2018-03-09 NOTE — PROGRESS NOTES
Pt seen and examined. States he feels much better. Lungs clear b/l. Heart RRR. Continue current management.

## 2018-03-09 NOTE — IP AVS SNAPSHOT
[address] [phone] Patient: Karlie Flores MRN: [id number] GTB:[birth year]15 A check damon indicates which time of day the medication should be taken. My Medications CONTINUE taking these medications Instructions Each Dose to Equal  
 Morning Noon Evening Bedtime  
 albuterol 90 mcg/actuation inhaler Commonly known as:  PROVENTIL HFA, VENTOLIN HFA, PROAIR HFA Your last dose was: Your next dose is: Take 2 Puffs by inhalation daily. 2 Puff  
    
   
   
   
  
 amitriptyline 50 mg tablet Commonly known as:  ELAVIL Your last dose was: Your next dose is: TAKE ONE TABLET BY MOUTH EVERY EVENING  
     
   
   
   
  
 amLODIPine 10 mg tablet Commonly known as:  Sierra Rich Your last dose was: Your next dose is: Take 1 Tab by mouth daily. 10 mg  
    
   
   
   
  
 aspirin 81 mg chewable tablet Your last dose was: Your next dose is: Take 1 Tab by mouth daily. 81 mg  
    
   
   
   
  
 atorvastatin 40 mg tablet Commonly known as:  LIPITOR Your last dose was: Your next dose is: Take 1 Tab by mouth nightly. 40 mg  
    
   
   
   
  
 cloNIDine HCl 0.3 mg tablet Commonly known as:  CATAPRES Your last dose was: Your next dose is: Take 1 Tab by mouth three (3) times daily. 0.3 mg  
    
   
   
   
  
 fluticasone-salmeterol 250-50 mcg/dose diskus inhaler Commonly known as:  ADVAIR Your last dose was: Your next dose is: Take 1 Puff by inhalation daily. 1 Puff  
    
   
   
   
  
 furosemide 40 mg tablet Commonly known as:  LASIX Your last dose was: Your next dose is: TAKE 1  And 1/2 tab bid HumaLOG Mix 75-25(U-100)Insuln 100 unit/mL (75-25) injection Generic drug:  insulin lispro protamine/insulin lispro Your last dose was: Your next dose is: Take 30 units in the morning and 10 units at night.  
     
   
   
   
  
 hydrALAZINE 100 mg tablet Commonly known as:  APRESOLINE Your last dose was: Your next dose is: TAKE ONE TABLET BY MOUTH THREE TIMES DAILY  
     
   
   
   
  
 labetalol 300 mg tablet Commonly known as:  Andree Rubio Your last dose was: Your next dose is: Take 1 Tab by mouth every eight (8) hours. 300 mg  
    
   
   
   
  
 LYRICA 75 mg capsule Generic drug:  pregabalin Your last dose was: Your next dose is: Take 75 mg by mouth three (3) times daily. 75 mg  
    
   
   
   
  
 methocarbamol 750 mg tablet Commonly known as:  ROBAXIN Your last dose was: Your next dose is: Take 750 mg by mouth three (3) times daily. 750 mg  
    
   
   
   
  
 multivitamin, tx-iron-ca-min 9 mg iron-400 mcg Tab tablet Commonly known as:  THERA-M w/ IRON Your last dose was: Your next dose is: Take 1 Tab by mouth daily. 1 Tab  
    
   
   
   
  
 omeprazole 40 mg capsule Commonly known as:  PRILOSEC Your last dose was: Your next dose is: Take 1 Cap by mouth daily. 40 mg  
    
   
   
   
  
 ondansetron 8 mg disintegrating tablet Commonly known as:  ZOFRAN ODT Your last dose was: Your next dose is: Take 1 Tab by mouth three (3) times daily. 8 mg  
    
   
   
   
  
 oxyCODONE IR 15 mg immediate release tablet Commonly known as:  OXY-IR Your last dose was: Your next dose is: Take 15 mg by mouth every eight (8) hours as needed for Pain. 15 mg  
    
   
   
   
  
 sevelamer carbonate 800 mg Tab tab Commonly known as:  Nahid Colvin Your last dose was: Your next dose is: TAKE ONE TABLET BY MOUTH THREE TIMES DAILY WITH MEALS  
     
   
   
   
  
 sodium bicarbonate 650 mg tablet Your last dose was: Your next dose is: Take 1 Tab by mouth two (2) times a day.   
 650 mg

## 2018-03-09 NOTE — CDMP QUERY
Dear Hospitalists,    Patient is noted to have a BMI of 39.79. Please clarify if this patient is:     =>Morbidly obese (BMI ³ 40)  =>Obese (BMI 30 - 39.9)  =>Overweight (BMI 25 - 29.9)  => Other explanation of clinical findings  => Unable to determine (no explanation for clinical findings)    The medical record reflects the following clinical findings, treatment, and risk factors. Presentation- BMI 39.79, Ht: 6' 2\" (1.88 m), Wt: 140.6 kg (309 lb 14.4 oz)    REFERENCE:  The [address] [place] [address] has issued a statement indicating that, \"Individuals who are overweight, obese, or morbidly obese are at an increased risk for certain medical conditions when compared to persons of normal weight. Therefore, these conditions are always clinically significant and reportable when documented by the provider. Please clarify and document your clinical opinion in the progress notes and discharge summary including the definitive and/or presumptive diagnosis, (suspected or probable), related to the above clinical findings. Please include clinical findings supporting your diagnosis.     Thank You  Shanna Danielson,MSN,BSN,RN,[facility]  [phone]

## 2018-03-09 NOTE — ROUTINE PROCESS
TRANSFER - OUT REPORT:    Verbal report given to Ronnie Meng RN(name) on Ewelina Galan  being transferred to [facility]) for routine progression of care       Report consisted of patients Situation, Background, Assessment and   Recommendations(SBAR). Information from the following report(s) SBAR, ED Summary, Procedure Summary, MAR and Recent Results was reviewed with the receiving nurse. Lines:   Peripheral IV 03/09/18 Left Wrist (Active)   Site Assessment Clean, dry, & intact 3/9/2018  3:36 AM   Phlebitis Assessment 0 3/9/2018  3:36 AM   Infiltration Assessment 0 3/9/2018  3:36 AM   Dressing Status Clean, dry, & intact 3/9/2018  3:36 AM   Dressing Type Transparent 3/9/2018  3:36 AM   Hub Color/Line Status Pink;Flushed;Patent 3/9/2018  3:36 AM   Action Taken Blood drawn 3/9/2018  3:36 AM   Alcohol Cap Used No 3/9/2018  3:36 AM        Opportunity for questions and clarification was provided.       Patient transported with:   Monitor  Registered Nurse   O2 @4LNC

## 2018-03-09 NOTE — PROGRESS NOTES
Patient is a 38 y/o [place] American male insured by Medicaid of [place], admitted to [facility] earlier this morning for c/o SOB. PMH CHF, CKD V, HTN, DMII, dyslipidemia, asthma, bilateral BKA. Cardiology and nephrology consults ordered. CM s/w patient at bedside. Patient A&Ox4, confirmed demographics on facesheet. Tash lives alone in [place] apartment, independent with ADL's. Has BLE prosthetics for ambulation, walker, home O2. Is working with outpatient [facility] physiartist to obtain a motorized scooter. Patient previously had [address] through [facility], agrees with [facility] visit if ordered. Patient confirmed emergency contact is brother Frankie. Patient hopes family will be able to transport home. If not, may require [facility] transport. Expect continued hospitalization through weekend. CM placed note on chart requesting hospitalist order [facility], also sent referral to [facility]. Unit CM to follow next week for additional discharge needs as indicated. HANG Mchugh    Care Management Interventions  PCP Verified by CM:  Yes  Last Visit to PCP: 02/02/18  Transition of Care Consult (CM Consult): [address]: Yes  MyChart Signup: No  Discharge Durable Medical Equipment: No  Physical Therapy Consult: No  Occupational Therapy Consult: No  Speech Therapy Consult: No  Current Support Network: Lives Alone  Confirm Follow Up Transport: Family  Plan discussed with Pt/Family/Caregiver: Yes  Freedom of Choice Offered: Yes  Discharge Location  Discharge Placement: Home with home health

## 2018-03-09 NOTE — CONSULTS
[address]    Ynes Byers  MR#: [id number]  : 1975  ACCOUNT #: [de-identified]   DATE OF SERVICE: 2018    REFERRING PHYSICIAN:  Dr. Shawna Carrel. REASON FOR CONSULTATION:  Advanced chronic kidney disease, for management. HISTORY OF PRESENT ILLNESS:  The patient is very well known to our service, 59-year-old -American man who is an office patient of Dr. Radha Crawford. The patient has advanced chronic kidney disease stage IV/V due to diabetic nephropathy. He has chronic issues with compliance and with dietary restrictions. Patient presented to the emergency room with complaints of shortness of breath which was progressively getting worse. He explained to me that he has been taking his medications, but he was not adherent to fluid restriction and he ate a lot of salty food while on a trip to [facility] to attend a baseball gathering. On further questioning, the patient admitted that he has not been coming to the office for his monthly Procrit injections. Patient blames insurance change and issue with the copays. In the emergency room, he was found to be in pulmonary edema and started on parenteral diuretics. PAST MEDICAL HISTORY:  Chronic diastolic congestive heart failure, chronic kidney disease, hypertension, dyslipidemia, asthma, anemia, type 2 diabetes mellitus status post bilateral below-the-knee amputation. ALLERGIES:  ALLERGIC TO PENICILLIN, ACE INHIBITORS AND ANGIOTENSIN RECEPTOR BLOCKER.     MEDICATIONS:  Medication list at home should consist of Marc 90 mcg 2 puffs via inhalation daily, Elavil 50 mg once a day, Norvasc 10 mg once a day, aspirin 81 mg once a day, Lipitor 40 mg once a day, clonidine 0.3 three times daily, Advair Diskus 250/50 inhalation daily, Lasix 60 mg twice a day, hydralazine 100 mg 3 times daily, insulin 75/25, 30 units in the morning and 10 units at bedtime, labetalol 300 mg every 8 hours, Robaxin 3 times daily, Prilosec 40 once a day, oxycodone as needed for pain, Renvela 800 mg 3 times daily, sodium bicarbonate 650 twice a day. FAMILY HISTORY:  Mother had diabetes and hypertension. The patient has a brother with diabetes and hypertension. PAST SURGICAL HISTORY:  Bilateral below-the-knee amputations, tonsillectomy and hernia repair. SOCIAL HISTORY:  Denies alcohol, tobacco or illicit drug abuse. He is unemployed. REVIEW OF SYSTEMS:  As outlined in history of present illness. No other acute symptoms. PHYSICAL EXAMINATION:  GENERAL:  Middle-aged black man who is awake, alert, oriented. He seems to be not in acute distress now. He is working on his laptop. There is a strong malodorous smell around the patient, which has been noted on previous hospitalizations before, although I do not see any open wound or infected area on his body. VITAL SIGNS:  Blood pressure 145/72, heart rate of 64, temperature is 97.7. HEENT:  Normocephalic. Eyes with anicteric sclerae. Patient looks very pale. Ears and nose without abnormal discharge. NECK:  With no increased JVP. LUNGS:  With crackles or rales and some expiratory wheezes. HEART:  S1 and S2, regular rate and rhythm. ABDOMEN:  Soft, nontender, nondistended. EXTREMITIES:  With bilateral below the knee amputation with swollen stumps but no open wounds. NEUROPSYCHIATRIC:  The patient is in good mood. He is awake, alert, oriented. LABORATORY DATA:  Sodium is 140, potassium is 4.3, CO2 is 23, BUN is 66, creatinine is 5. Hemoglobin A1c is 10.4. Hemoglobin is 7. 4. That    IMAGING: Chest x-ray with pulmonary edema. ASSESSMENT AND PLAN:    1. Chronic kidney disease, now at stage IV due to diabetic nephropathy. Patient is still not uremic. Electrolytes are acceptable. There is incrementally worsening of his renal function since the last visit.   2.  Hypervolemia with edema and pulmonary congestion due to noncompliance with medications and dietary restrictions. 3.  Severe anemia related to decreased endogenous production of erythropoietin related to chronic kidney disease. 4.  Poorly-controlled diabetes mellitus reflected on hemoglobin A1c of 10.4. IMPRESSION:   1. Continue aggressive diuresis. Patient has at least 7-8 pounds of fluid on. 2.  Anemia management. Check iron profile. Resume erythropoietin. 3.  Check a PTH and start calcitriol. 4.  Compliance was briefly reviewed. 5.  Patient will need renal replacement therapy in the next 9-12 months if not even earlier. He is followed by my partner, Dr. Zhang Chu, who will arrange for preparations for dialysis. Patient would benefit from the educational seminar [facility].  6.  Avoid nephrotoxic agents. Thank you very much for the opportunity to be part of this patient's care.       Kisha Frias MD       LTD / HN  D: 03/09/2018 13:02     T: 03/09/2018 14:12  JOB #: [id number]  CC: Jamie Cardona MD

## 2018-03-10 NOTE — PROGRESS NOTES
Bedside shift change report given to LEELEE Arauz (oncoming nurse) by Severiano Space, RN (offgoing nurse). Report included the following information SBAR, Kardex, MAR, Accordion, Recent Results and Cardiac Rhythm NSR.

## 2018-03-10 NOTE — PROGRESS NOTES
Name: Rodo Contreras MRN: [id number]   : 1975 Hospital: [address]   Date: 3/10/2018        IMPRESSION:   · CKD stage 4/5 from diabetic nephropathy. Patient's GFR is declining gradually. No signs of uremia. · Hypervolemia from non compliance with diet, medications and oral fluid restriction. Volume status is improved. · Anemia of CKD- procrit was restarted. PLAN:   · Continue present care. Compliance was reviewed. · After discharge patient will need to follow up with Dr. Bautista Arzate. Subjective/Interval History:   I have reviewed the flowsheet and previous days notes. ROS:Pertinent items are noted in HPI. Patient feels better. Objective:   Vital Signs:    Visit Vitals    /79    Pulse 70    Temp 98.1 °F (36.7 °C)    Resp 18    Ht 6' 2\" (1.88 m)    Wt 141 kg (310 lb 13.6 oz)    SpO2 97%    BMI 39.91 kg/m2       O2 Device: Nasal cannula   O2 Flow Rate (L/min): 5 l/min   Temp (24hrs), Av.8 °F (36.6 °C), Min:97.5 °F (36.4 °C), Max:98.1 °F (36.7 °C)       Intake/Output:   Last shift:         Last 3 shifts:  1901 - 03/10 0700  In: 521.8 [I.V.:281.8]  Out: 2550 [Urine:2550]    Intake/Output Summary (Last 24 hours) at 03/10/18 1043  Last data filed at 03/10/18 0455   Gross per 24 hour   Intake           521.75 ml   Output             1800 ml   Net         -1278.25 ml        Physical Exam:  General:    Alert, cooperative, no distress, appears stated age. Pale. Head:   Normocephalic, without obvious abnormality, atraumatic. Eyes:   Conjunctivae/corneas clear. Nose:  Nares normal. No drainage or sinus tenderness. Throat:    Lips, mucosa, and tongue normal.   Neck:  Supple, symmetrical,  no adenopathy, thyroid: non tender    no carotid bruit and no JVD. Lungs:   Clear to auscultation bilaterally. No Wheezing or Rhonchi. No rales. Chest wall:  No tenderness or deformity. No Accessory muscle use.   Heart:   Regular rate and rhythm,  no murmur, rub or gallop. Abdomen:   Soft, non-tender. Not distended. Bowel sounds normal. No masses  Extremities: B/L BKA's, No cyanosis. Less edema. No clubbing  Skin:     Texture, turgor normal. No rashes or lesions. Not Jaundiced  Psych:  Good insight. Not depressed. Not anxious or agitated. Neurologic:  Alert and oriented X 3. DATA:  Labs:  Recent Labs      03/10/18   0429  03/10/18   0423  03/09/18   1039  03/09/18   0336   NA  142   --    --   140   K  4.4   --    --   4.3   CL  108   --    --   106   CO2  23   --    --   23   BUN  67*   --    --   66*   CREA  5.02*   --    --   5.01*   CA  8.8  8.8   --   8.9   ALB  3.1*   --    --   3.3*   PHOS  4.4   --   4.1   --    MG   --    --   1.4*   --      Recent Labs      03/10/18   0429  03/09/18   0336   WBC  8.5  9.1   HGB  7.9*  7.4*   HCT  25.6*  23.6*   PLT  172  176     No results for input(s): VIOLA, KU, CLU, CREAU in the last 72 hours.     No lab exists for component: PROU    Total time spent with patient:  35 minutes    [] Critical Care Provided    Care Plan discussed with:   Staff, Kerline Carvajal MD

## 2018-03-10 NOTE — PROGRESS NOTES
2040: Bedside shift change report given to Ross Mazariegos RN (oncoming nurse) by Trina Potter RN (offgoing nurse). Report included the following information SBAR, Kardex, Intake/Output, MAR, Recent Results and Med Rec Status.

## 2018-03-10 NOTE — PROGRESS NOTES
Problem: Heart Failure: Day 1  Goal: Activity/Safety  Outcome: Progressing Towards Goal  Patient has been ambulating around is room. Bed is in the lowest position and wheels are locked, call bell is within reach, bathroom light is on during evening hours, gripper socks are on and patient has been instructed to call out for assistance if needed. As of now, patient is free from falls and will continue to be monitored.      Goal: Consults, if ordered  Outcome: Progressing Towards Goal  Nephrology was consulted due to decreased kidney function  Goal: Diagnostic Test/Procedures  Outcome: Progressing Towards Goal  EKG was preformed   Goal: Nutrition/Diet  Outcome: Progressing Towards Goal  Patient is eating well and tolerating his diet  Goal: Respiratory  Outcome: Progressing Towards Goal  Patient in on 5L NC and maintaining his O2 saturations

## 2018-03-10 NOTE — PROGRESS NOTES
Hospitalist Progress Note      Hospital summary: 39 y.o man w/ CKD, CHF, DM, HTN, b/l BKA's, who presented with dyspnea and was found to be volume overloaded. Assessment/Plan:  Acute on chronic diastolic CHF: (POA) NYHA class II  -continue IV furosemide    CKD 4-5: due to diabetic nephropathy  -nephrology following     HTN:  -continue current meds    S/p b/l BKA's    Hyperlipidemia:  -continue atorvastatin    T2DM:  -continue current meds    Anemia of CKD:  -on venofer and procrit per nephrology    Morbid obesity    Code status: full  DVT prophylaxis: sq heparin  Disposition: home likely tomorrow  ----------------------------------------------    CC: f/u shortness of breath    S: breathing improved today, no pain, no n/v/d     Review of Systems:  Pertinent items are noted in HPI.     O:  Visit Vitals    /74 (BP 1 Location: Right arm, BP Patient Position: At rest;Supine)    Pulse 67    Temp 98.4 °F (36.9 °C)    Resp 18    Ht 6' 2\" (1.88 m)    Wt 141 kg (310 lb 13.6 oz)    SpO2 96%    BMI 39.91 kg/m2       PHYSICAL EXAM:  Gen: NAD, non-toxic  HEENT: anicteric sclerae, normal conjunctiva, oropharynx clear, MM moist  Neck: supple  Heart: RRR, no MRG, no JVD, no peripheral edema  Lungs: CTA b/l, non-labored respirations  Abd: soft, NT, ND, BS+  Extr: warm, b/l BKA's  Skin: dry, no rash  Neuro: CN II-XII grossly intact, normal speech  Psych: normal mood, appropriate affect      Intake/Output Summary (Last 24 hours) at 03/10/18 1419  Last data filed at 03/10/18 0455   Gross per 24 hour   Intake           521.75 ml   Output             1325 ml   Net          -803.25 ml        Recent labs & imaging reviewed:  Recent Labs      03/10/18   0429  03/09/18   0336   WBC  8.5  9.1   HGB  7.9*  7.4*   HCT  25.6*  23.6*   PLT  172  176     Recent Labs      03/10/18   0429  03/10/18   0423  03/09/18   1039  03/09/18   0336   NA  142   --    --   140   K  4.4   --    --   4.3   CL  108 --    --   106   CO2  23   --    --   23   BUN  67*   --    --   66*   CREA  5.02*   --    --   5.01*   GLU  180*   --    --   370*   CA  8.8  8.8   --   8.9   MG   --    --   1.4*   --    PHOS  4.4   --   4.1   --      Recent Labs      03/10/18   0429  03/09/18   0336   SGOT  8*  6*   ALT  10*  11*   AP  102  103   TBILI  0.8  0.9   TP  7.6  7.6   ALB  3.1*  3.3*   GLOB  4.5*  4.3*     No results for input(s): INR, PTP, APTT in the last 72 hours. No lab exists for component: INREXT   Recent Labs      03/10/18   0429  03/09/18   1039   TIBC  178*   --    PSAT  15*   --    FERR   --   861*      Lab Results   Component Value Date/Time    Folate 5.9 03/09/2018 10:39 AM      No results for input(s): PH, PCO2, PO2 in the last 72 hours.   Recent Labs      03/09/18 1925 03/09/18 1039  03/09/18   0336   CPK  369*  290   --    CKNDX  0.7  0.7   --    TROIQ  <0.04  <0.04  <0.04     Lab Results   Component Value Date/Time    Cholesterol, total 88 03/10/2018 04:29 AM    HDL Cholesterol 33 03/10/2018 04:29 AM    LDL, calculated 35.8 03/10/2018 04:29 AM    Triglyceride 96 03/10/2018 04:29 AM    CHOL/HDL Ratio 2.7 03/10/2018 04:29 AM     Lab Results   Component Value Date/Time    Glucose (POC) 178 (H) 03/10/2018 11:06 AM    Glucose (POC) 187 (H) 03/10/2018 07:23 AM    Glucose (POC) 152 (H) 03/09/2018 10:15 PM    Glucose (POC) 165 (H) 03/09/2018 04:32 PM    Glucose (POC) 105 (H) 03/09/2018 11:24 AM     Lab Results   Component Value Date/Time    Color YELLOW/STRAW 09/26/2017 06:16 PM    Appearance CLEAR 09/26/2017 06:16 PM    Specific gravity 1.015 09/26/2017 06:16 PM    pH (UA) 5.5 09/26/2017 06:16 PM    Protein >300 (A) 09/26/2017 06:16 PM    Glucose NEGATIVE  09/26/2017 06:16 PM    Ketone NEGATIVE  09/26/2017 06:16 PM    Bilirubin NEGATIVE  09/26/2017 06:16 PM    Urobilinogen 0.2 09/26/2017 06:16 PM    Nitrites NEGATIVE  09/26/2017 06:16 PM    Leukocyte Esterase NEGATIVE  09/26/2017 06:16 PM    Epithelial cells FEW 09/26/2017 06:16 PM    Bacteria 1+ (A) 09/26/2017 06:16 PM    WBC 0-4 09/26/2017 06:16 PM    RBC 0-5 09/26/2017 06:16 PM       Med list reviewed  Current Facility-Administered Medications   Medication Dose Route Frequency    calcitRIOL (ROCALTROL) capsule 0.25 mcg  0.25 mcg Oral DAILY    0.9% sodium chloride infusion 250 mL  250 mL IntraVENous PRN    acetaminophen (TYLENOL) tablet 650 mg  650 mg Oral Q4H PRN    amitriptyline (ELAVIL) tablet 50 mg  50 mg Oral QHS    amLODIPine (NORVASC) tablet 10 mg  10 mg Oral DAILY    aspirin chewable tablet 81 mg  81 mg Oral DAILY    atorvastatin (LIPITOR) tablet 40 mg  40 mg Oral QHS    cloNIDine HCl (CATAPRES) tablet 0.3 mg  0.3 mg Oral TID    hydrALAZINE (APRESOLINE) tablet 100 mg  100 mg Oral Q8H    labetalol (NORMODYNE) tablet 300 mg  300 mg Oral Q8H    methocarbamol (ROBAXIN) tablet 750 mg  750 mg Oral TID    pantoprazole (PROTONIX) tablet 40 mg  40 mg Oral ACB    oxyCODONE IR (ROXICODONE) tablet 15 mg  15 mg Oral Q8H PRN    sevelamer carbonate (RENVELA) tab 800 mg  800 mg Oral TID WITH MEALS    sodium bicarbonate tablet 650 mg  650 mg Oral BID    multivitamin, tx-iron-ca-min (THERA-M w/ IRON) tablet 1 Tab  1 Tab Oral DAILY    sodium chloride (NS) flush 5-10 mL  5-10 mL IntraVENous Q8H    sodium chloride (NS) flush 5-10 mL  5-10 mL IntraVENous PRN    ondansetron (ZOFRAN ODT) tablet 4 mg  4 mg Oral Q8H PRN    docusate sodium (COLACE) capsule 100 mg  100 mg Oral BID    albuterol-ipratropium (DUO-NEB) 2.5 MG-0.5 MG/3 ML  3 mL Nebulization Q4H PRN    insulin lispro (HUMALOG) injection   SubCUTAneous AC&HS    glucose chewable tablet 16 g  4 Tab Oral PRN    dextrose (D50W) injection syrg 12.5-25 g  12.5-25 g IntraVENous PRN    glucagon (GLUCAGEN) injection 1 mg  1 mg IntraMUSCular PRN    furosemide (LASIX) injection 60 mg  60 mg IntraVENous BID    arformoterol (BROVANA) neb solution 15 mcg  15 mcg Nebulization BID RT    And    budesonide (PULMICORT) 500 mcg/2 ml nebulizer suspension  500 mcg Nebulization BID RT    insulin glargine (LANTUS) injection 25 Units  25 Units SubCUTAneous DAILY WITH DINNER    pregabalin (LYRICA) capsule 75 mg  75 mg Oral TID       Care Plan discussed with:  Patient/Family and Nurse    Vianca Schilling MD  Internal Medicine  Date of Service: 3/10/2018

## 2018-03-10 NOTE — PROGRESS NOTES
CM follow up. CM received Yes response to referral to [address] [place] [address] for Hospital to Home Visit for CHF.  CM upated AVS.

## 2018-03-11 PROBLEM — E87.70 FLUID OVERLOAD: Status: RESOLVED | Noted: 2018-01-01 | Resolved: 2018-01-01

## 2018-03-11 NOTE — PROGRESS NOTES
Problem: Heart Failure: Day 2  Goal: Activity/Safety  Outcome: Progressing Towards Goal  Patient is active in his room. Bed is in the lowest position and wheels are locked, call bell is within reach, bathroom light is on during evening hours, gripper socks are on and patient has been instructed to call out for assistance if needed. As of now, patient is free from falls and will continue to be monitored. Goal: Nutrition/Diet  Outcome: Progressing Towards Goal  Patient is eating very well.  Patient needs more education in reference to the proper food choices for a diabetic

## 2018-03-11 NOTE — DISCHARGE SUMMARY
Discharge Summary     Patient: Arlyss Spatz MRN: [id number]  SSN: [id number]    YOB: 1975  Age: 39 y.o. Sex: male       Admit Date: 3/9/2018    Discharge Date: 3/11/2018      Admission Diagnoses: Fluid overload    Discharge Diagnoses:   Acute on chronic diastolic heart failure  CKD stage V  Chronic hypoxic respiratory failure  Hypertension  History of bilateral BKA's  Hyperlipidemia  Type 2 DM  Anemia of CKD    Discharge Condition: Stable    Hospital Course: 39 y.o man w/ CKD, CHF, DM, HTN, b/l BKA's, who presented with dyspnea and was found to be volume overloaded.     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 David Sin, will likely need HD soon     Chronic respiratory failure with hypoxia:  -on 2L home O2    HTN    S/p b/l BKA's     Hyperlipidemia    T2DM    Anemia of CKD  -received venofer and procrit    Morbid obesity       Consults: Cardiology and Nephrology    Significant Diagnostic Studies: see above    Disposition: home    S: feels well today, breathing at baseline, no pain, no n/v/d, no cough, wants to go home    O:   Visit Vitals    /84 (BP 1 Location: Right arm, BP Patient Position: At rest)    Pulse 63    Temp 97.1 °F (36.2 °C)    Resp 20    Ht 6' 2\" (1.88 m)    Wt 139.3 kg (307 lb 1.6 oz)    SpO2 96%    BMI 39.43 kg/m2     NAD  Heart RRR no MRG no JVD  Lungs CTAB normal work of breathing  Abd soft NT ND obese  Extr b/l NKA's    Discharge Medications:   Current Discharge Medication List      CONTINUE these medications which have NOT CHANGED    Details   pregabalin (LYRICA) 75 mg capsule Take 75 mg by mouth three (3) times daily. labetalol (NORMODYNE) 300 mg tablet Take 1 Tab by mouth every eight (8) hours.   Qty: 90 Tab, Refills: 0      furosemide (LASIX) 40 mg tablet TAKE 1  And 1/2 tab bid  Qty: 90 Tab, Refills: 12    Associated Diagnoses: Acute diastolic heart failure (HCC)      ondansetron (ZOFRAN ODT) 8 mg disintegrating tablet Take 1 Tab by mouth three (3) times daily. Qty: 15 Tab, Refills: 3    Associated Diagnoses: Nausea      oxyCODONE IR (OXY-IR) 15 mg immediate release tablet Take 15 mg by mouth every eight (8) hours as needed for Pain. albuterol (PROVENTIL HFA, VENTOLIN HFA, PROAIR HFA) 90 mcg/actuation inhaler Take 2 Puffs by inhalation daily. Qty: 1 Inhaler, Refills: 0      amitriptyline (ELAVIL) 50 mg tablet TAKE ONE TABLET BY MOUTH EVERY EVENING  Qty: 30 Tab, Refills: 0      amLODIPine (NORVASC) 10 mg tablet Take 1 Tab by mouth daily. Qty: 30 Tab, Refills: 0      aspirin 81 mg chewable tablet Take 1 Tab by mouth daily. Qty: 30 Tab, Refills: 0      atorvastatin (LIPITOR) 40 mg tablet Take 1 Tab by mouth nightly. Qty: 30 Tab, Refills: 0      cloNIDine HCl (CATAPRES) 0.3 mg tablet Take 1 Tab by mouth three (3) times daily. Qty: 90 Tab, Refills: 2    Associated Diagnoses: Essential hypertension, benign      fluticasone-salmeterol (ADVAIR) 250-50 mcg/dose diskus inhaler Take 1 Puff by inhalation daily. Qty: 1 Each, Refills: 0      hydrALAZINE (APRESOLINE) 100 mg tablet TAKE ONE TABLET BY MOUTH THREE TIMES DAILY  Qty: 90 Tab, Refills: 0      multivitamin, tx-iron-ca-min (THERA-M W/ IRON) 9 mg iron-400 mcg tab tablet Take 1 Tab by mouth daily. Qty: 30 Tab, Refills: 0      sevelamer carbonate (RENVELA) 800 mg tab tab TAKE ONE TABLET BY MOUTH THREE TIMES DAILY WITH MEALS  Qty: 90 Tab, Refills: 0      sodium bicarbonate 650 mg tablet Take 1 Tab by mouth two (2) times a day. Qty: 60 Tab, Refills: 0      omeprazole (PRILOSEC) 40 mg capsule Take 1 Cap by mouth daily. Qty: 90 Cap, Refills: 3      methocarbamol (ROBAXIN) 750 mg tablet Take 750 mg by mouth three (3) times daily.       insulin lispro protamine/insulin lispro (HUMALOG MIX 75-25) 100 unit/mL (75-25) injection Take 30 units in the morning and 10 units at night. Follow-up Information     Follow up With Details Comments [address] [facility]  Referred for one time home health nursing visit. [facility] [phone] [facility]    Deacon Hurst MD Schedule an appointment as soon as possible for a visit in 1 week for hospital discharge follow-up [address] [facility] [address] [facility]      Renny Flores MD Schedule an appointment as soon as possible for a visit in 1 week for follow-up of congestive heart failure [address] [place] [address]  [phone]      Nohelia Hernandez MD Schedule an appointment as soon as possible for a visit in 2 weeks for follow-up of kidney function [address] [phone]            Follow-up Appointments   Procedures    FOLLOW UP VISIT Appointment in: 3 - 5 Days     Standing Status:   Standing     Number of Occurrences:   1     Standing Expiration Date:   3/12/2018     Order Specific Question:   Appointment in     Answer:   3 - 5 Days       Signed By: Amilcar Mccann MD     March 11, 2018      Greater than 30 minutes spent on patient discharge.

## 2018-03-11 NOTE — PROGRESS NOTES
The beginning of Daylight Saving Time occurred at 0200 hrs. Documentation of patient care and medications administered is done with respect to the time change. Bedside shift change report given to LEELEE Arauz (oncoming nurse) by Chris Saldivar RN (offgoing nurse). Report included the following information SBAR, Kardex, Intake/Output, Accordion, Recent Results and Cardiac Rhythm NSR.

## 2018-03-11 NOTE — PROGRESS NOTES
Spiritual Care Assessment/Progress Note  [address]      NAME: Allyn Weaver      MRN: [id number]  AGE: 39 y.o. SEX: male  Yazidism Affiliation: Baptism   Language: English     3/11/2018     Total Time (in minutes): 10     Spiritual Assessment begun in [facility] IMCU through conversation with:         [x]Patient        [] Family    [] Friend(s)        Reason for Consult: Advance medical directive consult     Spiritual beliefs: (Please include comment if needed)     [] Involved in a casimiro tradition/spiritual practice:     [] Supported by a casimiro community:      [] Claims no spiritual orientation:      [] Seeking spiritual identity:           [] Adheres to an individual form of spirituality:      [x] Not able to assess:                     Identified resources for coping:      [] Prayer                  [] Devotional reading               [] Music                  [] Guided Imagery     [] Family/friends                 [] Pet visits     [] Other: NA       Interventions offered during this visit: (See comments for more details)    Patient Interventions: Advance medical directive consult           Plan of Care:     [] Discuss Spiritual/Cultural needs    [x] Support AMD and/or advance care planning process      [] Support grieving process   [] Coordinate Rites/Rituals    [] Coordination with community clergy   [] No spiritual needs identified at this time   [] Detailed Plan of Care below (See Comments)  [] Make referral to Music Therapy  [] Make referral to Pet Therapy     [] Make referral to Addiction services  [] Make referral to [facility]  [] Make referral to Spiritual Care Partner  [] No future visits requested             Comments: Responded to [facility] request to provide an Advanced Medical Directive consult. Arrived to find Mr. Marek Lorenzana alone in room 408--was sitting on side of bed and appeared to be dozing. He roused easily.   Indicated that he had information about an AMD already (noted \"Living Will\" brochure on his side table). Declined offer to assist with completing one today. Assured him of  availability to support that process. No immediate spiritual needs. [facility] Staff  (Marcellus Rodriguez Patient Care Specialist)   Paging Service [phone])

## 2018-03-11 NOTE — PROGRESS NOTES
DC home today ,HF navigator called re- appointmet for CHF -pt also will see MD Dr. Radhames Cruz in a week -stated he already have an appointment

## 2018-03-11 NOTE — ACP (ADVANCE CARE PLANNING)
Comments: Responded to [facility] request to provide an Advanced Medical Directive consult. Arrived to find Mr. Nia Cleveland alone in room 408--was sitting on side of bed and appeared to be dozing. He roused easily. Indicated that he had information about an AMD already (noted \"Living Will\" brochure on his side table). Declined offer to assist with completing one today. Assured him of  availability to support that process. [address] [facility]'s Staff  (Marcellus Rodriguez Patient Care Specialist)   Paging Service [phone])

## 2018-03-13 NOTE — TELEPHONE ENCOUNTER
Patient called to schedule his new patient appointment per last hospital visit.     Appointment is scheduled with Dr. Rao Webb on Wednesday March 28th at 1pm.     Will notify Dr. Oh Bauer office

## 2018-03-14 NOTE — PROGRESS NOTES
Subjective:   Guero Parsons is a 39 y.o. male and presents with Hospital Follow Up; CHF; and Chronic Kidney Disease  . He needs an electric mobility device   his ambulation is poor, cannot walk a block without fatigue. Activities of daily living are difficulty to perform   Has no strength with the use of a cane walker ,he cannot propel a goyo wheel chair. He is on chronic oxygentherapy  He has bilateral amputations    Review of Systems   Constitutional: negative for fevers, chills, anorexia and weight loss   Eyes: negative for visual disturbance and irritation   ENT: negative for tinnitus,sore throat,nasal congestion,ear pains. hoarseness   Respiratory:  dyspnea  CV: negative for chest pain, palpitations, lower extremity edema   GI: negative for nausea, vomiting, diarrhea, abdominal pain,melena   Endo: negative for polyuria,polydipsia,polyphagia,heat intolerance   Genitourinary: negative for frequency, dysuria and hematuria   Integument: negative for rash and pruritus   Hematologic: negative for easy bruising and gum/nose bleeding   Musculoskel: myalgias, arthralgias,muscle weakness, joint pain   Neurological: gait problems,upper and lower extremity weakness   Behavl/Psych: negative for feelings of anxiety, depression, mood changes    Past Medical History:   Diagnosis Date    Arrhythmia     afib    Asthma     3/2013 last attack    CKD (chronic kidney disease), stage III 2/12/2013    Nephrology: Chris Lopez MD    Diabetes [facility])     Gout     Heart failure ([facility] [address]     Hyperlipidemia     Hypertension     Ill-defined condition     Positive PPD, treated 2001    treated 9 months     Past Surgical History:   Procedure Laterality Date    HX BELOW KNEE AMPUTATION Left     due to osteomyelitis    HX HEENT      tonsilectomy    HX HERNIA REPAIR      HX OTHER SURGICAL      below the knee right amputation    HX TONSILLECTOMY       Social History     Social History    Marital status:      Spouse name: N/A    Number of children: N/A    Years of education: N/A     Social History Main Topics    Smoking status: Never Smoker    Smokeless tobacco: Never Used    Alcohol use No    Drug use: No    Sexual activity: Not Currently     Partners: Male     Other Topics Concern    Not on file     Social History Narrative         Per conversation 03/26/17    He would like his new Medical  power of  to be his Brother Michael Atkins. (Previously was his Mother Dana Tyler)        Never smoker     Family History   Problem Relation Age of Onset    Diabetes Mother     Hypertension Mother     Hypertension Brother     Diabetes Brother     Diabetes Maternal Grandmother     Hypertension Maternal Grandmother     Diabetes Other     Hypertension Other     Heart Disease Other      Current Outpatient Prescriptions   Medication Sig Dispense Refill    pregabalin (LYRICA) 75 mg capsule Take 75 mg by mouth three (3) times daily.  labetalol (NORMODYNE) 300 mg tablet Take 1 Tab by mouth every eight (8) hours. 90 Tab 0    furosemide (LASIX) 40 mg tablet TAKE 1  And 1/2 tab bid 90 Tab 12    ondansetron (ZOFRAN ODT) 8 mg disintegrating tablet Take 1 Tab by mouth three (3) times daily. 15 Tab 3    oxyCODONE IR (OXY-IR) 15 mg immediate release tablet Take 15 mg by mouth every eight (8) hours as needed for Pain.  albuterol (PROVENTIL HFA, VENTOLIN HFA, PROAIR HFA) 90 mcg/actuation inhaler Take 2 Puffs by inhalation daily. 1 Inhaler 0    amitriptyline (ELAVIL) 50 mg tablet TAKE ONE TABLET BY MOUTH EVERY EVENING 30 Tab 0    amLODIPine (NORVASC) 10 mg tablet Take 1 Tab by mouth daily. 30 Tab 0    aspirin 81 mg chewable tablet Take 1 Tab by mouth daily. 30 Tab 0    atorvastatin (LIPITOR) 40 mg tablet Take 1 Tab by mouth nightly. 30 Tab 0    cloNIDine HCl (CATAPRES) 0.3 mg tablet Take 1 Tab by mouth three (3) times daily.  90 Tab 2    fluticasone-salmeterol (ADVAIR) 250-50 mcg/dose diskus inhaler Take 1 Puff by inhalation daily. 1 Each 0    hydrALAZINE (APRESOLINE) 100 mg tablet TAKE ONE TABLET BY MOUTH THREE TIMES DAILY 90 Tab 0    multivitamin, tx-iron-ca-min (THERA-M W/ IRON) 9 mg iron-400 mcg tab tablet Take 1 Tab by mouth daily. 30 Tab 0    sevelamer carbonate (RENVELA) 800 mg tab tab TAKE ONE TABLET BY MOUTH THREE TIMES DAILY WITH MEALS 90 Tab 0    sodium bicarbonate 650 mg tablet Take 1 Tab by mouth two (2) times a day. 60 Tab 0    omeprazole (PRILOSEC) 40 mg capsule Take 1 Cap by mouth daily. 90 Cap 3    methocarbamol (ROBAXIN) 750 mg tablet Take 750 mg by mouth three (3) times daily.  insulin lispro protamine/insulin lispro (HUMALOG MIX 75-25) 100 unit/mL (75-25) injection Take 30 units in the morning and 10 units at night. Allergies   Allergen Reactions    Ace Inhibitors Cough     AND CKD/SD    Arb-Angiotensin Receptor Antagonist Other (comments)     CKD/SD,        Objective:  Visit Vitals    /74    Pulse 75    Temp 98.2 °F (36.8 °C) (Oral)    Resp 22    Ht 6' 2\" (1.88 m)    Wt 325 lb (147.4 kg)    SpO2 92%    BMI 41.73 kg/m2     Physical Exam:   General appearance - alert, well appearing, and in no distress  Mental status - alert, oriented to person, place, and time  EYE-DESI, EOMI, corneas normal, no foreign bodies  ENT-ENT exam normal, no neck nodes or sinus tenderness  Nose - normal and patent, no erythema, discharge or polyps  Mouth - mucous membranes moist, pharynx normal without lesions  Neck - supple, no significant adenopathy   Chest - clear to auscultation, no wheezes, rales or rhonchi, symmetric air entry   Heart - normal rate, regular rhythm, normal S1, S2, no murmurs, rubs, clicks or gallops   Abdomen - soft, nontender, nondistended, no masses or organomegaly  Lymph- no adenopathy palpable  Skin-Warm and dry.  no hyperpigmentation, vitiligo, or suspicious lesions  Neuro -alert, oriented, normal speech, no focal findings or movement disorder noted  Neck-normal C-spine, no tenderness, full ROM without pain  Bilateral bka  Weakness upper and lower body    Results for orders placed or performed in visit on 03/14/18   AMB POC GLUCOSE BLOOD, BY GLUCOSE MONITORING DEVICE   Result Value Ref Range    Glucose  mg/dL       Assessment/Plan:    ICD-10-CM ICD-9-CM    1. Type 2 diabetes mellitus with nephropathy ([facility]) E11.21 250.40 AMB POC GLUCOSE BLOOD, BY GLUCOSE MONITORING DEVICE     583.81    2. Acute on chronic diastolic CHF (congestive heart failure) ([facility]) I50.33 428.33      428.0    3. Chronic renal failure, stage 4 (severe) ([facility]) N18.4 585.4    4. History of amputation of lower extremity through tibia and fibula, unspecified [facility] Z89.519 V49.75      Orders Placed This Encounter    AMB POC GLUCOSE BLOOD, BY GLUCOSE MONITORING DEVICE     He needs an electric mobility device. scooter is advised.

## 2018-03-14 NOTE — PATIENT INSTRUCTIONS
Bridgevine Activation    Thank you for requesting access to [employer]vine. Please follow the instructions below to securely access and download your online medical record. Bridgevine allows you to send messages to your doctor, view your test results, renew your prescriptions, schedule appointments, and more. How Do I Sign Up? 1. In your internet browser, go to [email or web address]  2. Click on the First Time User? Click Here link in the Sign In box. You will be redirect to the New Member Sign Up page. 3. Enter your Bridgevine Access Code exactly as it appears below. You will not need to use this code after youve completed the sign-up process. If you do not sign up before the expiration date, you must request a new code. Bridgevine Access Code: [email or web address]  Expires: 2018  1:15 PM (This is the date your Bridgevine access code will )    4. Enter the last four digits of your Social Security Number (xxxx) and Date of Birth (mm/dd/yyyy) as indicated and click Submit. You will be taken to the next sign-up page. 5. Create a Bridgevine ID. This will be your Bridgevine login ID and cannot be changed, so think of one that is secure and easy to remember. 6. Create a Bridgevine password. You can change your password at any time. 7. Enter your Password Reset Question and Answer. This can be used at a later time if you forget your password. 8. Enter your e-mail address. You will receive e-mail notification when new information is available in [address]. 9. Click Sign Up. You can now view and download portions of your medical record. 10. Click the Download Summary menu link to download a portable copy of your medical information. Additional Information    If you have questions, please visit the Frequently Asked Questions section of the Bridgevine website at [email or web address] Remember, Bridgevine is NOT to be used for urgent needs. For medical emergencies, dial 911.

## 2018-03-14 NOTE — PROGRESS NOTES
Gemma Boast is a 39 y.o. male and presents with Hospital Follow Up; CHF; and Chronic Kidney Disease    Subjective:  He has had repeated admissions due to a chronic anemia secondary to CHRONIC RENAL DISEASE. He has been on erythropoietin with relief. .    Congestive Heart Failure Review:  Patient presents for evaluation of congestive heart failure. Onset was  months ago, with unstable course since that time. Patient currently complains of dyspnea, fatigue, orthopnea. Patient states they are compliant most of the time with their medications. Patient states they are compliant most of the time with their diet    . Diabetes Mellitus Review:  He has diabetes mellitus. Diabetic ROS - medication compliance: compliant all of the time, diabetic diet compliance: compliant all of the time, home glucose monitoring: is performed. Known diabetic complications: neuropathy in hands  Cardiovascular risk factors: family history, dyslipidemia, diabetes mellitus, obesity, hypertension  Current diabetic medications include insulin   Eye exam current (within one year): no  Weight trend: stable  Prior visit with dietician: no  Current diet: \"healthy\" diet  in general  Current exercise: walking  Current monitoring regimen: home blood tests - daily  Home blood sugar records: trend: stable  Any episodes of hypoglycemia? no  Is He on ACE inhibitor or angiotensin II receptor blocker? Yes     Hypertension Review:  The patient has essential hypertension  Diet and Lifestyle: generally follows a  low sodium diet, exercises sporadically  Home BP Monitoring: is not measured at home. Pertinent ROS: taking medications as instructed, no medication side effects noted, no TIA's, no chest pain on exertion, no dyspnea on exertion, no swelling of ankles. Chronic Renal Disease Review:  HPI: Gemma Boast, ([id number]) is a 39 y.o. male who returns for follow up of   chronic renal disease caused by diabetic nephropathy. te patient  is followed by renal  The patient has experienced the following symptoms: no problems   The patient has not experienced any of the following symptoms: no problems   Prescribed diet is NICKY   The following actions have been prescribed for slowing the   progression of CRF: cardiovascular risk factor reduction including diabetes mellitus, obesity, male gender, hypertension   The patientis not  on dialysis  He is followed by DR. FULLER    Asthma Review:  The patient is being seen for follow up of asthma,  currently in no distress,wheezing has been reported   Asthma symptoms have occured frequently. Wheezing when present is described as moderate and not easily relieved with rescue bronchodilator. The patient does report use of a steroid inhaler. Frequency of use of quick-relief meds: frequent   Regimen compliance: The patient reports adherence to this regimen. Review of Systems  Constitutional: negative for fevers, chills, anorexia and weight loss  Eyes:   negative for visual disturbance and irritation  ENT:   negative for tinnitus,sore throat,nasal congestion,ear pains. hoarseness  Respiratory:  cough,  dyspnea,wheezing  CV:   negative for chest pain, palpitations, lower extremity edema  GI:   negative for nausea, vomiting, diarrhea, abdominal pain,melena  Endo:               negative for polyuria,polydipsia,polyphagia,heat intolerance  Genitourinary: negative for frequency, dysuria and hematuria  Integument:  negative for rash and pruritus  Hematologic:  negative for easy bruising and gum/nose bleeding  Musculoskel:  myalgias, arthralgias, back pain, muscle weakness, joint pain  Neurological:  negative for headaches, dizziness, vertigo, memory problems and gait   Behavl/Psych: negative for feelings of anxiety, depression, mood changes    Past Medical History:   Diagnosis Date    Arrhythmia     afib    Asthma     3/2013 last attack    CKD (chronic kidney disease), stage III 2/12/2013    Nephrology: Kelli Lindsay, MD    [facility]     Gout     Heart failure ([facility]     Hyperlipidemia     Hypertension     Ill-defined condition     Positive PPD, treated 2001    treated 9 months     Past Surgical History:   Procedure Laterality Date    HX BELOW KNEE AMPUTATION Left     due to osteomyelitis    HX HEENT      tonsilectomy    HX HERNIA REPAIR      HX OTHER SURGICAL      below the knee right amputation    HX TONSILLECTOMY       Social History     Social History    Marital status:      Spouse name: N/A    Number of children: N/A    Years of education: N/A     Social History Main Topics    Smoking status: Never Smoker    Smokeless tobacco: Never Used    Alcohol use No    Drug use: No    Sexual activity: Not Currently     Partners: Male     Other Topics Concern    Not on file     Social History Narrative         Per conversation 03/26/17    He would like his new Medical  power of  to be his Brother Michael Atkins. (Previously was his Mother Isauro Whittaker)        Never smoker     Family History   Problem Relation Age of Onset    Diabetes Mother     Hypertension Mother     Hypertension Brother     Diabetes Brother     Diabetes Maternal Grandmother     Hypertension Maternal Grandmother     Diabetes Other     Hypertension Other     Heart Disease Other      Current Outpatient Prescriptions   Medication Sig Dispense Refill    pregabalin (LYRICA) 75 mg capsule Take 75 mg by mouth three (3) times daily.  labetalol (NORMODYNE) 300 mg tablet Take 1 Tab by mouth every eight (8) hours. 90 Tab 0    furosemide (LASIX) 40 mg tablet TAKE 1  And 1/2 tab bid 90 Tab 12    ondansetron (ZOFRAN ODT) 8 mg disintegrating tablet Take 1 Tab by mouth three (3) times daily. 15 Tab 3    oxyCODONE IR (OXY-IR) 15 mg immediate release tablet Take 15 mg by mouth every eight (8) hours as needed for Pain.       albuterol (PROVENTIL HFA, VENTOLIN HFA, PROAIR HFA) 90 mcg/actuation inhaler Take 2 Puffs by inhalation daily. 1 Inhaler 0    amitriptyline (ELAVIL) 50 mg tablet TAKE ONE TABLET BY MOUTH EVERY EVENING 30 Tab 0    amLODIPine (NORVASC) 10 mg tablet Take 1 Tab by mouth daily. 30 Tab 0    aspirin 81 mg chewable tablet Take 1 Tab by mouth daily. 30 Tab 0    atorvastatin (LIPITOR) 40 mg tablet Take 1 Tab by mouth nightly. 30 Tab 0    cloNIDine HCl (CATAPRES) 0.3 mg tablet Take 1 Tab by mouth three (3) times daily. 90 Tab 2    fluticasone-salmeterol (ADVAIR) 250-50 mcg/dose diskus inhaler Take 1 Puff by inhalation daily. 1 Each 0    hydrALAZINE (APRESOLINE) 100 mg tablet TAKE ONE TABLET BY MOUTH THREE TIMES DAILY 90 Tab 0    multivitamin, tx-iron-ca-min (THERA-M W/ IRON) 9 mg iron-400 mcg tab tablet Take 1 Tab by mouth daily. 30 Tab 0    sevelamer carbonate (RENVELA) 800 mg tab tab TAKE ONE TABLET BY MOUTH THREE TIMES DAILY WITH MEALS 90 Tab 0    sodium bicarbonate 650 mg tablet Take 1 Tab by mouth two (2) times a day. 60 Tab 0    omeprazole (PRILOSEC) 40 mg capsule Take 1 Cap by mouth daily. 90 Cap 3    methocarbamol (ROBAXIN) 750 mg tablet Take 750 mg by mouth three (3) times daily.  insulin lispro protamine/insulin lispro (HUMALOG MIX 75-25) 100 unit/mL (75-25) injection Take 30 units in the morning and 10 units at night.        Allergies   Allergen Reactions    Ace Inhibitors Cough     AND CKD/SD    Arb-Angiotensin Receptor Antagonist Other (comments)     CKD/SD,        Objective:  Visit Vitals    /74    Pulse 75    Temp 98.2 °F (36.8 °C) (Oral)    Resp 22    Ht 6' 2\" (1.88 m)    Wt 325 lb (147.4 kg)    SpO2 92%    BMI 41.73 kg/m2     Physical Exam:   General appearance - alert, well appearing, and in no distress  Mental status - alert, oriented to person, place, and time  EYE-DESI, EOMI, corneas normal, no foreign bodies  ENT-ENT exam normal, no neck nodes or sinus tenderness  Nose - normal and patent, no erythema, discharge or polyps  Mouth - mucous membranes moist, pharynx normal without lesions  Neck - supple, no significant adenopathy   Chest - scattered wheezes,  symmetric air entry   Heart - normal rate, regular rhythm, normal S1, S2, no murmurs, rubs, clicks or gallops   Abdomen - soft, nontender, nondistended, no masses or organomegaly  Lymph- no adenopathy palpable  Ext-bilateral aka  Skin-Warm and dry. no hyperpigmentation, vitiligo, or suspicious lesions  Neuro -alert, oriented, normal speech, no focal findings or movement disorder noted  Neck-normal C-spine, no tenderness, full ROM without pain          Results for orders placed or performed in visit on 03/14/18   AMB POC GLUCOSE BLOOD, BY GLUCOSE MONITORING DEVICE   Result Value Ref Range    Glucose  mg/dL       Assessment/Plan:    ICD-10-CM ICD-9-CM    1. Type 2 diabetes mellitus with nephropathy ([facility]) E11.21 250.40 AMB POC GLUCOSE BLOOD, BY GLUCOSE MONITORING DEVICE     583.81    2. Acute on chronic diastolic CHF (congestive heart failure) ([facility]) I50.33 428.33      428.0    3. Chronic renal failure, stage 4 (severe) ([facility]) N18.4 585.4    4. History of amputation of lower extremity through tibia and fibula, unspecified laterality ([facility] Z89.519 V49.75      Orders Placed This Encounter    AMB POC GLUCOSE BLOOD, BY GLUCOSE MONITORING DEVICE     lose weight, follow low fat diet, follow low salt diet,Take 81mg aspirin daily  Patient Instructions   MyChart Activation    Thank you for requesting access to [employer]. Please follow the instructions below to securely access and download your online medical record. [employer] allows you to send messages to your doctor, view your test results, renew your prescriptions, schedule appointments, and more. How Do I Sign Up? 1. In your internet browser, go to [email or web address] - The Bigger Picture  2. Click on the First Time User? Click Here link in the Sign In box. You will be redirect to the New Member Sign Up page. 3. Enter your [employer] Access Code exactly as it appears below.  You will not need to use this code after youve completed the sign-up process. If you do not sign up before the expiration date, you must request a new code. End[employer]in Access Code: [email or web address]  Expires: 2018  1:15 PM (This is the date your End[employer]in access code will )    4. Enter the last four digits of your Social Security Number (xxxx) and Date of Birth (mm/dd/yyyy) as indicated and click Submit. You will be taken to the next sign-up page. 5. Create a MedaPhort ID. This will be your Endorphin login ID and cannot be changed, so think of one that is secure and easy to remember. 6. Create a Endorphin password. You can change your password at any time. 7. Enter your Password Reset Question and Answer. This can be used at a later time if you forget your password. 8. Enter your e-mail address. You will receive e-mail notification when new information is available in [address] 9. Click Sign Up. You can now view and download portions of your medical record. 10. Click the Download Summary menu link to download a portable copy of your medical information. Additional Information    If you have questions, please visit the Frequently Asked Questions section of the [employer] website at [email or web address] Remember, [employer] is NOT to be used for urgent needs. For medical emergencies, dial 911. Follow-up Disposition:  Return in about 4 weeks (around 2018), or if symptoms worsen or fail to improve. I have reviewed with the patient details of the assessment and plan and all questions were answered. Relevent patient education was performed. The most recent lab findings were reviewed with the patient. An After Visit Summary was printed and given to the patient.

## 2018-03-14 NOTE — TELEPHONE ENCOUNTER
Faxed office notes, wheelchair DME ORDER,  Insurance information to [facility] [phone].

## 2018-03-14 NOTE — PROGRESS NOTES
1. Have you been to the ER, urgent care clinic since your last visit? Hospitalized since your last visit? YES;[facility];CHF,CKD    2. Have you seen or consulted any other health care providers outside of the [address] [place] [address] since your last visit? Include any pap smears or colon screening.  NO      335= glucose

## 2018-03-14 NOTE — MR AVS SNAPSHOT
[facility] 
 
 
 [address] [facility] [facility] [address] [address] 
[phone] Patient: Jorgito Macedo MRN: [id number] WSE:[birth year] Visit Information Date & Time Provider Department Dept. Phone Encounter #  
 3/14/2018 10:45 AM Mitchell Cardoza MD [address] [facility] [phone] [id number] Follow-up Instructions Return in about 4 weeks (around 4/11/2018), or if symptoms worsen or fail to improve. Follow-up and Disposition History Your Appointments 3/28/2018  1:00 PM  
New Patient with Daly Srivastava MD  
[address] [facility] ([address]) [facility] [address]  
[address] [id number] [facility] [address] Upcoming Health Maintenance Date Due MICROALBUMIN Q1 8/28/2016 Pneumococcal 19-64 Highest Risk (2 of 3 - PCV13) 10/14/2016 EYE EXAM RETINAL OR DILATED Q1 4/13/2017 FOOT EXAM Q1 12/6/2017 DTaP/Tdap/Td series (1 - Tdap) 8/23/2018* HEMOGLOBIN A1C Q6M 9/10/2018 LIPID PANEL Q1 3/10/2019 *Topic was postponed. The date shown is not the original due date. Allergies as of 3/14/2018  Review Complete On: 3/14/2018 By: Fauzia Patel LPN Severity Noted Reaction Type Reactions Ace Inhibitors  03/02/2016    Cough AND CKD/SD Arb-angiotensin Receptor Antagonist  12/14/2017    Other (comments) CKD/SD, Current Immunizations  Reviewed on 12/13/2017 Name Date Influenza Vaccine 8/31/2017, 11/6/2014  4:23 PM  
 Influenza Vaccine (Quad) PF 12/19/2016 11:33 AM  
 Influenza Vaccine Intradermal PF 10/14/2015 Influenza Vaccine PF 2/17/2013  1:11 PM  
 Pneumococcal Polysaccharide (PPSV-23) 10/14/2015,  Deferred (Patient Refused) Not reviewed this visit You Were Diagnosed With   
  
 Codes Comments  Type 2 diabetes mellitus with nephropathy [facility]    -  Primary ICD-10-CM: E11.21 
 ICD-9-CM: 250.40, 583.81 Acute on chronic diastolic CHF (congestive heart failure) ([facility])     ICD-10-CM: I50.33 ICD-9-CM: 428.33, 428.0 Chronic renal failure, stage 4 (severe) ([facility])     ICD-10-CM: N18.4 ICD-9-CM: 585.4 History of amputation of lower extremity through tibia and fibula, unspecified [facility]ality [facility]     ICD-10-CM: X36.559 ICD-9-CM: V49.75 Vitals BP Pulse Temp Resp Height(growth percentile) Weight(growth percentile) 128/74 75 98.2 °F (36.8 °C) (Oral) 22 6' 2\" (1.88 m) 325 lb (147.4 kg) SpO2 BMI Smoking Status 92% 41.73 kg/m2 Never Smoker Vitals History BMI and BSA Data Body Mass Index Body Surface Area 41.73 kg/m 2 2.77 m 2 Preferred Pharmacy Pharmacy Name Phone [facility] [address] [address] [facility] [phone] Your Updated Medication List  
  
   
This list is accurate as of 3/14/18 11:51 AM.  Always use your most recent med list.  
  
  
  
  
 albuterol 90 mcg/actuation inhaler Commonly known as:  PROVENTIL HFA, VENTOLIN HFA, PROAIR HFA Take 2 Puffs by inhalation daily. amitriptyline 50 mg tablet Commonly known as:  ELAVIL TAKE ONE TABLET BY MOUTH EVERY EVENING  
  
 amLODIPine 10 mg tablet Commonly known as:  Deonna Parish Take 1 Tab by mouth daily. aspirin 81 mg chewable tablet Take 1 Tab by mouth daily. atorvastatin 40 mg tablet Commonly known as:  LIPITOR Take 1 Tab by mouth nightly. cloNIDine HCl 0.3 mg tablet Commonly known as:  CATAPRES Take 1 Tab by mouth three (3) times daily. fluticasone-salmeterol 250-50 mcg/dose diskus inhaler Commonly known as:  ADVAIR Take 1 Puff by inhalation daily. furosemide 40 mg tablet Commonly known as:  LASIX TAKE 1  And 1/2 tab bid HumaLOG Mix 75-25(U-100)Insuln 100 unit/mL (75-25) injection Generic drug:  insulin lispro protamine/insulin lispro Take 30 units in the morning and 10 units at night.  
  
 hydrALAZINE 100 mg tablet Commonly known as:  APRESOLINE  
TAKE ONE TABLET BY MOUTH THREE TIMES DAILY  
  
 labetalol 300 mg tablet Commonly known as:  Parthenia Teresa Take 1 Tab by mouth every eight (8) hours. LYRICA 75 mg capsule Generic drug:  pregabalin Take 75 mg by mouth three (3) times daily. methocarbamol 750 mg tablet Commonly known as:  ROBAXIN Take 750 mg by mouth three (3) times daily. multivitamin, tx-iron-ca-min 9 mg iron-400 mcg Tab tablet Commonly known as:  THERA-M w/ IRON Take 1 Tab by mouth daily. omeprazole 40 mg capsule Commonly known as:  PRILOSEC Take 1 Cap by mouth daily. ondansetron 8 mg disintegrating tablet Commonly known as:  ZOFRAN ODT Take 1 Tab by mouth three (3) times daily. oxyCODONE IR 15 mg immediate release tablet Commonly known as:  OXY-IR Take 15 mg by mouth every eight (8) hours as needed for Pain. sevelamer carbonate 800 mg Tab tab Commonly known as:  Moshe Pester TAKE ONE TABLET BY MOUTH THREE TIMES DAILY WITH MEALS  
  
 sodium bicarbonate 650 mg tablet Take 1 Tab by mouth two (2) times a day. We Performed the Following AMB POC GLUCOSE BLOOD, BY GLUCOSE MONITORING DEVICE [87902 CPT(R)] Follow-up Instructions Return in about 4 weeks (around 4/11/2018), or if symptoms worsen or fail to improve. To-Do List   
 03/15/2018 To Be Determined Appointment with Shelbie Delgado RN at [facility] 39 Patient Instructions MyChart Activation Thank you for requesting access to [employer]. Please follow the instructions below to securely access and download your online medical record. [employer] allows you to send messages to your doctor, view your test results, renew your prescriptions, schedule appointments, and more. How Do I Sign Up? 1. In your internet browser, go to [email or web address] 
2. Click on the First Time User? Click Here link in the Sign In box. You will be redirect to the New Member Sign Up page. 3. Enter your L2C Access Code exactly as it appears below. You will not need to use this code after youve completed the sign-up process. If you do not sign up before the expiration date, you must request a new code. [employer]2C Access Code: [email or web address] Expires: 2018  1:15 PM (This is the date your L2C access code will ) 4. Enter the last four digits of your Social Security Number (xxxx) and Date of Birth (mm/dd/yyyy) as indicated and click Submit. You will be taken to the next sign-up page. 5. Create a L2C ID. This will be your L2C login ID and cannot be changed, so think of one that is secure and easy to remember. 6. Create a L2C password. You can change your password at any time. 7. Enter your Password Reset Question and Answer. This can be used at a later time if you forget your password. 8. Enter your e-mail address. You will receive e-mail notification when new information is available in [address] 9. Click Sign Up. You can now view and download portions of your medical record. 10. Click the Download Summary menu link to download a portable copy of your medical information. Additional Information If you have questions, please visit the Frequently Asked Questions section of the [employer]2C website at [email or web address] Remember, [employer]C is NOT to be used for urgent needs. For medical emergencies, dial 911. Introducing [address] Kartik Solis introduces [employer]2C patient portal. Now you can access parts of your medical record, email your doctor's office, and request medication refills online. 1. In your internet browser, go to [email or web address] 2. Click on the First Time User? Click Here link in the Sign In box.  You will see the New Member Sign Up page. 3. Enter your 51Talk Access Code exactly as it appears below. You will not need to use this code after youve completed the sign-up process. If you do not sign up before the expiration date, you must request a new code. · 51Talk Access Code: [email or web address] Expires: 4/2/2018  1:15 PM 
 
4. Enter the last four digits of your Social Security Number (xxxx) and Date of Birth (mm/dd/yyyy) as indicated and click Submit. You will be taken to the next sign-up page. 5. Create a 51Talk ID. This will be your 51Talk login ID and cannot be changed, so think of one that is secure and easy to remember. 6. Create a 51Talk password. You can change your password at any time. 7. Enter your Password Reset Question and Answer. This can be used at a later time if you forget your password. 8. Enter your e-mail address. You will receive e-mail notification when new information is available in [address] 9. Click Sign Up. You can now view and download portions of your medical record. 10. Click the Download Summary menu link to download a portable copy of your medical information. If you have questions, please visit the Frequently Asked Questions section of the 51Talk website. Remember, 51Talk is NOT to be used for urgent needs. For medical emergencies, dial 911. Now available from your iPhone and Android! Please provide this summary of care documentation to your next provider. Your primary care clinician is listed as Marie Varner. If you have any questions after today's visit, please call [phone].

## 2018-04-11 NOTE — PATIENT INSTRUCTIONS
Please have sufficient courtesy to let us know when you are unable to keep an appointment. The schedule  is limited, and this activity prevents other patients from obtaining appointments.

## 2018-04-19 PROBLEM — E11.40 TYPE 2 DIABETES MELLITUS WITH DIABETIC NEUROPATHY (HCC): Status: ACTIVE | Noted: 2018-01-01

## 2018-04-19 NOTE — PATIENT INSTRUCTIONS
[employer] Activation    Thank you for requesting access to [employer]. Please follow the instructions below to securely access and download your online medical record. [employer] allows you to send messages to your doctor, view your test results, renew your prescriptions, schedule appointments, and more. How Do I Sign Up? 1. In your internet browser, go to [email or web address]  2. Click on the First Time User? Click Here link in the Sign In box. You will be redirect to the New Member Sign Up page. 3. Enter your [employer] Access Code exactly as it appears below. You will not need to use this code after youve completed the sign-up process. If you do not sign up before the expiration date, you must request a new code. [employer] Access Code: [email or web address]  Expires: 2018  2:46 PM (This is the date your [employer] access code will )    4. Enter the last four digits of your Social Security Number (xxxx) and Date of Birth (mm/dd/yyyy) as indicated and click Submit. You will be taken to the next sign-up page. 5. Create a [employer] ID. This will be your [employer] login ID and cannot be changed, so think of one that is secure and easy to remember. 6. Create a [employer] password. You can change your password at any time. 7. Enter your Password Reset Question and Answer. This can be used at a later time if you forget your password. 8. Enter your e-mail address. You will receive e-mail notification when new information is available in [address] 9. Click Sign Up. You can now view and download portions of your medical record. 10. Click the Download Summary menu link to download a portable copy of your medical information. Additional Information    If you have questions, please visit the Frequently Asked Questions section of the [employer] website at [email or web address] Remember, [employer] is NOT to be used for urgent needs. For medical emergencies, dial 911.

## 2018-04-19 NOTE — MR AVS SNAPSHOT
[address] [facility] 7 [address] 
[phone] Patient: Luis Miguel Espinoza MRN: [id number] ADA:5/78[birth year]196 Visit Information Date & Time Provider Department Dept. Phone Encounter #  
 4/19/2018  2:45 PM Colby Caal MD [address] [facility] [phone] [id number] Follow-up Instructions Return in about 4 weeks (around 5/17/2018). Upcoming Health Maintenance Date Due MICROALBUMIN Q1 8/28/2016 Pneumococcal 19-64 Highest Risk (2 of 3 - PCV13) 10/14/2016 EYE EXAM RETINAL OR DILATED Q1 4/13/2017 FOOT EXAM Q1 12/6/2017 DTaP/Tdap/Td series (1 - Tdap) 8/23/2018* HEMOGLOBIN A1C Q6M 9/10/2018 LIPID PANEL Q1 3/10/2019 *Topic was postponed. The date shown is not the original due date. Allergies as of 4/19/2018  Review Complete On: 3/14/2018 By: April Serina Castleman, LPN Severity Noted Reaction Type Reactions Ace Inhibitors  03/02/2016    Cough AND CKD/SD Arb-angiotensin Receptor Antagonist  12/14/2017    Other (comments) CKD/SD, Current Immunizations  Reviewed on 12/13/2017 Name Date Influenza Vaccine 8/31/2017, 11/6/2014  4:23 PM  
 Influenza Vaccine (Quad) PF 12/19/2016 11:33 AM  
 Influenza Vaccine Intradermal PF 10/14/2015 Influenza Vaccine PF 2/17/2013  1:11 PM  
 Pneumococcal Polysaccharide (PPSV-23) 10/14/2015,  Deferred (Patient Refused) Not reviewed this visit You Were Diagnosed With   
  
 Codes Comments Type 2 diabetes mellitus with nephropathy ([facility] [address]    -  Primary ICD-10-CM: E11.21 
ICD-9-CM: 250.40, 583.81 Essential hypertension, benign     ICD-10-CM: I10 
ICD-9-CM: 401.1 Localized edema     ICD-10-CM: R60.0 ICD-9-CM: 782.3 Stage 5 chronic kidney disease on chronic dialysis (HCC)     ICD-10-CM: N18.6, Z99.2 ICD-9-CM: 585.6, V45.11 Vitals BP Pulse Temp Resp Height(growth percentile) Weight(growth percentile) 144/80 84 98.6 °F (37 °C) (Oral) 18 6' 2\" (1.88 m) 313 lb (142 kg) SpO2 BMI Smoking Status 93% 40.19 kg/m2 Never Smoker Vitals History BMI and BSA Data Body Mass Index Body Surface Area  
 40.19 kg/m 2 2.72 m 2 Preferred Pharmacy Pharmacy Name Phone [facility] [address], [place] [facility] [address] [facility] [phone] Your Updated Medication List  
  
   
This list is accurate as of 4/19/18  3:13 PM.  Always use your most recent med list.  
  
  
  
  
 * albuterol 90 mcg/actuation inhaler Commonly known as:  PROVENTIL HFA, VENTOLIN HFA, PROAIR HFA Take 2 Puffs by inhalation daily. * albuterol 2.5 mg /3 mL (0.083 %) nebulizer solution Commonly known as:  PROVENTIL VENTOLIN  
  
 amitriptyline 50 mg tablet Commonly known as:  ELAVIL TAKE ONE TABLET BY MOUTH EVERY EVENING  
  
 amLODIPine 10 mg tablet Commonly known as:  Marci Pall Take 1 Tab by mouth daily. aspirin 81 mg chewable tablet Take 1 Tab by mouth daily. atorvastatin 40 mg tablet Commonly known as:  LIPITOR Take 1 Tab by mouth nightly. cloNIDine HCl 0.3 mg tablet Commonly known as:  CATAPRES Take 1 Tab by mouth three (3) times daily. fluticasone-salmeterol 250-50 mcg/dose diskus inhaler Commonly known as:  ADVAIR Take 1 Puff by inhalation daily. furosemide 40 mg tablet Commonly known as:  LASIX TAKE 1  And 1/2 tab bid  
  
 hydrALAZINE 100 mg tablet Commonly known as:  APRESOLINE  
TAKE ONE TABLET BY MOUTH THREE TIMES DAILY  
  
 insulin lispro protamine/insulin lispro 100 unit/mL (75-25) injection Commonly known as:  HumaLOG Mix 75-25(U-100)Insuln Take 30 units in the morning and 6 units at night. labetalol 300 mg tablet Commonly known as:  Cyndia Bacca Take 1 Tab by mouth every eight (8) hours. LYRICA 75 mg capsule Generic drug:  pregabalin Take 75 mg by mouth three (3) times daily. methocarbamol 750 mg tablet Commonly known as:  ROBAXIN Take 750 mg by mouth three (3) times daily. multivitamin, tx-iron-ca-min 9 mg iron-400 mcg Tab tablet Commonly known as:  THERA-M w/ IRON Take 1 Tab by mouth daily. omeprazole 40 mg capsule Commonly known as:  PRILOSEC Take 1 Cap by mouth daily. ondansetron 8 mg disintegrating tablet Commonly known as:  ZOFRAN ODT Take 1 Tab by mouth three (3) times daily. oxyCODONE IR 15 mg immediate release tablet Commonly known as:  OXY-IR Take 15 mg by mouth every eight (8) hours as needed for Pain. PROCRIT 40,000 unit/mL injection Generic drug:  epoetin craig RAYALDEE 30 mcg Cs24 Generic drug:  calcifediol  
  
 sevelamer carbonate 800 mg Tab tab Commonly known as:  Carrillo Bullock TAKE ONE TABLET BY MOUTH THREE TIMES DAILY WITH MEALS  
  
 sodium bicarbonate 650 mg tablet Take 1 Tab by mouth two (2) times a day. * Notice: This list has 2 medication(s) that are the same as other medications prescribed for you. Read the directions carefully, and ask your doctor or other care provider to review them with you. Prescriptions Printed Refills  
 cloNIDine HCl (CATAPRES) 0.3 mg tablet 1 Sig: Take 1 Tab by mouth three (3) times daily. Class: Print Route: Oral  
  
We Performed the Following AMB POC GLUCOSE BLOOD, BY GLUCOSE MONITORING DEVICE [02116 CPT(R)] REFERRAL TO LYMPHEDEMA CLINIC [YBG272 Custom] Comments:  
 Please evaluate patient for leg edema. Follow-up Instructions Return in about 4 weeks (around 5/17/2018). Referral Information Referral ID Referred By Referred To  
  
 [id number] Michelle OGLESBY Not Available Visits Status Start Date End Date 1 New Request 4/19/18 4/19/19  If your referral has a status of pending review or denied, additional information will be sent to support the outcome of this decision. Patient Instructions MyChart Activation Thank you for requesting access to Hel[employer]m. Please follow the instructions below to securely access and download your online medical record. Hel[employer]omm allows you to send messages to your doctor, view your test results, renew your prescriptions, schedule appointments, and more. How Do I Sign Up? 1. In your internet browser, go to [email or web address] 
2. Click on the First Time User? Click Here link in the Sign In box. You will be redirect to the New Member Sign Up page. 3. Enter your Helicomm Access Code exactly as it appears below. You will not need to use this code after youve completed the sign-up process. If you do not sign up before the expiration date, you must request a new code. Helicomm Access Code: [email or web address] Expires: 2018  2:46 PM (This is the date your Helicomm access code will ) 4. Enter the last four digits of your Social Security Number (xxxx) and Date of Birth (mm/dd/yyyy) as indicated and click Submit. You will be taken to the next sign-up page. 5. Create a Helicomm ID. This will be your Helicomm login ID and cannot be changed, so think of one that is secure and easy to remember. 6. Create a Helicomm password. You can change your password at any time. 7. Enter your Password Reset Question and Answer. This can be used at a later time if you forget your password. 8. Enter your e-mail address. You will receive e-mail notification when new information is available in [address] 9. Click Sign Up. You can now view and download portions of your medical record. 10. Click the Download Summary menu link to download a portable copy of your medical information. Additional Information If you have questions, please visit the Frequently Asked Questions section of the Helicomm website at [email or web address] Remember, PoweredAnalytic[employer] is NOT to be used for urgent needs. For medical emergencies, dial 911. Introducing [facility]! Alexandra Coffey introduces PreViser patient portal. Now you can access parts of your medical record, email your doctor's office, and request medication refills online. 1. In your internet browser, go to [email or web address] 2. Click on the First Time User? Click Here link in the Sign In box. You will see the New Member Sign Up page. 3. Enter your PreViser Access Code exactly as it appears below. You will not need to use this code after youve completed the sign-up process. If you do not sign up before the expiration date, you must request a new code. · PreViser Access Code: [email or web address] Expires: 7/16/2018  2:46 PM 
 
4. Enter the last four digits of your Social Security Number (xxxx) and Date of Birth (mm/dd/yyyy) as indicated and click Submit. You will be taken to the next sign-up page. 5. Create a PreViser ID. This will be your PreViser login ID and cannot be changed, so think of one that is secure and easy to remember. 6. Create a PreViser password. You can change your password at any time. 7. Enter your Password Reset Question and Answer. This can be used at a later time if you forget your password. 8. Enter your e-mail address. You will receive e-mail notification when new information is available in [address] 9. Click Sign Up. You can now view and download portions of your medical record. 10. Click the Download Summary menu link to download a portable copy of your medical information. If you have questions, please visit the Frequently Asked Questions section of the PreViser website. Remember, PreViser is NOT to be used for urgent needs. For medical emergencies, dial 911. Now available from your iPhone and Android! Please provide this summary of care documentation to your next provider. Your primary care clinician is listed as Mary Zayas. If you have any questions after today's visit, please call [phone].

## 2018-04-19 NOTE — PROGRESS NOTES
Sarah Mckee is a 39 y.o. male and presents with CHF; Chronic Kidney Disease; and Diabetes  . Subjective:    Vertigo  Patient complains of faintness/lightheadedness. The symptoms started 1 week ago and are gradually improving. The attacks occur every a few seconds and last a fewminutes. Positions that worsen symptoms: turning head. Previous workup/treatments: none. Associated ear symptoms: none. Associated CNS symptoms: none. Recent infections: none. Head trauma: denied. Drug ingestion: none Noise exposure: no occupational exposure. Family history: non-contributory    Congestive Heart Failure Review:  Patient presents for evaluation of congestive heart failure. Onset was  months ago, with stable course since that time. Patient currently complains of dyspnea, fatigue, orthopnea. Patient states they are compliant most of the time with their medications. Patient states they are compliant most of the time with their diet. Diabetes Mellitus Review:  He has diabetes mellitus. Diabetic ROS - medication compliance: compliant all of the time, diabetic diet compliance: compliant all of the time, home glucose monitoring: is performed. Known diabetic complications: none  Cardiovascular risk factors: family history, dyslipidemia, diabetes mellitus, obesity, hypertension  Current diabetic medications include /insulin   Eye exam current (within one year): no  Weight trend: stable  Prior visit with dietician: no  Current diet: \"healthy\" diet  in general  Current exercise: walking  Current monitoring regimen: home blood tests - daily  Home blood sugar records: trend: stable  Any episodes of hypoglycemia? no  Is He on ACE inhibitor or angiotensin II receptor blocker? Yes           Review of Systems  Constitutional: negative for fevers, chills, anorexia and weight loss  Eyes:   negative for visual disturbance and irritation  ENT:   negative for tinnitus,sore throat,nasal congestion,ear pains. hoarseness  Respiratory: negative for cough, hemoptysis, dyspnea,wheezing  CV:   negative for chest pain, palpitations, lower extremity edema  GI:   negative for nausea, vomiting, diarrhea, abdominal pain,melena  Endo:               negative for polyuria,polydipsia,polyphagia,heat intolerance  Genitourinary: negative for frequency, dysuria and hematuria  Integument:  negative for rash and pruritus  Hematologic:  negative for easy bruising and gum/nose bleeding  Musculoskel: negative for myalgias, arthralgias, back pain, muscle weakness, joint pain  Neurological:  negative for headaches, dizziness, vertigo, memory problems and gait   Behavl/Psych: negative for feelings of anxiety, depression, mood changes    Past Medical History:   Diagnosis Date    Arrhythmia     afib    Asthma     3/2013 last attack    CKD (chronic kidney disease), stage III 2/12/2013    Nephrology: Jerome Burch MD    [facility])     Gout     Heart failure ([facility] [address]     Hyperlipidemia     Hypertension     Ill-defined condition     Positive PPD, treated 2001    treated 9 months     Past Surgical History:   Procedure Laterality Date    HX BELOW KNEE AMPUTATION Left     due to osteomyelitis    HX HEENT      tonsilectomy    HX HERNIA REPAIR      HX OTHER SURGICAL      below the knee right amputation    HX TONSILLECTOMY       Social History     Social History    Marital status:      Spouse name: N/A    Number of children: N/A    Years of education: N/A     Social History Main Topics    Smoking status: Never Smoker    Smokeless tobacco: Never Used    Alcohol use No    Drug use: No    Sexual activity: Not Currently     Partners: Male     Other Topics Concern    None     Social History Narrative         Per conversation 03/26/17    He would like his new Medical  power of  to be his Brother Michael Atkins.      (Previously was his Mother Samira Nash)        Never smoker     Family History   Problem Relation Age of Onset    Diabetes Mother    Emanuel Randall Hypertension Mother     Hypertension Brother     Diabetes Brother     Diabetes Maternal Grandmother     Hypertension Maternal Grandmother     Diabetes Other     Hypertension Other     Heart Disease Other      Current Outpatient Prescriptions   Medication Sig Dispense Refill    cloNIDine HCl (CATAPRES) 0.3 mg tablet Take 1 Tab by mouth three (3) times daily. 90 Tab 1    insulin lispro protamine/insulin lispro (HUMALOG MIX 75-25,U-100,INSULN) 100 unit/mL (75-25) injection Take 30 units in the morning and 6 units at night. 1 Vial 12    amLODIPine (NORVASC) 10 mg tablet Take 1 Tab by mouth daily. 90 Tab 0    PROCRIT 40,000 unit/mL injection       albuterol (PROVENTIL VENTOLIN) 2.5 mg /3 mL (0.083 %) nebulizer solution       RAYALDEE 30 mcg Cs24       pregabalin (LYRICA) 75 mg capsule Take 75 mg by mouth three (3) times daily.  labetalol (NORMODYNE) 300 mg tablet Take 1 Tab by mouth every eight (8) hours. 90 Tab 0    furosemide (LASIX) 40 mg tablet TAKE 1  And 1/2 tab bid 90 Tab 12    ondansetron (ZOFRAN ODT) 8 mg disintegrating tablet Take 1 Tab by mouth three (3) times daily. 15 Tab 3    oxyCODONE IR (OXY-IR) 15 mg immediate release tablet Take 15 mg by mouth every eight (8) hours as needed for Pain.  albuterol (PROVENTIL HFA, VENTOLIN HFA, PROAIR HFA) 90 mcg/actuation inhaler Take 2 Puffs by inhalation daily. 1 Inhaler 0    amitriptyline (ELAVIL) 50 mg tablet TAKE ONE TABLET BY MOUTH EVERY EVENING 30 Tab 0    aspirin 81 mg chewable tablet Take 1 Tab by mouth daily. 30 Tab 0    atorvastatin (LIPITOR) 40 mg tablet Take 1 Tab by mouth nightly. 30 Tab 0    fluticasone-salmeterol (ADVAIR) 250-50 mcg/dose diskus inhaler Take 1 Puff by inhalation daily. 1 Each 0    hydrALAZINE (APRESOLINE) 100 mg tablet TAKE ONE TABLET BY MOUTH THREE TIMES DAILY 90 Tab 0    multivitamin, tx-iron-ca-min (THERA-M W/ IRON) 9 mg iron-400 mcg tab tablet Take 1 Tab by mouth daily.  30 Tab 0    sevelamer carbonate (RENVELA) 800 mg tab tab TAKE ONE TABLET BY MOUTH THREE TIMES DAILY WITH MEALS 90 Tab 0    sodium bicarbonate 650 mg tablet Take 1 Tab by mouth two (2) times a day. 60 Tab 0    omeprazole (PRILOSEC) 40 mg capsule Take 1 Cap by mouth daily. 90 Cap 3    methocarbamol (ROBAXIN) 750 mg tablet Take 750 mg by mouth three (3) times daily. Allergies   Allergen Reactions    Ace Inhibitors Cough     AND CKD/SD    Arb-Angiotensin Receptor Antagonist Other (comments)     CKD/SD,        Objective:  Visit Vitals    /80    Pulse 84    Temp 98.6 °F (37 °C) (Oral)    Resp 18    Ht 6' 2\" (1.88 m)    Wt 313 lb (142 kg)    SpO2 93%    BMI 40.19 kg/m2     Physical Exam:   General appearance - alert, well appearing, and in no distress  Mental status - alert, oriented to person, place, and time  EYE-DESI, EOMI, corneas normal, no foreign bodies  ENT-ENT exam normal, no neck nodes or sinus tenderness  Nose - normal and patent, no erythema, discharge or polyps  Mouth - mucous membranes moist, pharynx normal without lesions  Neck - supple, no significant adenopathy   Chest - clear to auscultation, no wheezes, rales or rhonchi, symmetric air entry   Heart - normal rate, regular rhythm, normal S1, S2, no murmurs, rubs, clicks or gallops   Abdomen - soft, nontender, nondistended, no masses or organomegaly  Lymph- no adenopathy palpable  Ext-peripheral pulses normal, no pedal edema, no clubbing or cyanosis  Skin-Warm and dry. no hyperpigmentation, vitiligo, or suspicious lesions  Neuro -alert, oriented, normal speech, no focal findings or movement disorder noted  Neck-normal C-spine, no tenderness, full ROM without pain  Feet-no nail deformities or callus formation with good pulses noted      Results for orders placed or performed in visit on 04/19/18   AMB POC GLUCOSE BLOOD, BY GLUCOSE MONITORING DEVICE   Result Value Ref Range    Glucose  mg/dL       Assessment/Plan:    ICD-10-CM ICD-9-CM    1.  Type 2 diabetes mellitus with nephropathy (HCC) E11.21 250.40 AMB POC GLUCOSE BLOOD, BY GLUCOSE MONITORING DEVICE     583.81    2. Essential hypertension, benign I10 401.1 AMB POC GLUCOSE BLOOD, BY GLUCOSE MONITORING DEVICE   3. Localized edema R60.0 782.3 REFERRAL TO LYMPHEDEMA CLINIC   4. Stage 5 chronic kidney disease on chronic dialysis ([facility]) N18.6 585.6     Z99.2 V45.11      Orders Placed This Encounter    REFERRAL TO LYMPHEDEMA CLINIC     Referral Priority:   Routine     Referral Type:   Consultation     Referral Reason:   Specialty Services Required    AMB POC GLUCOSE BLOOD, BY GLUCOSE MONITORING DEVICE    cloNIDine HCl (CATAPRES) 0.3 mg tablet     Sig: Take 1 Tab by mouth three (3) times daily. Dispense:  90 Tab     Refill:  1    insulin lispro protamine/insulin lispro (HUMALOG MIX 75-25,U-100,INSULN) 100 unit/mL (75-25) injection     Sig: Take 30 units in the morning and 6 units at night. Dispense:  1 Vial     Refill:  12     lose weight, follow low fat diet, follow low salt diet,Take 81mg aspirin daily  Patient Instructions   [employer] Software Activation    Thank you for requesting access to [employer] Software. Please follow the instructions below to securely access and download your online medical record. [employer]Point Software allows you to send messages to your doctor, view your test results, renew your prescriptions, schedule appointments, and more. How Do I Sign Up? 1. In your internet browser, go to [email or web address]  2. Click on the First Time User? Click Here link in the Sign In box. You will be redirect to the New Member Sign Up page. 3. Enter your [employer] Software Access Code exactly as it appears below. You will not need to use this code after youve completed the sign-up process. If you do not sign up before the expiration date, you must request a new code. [employer] Software Access Code: [email or web address]  Expires: 2018  2:46 PM (This is the date your [employer]Point Software access code will )    4.  Enter the last four digits of your Social Security Number (xxxx) and Date of Birth (mm/dd/yyyy) as indicated and click Submit. You will be taken to the next sign-up page. 5. Create a [employer] ID. This will be your [employer] login ID and cannot be changed, so think of one that is secure and easy to remember. 6. Create a [employer] password. You can change your password at any time. 7. Enter your Password Reset Question and Answer. This can be used at a later time if you forget your password. 8. Enter your e-mail address. You will receive e-mail notification when new information is available in [address] 9. Click Sign Up. You can now view and download portions of your medical record. 10. Click the Download Summary menu link to download a portable copy of your medical information. Additional Information    If you have questions, please visit the Frequently Asked Questions section of the [employer] website at [email or web address] Remember, [employer] is NOT to be used for urgent needs. For medical emergencies, dial 911. Follow-up Disposition:  Return in about 4 weeks (around 5/17/2018). I have reviewed with the patient details of the assessment and plan and all questions were answered. Relevent patient education was performed. The most recent lab findings were reviewed with the patient. An After Visit Summary was printed and given to the patient.

## 2018-04-24 PROBLEM — I50.9 CHF EXACERBATION (HCC): Status: ACTIVE | Noted: 2018-01-01

## 2018-04-24 NOTE — ED NOTES
425 of urine emptied out of pt urinal prior to administering lasix. Pt now complaining of nausea at this time.

## 2018-04-24 NOTE — ED TRIAGE NOTES
Pt arrives to ED via EMS with complaints of SOB with exertion. Pt was found to be 85% on room air per EMS. Pt oxygen increased to 93% on 4L. Pt wears home oxygen at night only per EMS. Pt is a diabetic,  for EMS. EMS also states pt is supposed to have monthly procrit shots, however has not had it yet.

## 2018-04-24 NOTE — IP AVS SNAPSHOT
[address] [facility] [address] 
[phone] Patient: Lillie Dinh MRN: [id number] ITV:[id number]/[id number] A check damon indicates which time of day the medication should be taken. My Medications CHANGE how you take these medications Instructions Each Dose to Equal  
 Morning Noon Evening Bedtime * albuterol 90 mcg/actuation inhaler Commonly known as:  PROVENTIL HFA, VENTOLIN HFA, PROAIR HFA What changed:  Another medication with the same name was removed. Continue taking this medication, and follow the directions you see here. Your last dose was: Your next dose is: Take 2 Puffs by inhalation every four (4) hours as needed for Wheezing. 2 Puff * albuterol 2.5 mg /3 mL (0.083 %) nebulizer solution Commonly known as:  PROVENTIL VENTOLIN What changed:  Another medication with the same name was removed. Continue taking this medication, and follow the directions you see here. Your last dose was: Your next dose is:    
   
   
 2.5 mg by Nebulization route three (3) times daily. 2.5 mg  
    
   
   
   
  
 cloNIDine HCl 0.3 mg tablet Commonly known as:  CATAPRES What changed:  Another medication with the same name was removed. Continue taking this medication, and follow the directions you see here. Your last dose was: Your next dose is: Take 1 Tab by mouth three (3) times daily. 0.3 mg  
    
   
   
   
  
 * HumaLOG Mix 75-25(U-100)Insuln 100 unit/mL (75-25) injection Generic drug:  insulin lispro protamine/insulin lispro What changed:  Another medication with the same name was removed. Continue taking this medication, and follow the directions you see here. Your last dose was: Your next dose is:    
   
   
 30 Units by SubCUTAneous route Daily (before breakfast). 30 Units * HumaLOG Mix 75-25(U-100)Insuln 100 unit/mL (75-25) injection Generic drug:  insulin lispro protamine/insulin lispro What changed:  Another medication with the same name was removed. Continue taking this medication, and follow the directions you see here. Your last dose was: Your next dose is:    
   
   
 10 Units by SubCUTAneous route Daily (before dinner). 10 Units  
    
   
   
   
  
 labetalol 300 mg tablet Commonly known as:  Andree Purdue What changed:  Another medication with the same name was removed. Continue taking this medication, and follow the directions you see here. Your last dose was: Your next dose is: Take 600 mg by mouth every eight (8) hours. 600 mg  
    
   
   
   
  
 LASIX 40 mg tablet Generic drug:  furosemide What changed:  Another medication with the same name was removed. Continue taking this medication, and follow the directions you see here. Your last dose was: Your next dose is: Take 60 mg by mouth two (2) times a day. 60 mg  
    
   
   
   
  
 methocarbamol 750 mg tablet Commonly known as:  ROBAXIN What changed:  Another medication with the same name was removed. Continue taking this medication, and follow the directions you see here. Your last dose was: Your next dose is: Take 750 mg by mouth three (3) times daily as needed. 750 mg  
    
   
   
   
  
 ondansetron 8 mg disintegrating tablet Commonly known as:  ZOFRAN ODT What changed:  Another medication with the same name was removed. Continue taking this medication, and follow the directions you see here. Your last dose was: Your next dose is: Take 8 mg by mouth every eight (8) hours as needed for Nausea. 8 mg * Notice: This list has 4 medication(s) that are the same as other medications prescribed for you.  Read the directions carefully, and ask your doctor or other care provider to review them with you. CONTINUE taking these medications Instructions Each Dose to Equal  
 Morning Noon Evening Bedtime  
 amitriptyline 50 mg tablet Commonly known as:  ELAVIL Your last dose was: Your next dose is: TAKE ONE TABLET BY MOUTH EVERY EVENING  
     
   
   
   
  
 amLODIPine 10 mg tablet Commonly known as:  Omari Christensen Your last dose was: Your next dose is: Take 1 Tab by mouth daily. 10 mg  
    
   
   
   
  
 aspirin 81 mg chewable tablet Your last dose was: Your next dose is: Take 1 Tab by mouth daily. 81 mg  
    
   
   
   
  
 atorvastatin 40 mg tablet Commonly known as:  LIPITOR Your last dose was: Your next dose is: Take 1 Tab by mouth nightly. 40 mg  
    
   
   
   
  
 fluticasone-salmeterol 250-50 mcg/dose diskus inhaler Commonly known as:  ADVAIR Your last dose was: Your next dose is: Take 1 Puff by inhalation daily. 1 Puff  
    
   
   
   
  
 hydrALAZINE 100 mg tablet Commonly known as:  APRESOLINE Your last dose was: Your next dose is: TAKE ONE TABLET BY MOUTH THREE TIMES DAILY  
     
   
   
   
  
 LYRICA 75 mg capsule Generic drug:  pregabalin Your last dose was: Your next dose is: Take 75 mg by mouth three (3) times daily. 75 mg  
    
   
   
   
  
 multivitamin, tx-iron-ca-min 9 mg iron-400 mcg Tab tablet Commonly known as:  THERA-M w/ IRON Your last dose was: Your next dose is: Take 1 Tab by mouth daily. 1 Tab  
    
   
   
   
  
 omeprazole 40 mg capsule Commonly known as:  PRILOSEC Your last dose was: Your next dose is: Take 1 Cap by mouth daily. 40 mg  
    
   
   
   
  
 oxyCODONE IR 15 mg immediate release tablet Commonly known as:  OXY-IR  
 Your last dose was: Your next dose is: Take 15 mg by mouth every eight (8) hours as needed for Pain. 15 mg RAYALDEE 30 mcg Cs24 Generic drug:  calcifediol Your last dose was: Your next dose is:    
   
   
 1 Cap nightly. 1 Cap  
    
   
   
   
  
 sevelamer carbonate 800 mg Tab tab Commonly known as:  Carrillo Bullock Your last dose was: Your next dose is: TAKE ONE TABLET BY MOUTH THREE TIMES DAILY WITH MEALS  
     
   
   
   
  
 sodium bicarbonate 650 mg tablet Your last dose was: Your next dose is: Take 1 Tab by mouth two (2) times a day.   
 650 mg

## 2018-04-24 NOTE — ED PROVIDER NOTES
HPI Comments: 39 y.o. male with past medical history significant for HTN, DM, gout, CKD, HLD, asthma, atrial fibrillation, and heart failure who presents from home via EMS with chief complaint of SOB on exertion since earlier today. Per EMS, pt's O2 sats were 85% on RA, up to 93% on 4L. Pt is on home O2 PRN. Of note, patient was admitted to the hospital on 3/9/2018 for acute pulmonary edema, CKD Stage V, chronic hypoxic respiratory failure. Pt states this feels similar to prior CHF exacerbation. He is on lasix. Denies additional sx. Not receiving dialysis. There are no other acute medical concerns at this time. Social hx: never smoker, no alcohol or drug use  PCP: Liliam Macias MD    Note written by Virgie Cid, as dictated by Chris Briones MD 4:31 PM      The history is provided by the patient. No  was used.         Past Medical History:   Diagnosis Date    Arrhythmia     afib    Asthma     3/2013 last attack    CKD (chronic kidney disease), stage III 2/12/2013    Nephrology: Selena Mims MD    Diabetes [facility])     Gout     Heart failure ([facility] [address]     Hyperlipidemia     Hypertension     Ill-defined condition     Positive PPD, treated 2001    treated 9 months       Past Surgical History:   Procedure Laterality Date    HX BELOW KNEE AMPUTATION Left     due to osteomyelitis    HX HEENT      tonsilectomy    HX HERNIA REPAIR      HX OTHER SURGICAL      below the knee right amputation    HX TONSILLECTOMY           Family History:   Problem Relation Age of Onset    Diabetes Mother     Hypertension Mother     Hypertension Brother     Diabetes Brother     Diabetes Maternal Grandmother     Hypertension Maternal Grandmother     Diabetes Other     Hypertension Other     Heart Disease Other        Social History     Social History    Marital status:      Spouse name: N/A    Number of children: N/A    Years of education: N/A     Occupational History    Not on file. Social History Main Topics    Smoking status: Never Smoker    Smokeless tobacco: Never Used    Alcohol use No    Drug use: No    Sexual activity: Not Currently     Partners: Male     Other Topics Concern    Not on file     Social History Narrative         Per conversation 03/26/17    He would like his new Medical  power of  to be his Brother Michael Atkins. (Previously was his Mother Corinna Adkins)        Never smoker         ALLERGIES: Ace inhibitors and Arb-angiotensin receptor antagonist    Review of Systems   Constitutional: Negative for chills and fever. Respiratory: Positive for shortness of breath. Cardiovascular: Negative for chest pain. Gastrointestinal: Negative for vomiting. Genitourinary: Negative for dysuria. Musculoskeletal: Negative for back pain. Neurological: Negative for headaches. All other systems reviewed and are negative. Vitals:    04/24/18 1555   BP: 153/73   Pulse: 84   Resp: 18   Temp: 98.5 °F (36.9 °C)   SpO2: 90%            Physical Exam   Constitutional: He is oriented to person, place, and time. He appears well-developed and well-nourished. No distress. HENT:   Head: Normocephalic and atraumatic. Eyes: Conjunctivae are normal. No scleral icterus. Neck: Neck supple. JVD present. No tracheal deviation present. + JVD. Cardiovascular: Regular rhythm, normal heart sounds and intact distal pulses. Exam reveals no gallop and no friction rub. No murmur heard. Tachycardic rate   Pulmonary/Chest: Effort normal. He has no wheezes. Bibasilar rales noted   Abdominal: Soft. He exhibits no distension. There is no tenderness. There is no rebound and no guarding. Musculoskeletal:   Peripheral edema noted to LLE    Neurological: He is alert and oriented to person, place, and time. Skin: Skin is warm and dry. No rash noted. Psychiatric: He has a normal mood and affect. Nursing note and vitals reviewed. Note written by William ROWE Joyce Snell, as dictated by Jonas Sutton MD 4:32 PM      MDM  Number of Diagnoses or Management Options     Amount and/or Complexity of Data Reviewed  Clinical lab tests: ordered and reviewed  Tests in the radiology section of CPT®: ordered and reviewed  Tests in the medicine section of CPT®: ordered and reviewed  Discussion of test results with the performing providers: yes  Obtain history from someone other than the patient: yes  Discuss the patient with other providers: yes    Total critical care time spent exclusive of procedures: 34  minutes      ED Course     PROGRESS NOTE:  7:17 PM Appears to be in mild pulmonary edema. Has significant renal failure. Will admit to hospitalist service. Is close to dialysis. May need that tomorrow. CONSULT NOTE:  7:20 Joselin Whittaker MD spoke with Dr. Portillo Pizano, Consult for Hospitalist.  Discussed available diagnostic tests and clinical findings. Dr. Portillo Pizano will evaluate and admit patient.     Procedures

## 2018-04-25 NOTE — CONSULTS
Cardiology Consult Note      Patient Name: Luis Miguel Espinoza  : 1975 MRN: [id number]  Date: 2018  Time: 9:49 AM    Admit Diagnosis: CHF exacerbation [facility])    Primary Cardiologist: Constance Taylor MD   Consulting Cardiologist: Lisa Alanis. Cordelia Trotter for Consult: CHF     Requesting MD: Francia Elliott     HPI:  Anabella Garcia is a 42y. o. Male patient with a PMH significant for diastolic CHF, PAF, HLD, HTN, CKD V, DM type 2, gout, asthma and bilateral below the knee amputations who presented to the ER yesterday complaining of SOB with exertion that had been worsening over several days. Patient reported to ER that he feels similar to his previous CHF exacerbations. Patient has had noncompliance issues with medications d/t a new insurance provider and is waiting on authorization. Patient missed appointments with primary cardiologist in Feb/Mar d/t transportation issues. Subjective: Patient states his SOB has improved compared to arrival. Patient denies any CP, palpitations, dizziness, or orthopnea. Patient reports recent swelling of his stumps. Patient reports a cough that has been ongoing for the past few weeks that had blood tinged sputum that now has resolved. States he weighs himself daily, denies any weight gain. Denies any increased salt intake. States he has not been taking the clonidine due to awaiting new insurance authorization of meds. Reports hx of  Lymphedema and says his PCP was arranging follow up with lymphedema clinic but he has not heard from the lymphedema clinic. He also reports that he had KRISTAL testing and believes he has KRISTAL but never followed up for treatment. SH;  Never smoker, no ETOH or illicit drug use  FH:  Grandparents with CAD but he is uncertain of age of onset. Assessment and Plan     1.  Acute on Chronic diastolic CHF, NYHA class III   -pulmonary edema on CXR: bilateral perihilar and lower lobe patchy densities   - Most recent TTE: 10/22/2017: EF 70-75% NWMA   -Strict I&O's, daily weights   - weight has increased to 323lbs from 310lbs  on 03/09/2018   - pro-BNP on 04/24 was 955, decreased from 1612 in March 2018   -BP control   - Agree with IV diuresis   -cardiac rehab consult d/t CHF history   - will need H2H at discharge    2. Hx of HTN: BP elevated   -continue Norvasc, Clonidine, Labetalol   -defer BP management to nephrology    3. Hx of PAF/FL: denoted in 03/2016   - currently NSR on monitor    -[id number]= 4(CHF, HTN, Vasc dz, DM):  On ASA. Due to anemia, noncompliance would not recommend [address] at this time. 4. Hx of CKD V: creatinine 4.53, GFR 17   - nephrology has been consulted     5. Hx of HLD: 02/2018 HDL 30, LDL 75   -continue Lipitor 40mg     6. Anemia (Hbg 7.1)-suspect d/t chronic disease   -transfusion ordered by primary team     7. Hx of DM type 2: A1c 7.4      Patient with hx of diastolic CHF, HTN, CKD V, PAF now presents again with worsened TAMEZ and fluid overload. Suspect poor renal function playing the primary role in fluid overload. Will obtain repeat TTE today. Agree with IV diuresis. BP management  And diuretics per nephrology. Recommend case management consult as patient has transportation issues to appointments. Cardiac testing hx. Saw and evaluated pt and agree with above assessment and plan. Fluid overload from renal dysfunction; nephrology to follow. May need HD. HTN per nephrology given CKD. No additional cardiac evaluation indicated and will sign off. Close outpt f/u with cardiology Dr. Mark Cortez. Paulina Calderón MD    Echo 2/16 - LVEF 65 %, no WMA, wall thickness was mildly increased, small pericardial effusion was identified without hemodynamic compromise.   HERNAN 6/2015 - LVEF 60 % to 65 %, no WMA, moderate cLVH, no right-to-left shunt in atria, no thrombus noted, small pericardial effusion was identified circumferential to the heart without evidence of hemodynamic compromise. Review of Systems:     GENERAL   Recent weight loss - no   Fever -----------------   no   Chills -----------------   no     EYES, VISION   Visual Changes - no     EARS, NOSE, THROAT   Hearing loss ----------- no   Swallowing difficulties - no     CARDIOVASCULAR   Chest pain/pressure ---- no   Arrhythmia/palpitations - no       RESPIRATORY   Cough ------------------ yes   Shortness of breath - yes   Wheezing -------------- no   GASTROINTESTINAL   Abdominal pain - no   Heartburn -------- no   Bloody stool ----- no     GENITOURINARY   Frequent urination - no   Urgency -------------- no     MUSCULOSKELETAL   Joint pain/swelling ---- yes, stumps   Musculoskeletal pain - no     SKIN & INTEGUMENTARY   Rashes - no   Sores --- no         NEUROLOGICAL   Numbness/tingling - no   Sensation loss ------ no     PSYCHIATRIC   Nervousness/anxiety - no   Depression -------------- no     ENDOCRINE   Heat/cold intolerance - no   Excessive thirst -------- no     HEMATOLOGIC/LYMPHATIC   No current bleeding. Previous treatment/evaluation includes: echocardiogram, nuclear stress test  Cardiac risk factors: dyslipidemia, diabetes mellitus, obesity, sedentary life style, male gender, hypertension.     Past Medical History:   Diagnosis Date    Arrhythmia     afib    Asthma     3/2013 last attack    CKD (chronic kidney disease), stage III 2/12/2013    Nephrology: Janice Opitz, MD    Diabetes [facility])     Gout     Heart failure ([facility] [address]     Hyperlipidemia     Hypertension     Ill-defined condition     Positive PPD, treated 2001    treated 9 months     Past Surgical History:   Procedure Laterality Date    HX BELOW KNEE AMPUTATION Left     due to osteomyelitis    HX HEENT      tonsilectomy    HX HERNIA REPAIR      HX OTHER SURGICAL      below the knee right amputation    HX TONSILLECTOMY       Current Facility-Administered Medications   Medication Dose Route Frequency    amitriptyline (ELAVIL) tablet 50 mg  50 mg Oral QHS    amLODIPine (NORVASC) tablet 10 mg  10 mg Oral DAILY    atorvastatin (LIPITOR) tablet 40 mg  40 mg Oral QHS    cloNIDine HCl (CATAPRES) tablet 0.3 mg  0.3 mg Oral TID    labetalol (NORMODYNE) tablet 300 mg  300 mg Oral Q8H    oxyCODONE IR (ROXICODONE) tablet 5 mg  5 mg Oral Q8H PRN    pregabalin (LYRICA) capsule 75 mg  75 mg Oral TID    sevelamer carbonate (RENVELA) tab 800 mg  800 mg Oral TID WITH MEALS    sodium bicarbonate tablet 650 mg  650 mg Oral BID    sodium chloride (NS) flush 5-10 mL  5-10 mL IntraVENous Q8H    sodium chloride (NS) flush 5-10 mL  5-10 mL IntraVENous PRN    furosemide (LASIX) injection 80 mg  80 mg IntraVENous BID    aspirin delayed-release tablet 81 mg  81 mg Oral DAILY    acetaminophen (TYLENOL) tablet 650 mg  650 mg Oral Q4H PRN    heparin (porcine) injection 5,000 Units  5,000 Units SubCUTAneous Q12H    glucose chewable tablet 16 g  4 Tab Oral PRN    dextrose (D50W) injection syrg 12.5-25 g  12.5-25 g IntraVENous PRN    glucagon (GLUCAGEN) injection 1 mg  1 mg IntraMUSCular PRN    insulin lispro (HUMALOG) injection   SubCUTAneous Q6H    0.9% sodium chloride infusion 250 mL  250 mL IntraVENous PRN    hydrALAZINE (APRESOLINE) 20 mg/mL injection 10 mg  10 mg IntraVENous Q6H PRN    pantoprazole (PROTONIX) tablet 40 mg  40 mg Oral DAILY     Current Outpatient Prescriptions   Medication Sig    furosemide (LASIX) 40 mg tablet Take 60 mg by mouth two (2) times a day.  methocarbamol (ROBAXIN) 750 mg tablet Take 750 mg by mouth three (3) times daily as needed.  ondansetron (ZOFRAN ODT) 8 mg disintegrating tablet Take 8 mg by mouth every eight (8) hours as needed for Nausea.  insulin lispro protamine/insulin lispro (HUMALOG MIX 75-25,U-100,INSULN) 100 unit/mL (75-25) injection 30 Units by SubCUTAneous route Daily (before breakfast).     insulin lispro protamine/insulin lispro (HUMALOG MIX 75-25,U-100,INSULN) 100 unit/mL (75-25) injection 10 Units by SubCUTAneous route Daily (before dinner).  albuterol (PROVENTIL HFA, VENTOLIN HFA, PROAIR HFA) 90 mcg/actuation inhaler Take 2 Puffs by inhalation every four (4) hours as needed for Wheezing.  labetalol (NORMODYNE) 300 mg tablet Take 600 mg by mouth every eight (8) hours.  cloNIDine HCl (CATAPRES) 0.3 mg tablet Take 1 Tab by mouth three (3) times daily.  amLODIPine (NORVASC) 10 mg tablet Take 1 Tab by mouth daily.  albuterol (PROVENTIL VENTOLIN) 2.5 mg /3 mL (0.083 %) nebulizer solution 2.5 mg by Nebulization route three (3) times daily.  RAYALDEE 30 mcg Cs24 1 Cap nightly.  pregabalin (LYRICA) 75 mg capsule Take 75 mg by mouth three (3) times daily.  oxyCODONE IR (OXY-IR) 15 mg immediate release tablet Take 15 mg by mouth every eight (8) hours as needed for Pain.  amitriptyline (ELAVIL) 50 mg tablet TAKE ONE TABLET BY MOUTH EVERY EVENING    aspirin 81 mg chewable tablet Take 1 Tab by mouth daily.  atorvastatin (LIPITOR) 40 mg tablet Take 1 Tab by mouth nightly.  hydrALAZINE (APRESOLINE) 100 mg tablet TAKE ONE TABLET BY MOUTH THREE TIMES DAILY    multivitamin, tx-iron-ca-min (THERA-M W/ IRON) 9 mg iron-400 mcg tab tablet Take 1 Tab by mouth daily.  sevelamer carbonate (RENVELA) 800 mg tab tab TAKE ONE TABLET BY MOUTH THREE TIMES DAILY WITH MEALS    sodium bicarbonate 650 mg tablet Take 1 Tab by mouth two (2) times a day.  omeprazole (PRILOSEC) 40 mg capsule Take 1 Cap by mouth daily.  fluticasone-salmeterol (ADVAIR) 250-50 mcg/dose diskus inhaler Take 1 Puff by inhalation daily.        Allergies   Allergen Reactions    Ace Inhibitors Cough     AND CKD/SD    Arb-Angiotensin Receptor Antagonist Other (comments)     CKD/SD,       Family History   Problem Relation Age of Onset    Diabetes Mother     Hypertension Mother     Hypertension Brother     Diabetes Brother     Diabetes Maternal Grandmother     Hypertension Maternal Grandmother     Diabetes Other     Hypertension Other     Heart Disease Other       Social History     Social History    Marital status:      Spouse name: N/A    Number of children: N/A    Years of education: N/A     Social History Main Topics    Smoking status: Never Smoker    Smokeless tobacco: Never Used    Alcohol use No    Drug use: No    Sexual activity: Not Currently     Partners: Male     Other Topics Concern    None     Social History Narrative         Per conversation 03/26/17    He would like his new Medical  power of  to be his Brother Michael Atkins. (Previously was his Mother Geovany Rose)        Never smoker       Objective:    Physical Exam    Vitals:   Vitals:    04/25/18 0300 04/25/18 0400 04/25/18 0700 04/25/18 0800   BP: 167/80 148/62 160/75 150/71   Pulse: 79 72 74 75   Resp: 20 17 19 16   Temp:  98.6 °F (37 °C)     SpO2: 95% 97% 91% 95%   Weight:  146.6 kg (323 lb 3.1 oz)         General:    Alert, cooperative, no distress, appears stated age. Neck:   Supple, no carotid bruit    Back:     Symmetric    Lungs:     Basilar crackles to auscultation bilaterally. Heart[de-identified]    Regular rate and rhythm, S1, S2 normal, no murmur, click, rub or gallop. Abdomen:     Soft, non-tender. Bowel sounds normal.    Extremities:   Bilateral below the knee amputations, lymphedema in R-stump       Skin:   Skin color normal. No rashes or lesions   Neurologic:  JIAGN       Telemetry: NSR    ECG:   EKG Results     Procedure 720 Value Units Date/Time    EKG, 12 LEAD, INITIAL [[id number]]     Order Status:  Sent           Data Review:     Radiology:   XR Results (most recent):    Results from Hospital Encounter encounter on 04/24/18   XR CHEST PA LAT   Narrative EXAM:  XR CHEST PA LAT    INDICATION:   Arrival via EMS with shortness of breath on exertion. 85% O2  saturation on room air, increased to 93% on 4 L oxygen. Patient diabetic. COMPARISON: March 9, 2018.     FINDINGS: PA and lateral radiographs of the chest demonstrate bilateral  perihilar and basilar patchy pulmonary opacification. There is no pneumothorax  or pleural effusion. Cardiac size remains mildly to moderately enlarged. Mediastinal contours are stable with unchanged mild thoracic aortic tortuosity. Hilar contours are obscured by adjacent parenchymal lung disease. Impression IMPRESSION: Bilateral perihilar and lower lung patchy densities. Consider  pulmonary edema and bilateral pneumonia as diagnostic possibilities. Recent Labs      04/25/18   0407  04/24/18 2020 04/24/18   1632   CPK  509*  692*   --    TROIQ  <0.04  <0.04  <0.04     Recent Labs      04/25/18   0407  04/24/18   1632   NA  143  141   K  5.0  5.0   CL  117*  115*   CO2  17*  17*   BUN  54*  64*   CREA  4.53*  4.78*   GLU  105*  141*   CA  8.3*  8.1*     Recent Labs      04/25/18   0407  04/24/18   1632   WBC  8.0  9.8   HGB  7.1*  7.8*   HCT  24.0*  26.0*   PLT  196  224     Recent Labs      04/25/18   0407  04/24/18   1632   SGOT  10*  19   AP  87  97     Recent Labs      04/24/18   2200   CHOL  88   LDLC  41.6     No results for input(s): CRP, TSH, TSHEXT in the last 72 hours.     No lab exists for component: ESR             [facility] [address] [place] [address] Rd[address] [facility] [address]     Kamala Granadomolaina     [phone]    Jael Miller MD

## 2018-04-25 NOTE — PROGRESS NOTES
Admission Medication Reconciliation:    Information obtained from: Patient, Rx Query    Significant PMH/Disease States:   Past Medical History:   Diagnosis Date    Arrhythmia     afib    Asthma     3/2013 last attack    CKD (chronic kidney disease), stage III 2013    Nephrology: Tierra Benedict MD    [facility])     Gout     Heart failure ([address]     Hyperlipidemia     Hypertension     Ill-defined condition     Positive PPD, treated 2001    treated 9 months       Chief Complaint for this Admission:    Chief Complaint   Patient presents with    Shortness of Breath         Allergies:  Ace inhibitors and Arb-angiotensin receptor antagonist    Prior to Admission Medications:   Prior to Admission Medications   Prescriptions Last Dose Informant Patient Reported? Taking? RAYALDEE 30 mcg Cs24 2018 at PM  Yes Yes   Si Cap nightly. albuterol (PROVENTIL HFA, VENTOLIN HFA, PROAIR HFA) 90 mcg/actuation inhaler   Yes Yes   Sig: Take 2 Puffs by inhalation every four (4) hours as needed for Wheezing. albuterol (PROVENTIL VENTOLIN) 2.5 mg /3 mL (0.083 %) nebulizer solution 2018 at AM  Yes Yes   Si.5 mg by Nebulization route three (3) times daily. amLODIPine (NORVASC) 10 mg tablet 2018 at AM  No Yes   Sig: Take 1 Tab by mouth daily. amitriptyline (ELAVIL) 50 mg tablet 2018 at PM  No Yes   Sig: TAKE ONE TABLET BY MOUTH EVERY EVENING   aspirin 81 mg chewable tablet 2018 at AM  No Yes   Sig: Take 1 Tab by mouth daily. atorvastatin (LIPITOR) 40 mg tablet 2018 at PM  No Yes   Sig: Take 1 Tab by mouth nightly. cloNIDine HCl (CATAPRES) 0.3 mg tablet 2018  No Yes   Sig: Take 1 Tab by mouth three (3) times daily. fluticasone-salmeterol (ADVAIR) 250-50 mcg/dose diskus inhaler   No No   Sig: Take 1 Puff by inhalation daily. furosemide (LASIX) 40 mg tablet 2018 at AM  Yes Yes   Sig: Take 60 mg by mouth two (2) times a day.    hydrALAZINE (APRESOLINE) 100 mg tablet 2018 at AM  No Yes   Sig: TAKE ONE TABLET BY MOUTH THREE TIMES DAILY   insulin lispro protamine/insulin lispro (HUMALOG MIX 75-25,U-100,INSULN) 100 unit/mL (75-25) injection 2018 at AM  Yes Yes   Si Units by SubCUTAneous route Daily (before breakfast). insulin lispro protamine/insulin lispro (HUMALOG MIX 75-25,U-100,INSULN) 100 unit/mL (75-25) injection 2018 at PM  Yes Yes   Sig: 10 Units by SubCUTAneous route Daily (before dinner). labetalol (NORMODYNE) 300 mg tablet 2018 at AM  Yes Yes   Sig: Take 600 mg by mouth every eight (8) hours. methocarbamol (ROBAXIN) 750 mg tablet 2018  Yes Yes   Sig: Take 750 mg by mouth three (3) times daily as needed. multivitamin, tx-iron-ca-min (THERA-M W/ IRON) 9 mg iron-400 mcg tab tablet 2018 at AM  No Yes   Sig: Take 1 Tab by mouth daily. omeprazole (PRILOSEC) 40 mg capsule 2018 at AM  No Yes   Sig: Take 1 Cap by mouth daily. ondansetron (ZOFRAN ODT) 8 mg disintegrating tablet   Yes Yes   Sig: Take 8 mg by mouth every eight (8) hours as needed for Nausea. oxyCODONE IR (OXY-IR) 15 mg immediate release tablet 2018 at AM  Yes Yes   Sig: Take 15 mg by mouth every eight (8) hours as needed for Pain. pregabalin (LYRICA) 75 mg capsule 2018 at AM  Yes Yes   Sig: Take 75 mg by mouth three (3) times daily. sevelamer carbonate (RENVELA) 800 mg tab tab 2018 at 1200  No Yes   Sig: TAKE ONE TABLET BY MOUTH THREE TIMES DAILY WITH MEALS   sodium bicarbonate 650 mg tablet 2018 at AM  No Yes   Sig: Take 1 Tab by mouth two (2) times a day. Facility-Administered Medications: None         Comments/Recommendations: Spoke with patient.   Changes made/pertinent information regarding PTA medications as follows:    Changed Dose: Humalog 75-25 dose (from 6 units QPM to 10 units), Labetalol  from 300 mg TID to 600 mg TID (PER DR BRADFORD, LEAVE ORDER  mg TID)    Removed: Procrit (patient has not been taking, says he was never contacted about scheduling appt)      Morgan Casarez, PharmD

## 2018-04-25 NOTE — ED NOTES
Bedside shift change report given to Reginaldo Quinones (oncoming nurse) by Bev Yung (offgoing nurse). Report included the following information SBAR.     0215 Voided 500 ml.    0423 Patient placed in hospital bed. Clothes changed.  Incontinent x1.     0645 Urinal voided 800ml

## 2018-04-25 NOTE — NURSE NAVIGATOR
Chart reviewed by Heart Failure Nurse Navigator. Heart Failure database completed. EF:  70%. Echo dated 10/22/2017    ACEi/ARB: Contraindicated d/t allergy    BB: not indicated at this time    Aldosterone Antagonist: not indicated at this time    CRT not indicated at this time. NYHA Functional Class III. Heart Failure Teach Back in Patient Education. Heart Failure Avoiding Triggers on Discharge Instructions. Cardiologist: Dr. Omero Bach consulted.       OPNN/PHCA;  MSmith/Tavon notified of admission

## 2018-04-25 NOTE — CONSULTS
Patient is being f/u by Dr. Alma Rosa Adams. Patient is not compliant with his office visits. Presents with fluid overload- frequent admissions with similar symptoms. GFR is at baseline. Aggressive diuresis.

## 2018-04-25 NOTE — CDMP QUERY
Patient is noted to have a BMI of 41.5 kg/m 2 Please clarify if this patient is:     =>Morbidly obese (BMI ³ 40)  =>Obese (BMI 30 - 39.9)  =>Overweight (BMI 25 - 29.9)  =>Other explanation of clinical findings  =>Unable to determine (no explanation for clinical findings)    Presentation:     Ht: 6' 2\" (1.88 m)  Wt: 146.6 kg (323 lb 3.1 oz)          REFERENCE:  The [address] [place] [address] has issued a statement indicating that, \"Individuals who are overweight, obese, or morbidly obese are at an increased risk for certain medical conditions when compared to persons of normal weight. Therefore, these conditions are always clinically significant and reportable when documented by the provider. Please clarify and document your clinical opinion in the progress notes and discharge summary, including the definitive and or presumptive diagnosis, (suspected or probable), related to the above clinical findings. Please include clinical findings supporting your diagnosis.      Thank you,         Franklin Ceballos [facility]

## 2018-04-25 NOTE — H&P
[address]  HISTORY AND PHYSICAL      Karen Lee  MR#: [id number]  : 1975  ACCOUNT #: [de-identified]   ADMIT DATE: 2018    CHIEF COMPLAINT:  Shortness of breath. HISTORY OF PRESENT ILLNESS:  Patient is a 43-year-old gentleman with history of paroxysmal atrial fibrillation, history of asthma, chronic kidney disease stage V, history of diabetes mellitus type 2, gout, history of diastolic heart failure, NYHA class 2, history of hyperlipidemia, hypertension, positive PPD treated for 9 months, history of bilateral below-knee amputations, who presented to the hospital with the above-mentioned symptoms. Patient reports that for the past 3-4 days he started getting progressively short of breath. Reports that he has been having mild cough and increased swelling in his thighs. The patient reports that he feels that he is retaining water, reports that today the shortness of breath got worse. He was struggling to breathe and called EMS. Per EMS, patient's oxygen saturations were 85% on room air and about 93% on 4 liters. The patient was brought to the hospital for further management and evaluation. The patient reports that he has been taking his medications on a regular basis. Patient reports that he has not been able to follow up with the nephrologist.  The patient reports that he has been having some nausea associated with the symptoms, has been having increased exertional dyspnea where he is not able to walk much. The patient denies any chest pain associated with the symptoms. The patient denies any headache, blurry vision, sore throat, trouble swallowing, trouble with speech, denies any chest pain, abdominal pain, constipation, diarrhea, urinary symptoms, focal or generalized neurological weakness, recent travel, sick contacts, falls, injuries, hematemesis, melena, hemoptysis or any other concerns or problems.   Patient reports that he has not followed up with his nephrologist because \"they never called me back. \"  Patient denies any other complaints or problems. PAST MEDICAL HISTORY:  See above. HOME MEDICATIONS:  Currently the patient is on Lasix 60 mg b.i.d., Robaxin 750 mg p.o. t.i.d.,  Zofran, Humalog 75/35, 30 units every day, 10 units in the night, labetalol 600 mg q.8h., clonidine 0.3 mg t.i.d., amlodipine 10 mg every day, albuterol p.r.n., Lyrica 75 mg p.o. t.i.d., oxycodone 15 mg every 8 hours as needed, amitriptyline 50 mg every evening, aspirin 81 mg daily, Lipitor 40 mg nightly, Advair, hydralazine 100 mg t.i.d., multivitamins, Renvela 800 mg t.i.d., sodium bicarbonate 650 mg b.i.d. and omeprazole 40 mg daily. SOCIAL HISTORY:  Denies tobacco abuse. No alcohol, no IV drug abuse. ALLERGIES:  ACE INHIBITORS AND ARBs. FAMILY HISTORY:  Was discussed. Mother had a history of diabetes, hypertension. Brother had a history of hypertension and diabetes. Maternal grandmother had a history of hypertension and diabetes. PHYSICAL EXAMINATION:  VITAL SIGNS:  Temperature 98.5, pulse 81, respiratory rate 18, blood pressure 170/77, pulse oximetry 96% on 2 liters. GENERAL:  Alert x3, awake, mildly distressed, pleasant male, appears to be stated age. HEENT:  Pupils equal and reactive to light. Dry mucous membranes. Tympanic membranes clear. NECK:  Supple. CHEST:  Decreased basal breath sounds, scattered crackles. HEART:  S1, S2 were heard. ABDOMEN:  Soft, nontender, nondistended. Bowel sounds are physiological.  EXTREMITIES:  No clubbing, no cyanosis. Bilateral BKA. NEUROPSYCHIATRIC:  Cranial nerves II-XII grossly intact. No focal deficits were noted. SKIN:  Warm. LABORATORY DATA:  White count 9.8, hemoglobin 7.8, hematocrit 26, platelets 065,444. Sodium 141, potassium 5, chloride 115, bicarbonate 17, anion gap 9, glucose 141, BUN 64, creatinine 4.78, calcium 8.1, bilirubin total 0.6, ALT 23, AST 19, alkaline phosphatase 97.   , CK-MB 5.9, troponin less than 0.04. . IMAGING: X-ray of the chest shows bilateral perihilar and lower lobe patchy densities, consider pulmonary edema and bilateral pneumonia as diagnostic possibilities. Creatinine is 4.78.    ASSESSMENT AND PLAN:  1.  Pulmonary edema, most likely secondary to diastolic congestive heart failure exacerbation in light of chronic kidney disease stage V. The patient will be admitted on a telemetry bed. Start patient on aggressive IV diuresis, strict intake and output (I's and O's), daily weights. Continue aspirin and statin. Cycle cardiac enzymes. Cardiology consult has been obtained. We will continue to closely monitor. Further intervention will be per hospital course. Provide oxygen support and continue to closely monitor. 2.  Acute on chronic diastolic congestive heart failure exacerbation, New York Heart Association (NYHA) class 3 on admission. See above. Patient will be on IV diuretics, strict I's and O's, daily weights, aspirin, statin, nitroglycerin, blood pressure control and close monitoring. further intervention will be per hospital course. Oxygen support and reassess as needed. 3.  Chronic kidney disease stage V. The patient heading to dialysis. Nephrology consult has been requested. Avoid nephrotoxic medications. Close monitoring. May consider hemodialysis. We will discuss with nephrology during this admission. Further intervention will be per hospital course. We will continue to closely monitor and provide supportive care. 4.  Anemia, most likely secondary to renal disease. We will transfuse 1 unit packed red blood cells (PRBC) and closely monitored. Further intervention will be per hospital course. Reassess as needed and continue to monitor. The patient denies any hematemesis, melena, hemoptysis or hematuria. 5.  Diabetes, poorly controlled. We will provide sliding scale NovoLog insulin, Accu-Cheks, diet control and close monitoring. Further intervention will be per hospital course. Reassess as needed. 6.  Hyperlipidemia. Continue home medication. 7.  Hypertension, suboptimally controlled. Continue home medication. We will add p.r.n. medications. Continue to monitor. Optimize blood pressure control. Reassess as needed. Patient on nitroglycerin paste. Will reassess. Will continue to monitor. 8.  Gastrointestinal and deep vein thrombosis prophylaxis. The patient will be on heparin.       Tanvi Mccray MD MM/GIOVANNA  D: 04/24/2018 20:05     T: 04/24/2018 20:38  JOB #: [id number]

## 2018-04-25 NOTE — CDMP QUERY
#2    Please clarify if this patient is (was) being treated/managed for:     => Acute Hypoxic Respiratory Failure in the setting of CHF/Pulmonary edema requiring supplemental oxygen/lasix  => Other explanation of clinical findings  => Clinically Undetermined (no explanation for clinical findings)    The medical record reflects the following clinical findings, treatment, and risk factors. Risk Factors:  CHF/Pulmonary edema   Clinical Indicators:  ED note-Pt was found to be 85% on room air per EMS. Pt oxygen increased to 93% on 4L. Pt wears home oxygen at night only per EMS, resp rate 20-25  Treatment: supplemental oxygen @ 4 lts/min /lasix    Please clarify and document your clinical opinion in the progress notes and discharge summary including the definitive and/or presumptive diagnosis, (suspected or probable), related to the above clinical findings. Please include clinical findings supporting your diagnosis.     Thank you,         Flaquita oDwney [address] [place] [address]

## 2018-04-25 NOTE — PROGRESS NOTES
Hospitalist Progress Note  Jina Fernandez MD  Office: [phone]        Date of Service:  2018  NAME:  Alma Ziegler  :  1975  MRN:  [id number]      Admission Summary:   71-year-old M with PMH of paroxysmal atrial fibrillation, asthma, CKD V, DM type 2, gout,  diastolic heart failure, NYHA class 2, hyperlipidemia, hypertension, positive PPD treated for 9 months and bilateral BKA admitted on 18 for acute on chronic diastolic CHF/Fluid overload. Pt was also noted to be anemic with Hb of 7.8. Since admission, pt has been started on IV Lasix bid. He was given 1 unit of PRBC overnight. Interval history / Subjective:   Pt seen and examined this pm.   States he is feeling better today but complains about the food. Assessment & Plan:     1. Pulmonary edema:   -Likely due to acute on chronic diastolic CHF/fluid overload from CKD V.   -Pt is clinically improving. Trop x 3 negative  -Will continue aggressive IV diuresis  -Continue strict I's and O's and daily weights.    -Continue aspirin and statin.     -Cardiology consult appreciated. 2. Acute on chronic diastolic congestive heart failure exacerbation, New York Heart Association (NYHA) class 3:   -Continue mgt as above. 3. CKD V:    -Renal fxn is stable around baseline. -Appreciate Nephrology consult   -Avoid nephrotoxic medications. 4. Anemia:   -Likely due to CKD. Pt has no signs of bleeding.   -He is s/p transfusion of 1 unit PRBC but Hb is down from 7.8-->7.1  -Will repeat H/H now.   -Pt states he usually gets Procrit shot monthly but has not this month. 5. DM2:   -Poorly controlled. -Continue ADA diet and ISS. 6.  Hyperlipidemia:   -Continue Lipitor    7. Hypertension:   -BP is fair. Continue Norvasc, Labetalol and Clonidine.      Code status: Full code  DVT prophylaxis: Heparin    Care Plan discussed with: Patient/Family  Disposition: Home /[facility]  Date Reviewed: 4/24/2018          Codes Class Noted POA    * (Principal)CHF exacerbation ([facility] [address] ICD-10-CM: I50.9  ICD-9-CM: 428.0  4/24/2018 Unknown                Review of Systems:   Pertinent items are noted in HPI. Vital Signs:    Last 24hrs VS reviewed since prior progress note. Most recent are:  Visit Vitals    /83    Pulse 70    Temp 98.3 °F (36.8 °C)    Resp 21    Wt 146.6 kg (323 lb 3.1 oz)    SpO2 95%    BMI 41.5 kg/m2         Intake/Output Summary (Last 24 hours) at 04/25/18 1735  Last data filed at 04/25/18 1434   Gross per 24 hour   Intake            206.3 ml   Output             3500 ml   Net          -3293.7 ml        Physical Examination:     GENERAL:  Alert x3, awake, mildly distressed, pleasant male, appears to be stated age. HEENT:  Pupils equal and reactive to light. Dry mucous membranes. Tympanic membranes clear. NECK:  Supple. CHEST:  Decreased basal breath sounds, scattered crackles. HEART:  S1, S2 were heard. ABDOMEN:  Soft, nontender, nondistended. Bowel sounds are physiological.  EXTREMITIES:  No clubbing, no cyanosis. Bilateral BKA. NEUROPSYCHIATRIC:  Cranial nerves II-XII grossly intact. No focal deficits were noted. SKIN:  Warm.        Data Review:    Review and/or order of clinical lab test  Review and/or order of tests in the radiology section of [facility]      Labs:     Recent Labs      04/25/18   0407  04/24/18   1632   WBC  8.0  9.8   HGB  7.1*  7.8*   HCT  24.0*  26.0*   PLT  196  224     Recent Labs      04/25/18   0407  04/24/18   1632   NA  143  141   K  5.0  5.0   CL  117*  115*   CO2  17*  17*   BUN  54*  64*   CREA  4.53*  4.78*   GLU  105*  141*   CA  8.3*  8.1*     Recent Labs      04/25/18   0407  04/24/18   1632   SGOT  10*  19   ALT  20  23   AP  87  97   TBILI  0.7  0.6   TP  6.8  7.2   ALB  3.1*  3.3*   GLOB  3.7  3.9         Lab Results   Component Value Date/Time    Folate 5.9 03/09/2018 10:39 AM          Recent Labs      04/25/18   1523  04/25/18   0929  04/25/18   0407  04/24/18 2020 04/24/18   1632   CPK  412*  502*  509*  692*   < >   --    CKNDX   --    --    --    --    --   0.7   TROIQ   --   <0.04  <0.04  <0.04   --   <0.04    < > = values in this interval not displayed. Lab Results   Component Value Date/Time    Cholesterol, total 88 04/24/2018 10:00 PM    HDL Cholesterol 30 04/24/2018 10:00 PM    LDL, calculated 41.6 04/24/2018 10:00 PM    Triglyceride 82 04/24/2018 10:00 PM    CHOL/HDL Ratio 2.9 04/24/2018 10:00 PM     Lab Results   Component Value Date/Time    Glucose (POC) 122 (H) 04/25/2018 12:24 PM    Glucose (POC) 107 (H) 04/25/2018 06:41 AM    Glucose (POC) 111 (H) 04/25/2018 04:28 AM    Glucose (POC) 106 (H) 04/25/2018 12:32 AM    Glucose (POC) 288 (H) 03/11/2018 11:34 AM    Glucose  04/19/2018 03:00 PM    Glucose  03/14/2018 11:30 AM     Lab Results   Component Value Date/Time    Color YELLOW/STRAW 09/26/2017 06:16 PM    Appearance CLEAR 09/26/2017 06:16 PM    Specific gravity 1.015 09/26/2017 06:16 PM    pH (UA) 5.5 09/26/2017 06:16 PM    Protein >300 (A) 09/26/2017 06:16 PM    Glucose NEGATIVE  09/26/2017 06:16 PM    Ketone NEGATIVE  09/26/2017 06:16 PM    Bilirubin NEGATIVE  09/26/2017 06:16 PM    Urobilinogen 0.2 09/26/2017 06:16 PM    Nitrites NEGATIVE  09/26/2017 06:16 PM    Leukocyte Esterase NEGATIVE  09/26/2017 06:16 PM    Epithelial cells FEW 09/26/2017 06:16 PM    Bacteria 1+ (A) 09/26/2017 06:16 PM    WBC 0-4 09/26/2017 06:16 PM    RBC 0-5 09/26/2017 06:16 PM         ______________________________________________________________________  EXPECTED LENGTH OF STAY: 4d 12h  ACTUAL LENGTH OF STAY: 1 Day               Jina Fernandez MD

## 2018-04-26 NOTE — PROGRESS NOTES
Reason for Admission:   CHF exacerbation               RRAT Score:  28                Resources/supports as identified by patient/family:   Pt has a brother, Michael Atkins. Top Challenges facing patient (as identified by patient/family and CM): Finances/Medication cost?      No            Transportation? No              Support system or lack thereof? Pt has a brother, Michael Atkins. Living arrangements? Pt lives alone in an apartment           Self-care/ADLs/Cognition?   Pt has difficulties with ADL's          Current Advanced Directive/Advance Care Plan:  none                          Plan for utilizing home health:    TBD                      Likelihood of readmission:  High                 Transition of Care Plan:    Home

## 2018-04-26 NOTE — PROGRESS NOTES
CM met with pt to introduce him to the role of CM and transition of care. He verbalized understanding. He stated that he lives alone in an apartment. He stated that he has a difficult time with ADL's at times. Per pt , he has O2 from [facility]. (CM confirmed this with Maciej Mcintyre at [facility].. Pt has a concentrator and portable tanks). Pt stated that he has been to [address] [place] [address] twice, but has not used home health. This pt is a bilateral amputee and has 2 prosthetic legs. He walks with a walker. CM will follow for any d/c needs. [address] Management Interventions  PCP Verified by CM:  Yes  Palliative Care Criteria Met (RRAT>21 & CHF Dx)?: No  Transition of Care Consult (CM Consult): Discharge Planning  MyChart Signup: No  Discharge Durable Medical Equipment: No  Physical Therapy Consult: No  Occupational Therapy Consult: No  Speech Therapy Consult: No  Current Support Network: Lives Alone  Confirm Follow Up Transport: Family  Plan discussed with Pt/Family/Caregiver: Yes  Freedom of Choice Offered: Yes  Ramer Resource Information Provided?: No

## 2018-04-26 NOTE — PROGRESS NOTES
Primary Nurse Denver Kay, RN and Laurie Shea RN performed a dual skin assessment on this patient No impairment noted  Jacob score is 28.

## 2018-04-26 NOTE — PROGRESS NOTES
Bedside and Verbal shift change report given to Kate Garcia (oncoming nurse) by LEELEE Vázquez (offgoing nurse). Report included the following information SBAR, Kardex, Intake/Output, MAR and Cardiac Rhythm NSR.

## 2018-04-26 NOTE — PROGRESS NOTES
2300: patients blood pressure is 190/95.  2307: 10mg IV hydralazine given. 2326: blood pressure 173/76, will continue to monitor.

## 2018-04-26 NOTE — PROGRESS NOTES
Assumed care of pt this am. Pt is alert and oriented with no known needs at this time. Pt has rested in bed all shift with no needs at this time.

## 2018-04-26 NOTE — CONSULTS
[address]    Magdalena Haile  MR#: [id number]  : 1975  ACCOUNT #: [de-identified]   DATE OF SERVICE: 2018    REFERRING PHYSICIAN:  Cornelio Bhakta MD    REASON FOR CONSULTATION:  Management of a patient with advanced chronic kidney disease. HISTORY OF PRESENT ILLNESS:  The patient is well known to us, a 25-year-old -American man who has established diagnosis of stage IV chronic kidney disease due to diabetic nephropathy. Patient is an office patient of Dr. Huel Gosselin. He is poorly compliant with his office visits. He has not been seen since 2017. Patient is supposed to be on Procrit injections at our office, but he misses occasional doses. The patient has frequent hospitalizations, usually related to fluid overload. This time, he presented with shortness of breath and he was found to have significant hypervolemia. The patient is admitted for further management. When I saw him, the patient was deeply asleep, snoring loudly. I was not able to wake him up. Most likely he had received some pain medications. Most of the data is gathered from [facility] records. PAST MEDICAL HISTORY:  As outlined in history of present illness. Additionally, a positive PPD test, hyperlipidemia, hypertension, peripheral vascular disease, bilateral below-the-knee amputation. HOME MEDICATIONS:  Consist of Lasix 60 b.i.d., Robaxin 750 t.i.d., Zosyn, Humalog insulin, labetalol, clonidine, amlodipine, albuterol, Lyrica, oxycodone, amitriptyline, aspirin, Lipitor, Advair Diskus, hydralazine, multivitamins, Renvela, sodium bicarbonate and omeprazole. SOCIAL HISTORY:  Patient denies alcohol, tobacco or illicit drug abuse. He is disabled. 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 FAMILY HISTORY:  Mother with diabetes and hypertension. Brother with hypertension and diabetes.     REVIEW OF SYSTEMS:  Not obtained by me. PHYSICAL EXAMINATION:  GENERAL:  Middle-aged black man who looks chronically ill and pale. He is lying supine deeply asleep. VITAL SIGNS:  Blood pressure 164/85, heart rate is 65, temperature is 97.3. HEENT:  Head is normocephalic. Eyes are closed. Patient is breathing through his mouth. Ears and nose without abnormal discharge. NECK:  Supple. LUNGS:  Difficult to auscultate and assess due to loud snoring. HEART:  S1 and S2, regular rate and rhythm. ABDOMEN:  Obese, soft. EXTREMITIES:  With bilateral below-the-knee amputations. NEUROLOGIC:  Not done. LABORATORY DATA:  Hemoglobin 8, BUN 66, creatinine 4.4, which is the patient's baseline. Sodium is 139, potassium is 5, CO2 is 19. White blood count 9.3. IMPRESSION:   1. Chronic kidney disease stage IV with stable renal function and acceptable electrolytes. 2.  Hypervolemia, likely from noncompliance with outpatient diuretic regimen. 3.  Anemia of chronic kidney disease. Hemoglobin is below target. The patient misses some of his Procrit injections. 4.  Hypertension with suboptimally controlled blood pressure due to hypervolemia. RECOMMENDATIONS:    1. Continue aggressive diuresis. Consider tapering to oral doses. 2.  Continue anemia management. Obtain iron profile and start Procrit. 3.  Screening for secondary hyperparathyroidism with PTH. 4.  Monitor renal function. Patient's creatinine is expected to become higher with diuresis. 5.  Avoid nephrotoxic agents. Thank you very much for the opportunity to be part of this patient's care.       MD DEIDRA Steven / CN  D: 04/26/2018 11:12     T: 04/26/2018 11:37  JOB #: [id number]

## 2018-04-26 NOTE — CARDIO/PULMONARY
Cardiac Rehab: Heart Failure education folder, given to DEMANDIT. Visited to provide HF education as the Cardiac Rehab consult was placed by cardiology. DEMANDIT resting in bed and it is difficult to get him to answer questions. He denies knowing he has DHF. Kyle Srivastava He stated that the kidney doctor said it is due to his kidney disease. DEMANDIT does not recall education provided in December 2017 by myself nor receiving HF material. He fell asleep and education ended. Shila Holcomb RN

## 2018-04-26 NOTE — INTERDISCIPLINARY ROUNDS
IDR/SLIDR Summary          Patient: Javed Blake MRN: [id number]    Age: 39 y.o. YOB: 1975 Room/Bed: [facility]   Admit Diagnosis: CHF exacerbation ([place] [address].)  Principal Diagnosis: CHF exacerbation ([facility].)   Goals: home  Readmission: NO  Quality Measure: CHF  VTE Prophylaxis: Chemical  Influenza Vaccine screening completed? NO  Pneumococcal Vaccine screening completed? NO  Mobility needs: Yes   Nutrition plan:Yes  Consults: P. T and O.T. Financial concerns:No  Escalated to CM? YES  Testing due for pt today?  NO  LOS: 2 days Expected length of stay 4 days  Discharge plan: home   PCP: Declan Escobar MD  Transportation needs: No    Days before discharge:two or more days before discharge   Discharge disposition: Home    Signed:     Letty Muller RN  4/26/2018  4:31 AM

## 2018-04-26 NOTE — PROGRESS NOTES
Hospitalist Progress Note  Jina Fernandez MD  Office: [phone]        Date of Service:  2018  NAME:  Quique Hawkins  :  1975  MRN:  [id number]      Admission Summary:   41-year-old M with PMH of paroxysmal atrial fibrillation, asthma, CKD V, DM type 2, gout,  diastolic heart failure, NYHA class 2, hyperlipidemia, hypertension, positive PPD treated for 9 months and bilateral BKA admitted on 18 for acute on chronic diastolic CHF/Fluid overload. Pt was also noted to be anemic with Hb of 7.8. Since admission, pt has been started on IV Lasix bid. He was given 1 unit of PRBC overnight. Interval history / Subjective:   Pt seen and examined this pm. Sleeping when I came into the room. States he is feeling much better today. Assessment & Plan:     1. Acute hypoxic respiratory failure:   -In the setting of CHF/Pulmonary edema requiring supplemental oxygen/lasixPulmonary edema:   -Pt is clinically improving. Trop x 3 negative  -Will continue aggressive IV diuresis  -Continue strict I's and O's and daily weights.    -Continue aspirin and statin.     -Cardiology on board     2. Acute on chronic diastolic congestive heart failure exacerbation, New York Heart Association (NYHA) class 3:   -Continue mgt as above. 3. CKD V:    -Renal fxn is stable around baseline.   -Nephrology on board.  -Avoid nephrotoxic medications. 4. Anemia:   -Likely due to CKD. Pt has no signs of bleeding.   -He is s/p transfusion of 1 unit PRBC   -Hb is up to 8. Continue to monitor.   -Pt states he usually gets Procrit shot monthly but has not this month. 5. DM2:   -Poorly controlled. -Continue ADA diet and ISS. 6.  Hyperlipidemia:   -Continue Lipitor    7. Hypertension:   -BP is fair. Continue Norvasc, Labetalol and Clonidine. 8. Morbid obesity:   -BMI > 40. Will  on wgt loss.     Code status: Full code  DVT prophylaxis: Heparin    Care Plan discussed with: Patient/Family  Disposition: Home w/Family     Hospital Problems  Date Reviewed: 4/24/2018          Codes Class Noted POA    * (Principal)CHF exacerbation ([facility] ICD-10-CM: I50.9  ICD-9-CM: 428.0  4/24/2018 Unknown                Review of Systems:   Pertinent items are noted in HPI. Vital Signs:    Last 24hrs VS reviewed since prior progress note. Most recent are:  Visit Vitals    /84    Pulse 62    Temp 97.5 °F (36.4 °C)    Resp 16    Wt 139.8 kg (308 lb 3.3 oz)    SpO2 95%    BMI 39.57 kg/m2         Intake/Output Summary (Last 24 hours) at 04/26/18 1615  Last data filed at 04/26/18 0429   Gross per 24 hour   Intake                0 ml   Output             2500 ml   Net            -2500 ml        Physical Examination:     GENERAL:  Alert x3, awake, mildly distressed, pleasant male, appears to be stated age. HEENT:  Pupils equal and reactive to light. Dry mucous membranes. Tympanic membranes clear. NECK:  Supple. CHEST:  Decreased basal breath sounds, scattered crackles. HEART:  S1, S2 were heard. ABDOMEN:  Soft, nontender, nondistended. Bowel sounds are physiological.  EXTREMITIES:  No clubbing, no cyanosis. Bilateral BKA. NEUROPSYCHIATRIC:  Cranial nerves II-XII grossly intact. No focal deficits were noted. SKIN:  Warm.        Data Review:    Review and/or order of clinical lab test  Review and/or order of tests in the radiology section of [facility]      Labs:     Recent Labs      04/26/18   0629  04/25/18   2048  04/25/18   0407   WBC  9.3   --   8.0   HGB  8.0*  7.7*  7.1*   HCT  26.5*  25.1*  24.0*   PLT  213   --   196     Recent Labs      04/26/18   0629  04/25/18   0407  04/24/18   1632   NA  139  143  141   K  5.1  5.0  5.0   CL  111*  117*  115*   CO2  19*  17*  17*   BUN  66*  54*  64*   CREA  4.45*  4.53*  4.78*   GLU  170*  105*  141*   CA  8.9  8.3*  8.1*     Recent Labs      04/25/18   0407  04/24/18   1632   SGOT  10*  19   ALT  20  23   AP 87  97   TBILI  0.7  0.6   TP  6.8  7.2   ALB  3.1*  3.3*   GLOB  3.7  3.9         Lab Results   Component Value Date/Time    Folate 5.9 03/09/2018 10:39 AM          Recent Labs      04/26/18   0629  04/25/18 2049  04/25/18   1523  04/25/18   0929  04/25/18   0407  04/24/18 2020 04/24/18   1632   CPK  316*  341*  412*  502*  509*  692*   < >   --    CKNDX   --    --    --    --    --    --    --   0.7   TROIQ   --    --    --   <0.04  <0.04  <0.04   --   <0.04    < > = values in this interval not displayed. Lab Results   Component Value Date/Time    Cholesterol, total 88 04/24/2018 10:00 PM    HDL Cholesterol 30 04/24/2018 10:00 PM    LDL, calculated 41.6 04/24/2018 10:00 PM    Triglyceride 82 04/24/2018 10:00 PM    CHOL/HDL Ratio 2.9 04/24/2018 10:00 PM     Lab Results   Component Value Date/Time    Glucose (POC) 234 (H) 04/26/2018 11:47 AM    Glucose (POC) 255 (H) 04/26/2018 09:09 AM    Glucose (POC) 181 (H) 04/25/2018 10:57 PM    Glucose (POC) 122 (H) 04/25/2018 12:24 PM    Glucose (POC) 107 (H) 04/25/2018 06:41 AM     Lab Results   Component Value Date/Time    Color YELLOW/STRAW 09/26/2017 06:16 PM    Appearance CLEAR 09/26/2017 06:16 PM    Specific gravity 1.015 09/26/2017 06:16 PM    pH (UA) 5.5 09/26/2017 06:16 PM    Protein >300 (A) 09/26/2017 06:16 PM    Glucose NEGATIVE  09/26/2017 06:16 PM    Ketone NEGATIVE  09/26/2017 06:16 PM    Bilirubin NEGATIVE  09/26/2017 06:16 PM    Urobilinogen 0.2 09/26/2017 06:16 PM    Nitrites NEGATIVE  09/26/2017 06:16 PM    Leukocyte Esterase NEGATIVE  09/26/2017 06:16 PM    Epithelial cells FEW 09/26/2017 06:16 PM    Bacteria 1+ (A) 09/26/2017 06:16 PM    WBC 0-4 09/26/2017 06:16 PM    RBC 0-5 09/26/2017 06:16 PM         ______________________________________________________________________  EXPECTED LENGTH OF STAY: 4d 12h  ACTUAL LENGTH OF STAY: 2 Day               Jina Fernandez MD

## 2018-04-27 NOTE — PROGRESS NOTES
1945 - Bedside and Verbal shift change report given to madison dickey (oncoming nurse) by Onesimo Theodore (offgoing nurse). Report included the following information SBAR, Kardex, Intake/Output, MAR, Recent Results and Cardiac Rhythm NSR.   0730 - Bedside and Verbal shift change report given to efra garcia (oncoming nurse) by Mike Reed (offgoing nurse). Report included the following information SBAR, Kardex, Intake/Output, MAR, Recent Results and Cardiac Rhythm NSR.

## 2018-04-27 NOTE — PROGRESS NOTES
The patient was getting ready to leave the hospital when I arrived. He has been discharged. Mr. Fransico Hunt can be seen in 2 weeks for an office f/u visit.

## 2018-04-27 NOTE — DISCHARGE SUMMARY
Discharge Summary       PATIENT ID: Alissa Dubon  MRN: [id number]   YOB: 1975    DATE OF ADMISSION: 4/24/2018  3:43 PM    DATE OF DISCHARGE: 4/27/2018    PRIMARY CARE PROVIDER: Chandan Bernal MD     ATTENDING PHYSICIAN: Pancho Ball. MD Rebecca  DISCHARGING PROVIDER: Pancho Fernandez MD    To contact this individual call [phone] and ask the  to page. If unavailable ask to be transferred the Adult Hospitalist Department. CONSULTATIONS: IP CONSULT TO HOSPITALIST  IP CONSULT TO NEPHROLOGY  IP CONSULT TO CARDIOLOGY    PROCEDURES/SURGERIES: * No surgery found *    ADMITTING DIAGNOSES & HOSPITAL COURSE:     As per Dr Ayala Schaeffer H&P:     CHIEF COMPLAINT:  Shortness of breath.     HISTORY OF PRESENT ILLNESS:  Patient is a 79-year-old gentleman with history of paroxysmal atrial fibrillation, history of asthma, chronic kidney disease stage V, history of diabetes mellitus type 2, gout, history of diastolic heart failure, NYHA class 2, history of hyperlipidemia, hypertension, positive PPD treated for 9 months, history of bilateral below-knee amputations, who presented to the hospital with the above-mentioned symptoms. Patient reports that for the past 3-4 days he started getting progressively short of breath. Reports that he has been having mild cough and increased swelling in his thighs. The patient reports that he feels that he is retaining water, reports that today the shortness of breath got worse. He was struggling to breathe and called EMS. Per EMS, patient's oxygen saturations were 85% on room air and about 93% on 4 liters. The patient was brought to the hospital for further management and evaluation. The patient reports that he has been taking his medications on a regular basis.   Patient reports that he has not been able to follow up with the nephrologist.  The patient reports that he has been having some nausea associated with the symptoms, has been having increased exertional dyspnea where he is not able to walk much. The patient denies any chest pain associated with the symptoms. The patient denies any headache, blurry vision, sore throat, trouble swallowing, trouble with speech, denies any chest pain, abdominal pain, constipation, diarrhea, urinary symptoms, focal or generalized neurological weakness, recent travel, sick contacts, falls, injuries, hematemesis, melena, hemoptysis or any other concerns or problems. Patient reports that he has not followed up with his nephrologist because \"they never called me back. \"  Patient denies any other complaints or problems.     Pt was subsequently admitted to the hospitalist service and managed as below. DISCHARGE DIAGNOSES / PLAN:      1. Acute hypoxic respiratory failure:   -Pt was noted to have O2 sats as low as 85% prior to arrival in the ER on the day of admission.   -I suspect this is likely due to acute CHF/Pulmonary edema or fluid overload from advanced kidney disease.   -Pt was placed on a cardiac monitor and had serial trop x 3 which was negative  -He was started on aggressive IV diuresis with 80mg Lasix.   -Strict I's and O's and daily weights was closely monitored. -Pt was continued on Aspirin and statin.     -Cardiology was on board and agreed with our mgt. 2. Acute on chronic diastolic congestive heart failure exacerbation, New York Heart Association (NYHA) class 3:   -Pt was managed as above.      3. CKD V:    -Renal fxn was stable around baseline. Nephrology was on board during his stay. -Pt will need to be referred for HD access evaluation as pt is very close to HD.      4. Anemia:   -Likely due to CKD. Pt had no signs of bleeding.   -He is s/p transfusion of 1 unit PRBC   -Hb is up to 8.1 and remains stable.    -Pt states he usually gets Procrit shot monthly but has not this month. Was restarted on Epogen in the hospital.      5. DM2:   -BS was fairly controlled on ADA diet and ISS.    6.  Hyperlipidemia:   -Continued on Lipitor     7.  Hypertension:   -BP is fair. Pt was continued on Norvasc, Labetalol and Clonidine.      8. Morbid obesity:   -BMI > 40. Will  on wgt loss. PENDING TEST RESULTS:   At the time of discharge the following test results are still pending: None    FOLLOW UP APPOINTMENTS:    Follow-up Information     Follow up With Details Comments Contact Khushboo Borrego MD Schedule an appointment as soon as possible for a visit in 1 week  Naomi  [phone]             ADDITIONAL CARE RECOMMENDATIONS: None    DIET: Cardiac Diet and Diabetic Diet    ACTIVITY: Activity as tolerated    WOUND CARE: None    EQUIPMENT needed: None      DISCHARGE MEDICATIONS:  Current Discharge Medication List      CONTINUE these medications which have NOT CHANGED    Details   furosemide (LASIX) 40 mg tablet Take 60 mg by mouth two (2) times a day. methocarbamol (ROBAXIN) 750 mg tablet Take 750 mg by mouth three (3) times daily as needed. ondansetron (ZOFRAN ODT) 8 mg disintegrating tablet Take 8 mg by mouth every eight (8) hours as needed for Nausea. !! insulin lispro protamine/insulin lispro (HUMALOG MIX 75-25,U-100,INSULN) 100 unit/mL (75-25) injection 30 Units by SubCUTAneous route Daily (before breakfast). !! insulin lispro protamine/insulin lispro (HUMALOG MIX 75-25,U-100,INSULN) 100 unit/mL (75-25) injection 10 Units by SubCUTAneous route Daily (before dinner). albuterol (PROVENTIL HFA, VENTOLIN HFA, PROAIR HFA) 90 mcg/actuation inhaler Take 2 Puffs by inhalation every four (4) hours as needed for Wheezing. labetalol (NORMODYNE) 300 mg tablet Take 600 mg by mouth every eight (8) hours. cloNIDine HCl (CATAPRES) 0.3 mg tablet Take 1 Tab by mouth three (3) times daily. Qty: 90 Tab, Refills: 1      amLODIPine (NORVASC) 10 mg tablet Take 1 Tab by mouth daily.   Qty: 90 Tab, Refills: 0      albuterol (PROVENTIL VENTOLIN) 2.5 mg /3 mL (0.083 %) nebulizer solution 2.5 mg by Nebulization route three (3) times daily. RAYALDEE 30 mcg Cs24 1 Cap nightly. pregabalin (LYRICA) 75 mg capsule Take 75 mg by mouth three (3) times daily. oxyCODONE IR (OXY-IR) 15 mg immediate release tablet Take 15 mg by mouth every eight (8) hours as needed for Pain. amitriptyline (ELAVIL) 50 mg tablet TAKE ONE TABLET BY MOUTH EVERY EVENING  Qty: 30 Tab, Refills: 0      aspirin 81 mg chewable tablet Take 1 Tab by mouth daily. Qty: 30 Tab, Refills: 0      atorvastatin (LIPITOR) 40 mg tablet Take 1 Tab by mouth nightly. Qty: 30 Tab, Refills: 0      hydrALAZINE (APRESOLINE) 100 mg tablet TAKE ONE TABLET BY MOUTH THREE TIMES DAILY  Qty: 90 Tab, Refills: 0      multivitamin, tx-iron-ca-min (THERA-M W/ IRON) 9 mg iron-400 mcg tab tablet Take 1 Tab by mouth daily. Qty: 30 Tab, Refills: 0      sevelamer carbonate (RENVELA) 800 mg tab tab TAKE ONE TABLET BY MOUTH THREE TIMES DAILY WITH MEALS  Qty: 90 Tab, Refills: 0      sodium bicarbonate 650 mg tablet Take 1 Tab by mouth two (2) times a day. Qty: 60 Tab, Refills: 0      omeprazole (PRILOSEC) 40 mg capsule Take 1 Cap by mouth daily. Qty: 90 Cap, Refills: 3      fluticasone-salmeterol (ADVAIR) 250-50 mcg/dose diskus inhaler Take 1 Puff by inhalation daily. Qty: 1 Each, Refills: 0       !! - Potential duplicate medications found. Please discuss with provider. NOTIFY YOUR PHYSICIAN FOR ANY OF THE FOLLOWING:   Fever over 101 degrees for 24 hours. Chest pain, shortness of breath, fever, chills, nausea, vomiting, diarrhea, change in mentation, falling, weakness, bleeding. Severe pain or pain not relieved by medications. Or, any other signs or symptoms that you may have questions about.     DISPOSITION:  X  Home With: Family   OT  PT  HH  RN       Long term SNF/Inpatient Rehab    Independent/assisted living    Hospice    Other:       PATIENT CONDITION AT DISCHARGE:     Functional status    Poor     Deconditioned    X Independent      Cognition    X Lucid     Forgetful     Dementia      Catheters/lines (plus indication)    Sellers     PICC     PEG    X None      Code status   X  Full code     DNR      PHYSICAL EXAMINATION AT DISCHARGE:    GENERAL:  Alert x3, awake, mildly distressed, pleasant male, appears to be stated age. HEENT:  Pupils equal and reactive to light.  Dry mucous membranes.  Tympanic membranes clear. NECK:  Supple. CHEST:  CTA bilaterally  HEART:  S1, S2 were heard. ABDOMEN:  Soft, nontender, nondistended.  Bowel sounds are physiological.  EXTREMITIES:  No clubbing, no cyanosis.  Bilateral BKA. NEUROPSYCHIATRIC:  Cranial nerves II-XII grossly intact.  No focal deficits were noted. SKIN:  Warm.       CHRONIC MEDICAL DIAGNOSES:  Problem List as of 4/27/2018  Date Reviewed: 4/24/2018          Codes Class Noted - Resolved    * (Principal)CHF exacerbation ([facility] ICD-10-CM: I50.9  ICD-9-CM: 428.0  4/24/2018 - Present        Type 2 diabetes mellitus with diabetic neuropathy ([facility] ICD-10-CM: E11.40  ICD-9-CM: 250.60, 357.2  4/19/2018 - Present        Type 2 diabetes mellitus with nephropathy ([facility] ICD-10-CM: E11.21  ICD-9-CM: 250.40, 583.81  2/1/2018 - Present        Hypoxia ICD-10-CM: R09.02  ICD-9-CM: 799.02  10/21/2017 - Present        Acute pulmonary edema ([facility] ICD-10-CM: J81.0  ICD-9-CM: 518.4  9/27/2017 - Present        Anemia ICD-10-CM: D64.9  ICD-9-CM: 285.9  9/27/2017 - Present        Acute on chronic diastolic CHF (congestive heart failure) ([facility] ICD-10-CM: I50.33  ICD-9-CM: 428.33, 428.0  9/27/2017 - Present        Chronic renal failure, stage 4 (severe) (HCC) ICD-10-CM: N18.4  ICD-9-CM: 585.4  7/4/2017 - Present        Hypertensive urgency ICD-10-CM: I16.0  ICD-9-CM: 401.9  3/26/2017 - Present        Long term current use of anticoagulant ICD-10-CM: Z79.01  ICD-9-CM: V58.61  12/19/2016 - Present        CKD (chronic kidney disease) stage 3, GFR 30-59 ml/min ICD-10-CM: N18.3  ICD-9-CM: 585.3  12/18/2016 - Present        Accelerated hypertension with diastolic congestive heart failure, NYHA class 3 (HCC) (Chronic) ICD-10-CM: I11.0, I50.30  ICD-9-CM: 402.01, 428.30, 428.0  11/29/2016 - Present        Pulmonary edema ICD-10-CM: J81.1  ICD-9-CM: 185  11/7/2016 - Present        Heart failure ([facility] ICD-10-CM: I50.9  ICD-9-CM: 428.9  11/7/2016 - Present        Diabetic foot ulcer ([facility] ICD-10-CM: E11.621, L97.509  ICD-9-CM: 250.80, 707.15  3/15/2016 - Present        Septic arthritis of foot ([facility] ICD-10-CM: M00.9  ICD-9-CM: 711.07  3/5/2016 - Present        Osteomyelitis ([place] [address] ICD-10-CM: M86.9  ICD-9-CM: 730.20  3/4/2016 - Present        Acute diastolic CHF (congestive heart failure) (HCC) ICD-10-CM: I50.31  ICD-9-CM: 428.31, 428.0  3/2/2016 - Present        Pneumonia ICD-10-CM: J18.9  ICD-9-CM: 723  3/2/2016 - Present        Cellulitis and abscess of leg ICD-10-CM: L03.119, L02.419  ICD-9-CM: 682.6  3/2/2016 - Present        Atrial fibrillation ([facility] ICD-10-CM: I48.91  ICD-9-CM: 427.31  3/2/2016 - Present        HTN (hypertension) ICD-10-CM: I10  ICD-9-CM: 401.9  3/2/2016 - Present        Diabetic foot ulcer with osteomyelitis ([facility] ICD-10-CM: E11.621, E11.69, L97.509, M86.9  ICD-9-CM: 250.80, 707.15, 730.27, 731.8  2/20/2016 - Present        Symptomatic anemia ICD-10-CM: D64.9  ICD-9-CM: 285.9  1/9/2016 - Present        Elevated serum alkaline phosphatase level ICD-10-CM: R74.8  ICD-9-CM: 790.5  11/8/2015 - Present        Hyponatremia ICD-10-CM: E87.1  ICD-9-CM: 276.1  11/8/2015 - Present        Acute renal failure (ARF) ([facility] ICD-10-CM: N17.9  ICD-9-CM: 584.9  11/8/2015 - Present        Type 2 diabetes mellitus with right diabetic foot ulcer ([facility] ICD-10-CM: E11.621, L97.519  ICD-9-CM: 250.80, 707.15  11/8/2015 - Present        Iron deficiency anemia ICD-10-CM: D50.9  ICD-9-CM: 280.9  11/8/2015 - Present        Diabetic foot ulcer ([facility] ICD-10-CM: E11.621, L97.509  ICD-9-CM: 250.80, 707.15  7/19/2015 - Present        Acute diastolic heart failure ([facility] ICD-10-CM: I50.31  ICD-9-CM: 428.31  6/25/2015 - Present        Atrial fibrillation and flutter ([facility] ICD-10-CM: I48.91, I48.92  ICD-9-CM: 427.31, 427.32  6/24/2015 - Present        Hx of BKA ([place] [address].) ICD-10-CM: Z89.519  ICD-9-CM: V49.75  10/23/2014 - Present        Streptococcal infection(041.00) ICD-10-CM: A49.1  ICD-9-CM: 041.00  10/22/2014 - Present        Proteus infection ICD-10-CM: A49.8  ICD-9-CM: 041.6  10/22/2014 - Present        SIRS with acute organ dysfunction due to infectious process [facility]) ICD-10-CM: A41.9, R65.20  ICD-9-CM: 038.9, 995.92 Present on Admission 10/21/2014 - Present        Open wound of toe ICD-10-CM: S91.109A  ICD-9-CM: 893.0 Present on Admission 10/21/2014 - Present        Aortic regurgitation ICD-10-CM: I35.1  ICD-9-CM: 424.1 Chronic 10/21/2014 - Present    Overview Signed 10/21/2014 11:30 AM by Marek Martinez MD     Echo 2013              Renal failure, acute ([place] [address].) ICD-10-CM: N17.9  ICD-9-CM: 584.9 Present on Admission 10/21/2014 - Present        Gram-positive cocci bacteremia ICD-10-CM: R78.81  ICD-9-CM: 790.7, 041.89  10/21/2014 - Present        Cellulitis and abscess of leg ICD-10-CM: L03.119, L02.419  ICD-9-CM: 682.6  10/21/2014 - Present        History of positive PPD ICD-10-CM: R76.11  ICD-9-CM: 795.51  2/17/2013 - Present        Acute hypoxemic respiratory failure ([facility] ICD-10-CM: J96.01  ICD-9-CM: 518.81  2/16/2013 - Present        Malignant hypertension ICD-10-CM: I10  ICD-9-CM: 401.0  2/16/2013 - Present        Uncontrolled type II diabetes mellitus ([facility] ICD-10-CM: E11.65  ICD-9-CM: 250.02  2/16/2013 - Present        HTN (hypertension) ICD-10-CM: I10  ICD-9-CM: 401.9  1/19/2012 - Present        Morbid obesity with BMI of 40.0-44.9, adult [facility]) ICD-10-CM: E66.01, Z68.41  ICD-9-CM: 278.01, V85.41 Present on Admission 1/19/2012 - Present    Overview Addendum 10/21/2014 11:29 AM by Marek Martinez MD     Body mass index is 40.04 kg/(m^2). RESOLVED: Fluid overload ICD-10-CM: E87.70  ICD-9-CM: 276.69  3/9/2018 - 3/11/2018        RESOLVED: CHF (congestive heart failure) ([facility] ICD-10-CM: I50.9  ICD-9-CM: 428.0  3/26/2017 - 12/15/2017        RESOLVED: Leg ulcer ([facility] ICD-10-CM: L97.909  ICD-9-CM: 707.10  3/5/2016 - 4/19/2016        RESOLVED: Respiratory failure with hypoxia ([facility] ICD-10-CM: J96.91  ICD-9-CM: 518.81  2/14/2016 - 2/26/2016        RESOLVED: Acute respiratory failure with hypoxemia (HCC) ICD-10-CM: J96.01  ICD-9-CM: 518.81  11/8/2015 - 11/18/2015        RESOLVED: Severe sepsis ([facility] ICD-10-CM: A41.9, R65.20  ICD-9-CM: 038.9, 995.92  11/8/2015 - 11/18/2015        RESOLVED: Obstructive uropathy ICD-10-CM: N13.9  ICD-9-CM: 599.60  11/8/2015 - 11/18/2015        RESOLVED: Pneumonia ICD-10-CM: J18.9  ICD-9-CM: 917  11/5/2015 - 11/18/2015        RESOLVED: A-fib ([facility] ICD-10-CM: I48.91  ICD-9-CM: 427.31  6/25/2015 - 4/19/2016        RESOLVED: Gangrene ([facility] ICD-10-CM: D91  ICD-9-CM: 785.4  10/23/2014 - 4/19/2016        RESOLVED: Sepsis ([facility] ICD-10-CM: A41.9  ICD-9-CM: 038.9, 995.91  10/23/2014 - 4/19/2016        RESOLVED: Osteomyelitis of foot ([facility] ICD-10-CM: M86.9  ICD-9-CM: 730.27  10/22/2014 - 4/19/2016    Overview Signed 10/22/2014  1:29 PM by Hazel Beaver MD     IMPRESSION:   1. Osteomyelitis of the first and second distal phalanges. 2. Cellulitis and myositis. 3. Phlegmon deep to the dorsal skin breakdown superficial to the distal   second metatarsal. No drainable abscess.                RESOLVED: Type 2 diabetes mellitus with left diabetic foot ulcer ([facility] ICD-10-CM: E11.621, M96.568  ICD-9-CM: 250.80, 707.15 Present on Admission 10/21/2014 - 4/19/2016        RESOLVED: Sepsis ([facility] ICD-10-CM: A41.9  ICD-9-CM: 038.9, 995.91  10/21/2014 - 10/22/2014        RESOLVED: DKA (diabetic ketoacidoses) ([facility] ICD-10-CM: E13.10  ICD-9-CM: 250.10  10/20/2014 - 4/19/2016        RESOLVED: Pneumonia ICD-10-CM: J18.9  ICD-9-CM: 953  8/7/2013 - 10/21/2014        RESOLVED: Sepsis ICD-10-CM: A41.9  ICD-9-CM: 995.91  2/17/2013 - 2/26/2016        RESOLVED: Rhabdomyolysis ICD-10-CM: M62.82  ICD-9-CM: 728.88  2/17/2013 - 10/21/2014        RESOLVED: Pneumonia, organism unspecified(486) ICD-10-CM: J18.9  ICD-9-CM: 980  2/16/2013 - 10/21/2014              Greater than 30 minutes were spent with the patient on counseling and coordination of care    Signed:   Melba Fernandez MD  4/27/2018  11:07 AM

## 2018-04-27 NOTE — PROGRESS NOTES
Problem: Falls - Risk of  Goal: *Absence of Falls  Document Tonia Fall Risk and appropriate interventions in the flowsheet.    Outcome: Progressing Towards Goal  Fall Risk Interventions:  Mobility Interventions: Bed/chair exit alarm         Medication Interventions: Patient to call before getting OOB    Elimination Interventions: Call light in reach, Patient to call for help with toileting needs

## 2018-04-27 NOTE — PROGRESS NOTES
Problem: Pressure Injury - Risk of  Goal: *Prevention of pressure injury  Document Jacob Scale and appropriate interventions in the flowsheet. Outcome: Progressing Towards Goal  Pressure Injury Interventions:             Activity Interventions: Increase time out of bed, Pressure redistribution bed/mattress(bed type)    Mobility Interventions: HOB 30 degrees or less, Pressure redistribution bed/mattress (bed type)    Nutrition Interventions: Document food/fluid/supplement intake

## 2018-04-27 NOTE — DIABETES MGMT
Progress Note     Chart reviewed for elevated blood glucose ( > 180 mg/dL x 2 in the past 24 hours) . Recommendations/ Comments: If appropriate, please consider the followin. Adding diabetic restrictions to current diet order  2. Adding Lantus 7 units to substitute for pt's home basal insulin     Morning glucose: 194 mg/dL (per am POCT Glucose). Required 10 units of correction in the last 24 hours, including 1 missed dose. Inpatient medications for glucose management:  1. Correction Scale: Lispro (Humalog) Normal Sensitivity scale to cover for glucose > 139 mg/dL before meals and for glucose >199 at bedtime      POC Glucose last 24hrs:   Lab Results   Component Value Date/Time     (H) 2018 03:31 AM    GLUCPOC 194 (H) 2018 07:55 AM    GLUCPOC 218 (H) 2018 09:29 PM    GLUCPOC 218 (H) 2018 04:55 PM        Estimated Creatinine Clearance: 31.3 mL/min (based on Cr of 4.5). Diet order:   Active Orders   Diet    DIET CARDIAC Regular        PO intake: No data found. History of Diabetes:   Jana Pandya is a 39 y.o. male with a past medical history significant for DM per  Isabel Rojas MD's H&P dated 2018. Prior to admission medications for glucose management: per past medical records  -Humalog Mix 70/30 - 30 units before breakfast; 10 units before dinner    A1C:   Lab Results   Component Value Date/Time    Hemoglobin A1c 7.4 (H) 2018 04:32 PM    Hemoglobin A1c 10.6 (H) 03/10/2018 04:29 AM       Reference range*:  Increased risk for diabetes: 5.7 - 6.4%  Diabetes: >6.4%  Glycemic control for adults with diabetes: <7.0 %    *TIARA TORO (2014). Diagnosis and classification of diabetes mellitus. Diabetes care, 37, S81. Thank you. Parish Vincent MPH, RN, BSN, [address]   [phone]    -For most hospitalized persons with hyperglycemia in the intensive care unit (ICU), a glucose range of 140 to 180 mg/dL is recommended, provided this target can be safely achieved. *  - For general medicine and surgery patients in non-ICU settings, a premeal glucose target <140 mg/dL and a random blood glucose <180 mg/dL are recommended. *    *based on Frutoso Dari Shanks, Ricardo Larson, [id number], ... Noa Holm. Neal Been (2018) CONSENSUS STATEMENT BY THE AMERICAN ASSOCIATION OF CLINICAL ENDOCRINOLOGISTS AND AMERICAN COLLEGE OF ENDOCRINOLOGY ON THE COMPREHENSIVE TYPE 2 DIABETES MANAGEMENT ALGORITHM  2018 EXECUTIVE SUMMARY.  [facility]: January 2018, Vol. 24, No. 1, pp. .  [email or web address]

## 2018-04-27 NOTE — PROGRESS NOTES
Assumed care of pt this am. Pt is alert and oriented resting in bed at this time. Pt has been given all discharge instructions with no questions at this time. Pt stated that his ride had his portable tank. Pt ride showed up and stated o2 tank in car. Pt wheeled down, no 02 tank in car. Pt notified he must have 02 to safely be discharged. Pt refused to be wheeled back to floor and left without oxygen.

## 2018-04-27 NOTE — PROGRESS NOTES
Pharmacist Discharge Medication Reconciliation    Discharging Provider: Dr. Danny Arias PMH:   Past Medical History:   Diagnosis Date    Arrhythmia     afib    Asthma     3/2013 last attack    CKD (chronic kidney disease), stage III 2/12/2013    Nephrology: Addie Jones MD    Diabetes [facility])     Gout     [facility] failure ([facility]     Hyperlipidemia     Hypertension     Ill-defined condition     Positive PPD, treated 2001    treated 9 months     Chief Complaint for this Admission:   Chief Complaint   Patient presents with    Shortness of Breath     Allergies: Ace inhibitors and Arb-angiotensin receptor antagonist    Discharge Medications:   Current Discharge Medication List        CONTINUE these medications which have NOT CHANGED    Details   furosemide (LASIX) 40 mg tablet Take 60 mg by mouth two (2) times a day. methocarbamol (ROBAXIN) 750 mg tablet Take 750 mg by mouth three (3) times daily as needed. ondansetron (ZOFRAN ODT) 8 mg disintegrating tablet Take 8 mg by mouth every eight (8) hours as needed for Nausea. !! insulin lispro protamine/insulin lispro (HUMALOG MIX 75-25,U-100,INSULN) 100 unit/mL (75-25) injection 30 Units by SubCUTAneous route Daily (before breakfast). !! insulin lispro protamine/insulin lispro (HUMALOG MIX 75-25,U-100,INSULN) 100 unit/mL (75-25) injection 10 Units by SubCUTAneous route Daily (before dinner). albuterol (PROVENTIL HFA, VENTOLIN HFA, PROAIR HFA) 90 mcg/actuation inhaler Take 2 Puffs by inhalation every four (4) hours as needed for Wheezing. labetalol (NORMODYNE) 300 mg tablet Take 600 mg by mouth every eight (8) hours. cloNIDine HCl (CATAPRES) 0.3 mg tablet Take 1 Tab by mouth three (3) times daily. Qty: 90 Tab, Refills: 1      amLODIPine (NORVASC) 10 mg tablet Take 1 Tab by mouth daily.   Qty: 90 Tab, Refills: 0      albuterol (PROVENTIL VENTOLIN) 2.5 mg /3 mL (0.083 %) nebulizer solution 2.5 mg by Nebulization route three (3) times daily. RAYALDEE 30 mcg Cs24 1 Cap nightly. pregabalin (LYRICA) 75 mg capsule Take 75 mg by mouth three (3) times daily. oxyCODONE IR (OXY-IR) 15 mg immediate release tablet Take 15 mg by mouth every eight (8) hours as needed for Pain. amitriptyline (ELAVIL) 50 mg tablet TAKE ONE TABLET BY MOUTH EVERY EVENING  Qty: 30 Tab, Refills: 0      aspirin 81 mg chewable tablet Take 1 Tab by mouth daily. Qty: 30 Tab, Refills: 0      atorvastatin (LIPITOR) 40 mg tablet Take 1 Tab by mouth nightly. Qty: 30 Tab, Refills: 0      hydrALAZINE (APRESOLINE) 100 mg tablet TAKE ONE TABLET BY MOUTH THREE TIMES DAILY  Qty: 90 Tab, Refills: 0      multivitamin, tx-iron-ca-min (THERA-M W/ IRON) 9 mg iron-400 mcg tab tablet Take 1 Tab by mouth daily. Qty: 30 Tab, Refills: 0      sevelamer carbonate (RENVELA) 800 mg tab tab TAKE ONE TABLET BY MOUTH THREE TIMES DAILY WITH MEALS  Qty: 90 Tab, Refills: 0      sodium bicarbonate 650 mg tablet Take 1 Tab by mouth two (2) times a day. Qty: 60 Tab, Refills: 0      omeprazole (PRILOSEC) 40 mg capsule Take 1 Cap by mouth daily. Qty: 90 Cap, Refills: 3      fluticasone-salmeterol (ADVAIR) 250-50 mcg/dose diskus inhaler Take 1 Puff by inhalation daily. Qty: 1 Each, Refills: 0       !! - Potential duplicate medications found. Please discuss with provider. The patient's chart, MAR and AVS were reviewed by Kaylen Menendez.

## 2018-04-27 NOTE — DISCHARGE INSTRUCTIONS
Discharge Instructions       PATIENT ID: Luis Miguel Espinoza  MRN: [id number]   YOB: 1975    DATE OF ADMISSION: 4/24/2018  3:43 PM    DATE OF DISCHARGE: 4/27/2018    PRIMARY CARE PROVIDER: Colby Caal MD     ATTENDING PHYSICIAN: Lou Fernandez MD*  DISCHARGING PROVIDER: Lou Fernandez MD    To contact this individual call [phone] and ask the  to page. If unavailable ask to be transferred the Adult Hospitalist Department. DISCHARGE DIAGNOSES:   1. Acute hypoxic respiratory failure   2. Acute on chronic CHF, NYHA, Class 3.  3. CKD V   4. Anemia   5. DM2   6.  Hyperlipidemia   7.  Hypertension   8. Morbid obesity    CONSULTATIONS: IP CONSULT TO HOSPITALIST  IP CONSULT TO NEPHROLOGY  IP CONSULT TO CARDIOLOGY    PROCEDURES/SURGERIES: * No surgery found *    PENDING TEST RESULTS:   At the time of discharge the following test results are still pending: None    FOLLOW UP APPOINTMENTS:   Follow-up Information     Follow up With Details Comments Contact Info    Colby Caal MD Schedule an appointment as soon as possible for a visit in 1 week  [facility]  [phone]             ADDITIONAL CARE RECOMMENDATIONS: None    DIET: Cardiac Diet and Diabetic Diet    ACTIVITY: Activity as tolerated    WOUND CARE: None    EQUIPMENT needed: None      DISCHARGE MEDICATIONS:   See Medication Reconciliation Form    · It is important that you take the medication exactly as they are prescribed. · Keep your medication in the bottles provided by the pharmacist and keep a list of the medication names, dosages, and times to be taken in your wallet. · Do not take other medications without consulting your doctor. NOTIFY YOUR PHYSICIAN FOR ANY OF THE FOLLOWING:   Fever over 101 degrees for 24 hours. Chest pain, shortness of breath, fever, chills, nausea, vomiting, diarrhea, change in mentation, falling, weakness, bleeding.  Severe pain or pain not relieved by medications. Or, any other signs or symptoms that you may have questions about. DISPOSITION:  X  Home With: Family   OT  PT  HH  RN       SNF/Inpatient Rehab/LTAC    Independent/assisted living    Hospice    Other:     CDMP Checked: Yes X     PROBLEM LIST Updated: Yes X       Signed:   Rita Patel.  MD Rebecca  4/27/2018  11:02 AM

## 2018-04-27 NOTE — PROGRESS NOTES
CM obtained hospital to home order and sent it to [facility]PAT[facility]. Pt is being discharged today and will need transport via ambulance to home. CM called [facility] ([phone]) to set up ambulance transport for 2pm today. The reference number is [id number].  Brett Muro

## 2018-04-27 NOTE — PROGRESS NOTES
CM was notified that pt's sister picked him up so he won't need transportation to home. NAINA called [facility] to cancel this transport.  Jose Cerna

## 2018-04-27 NOTE — NURSE NAVIGATOR
Follow up scheduled with primary care for 5/2/18 at 10 am.  Information placed on After Visit Summary. Noted that AVS has already been printed. Called unit to speak with nurse. Unable to speak to patient's nurse Malika Fang. Called back and was able to speak to charge nurse and requested she reprinted or let Malika Fang know to reprint the AVS or it would be a variance.

## 2018-05-02 NOTE — MR AVS SNAPSHOT
[address] [id number] 
[phone] Patient: Micki Crigler MRN: [id number] EBM:[id number]/[id number] Visit Information Date & Time Provider Department Dept. Phone Encounter #  
 5/2/2018 10:00 AM MD Michael Ornelas  - [facility] [phone] [id number] Follow-up Instructions Return in about 1 week (around 5/9/2018), or if symptoms worsen or fail to improve. Your Appointments 5/17/2018  2:00 PM  
ROUTINE CARE with Brenden Caldera MD  
[facility] - [facility] Yu ([facility]) Appt Note: 4 week fu  
 [address] [place] [address]  
[phone]  
  
   
 [address] [place] [address] Upcoming Health Maintenance Date Due MICROALBUMIN Q1 8/28/2016 Pneumococcal 19-64 Highest Risk (2 of 3 - PCV13) 10/14/2016 EYE EXAM RETINAL OR DILATED Q1 4/13/2017 FOOT EXAM Q1 12/6/2017 DTaP/Tdap/Td series (1 - Tdap) 8/23/2018* Influenza Age 5 to Adult 8/1/2018 HEMOGLOBIN A1C Q6M 10/24/2018 LIPID PANEL Q1 4/24/2019 *Topic was postponed. The date shown is not the original due date. Allergies as of 5/2/2018  Review Complete On: 4/27/2018 By: Wing Dillon Severity Noted Reaction Type Reactions Ace Inhibitors  03/02/2016    Cough AND CKD/SD Arb-angiotensin Receptor Antagonist  12/14/2017    Other (comments) CKD/SD, Current Immunizations  Reviewed on 12/13/2017 Name Date Influenza Vaccine 8/31/2017, 11/6/2014  4:23 PM  
 Influenza Vaccine (Quad) PF 12/19/2016 11:33 AM  
 Influenza Vaccine Intradermal PF 10/14/2015 Influenza Vaccine PF 2/17/2013  1:11 PM  
 Pneumococcal Polysaccharide (PPSV-23) 10/14/2015,  Deferred (Patient Refused) Not reviewed this visit You Were Diagnosed With   
  
 Codes Comments Anemia, chronic disease    -  Primary ICD-10-CM: D63.8 ICD-9-CM: 285.29 Essential hypertension     ICD-10-CM: I10 
ICD-9-CM: 401.9 Type 2 diabetes mellitus with diabetic neuropathy, with long-term current use of insulin ([facility])     ICD-10-CM: E11.40, Z79.4 ICD-9-CM: 250.60, 357.2, V58.67 Morbid obesity with BMI of 40.0-44.9, adult ([facility])     ICD-10-CM: E66.01, Z68.41 
ICD-9-CM: 278.01, V85.41 Anemia in chronic kidney disease, unspecified CKD stage     ICD-10-CM: N18.9, D63.1 ICD-9-CM: 285.21 CKD (chronic kidney disease) stage 4, GFR 15-29 ml/min ([facility])     ICD-10-CM: N18.4 ICD-9-CM: 446. 4 Vitals BP Pulse Temp Resp Height(growth percentile) Weight(growth percentile) 140/76 79 98 °F (36.7 °C) (Oral) 18 6' 2\" (1.88 m) 315 lb (142.9 kg) SpO2 BMI Smoking Status 92% 40.44 kg/m2 Never Smoker BMI and BSA Data Body Mass Index Body Surface Area 40.44 kg/m 2 2.73 m 2 Preferred Pharmacy Pharm[place] [facility] [place] [address] [address] [facility] [phone] Your Updated Medication List  
  
   
This list is accurate as of 5/2/18 11:25 AM.  Always use your most recent med list.  
  
  
  
  
 * albuterol 90 mcg/actuation inhaler Commonly known as:  PROVENTIL HFA, VENTOLIN HFA, PROAIR HFA Take 2 Puffs by inhalation every four (4) hours as needed for Wheezing. * albuterol 2.5 mg /3 mL (0.083 %) nebulizer solution Commonly known as:  PROVENTIL VENTOLIN  
2.5 mg by Nebulization route three (3) times daily. amitriptyline 50 mg tablet Commonly known as:  ELAVIL TAKE ONE TABLET BY MOUTH EVERY EVENING  
  
 amLODIPine 10 mg tablet Commonly known as:  Radha Shield Take 1 Tab by mouth daily. aspirin 81 mg chewable tablet Take 1 Tab by mouth daily. atorvastatin 40 mg tablet Commonly known as:  LIPITOR Take 1 Tab by mouth nightly. cloNIDine HCl 0.3 mg tablet Commonly known as:  CATAPRES Take 1 Tab by mouth three (3) times daily. fluticasone-salmeterol 250-50 mcg/dose diskus inhaler Commonly known as:  ADVAIR Take 1 Puff by inhalation daily. furosemide 40 mg tablet Commonly known as:  LASIX Take 1.5 Tabs by mouth two (2) times a day. * HumaLOG Mix 75-25(U-100)Insuln 100 unit/mL (75-25) injection Generic drug:  insulin lispro protamine/insulin lispro 30 Units by SubCUTAneous route Daily (before breakfast). * HumaLOG Mix 75-25(U-100)Insuln 100 unit/mL (75-25) injection Generic drug:  insulin lispro protamine/insulin lispro 10 Units by SubCUTAneous route Daily (before dinner). hydrALAZINE 100 mg tablet Commonly known as:  APRESOLINE  
TAKE ONE TABLET BY MOUTH THREE TIMES DAILY  
  
 labetalol 300 mg tablet Commonly known as:  Marlene Day Take 600 mg by mouth every eight (8) hours. LYRICA 75 mg capsule Generic drug:  pregabalin Take 75 mg by mouth three (3) times daily. methocarbamol 750 mg tablet Commonly known as:  ROBAXIN Take 750 mg by mouth three (3) times daily as needed. multivitamin, tx-iron-ca-min 9 mg iron-400 mcg Tab tablet Commonly known as:  THERA-M w/ IRON Take 1 Tab by mouth daily. omeprazole 40 mg capsule Commonly known as:  PRILOSEC Take 1 Cap by mouth daily. ondansetron 8 mg disintegrating tablet Commonly known as:  ZOFRAN ODT Take 8 mg by mouth every eight (8) hours as needed for Nausea. oxyCODONE IR 15 mg immediate release tablet Commonly known as:  OXY-IR Take 15 mg by mouth every eight (8) hours as needed for Pain. RAYALDEE 30 mcg Cs24 Generic drug:  calcifediol 1 Cap nightly. sevelamer carbonate 800 mg Tab tab Commonly known as:  Dimple Ashford TAKE ONE TABLET BY MOUTH THREE TIMES DAILY WITH MEALS  
  
 sodium bicarbonate 650 mg tablet Take 1 Tab by mouth two (2) times a day. * Notice:   This list has 4 medication(s) that are the same as other medications prescribed for you. Read the directions carefully, and ask your doctor or other care provider to review them with you. Prescriptions Sent to Pharmacy Refills  
 furosemide (LASIX) 40 mg tablet 12 Sig: Take 1.5 Tabs by mouth two (2) times a day. Class: Normal  
 Pharmacy: [address] Ph #: [phone] Route: Oral  
  
We Performed the Following CBC W/O DIFF [31386 CPT(R)] Follow-up Instructions Return in about 1 week (around 2018), or if symptoms worsen or fail to improve. To-Do List   
 2018 To Be Determined Appointment with Lee Ramirez RN at [place] [address] Patient Instructions MyChart Activation Thank you for requesting access to [employer]. Please follow the instructions below to securely access and download your online medical record. [employer] allows you to send messages to your doctor, view your test results, renew your prescriptions, schedule appointments, and more. How Do I Sign Up? 1. In your internet browser, go to [email or web address] 
2. Click on the First Time User? Click Here link in the Sign In box. You will be redirect to the New Member Sign Up page. 3. Enter your [employer] Access Code exactly as it appears below. You will not need to use this code after youve completed the sign-up process. If you do not sign up before the expiration date, you must request a new code. [employer] Access Code: [email or web address] Expires: 2018  2:46 PM (This is the date your [employer] access code will ) 4. Enter the last four digits of your Social Security Number (xxxx) and Date of Birth (mm/dd/yyyy) as indicated and click Submit. You will be taken to the next sign-up page. 5. Create a [employer] ID. This will be your [employer] login ID and cannot be changed, so think of one that is secure and easy to remember. 6. Create a [employer] password. You can change your password at any time. 7. Enter your Password Reset Question and Answer. This can be used at a later time if you forget your password. 8. Enter your e-mail address. You will receive e-mail notification when new information is available in [address] 9. Click Sign Up. You can now view and download portions of your medical record. 10. Click the Download Summary menu link to download a portable copy of your medical information. Additional Information If you have questions, please visit the Frequently Asked Questions section of the [employer] website at [email or web address] Remember, [employer] is NOT to be used for urgent needs. For medical emergencies, dial 911. Introducing [facility] SERVICES! Sanchez Soler introduces [employer] patient portal. Now you can access parts of your medical record, email your doctor's office, and request medication refills online. 1. In your internet browser, go to [email or web address] 2. Click on the First Time User? Click Here link in the Sign In box. You will see the New Member Sign Up page. 3. Enter your [employer] Access Code exactly as it appears below. You will not need to use this code after youve completed the sign-up process. If you do not sign up before the expiration date, you must request a new code. · [employer] Access Code: [email or web address] Expires: 7/16/2018  2:46 PM 
 
4. Enter the last four digits of your Social Security Number (xxxx) and Date of Birth (mm/dd/yyyy) as indicated and click Submit. You will be taken to the next sign-up page. 5. Create a [employer] ID. This will be your [employer] login ID and cannot be changed, so think of one that is secure and easy to remember. 6. Create a [employer] password. You can change your password at any time. 7. Enter your Password Reset Question and Answer. This can be used at a later time if you forget your password. 8. Enter your e-mail address. You will receive e-mail notification when new information is available in [address] 9. Click Sign Up. You can now view and download portions of your medical record. 10. Click the Download Summary menu link to download a portable copy of your medical information. If you have questions, please visit the Frequently Asked Questions section of the [employer] website. Remember, [employer] is NOT to be used for urgent needs. For medical emergencies, dial 911. Now available from your iPhone and Android! Please provide this summary of care documentation to your next provider. Your primary care clinician is listed as Yolanda Dey. If you have any questions after today's visit, please call [phone].

## 2018-05-02 NOTE — PROGRESS NOTES
1. Have you been to the ER, urgent care clinic since your last visit? Hospitalized since your last visit? yes;CHF;[facility]    2. Have you seen or consulted any other health care providers outside of the [address] [place] [address] since your last visit? Include any pap smears or colon screening.  NO

## 2018-05-02 NOTE — PROGRESS NOTES
Lillie Dinh is a 39 y.o. male and presents with Hospital Follow Up and CHF  . Subjective:      Congestive Heart Failure Review:  Patient presents for evaluation of congestive heart failure. Onset was  days ago, with unstable course since that time. Patient currently complains of no dyspnea, fatigue, orthopnea. Patient states they are compliant most of the time with their medications. Patient states they are compliant most of the time with their diet. he was recently seen in the hospital for acute heart failure treated and released. felt it was secondary to anemia. Diabetes Mellitus Review:  He has diabetes mellitus. Diabetic ROS - medication compliance: compliant all of the time, diabetic diet compliance: compliant all of the time, home glucose monitoring: is performed. Known diabetic complications: none  Cardiovascular risk factors: family history, dyslipidemia, diabetes mellitus, obesity, hypertension  Current diabetic medications include /insulin   Eye exam current (within one year): no  Weight trend: stable  Prior visit with dietician: no  Current diet: \"healthy\" diet  in general  Current exercise: walking  Current monitoring regimen: home blood tests - daily  Home blood sugar records: trend: stable  Any episodes of hypoglycemia? no  Is He on ACE inhibitor or angiotensin II receptor blocker? Yes     Anemia review:  Patient presents for follow up  evaluation of an anemia. Anemia was found by routine CBC. It had been present for several months. Associated signs & symptoms:fatigue  The  most recent studies were improved,he is to receive procrit once a month. Chronic Renal Disease Review:  HPI: Lillie Dinh, ([id number]) is a 39 y.o. 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 te patient  is followed by renal  The patient has experienced the following symptoms: no problems   The patient has not experienced any of the following symptoms: orthopnea   Prescribed diet is NICKY   The following actions have been prescribed for slowing the   progression of CRF: cardiovascular risk factor reduction including male gender, hypertension   The patientis not  on dialysis          Review of Systems  Constitutional: negative for fevers, chills, anorexia and weight loss  Eyes:   negative for visual disturbance and irritation  ENT:   negative for tinnitus,sore throat,nasal congestion,ear pains. hoarseness  Respiratory:  negative for cough, hemoptysis, dyspnea,wheezing  CV:   negative for chest pain, palpitations, lower extremity edema  GI:   negative for nausea, vomiting, diarrhea, abdominal pain,melena  Endo:               negative for polyuria,polydipsia,polyphagia,heat intolerance  Genitourinary: negative for frequency, dysuria and hematuria  Integument:  negative for rash and pruritus  Hematologic:  negative for easy bruising and gum/nose bleeding  Musculoskel: negative for myalgias, arthralgias, back pain, muscle weakness, joint pain  Neurological:  negative for headaches, dizziness, vertigo, memory problems and gait   Behavl/Psych: negative for feelings of anxiety, depression, mood changes    Past Medical History:   Diagnosis Date    Arrhythmia     afib    Asthma     3/2013 last attack    CKD (chronic kidney disease), stage III 2/12/2013    Nephrology: Jone Miller MD    Diabetes [facility])     Gout     Heart failure ([facility] [address]     Hyperlipidemia     Hypertension     Ill-defined condition     Positive PPD, treated 2001    treated 9 months     Past Surgical History:   Procedure Laterality Date    HX BELOW KNEE AMPUTATION Left     due to osteomyelitis    HX HEENT      tonsilectomy    HX HERNIA REPAIR      HX OTHER SURGICAL      below the knee right amputation    HX TONSILLECTOMY       Social History     Social History    Marital status:      Spouse name: N/A    Number of children: N/A    Years of education: N/A     Social History Main Topics    Smoking status: Never Smoker    Smokeless tobacco: Never Used    Alcohol use No    Drug use: No    Sexual activity: Not Currently     Partners: Male     Other Topics Concern    None     Social History Narrative         Per conversation 03/26/17    He would like his new Medical  power of  to be his Brother Concha Mc. (Previously was his Mother Ronnell Keene)        Never smoker     Family History   Problem Relation Age of Onset    Diabetes Mother     Hypertension Mother     Hypertension Brother     Diabetes Brother     Diabetes Maternal Grandmother     Hypertension Maternal Grandmother     Diabetes Other     Hypertension Other     Heart Disease Other      Current Outpatient Prescriptions   Medication Sig Dispense Refill    furosemide (LASIX) 40 mg tablet Take 1.5 Tabs by mouth two (2) times a day. 60 Tab 12    methocarbamol (ROBAXIN) 750 mg tablet Take 750 mg by mouth three (3) times daily as needed.  ondansetron (ZOFRAN ODT) 8 mg disintegrating tablet Take 8 mg by mouth every eight (8) hours as needed for Nausea.  insulin lispro protamine/insulin lispro (HUMALOG MIX 75-25,U-100,INSULN) 100 unit/mL (75-25) injection 30 Units by SubCUTAneous route Daily (before breakfast).  insulin lispro protamine/insulin lispro (HUMALOG MIX 75-25,U-100,INSULN) 100 unit/mL (75-25) injection 10 Units by SubCUTAneous route Daily (before dinner).  albuterol (PROVENTIL HFA, VENTOLIN HFA, PROAIR HFA) 90 mcg/actuation inhaler Take 2 Puffs by inhalation every four (4) hours as needed for Wheezing.  labetalol (NORMODYNE) 300 mg tablet Take 600 mg by mouth every eight (8) hours.  cloNIDine HCl (CATAPRES) 0.3 mg tablet Take 1 Tab by mouth three (3) times daily. 90 Tab 1    amLODIPine (NORVASC) 10 mg tablet Take 1 Tab by mouth daily. 90 Tab 0    albuterol (PROVENTIL VENTOLIN) 2.5 mg /3 mL (0.083 %) nebulizer solution 2.5 mg by Nebulization route three (3) times daily.       RAYALDEE 30 mcg Cs24 1 Cap nightly.  pregabalin (LYRICA) 75 mg capsule Take 75 mg by mouth three (3) times daily.  oxyCODONE IR (OXY-IR) 15 mg immediate release tablet Take 15 mg by mouth every eight (8) hours as needed for Pain.  amitriptyline (ELAVIL) 50 mg tablet TAKE ONE TABLET BY MOUTH EVERY EVENING 30 Tab 0    aspirin 81 mg chewable tablet Take 1 Tab by mouth daily. 30 Tab 0    atorvastatin (LIPITOR) 40 mg tablet Take 1 Tab by mouth nightly. 30 Tab 0    fluticasone-salmeterol (ADVAIR) 250-50 mcg/dose diskus inhaler Take 1 Puff by inhalation daily. 1 Each 0    hydrALAZINE (APRESOLINE) 100 mg tablet TAKE ONE TABLET BY MOUTH THREE TIMES DAILY 90 Tab 0    multivitamin, tx-iron-ca-min (THERA-M W/ IRON) 9 mg iron-400 mcg tab tablet Take 1 Tab by mouth daily. 30 Tab 0    sevelamer carbonate (RENVELA) 800 mg tab tab TAKE ONE TABLET BY MOUTH THREE TIMES DAILY WITH MEALS 90 Tab 0    sodium bicarbonate 650 mg tablet Take 1 Tab by mouth two (2) times a day. 60 Tab 0    omeprazole (PRILOSEC) 40 mg capsule Take 1 Cap by mouth daily.  90 Cap 3     Allergies   Allergen Reactions    Ace Inhibitors Cough     AND CKD/SD    Arb-Angiotensin Receptor Antagonist Other (comments)     CKD/SD,        Objective:  Visit Vitals    /76    Pulse 79    Temp 98 °F (36.7 °C) (Oral)    Resp 18    Ht 6' 2\" (1.88 m)    Wt 315 lb (142.9 kg)    SpO2 92%    BMI 40.44 kg/m2     Physical Exam:   General appearance - alert, well appearing, and in no distress  Mental status - alert, oriented to person, place, and time  EYE-DESI, EOMI, corneas normal, no foreign bodies  ENT-ENT exam normal, no neck nodes or sinus tenderness  Nose - normal and patent, no erythema, discharge or polyps  Mouth - mucous membranes moist, pharynx normal without lesions  Neck - supple, no significant adenopathy   Chest - clear to auscultation, no wheezes, rales or rhonchi, symmetric air entry   Heart - normal rate, regular rhythm, normal S1, S2, no murmurs, rubs, clicks or gallops   Abdomen - soft, nontender, nondistended, no masses or organomegaly  Lymph- no adenopathy palpable  Ext-peripheral pulses normal, no pedal edema, no clubbing or cyanosis  Skin-Warm and dry. no hyperpigmentation, vitiligo, or suspicious lesions  Neuro -alert, oriented, normal speech, no focal findings or movement disorder noted  Neck-normal C-spine, no tenderness, full ROM without pain  Feet-no nail deformities or callus formation with good pulses noted      Results for orders placed or performed during the hospital encounter of 04/24/18   CBC WITH AUTOMATED DIFF   Result Value Ref Range    WBC 9.8 4.1 - 11.1 K/uL    RBC 2.96 (L) 4.10 - 5.70 M/uL    HGB 7.8 (L) 12.1 - 17.0 g/dL    HCT 26.0 (L) 36.6 - 50.3 %    MCV 87.8 80.0 - 99.0 FL    MCH 26.4 26.0 - 34.0 PG    MCHC 30.0 30.0 - 36.5 g/dL    RDW 18.4 (H) 11.5 - 14.5 %    PLATELET 377 471 - 994 K/uL    MPV 11.4 8.9 - 12.9 FL    NRBC 0.0 0  WBC    ABSOLUTE NRBC 0.00 0.00 - 0.01 K/uL    NEUTROPHILS 83 (H) 32 - 75 %    LYMPHOCYTES 7 (L) 12 - 49 %    MONOCYTES 7 5 - 13 %    EOSINOPHILS 2 0 - 7 %    BASOPHILS 0 0 - 1 %    IMMATURE GRANULOCYTES 1 (H) 0.0 - 0.5 %    ABS. NEUTROPHILS 8.1 (H) 1.8 - 8.0 K/UL    ABS. LYMPHOCYTES 0.7 (L) 0.8 - 3.5 K/UL    ABS. MONOCYTES 0.7 0.0 - 1.0 K/UL    ABS. EOSINOPHILS 0.2 0.0 - 0.4 K/UL    ABS. BASOPHILS 0.0 0.0 - 0.1 K/UL    ABS. IMM.  GRANS. 0.1 (H) 0.00 - 0.04 K/UL    DF SMEAR SCANNED      RBC COMMENTS ANISOCYTOSIS  1+        RBC COMMENTS RBC FRAGMENTS  POLYCHROMASIA  PRESENT        RBC COMMENTS KATHI CELLS  1+       CK W/ REFLX CKMB   Result Value Ref Range     (H) 39 - 613 U/L   METABOLIC PANEL, COMPREHENSIVE   Result Value Ref Range    Sodium 141 136 - 145 mmol/L    Potassium 5.0 3.5 - 5.1 mmol/L    Chloride 115 (H) 97 - 108 mmol/L    CO2 17 (L) 21 - 32 mmol/L    Anion gap 9 5 - 15 mmol/L    Glucose 141 (H) 65 - 100 mg/dL    BUN 64 (H) 6 - 20 MG/DL    Creatinine 4.78 (H) 0.70 - 1.30 MG/DL BUN/Creatinine ratio 13 12 - 20      GFR est AA 16 (L) >60 ml/min/1.73m2    GFR est non-AA 13 (L) >60 ml/min/1.73m2    Calcium 8.1 (L) 8.5 - 10.1 MG/DL    Bilirubin, total 0.6 0.2 - 1.0 MG/DL    ALT (SGPT) 23 12 - 78 U/L    AST (SGOT) 19 15 - 37 U/L    Alk. phosphatase 97 45 - 117 U/L    Protein, total 7.2 6.4 - 8.2 g/dL    Albumin 3.3 (L) 3.5 - 5.0 g/dL    Globulin 3.9 2.0 - 4.0 g/dL    A-G Ratio 0.8 (L) 1.1 - 2.2     NT-PRO BNP   Result Value Ref Range    NT pro- (H) 0 - 125 PG/ML   TROPONIN I   Result Value Ref Range    Troponin-I, Qt. <0.04 <0.05 ng/mL   CK-MB,QUANT. Result Value Ref Range    CK - MB 5.9 (H) <3.6 NG/ML    CK-MB Index 0.7 0 - 2.5     CK   Result Value Ref Range     (H) 39 - 308 U/L   TROPONIN I   Result Value Ref Range    Troponin-I, Qt. <0.04 <0.05 ng/mL   HEMOGLOBIN A1C WITH EAG   Result Value Ref Range    Hemoglobin A1c 7.4 (H) 4.2 - 6.3 %    Est. average glucose 166 mg/dL   TROPONIN I   Result Value Ref Range    Troponin-I, Qt. <0.04 <0.05 ng/mL   LIPID PANEL   Result Value Ref Range    LIPID PROFILE          Cholesterol, total 88 <200 MG/DL    Triglyceride 82 <150 MG/DL    HDL Cholesterol 30 MG/DL    LDL, calculated 41.6 0 - 100 MG/DL    VLDL, calculated 16.4 MG/DL    CHOL/HDL Ratio 2.9 0 - 5.0     CBC WITH AUTOMATED DIFF   Result Value Ref Range    WBC 8.0 4.1 - 11.1 K/uL    RBC 2.72 (L) 4.10 - 5.70 M/uL    HGB 7.1 (L) 12.1 - 17.0 g/dL    HCT 24.0 (L) 36.6 - 50.3 %    MCV 88.2 80.0 - 99.0 FL    MCH 26.1 26.0 - 34.0 PG    MCHC 29.6 (L) 30.0 - 36.5 g/dL    RDW 17.6 (H) 11.5 - 14.5 %    PLATELET 243 086 - 991 K/uL    MPV 10.8 8.9 - 12.9 FL    NRBC 0.0 0  WBC    ABSOLUTE NRBC 0.00 0.00 - 0.01 K/uL    NEUTROPHILS 81 (H) 32 - 75 %    LYMPHOCYTES 8 (L) 12 - 49 %    MONOCYTES 9 5 - 13 %    EOSINOPHILS 2 0 - 7 %    BASOPHILS 0 0 - 1 %    IMMATURE GRANULOCYTES 0 0.0 - 0.5 %    ABS. NEUTROPHILS 6.4 1.8 - 8.0 K/UL    ABS. LYMPHOCYTES 0.6 (L) 0.8 - 3.5 K/UL    ABS.  MONOCYTES 0.7 0.0 - 1.0 K/UL    ABS. EOSINOPHILS 0.2 0.0 - 0.4 K/UL    ABS. BASOPHILS 0.0 0.0 - 0.1 K/UL    ABS. IMM. GRANS. 0.0 0.00 - 0.04 K/UL    DF AUTOMATED     METABOLIC PANEL, COMPREHENSIVE   Result Value Ref Range    Sodium 143 136 - 145 mmol/L    Potassium 5.0 3.5 - 5.1 mmol/L    Chloride 117 (H) 97 - 108 mmol/L    CO2 17 (L) 21 - 32 mmol/L    Anion gap 9 5 - 15 mmol/L    Glucose 105 (H) 65 - 100 mg/dL    BUN 54 (H) 6 - 20 MG/DL    Creatinine 4.53 (H) 0.70 - 1.30 MG/DL    BUN/Creatinine ratio 12 12 - 20      GFR est AA 17 (L) >60 ml/min/1.73m2    GFR est non-AA 14 (L) >60 ml/min/1.73m2    Calcium 8.3 (L) 8.5 - 10.1 MG/DL    Bilirubin, total 0.7 0.2 - 1.0 MG/DL    ALT (SGPT) 20 12 - 78 U/L    AST (SGOT) 10 (L) 15 - 37 U/L    Alk.  phosphatase 87 45 - 117 U/L    Protein, total 6.8 6.4 - 8.2 g/dL    Albumin 3.1 (L) 3.5 - 5.0 g/dL    Globulin 3.7 2.0 - 4.0 g/dL    A-G Ratio 0.8 (L) 1.1 - 2.2     TROPONIN I   Result Value Ref Range    Troponin-I, Qt. <0.04 <0.05 ng/mL   CK   Result Value Ref Range     (H) 39 - 308 U/L   CK   Result Value Ref Range     (H) 39 - 308 U/L   CK   Result Value Ref Range     (H) 39 - 308 U/L   CK   Result Value Ref Range     (H) 39 - 308 U/L   HGB & HCT   Result Value Ref Range    HGB 7.7 (L) 12.1 - 17.0 g/dL    HCT 25.1 (L) 36.6 - 50.3 %   CBC W/O DIFF   Result Value Ref Range    WBC 9.3 4.1 - 11.1 K/uL    RBC 3.07 (L) 4.10 - 5.70 M/uL    HGB 8.0 (L) 12.1 - 17.0 g/dL    HCT 26.5 (L) 36.6 - 50.3 %    MCV 86.3 80.0 - 99.0 FL    MCH 26.1 26.0 - 34.0 PG    MCHC 30.2 30.0 - 36.5 g/dL    RDW 17.2 (H) 11.5 - 14.5 %    PLATELET 398 015 - 367 K/uL    MPV 10.8 8.9 - 12.9 FL    NRBC 0.0 0  WBC    ABSOLUTE NRBC 0.00 0.00 - 9.41 K/uL   METABOLIC PANEL, BASIC   Result Value Ref Range    Sodium 139 136 - 145 mmol/L    Potassium 5.1 3.5 - 5.1 mmol/L    Chloride 111 (H) 97 - 108 mmol/L    CO2 19 (L) 21 - 32 mmol/L    Anion gap 9 5 - 15 mmol/L    Glucose 170 (H) 65 - 100 mg/dL    BUN 66 (H) 6 - 20 MG/DL    Creatinine 4.45 (H) 0.70 - 1.30 MG/DL    BUN/Creatinine ratio 15 12 - 20      GFR est AA 18 (L) >60 ml/min/1.73m2    GFR est non-AA 14 (L) >60 ml/min/1.73m2    Calcium 8.9 8.5 - 10.1 MG/DL   CK   Result Value Ref Range     (H) 39 - 308 U/L   PTH INTACT   Result Value Ref Range    Calcium 8.4 (L) 8.5 - 10.1 MG/DL    PTH, Intact 423.2 (H) 18.4 - 88.0 pg/mL   CBC W/O DIFF   Result Value Ref Range    WBC 9.3 4.1 - 11.1 K/uL    RBC 3.12 (L) 4.10 - 5.70 M/uL    HGB 8.1 (L) 12.1 - 17.0 g/dL    HCT 26.9 (L) 36.6 - 50.3 %    MCV 86.2 80.0 - 99.0 FL    MCH 26.0 26.0 - 34.0 PG    MCHC 30.1 30.0 - 36.5 g/dL    RDW 17.2 (H) 11.5 - 14.5 %    PLATELET 624 887 - 798 K/uL    MPV 10.3 8.9 - 12.9 FL    NRBC 0.0 0  WBC    ABSOLUTE NRBC 0.00 0.00 - 5.40 K/uL   METABOLIC PANEL, BASIC   Result Value Ref Range    Sodium 137 136 - 145 mmol/L    Potassium 4.9 3.5 - 5.1 mmol/L    Chloride 108 97 - 108 mmol/L    CO2 21 21 - 32 mmol/L    Anion gap 8 5 - 15 mmol/L    Glucose 182 (H) 65 - 100 mg/dL    BUN 71 (H) 6 - 20 MG/DL    Creatinine 4.50 (H) 0.70 - 1.30 MG/DL    BUN/Creatinine ratio 16 12 - 20      GFR est AA 17 (L) >60 ml/min/1.73m2    GFR est non-AA 14 (L) >60 ml/min/1.73m2    Calcium 8.8 8.5 - 10.1 MG/DL   IRON PROFILE   Result Value Ref Range    Iron 22 (L) 35 - 150 ug/dL    TIBC 208 (L) 250 - 450 ug/dL    Iron % saturation 11 (L) 20 - 50 %   GLUCOSE, POC   Result Value Ref Range    Glucose (POC) 106 (H) 65 - 100 mg/dL    Performed by Janice Martinez    GLUCOSE, POC   Result Value Ref Range    Glucose (POC) 111 (H) 65 - 100 mg/dL    Performed by Sandee Soni    GLUCOSE, POC   Result Value Ref Range    Glucose (POC) 107 (H) 65 - 100 mg/dL    Performed by Janice Martinez    GLUCOSE, POC   Result Value Ref Range    Glucose (POC) 122 (H) 65 - 100 mg/dL    Performed by RIGLER SHERRIE    GLUCOSE, POC   Result Value Ref Range    Glucose (POC) 181 (H) 65 - 100 mg/dL    Performed by Gavin Isaacs    GLUCOSE, POC Result Value Ref Range    Glucose (POC) 255 (H) 65 - 100 mg/dL    Performed by Sarah Sharma (PCT)    GLUCOSE, POC   Result Value Ref Range    Glucose (POC) 234 (H) 65 - 100 mg/dL    Performed by Sarah Sharma (PCT)    GLUCOSE, POC   Result Value Ref Range    Glucose (POC) 218 (H) 65 - 100 mg/dL    Performed by Hollice Ganser    GLUCOSE, POC   Result Value Ref Range    Glucose (POC) 218 (H) 65 - 100 mg/dL    Performed by Mir Medellin    GLUCOSE, POC   Result Value Ref Range    Glucose (POC) 194 (H) 65 - 100 mg/dL    Performed by [facility](CON)    GLUCOSE, POC   Result Value Ref Range    Glucose (POC) 269 (H) 65 - 100 mg/dL    Performed by [facility](CON)    EKG, 12 LEAD, INITIAL   Result Value Ref Range    Ventricular Rate 74 BPM    Atrial Rate 74 BPM    P-R Interval 182 ms    QRS Duration 88 ms    Q-T Interval 386 ms    QTC Calculation (Bezet) 428 ms    Calculated P Axis 45 degrees    Calculated R Axis 101 degrees    Calculated T Axis 38 degrees    Diagnosis       Normal sinus rhythm  Nonspecific T wave abnormality  When compared with ECG of 09-MAR-2018 03:47,  Nonspecific T wave abnormality, improved in Lateral leads  Confirmed by Emma Carpenter M.D., Krishna Desir ([id number]) on 4/25/2018 12:07:08 PM     TYPE + CROSSMATCH   Result Value Ref Range    Crossmatch Expiration 04/27/2018     ABO/Rh(D) Bobie Rad NEGATIVE     Antibody screen NEG     Unit number [id number]     Blood component type RC LR,1     Unit division 00     Status of unit TRANSFUSED     Crossmatch result Compatible        Assessment/Plan:    ICD-10-CM ICD-9-CM    1. Anemia, chronic disease D63.8 285.29 CBC W/O DIFF   2. Essential hypertension I10 401.9    3. Type 2 diabetes mellitus with diabetic neuropathy, with long-term current use of insulin (HCC) E11.40 250.60     Z79.4 357.2      V58.67    4. Morbid obesity with BMI of 40.0-44.9, [facility] E66.01 278.01     Z68.41 V85.41    5.  Anemia in chronic kidney disease, unspecified CKD stage N18.9 285.21     D63.1     6. CKD (chronic kidney disease) stage 4, GFR 15-29 ml/min ([facility]) N18.4 585.4      Orders Placed This Encounter    CBC W/O DIFF    furosemide (LASIX) 40 mg tablet     Sig: Take 1.5 Tabs by mouth two (2) times a day. Dispense:  60 Tab     Refill:  12     lose weight, follow low fat diet, follow low salt diet,Take 81mg aspirin daily  Patient Instructions   MyChart Activation    Thank you for requesting access to [facility]. Please follow the instructions below to securely access and download your online medical record. [facility] allows you to send messages to your doctor, view your test results, renew your prescriptions, schedule appointments, and more. How Do I Sign Up? 1. In your internet browser, go to [email or web address]  2. Click on the First Time User? Click Here link in the Sign In box. You will be redirect to the New Member Sign Up page. 3. Enter your [facility] Access Code exactly as it appears below. You will not need to use this code after youve completed the sign-up process. If you do not sign up before the expiration date, you must request a new code. [facility] Access Code: [email or web address]  Expires: 2018  2:46 PM (This is the date your [facility] access code will )    4. Enter the last four digits of your Social Security Number (xxxx) and Date of Birth (mm/dd/yyyy) as indicated and click Submit. You will be taken to the next sign-up page. 5. Create a [facility] ID. This will be your [facility] login ID and cannot be changed, so think of one that is secure and easy to remember. 6. Create a [facility] password. You can change your password at any time. 7. Enter your Password Reset Question and Answer. This can be used at a later time if you forget your password. 8. Enter your e-mail address. You will receive e-mail notification when new information is available in [address] 9. Click Sign Up. You can now view and download portions of your medical record.   10. Click the Download Summary menu link to download a portable copy of your medical information. Additional Information    If you have questions, please visit the Frequently Asked Questions section of the [employer] website at [email or web address] Remember, [employer] is NOT to be used for urgent needs. For medical emergencies, dial 911. Follow-up Disposition:  Return in about 1 week (around 5/9/2018), or if symptoms worsen or fail to improve. I have reviewed with the patient details of the assessment and plan and all questions were answered. Relevent patient education was performed. The most recent lab findings were reviewed with the patient. An After Visit Summary was printed and given to the patient.

## 2018-05-02 NOTE — PATIENT INSTRUCTIONS
[employer]Picker Activation    Thank you for requesting access to [employer]. Please follow the instructions below to securely access and download your online medical record. [employer] allows you to send messages to your doctor, view your test results, renew your prescriptions, schedule appointments, and more. How Do I Sign Up? 1. In your internet browser, go to [email or web address]  2. Click on the First Time User? Click Here link in the Sign In box. You will be redirect to the New Member Sign Up page. 3. Enter your [employer] Access Code exactly as it appears below. You will not need to use this code after youve completed the sign-up process. If you do not sign up before the expiration date, you must request a new code. [employer] Access Code: [email or web address]  Expires: 2018  2:46 PM (This is the date your [employer]Picker access code will )    4. Enter the last four digits of your Social Security Number (xxxx) and Date of Birth (mm/dd/yyyy) as indicated and click Submit. You will be taken to the next sign-up page. 5. Create a [employer] ID. This will be your [employer] login ID and cannot be changed, so think of one that is secure and easy to remember. 6. Create a [employer]Picker password. You can change your password at any time. 7. Enter your Password Reset Question and Answer. This can be used at a later time if you forget your password. 8. Enter your e-mail address. You will receive e-mail notification when new information is available in [address] 9. Click Sign Up. You can now view and download portions of your medical record. 10. Click the Download Summary menu link to download a portable copy of your medical information. Additional Information    If you have questions, please visit the Frequently Asked Questions section of the [employer] website at [email or web address] Remember, [employer] is NOT to be used for urgent needs. For medical emergencies, dial 911.

## 2018-06-18 NOTE — PROGRESS NOTES
Magy Davis is a 39 y.o. male and presents with Wound Check and Diabetes  . Subjective:  He has a rt.leg ulcer that has been present for some time    Congestive Heart Failure Review:  Patient presents for evaluation of congestive heart failure. Onset was  days ago, with unstable course since that time. Patient currently complains of no dyspnea, fatigue, orthopnea. Patient states they are compliant most of the time with their medications. Patient states they are compliant most of the time with their diet. he was recently seen in the hospital for acute heart failure treated and released. felt it was secondary to anemia. Diabetes Mellitus Review:  He has diabetes mellitus. Diabetic ROS - medication compliance: compliant all of the time, diabetic diet compliance: compliant all of the time, home glucose monitoring: is performed. Known diabetic complications: none  Cardiovascular risk factors: family history, dyslipidemia, diabetes mellitus, obesity, hypertension  Current diabetic medications include /insulin   Eye exam current (within one year): no  Weight trend: stable  Prior visit with dietician: no  Current diet: \"healthy\" diet  in general  Current exercise: walking  Current monitoring regimen: home blood tests - daily  Home blood sugar records: trend: stable  Any episodes of hypoglycemia? no  Is He on ACE inhibitor or angiotensin II receptor blocker? Yes     Anemia review:  Patient presents for follow up  evaluation of an anemia. Anemia was found by routine CBC. It had been present for several months. Associated signs & symptoms:fatigue  The  most recent studies were improved,he is to receive procrit once a month. Chronic Renal Disease Review:  HPI: Magy Davis, ([id number]) is a 39 y.o. 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 te patient  is followed by renal  The patient has experienced the following symptoms: no problems   The patient has not experienced any of the following symptoms: orthopnea   Prescribed diet is NICKY   The following actions have been prescribed for slowing the   progression of CRF: cardiovascular risk factor reduction including male gender, hypertension   The patientis not  on dialysis          Review of Systems  Constitutional: negative for fevers, chills, anorexia and weight loss  Eyes:   negative for visual disturbance and irritation  ENT:   negative for tinnitus,sore throat,nasal congestion,ear pains. hoarseness  Respiratory:  negative for cough, hemoptysis, dyspnea,wheezing  CV:   negative for chest pain, palpitations, lower extremity edema  GI:   negative for nausea, vomiting, diarrhea, abdominal pain,melena  Endo:               negative for polyuria,polydipsia,polyphagia,heat intolerance  Genitourinary: negative for frequency, dysuria and hematuria  Integument:  negative for rash and pruritus  Hematologic:  negative for easy bruising and gum/nose bleeding  Musculoskel: negative for myalgias, arthralgias, back pain, muscle weakness, joint pain  Neurological:  negative for headaches, dizziness, vertigo, memory problems and gait   Behavl/Psych: negative for feelings of anxiety, depression, mood changes    Past Medical History:   Diagnosis Date    Arrhythmia     afib    Asthma     3/2013 last attack    CKD (chronic kidney disease), stage III 2/12/2013    Nephrology: Nieves Proctor MD    Diabetes [facility])     Gout     Heart failure ([facility] [address] [facility]     Hyperlipidemia     Hypertension     Ill-defined condition     Positive PPD, treated 2001    treated 9 months     Past Surgical History:   Procedure Laterality Date    HX BELOW KNEE AMPUTATION Left     due to osteomyelitis    HX HEENT      tonsilectomy    HX HERNIA REPAIR      HX OTHER SURGICAL      below the knee right amputation    HX TONSILLECTOMY       Social History     Social History    Marital status:      Spouse name: N/A    Number of children: N/A    Years of education: N/A     Social History Main Topics    Smoking status: Never Smoker    Smokeless tobacco: Never Used    Alcohol use No    Drug use: No    Sexual activity: Not Currently     Partners: Male     Other Topics Concern    None     Social History Narrative         Per conversation 03/26/17    He would like his new Medical  power of  to be his Brother Michael Atkins. (Previously was his Mother Samira Nash)        Never smoker     Family History   Problem Relation Age of Onset    Diabetes Mother     Hypertension Mother     Hypertension Brother     Diabetes Brother     Diabetes Maternal Grandmother     Hypertension Maternal Grandmother     Diabetes Other     Hypertension Other     Heart Disease Other      Current Outpatient Prescriptions   Medication Sig Dispense Refill    levoFLOXacin (LEVAQUIN) 500 mg tablet Take 1 Tab by mouth daily. 14 Tab 0    ondansetron (ZOFRAN ODT) 8 mg disintegrating tablet Take 8 mg by mouth every eight (8) hours as needed for Nausea.  insulin lispro protamine/insulin lispro (HUMALOG MIX 75-25,U-100,INSULN) 100 unit/mL (75-25) injection 30 Units by SubCUTAneous route Daily (before breakfast).  insulin lispro protamine/insulin lispro (HUMALOG MIX 75-25,U-100,INSULN) 100 unit/mL (75-25) injection 10 Units by SubCUTAneous route Daily (before dinner).  albuterol (PROVENTIL HFA, VENTOLIN HFA, PROAIR HFA) 90 mcg/actuation inhaler Take 2 Puffs by inhalation every four (4) hours as needed for Wheezing.  labetalol (NORMODYNE) 300 mg tablet Take 600 mg by mouth every eight (8) hours.  cloNIDine HCl (CATAPRES) 0.3 mg tablet Take 1 Tab by mouth three (3) times daily. 90 Tab 1    amLODIPine (NORVASC) 10 mg tablet Take 1 Tab by mouth daily. 90 Tab 0    albuterol (PROVENTIL VENTOLIN) 2.5 mg /3 mL (0.083 %) nebulizer solution 2.5 mg by Nebulization route three (3) times daily.  RAYALDEE 30 mcg Cs24 1 Cap nightly.       pregabalin (LYRICA) 75 mg capsule Take 75 mg by mouth three (3) times daily.  oxyCODONE IR (OXY-IR) 15 mg immediate release tablet Take 15 mg by mouth every eight (8) hours as needed for Pain.  atorvastatin (LIPITOR) 40 mg tablet Take 1 Tab by mouth nightly. 30 Tab 0    fluticasone-salmeterol (ADVAIR) 250-50 mcg/dose diskus inhaler Take 1 Puff by inhalation daily. 1 Each 0    hydrALAZINE (APRESOLINE) 100 mg tablet TAKE ONE TABLET BY MOUTH THREE TIMES DAILY 90 Tab 0    sevelamer carbonate (RENVELA) 800 mg tab tab TAKE ONE TABLET BY MOUTH THREE TIMES DAILY WITH MEALS 90 Tab 0    sodium bicarbonate 650 mg tablet Take 1 Tab by mouth two (2) times a day. 60 Tab 0    furosemide (LASIX) 40 mg tablet Take 1.5 Tabs by mouth two (2) times a day. 60 Tab 12    methocarbamol (ROBAXIN) 750 mg tablet Take 750 mg by mouth three (3) times daily as needed.  amitriptyline (ELAVIL) 50 mg tablet TAKE ONE TABLET BY MOUTH EVERY EVENING 30 Tab 0    aspirin 81 mg chewable tablet Take 1 Tab by mouth daily. 30 Tab 0    multivitamin, tx-iron-ca-min (THERA-M W/ IRON) 9 mg iron-400 mcg tab tablet Take 1 Tab by mouth daily. 30 Tab 0    omeprazole (PRILOSEC) 40 mg capsule Take 1 Cap by mouth daily.  90 Cap 3     Allergies   Allergen Reactions    Ace Inhibitors Cough     AND CKD/SD    Arb-Angiotensin Receptor Antagonist Other (comments)     CKD/SD,        Objective:  Visit Vitals    /90    Pulse 72    Temp 98.2 °F (36.8 °C) (Oral)    Resp 16    Ht 6' 2\" (1.88 m)    Wt 295 lb (133.8 kg)    SpO2 96%    BMI 37.88 kg/m2     Physical Exam:   General appearance - alert, well appearing, and in no distress  Mental status - alert, oriented to person, place, and time  EYE-DESI, EOMI, corneas normal, no foreign bodies  ENT-ENT exam normal, no neck nodes or sinus tenderness  Nose - normal and patent, no erythema, discharge or polyps  Mouth - mucous membranes moist, pharynx normal without lesions  Neck - supple, no significant adenopathy   Chest - clear to auscultation, no wheezes, rales or rhonchi, symmetric air entry   Heart - normal rate, regular rhythm, normal S1, S2, no murmurs, rubs, clicks or gallops   Abdomen - soft, nontender, nondistended, no masses or organomegaly  Lymph- no adenopathy palpable  Ext-peripheral pulses normal, no pedal edema, no clubbing or cyanosis  Skin-Warm and dry. no hyperpigmentation, vitiligo, or suspicious lesions  Neuro -alert, oriented, normal speech, no focal findings or movement disorder noted  Neck-normal C-spine, no tenderness, full ROM without pain  Feet-no nail deformities or callus formation with good pulses noted  Rt.bka ulcer in folds noted with necrotic 4cm    Results for orders placed or performed in visit on 06/18/18   AMB POC GLUCOSE BLOOD, BY GLUCOSE MONITORING DEVICE   Result Value Ref Range    Glucose POC 79 mg/dL   AMB POC HEMOGLOBIN A1C   Result Value Ref Range    Hemoglobin A1c (POC) 7.4 %       Assessment/Plan:    ICD-10-CM ICD-9-CM    1. Type 2 diabetes mellitus with diabetic neuropathy, with long-term current use of insulin ([facility]) E11.40 250.60 AMB POC GLUCOSE BLOOD, BY GLUCOSE MONITORING DEVICE    Z79.4 357.2 AMB POC HEMOGLOBIN A1C     V58.67    2. Morbid obesity with BMI of 40.0-44.9, adult ([facility] E66.01 278.01     Z68.41 V85.41    3. Essential hypertension I10 401.9    4. Anemia, chronic disease D63.8 285.29      Orders Placed This Encounter    AMB POC GLUCOSE BLOOD, BY GLUCOSE MONITORING DEVICE    AMB POC HEMOGLOBIN A1C    levoFLOXacin (LEVAQUIN) 500 mg tablet     Sig: Take 1 Tab by mouth daily. Dispense:  14 Tab     Refill:  0     lose weight, follow low fat diet, follow low salt diet,Take 81mg aspirin daily  Patient Instructions   [employer] Activation    Thank you for requesting access to [employer]. Please follow the instructions below to securely access and download your online medical record.  [employer] allows you to send messages to your doctor, view your test results, renew your prescriptions, schedule appointments, and more. How Do I Sign Up? 1. In your internet browser, go to [email or web address]  2. Click on the First Time User? Click Here link in the Sign In box. You will be redirect to the New Member Sign Up page. 3. Enter your Hypercontext Access Code exactly as it appears below. You will not need to use this code after youve completed the sign-up process. If you do not sign up before the expiration date, you must request a new code. Hypercontext Access Code: [email or web address]  Expires: 2018  2:46 PM (This is the date your Hypercontext access code will )    4. Enter the last four digits of your Social Security Number (xxxx) and Date of Birth (mm/dd/yyyy) as indicated and click Submit. You will be taken to the next sign-up page. 5. Create a Hypercontext ID. This will be your Hypercontext login ID and cannot be changed, so think of one that is secure and easy to remember. 6. Create a Hypercontext password. You can change your password at any time. 7. Enter your Password Reset Question and Answer. This can be used at a later time if you forget your password. 8. Enter your e-mail address. You will receive e-mail notification when new information is available in [address] 9. Click Sign Up. You can now view and download portions of your medical record. 10. Click the Download Summary menu link to download a portable copy of your medical information. Additional Information    If you have questions, please visit the Frequently Asked Questions section of the Hypercontext website at [email or web address] Remember, Hypercontext is NOT to be used for urgent needs. For medical emergencies, dial 911. Follow-up Disposition:  Return in about 2 weeks (around 2018). I have reviewed with the patient details of the assessment and plan and all questions were answered. Relevent patient education was performed. The most recent lab findings were reviewed with the patient.     An After Visit Summary was printed and given to the patient.

## 2018-06-18 NOTE — MR AVS SNAPSHOT
[address] [facility] [address] [address] 
[phone] Patient: Jean Connor MRN: [id number] VBL:[birth year] Visit Information Date & Time Provider Department Dept. Phone Encounter #  
 6/18/2018  2:45 PM Tamia Fischer MD [address] [facility] [phone] [id number] Follow-up Instructions Return in about 2 weeks (around 7/2/2018). Follow-up and Disposition History Upcoming Health Maintenance Date Due MICROALBUMIN Q1 8/28/2016 Pneumococcal 19-64 Highest Risk (2 of 3 - PCV13) 10/14/2016 EYE EXAM RETINAL OR DILATED Q1 4/13/2017 FOOT EXAM Q1 12/6/2017 DTaP/Tdap/Td series (1 - Tdap) 8/23/2018* Influenza Age 5 to Adult 8/1/2018 HEMOGLOBIN A1C Q6M 10/24/2018 LIPID PANEL Q1 4/24/2019 *Topic was postponed. The date shown is not the original due date. Allergies as of 6/18/2018  Review Complete On: 4/27/2018 By: Batsheva Bishop Severity Noted Reaction Type Reactions Ace Inhibitors  03/02/2016    Cough AND CKD/SD Arb-angiotensin Receptor Antagonist  12/14/2017    Other (comments) CKD/SD, Current Immunizations  Reviewed on 12/13/2017 Name Date Influenza Vaccine 8/31/2017, 11/6/2014  4:23 PM  
 Influenza Vaccine (Quad) PF 12/19/2016 11:33 AM  
 Influenza Vaccine Intradermal PF 10/14/2015 Influenza Vaccine PF 2/17/2013  1:11 PM  
 Pneumococcal Polysaccharide (PPSV-23) 10/14/2015,  Deferred (Patient Refused) Not reviewed this visit You Were Diagnosed With   
  
 Codes Comments Type 2 diabetes mellitus with diabetic neuropathy, with long-term current use of insulin (HCC)    -  Primary ICD-10-CM: E11.40, Z79.4 ICD-9-CM: 250.60, 357.2, V58.67 Morbid obesity with BMI of 40.0-44.9, adult (HCC)     ICD-10-CM: E66.01, Z68.41 
ICD-9-CM: 278.01, V85.41 Essential hypertension     ICD-10-CM: I10 
ICD-9-CM: 401.9 Anemia, chronic disease     ICD-10-CM: D63.8 ICD-9-CM: 285.29 Vitals BP Pulse Temp Resp Height(growth percentile) Weight(growth percentile) 160/90 72 98.2 °F (36.8 °C) (Oral) 16 6' 2\" (1.88 m) 295 lb (133.8 kg) SpO2 BMI Smoking Status 96% 37.88 kg/m2 Never Smoker Vitals History BMI and BSA Data Body Mass Index Body Surface Area  
 37.88 kg/m 2 2.64 m 2 Preferred Pharmacy Pharmacy Name Phone [facility] [address] [facility] [address] [place] [facility] [phone] Your Updated Medication List  
  
   
This list is accurate as of 6/18/18  3:42 PM.  Always use your most recent med list.  
  
  
  
  
 * albuterol 90 mcg/actuation inhaler Commonly known as:  PROVENTIL HFA, VENTOLIN HFA, PROAIR HFA Take 2 Puffs by inhalation every four (4) hours as needed for Wheezing. * albuterol 2.5 mg /3 mL (0.083 %) nebulizer solution Commonly known as:  PROVENTIL VENTOLIN  
2.5 mg by Nebulization route three (3) times daily. amitriptyline 50 mg tablet Commonly known as:  ELAVIL TAKE ONE TABLET BY MOUTH EVERY EVENING  
  
 amLODIPine 10 mg tablet Commonly known as:  Filley Bars Take 1 Tab by mouth daily. aspirin 81 mg chewable tablet Take 1 Tab by mouth daily. atorvastatin 40 mg tablet Commonly known as:  LIPITOR Take 1 Tab by mouth nightly. cloNIDine HCl 0.3 mg tablet Commonly known as:  CATAPRES Take 1 Tab by mouth three (3) times daily. fluticasone-salmeterol 250-50 mcg/dose diskus inhaler Commonly known as:  ADVAIR Take 1 Puff by inhalation daily. furosemide 40 mg tablet Commonly known as:  LASIX Take 1.5 Tabs by mouth two (2) times a day. * HumaLOG Mix 75-25(U-100)Insuln 100 unit/mL (75-25) injection Generic drug:  insulin lispro protamine/insulin lispro 30 Units by SubCUTAneous route Daily (before breakfast). * HumaLOG Mix 75-25(U-100)Insuln 100 unit/mL (75-25) injection Generic drug:  insulin lispro protamine/insulin lispro 10 Units by SubCUTAneous route Daily (before dinner). hydrALAZINE 100 mg tablet Commonly known as:  APRESOLINE  
TAKE ONE TABLET BY MOUTH THREE TIMES DAILY  
  
 labetalol 300 mg tablet Commonly known as:  Rosa Ivan Take 600 mg by mouth every eight (8) hours. levoFLOXacin 500 mg tablet Commonly known as:  John Yareli Take 1 Tab by mouth daily. LYRICA 75 mg capsule Generic drug:  pregabalin Take 75 mg by mouth three (3) times daily. methocarbamol 750 mg tablet Commonly known as:  ROBAXIN Take 750 mg by mouth three (3) times daily as needed. multivitamin, tx-iron-ca-min 9 mg iron-400 mcg Tab tablet Commonly known as:  THERA-M w/ IRON Take 1 Tab by mouth daily. omeprazole 40 mg capsule Commonly known as:  PRILOSEC Take 1 Cap by mouth daily. ondansetron 8 mg disintegrating tablet Commonly known as:  ZOFRAN ODT Take 8 mg by mouth every eight (8) hours as needed for Nausea. oxyCODONE IR 15 mg immediate release tablet Commonly known as:  OXY-IR Take 15 mg by mouth every eight (8) hours as needed for Pain. RAYALDEE 30 mcg Cs24 Generic drug:  calcifediol 1 Cap nightly. sevelamer carbonate 800 mg Tab tab Commonly known as:  Berniece Bame TAKE ONE TABLET BY MOUTH THREE TIMES DAILY WITH MEALS  
  
 sodium bicarbonate 650 mg tablet Take 1 Tab by mouth two (2) times a day. * Notice: This list has 4 medication(s) that are the same as other medications prescribed for you. Read the directions carefully, and ask your doctor or other care provider to review them with you. Prescriptions Sent to Pharmacy Refills  
 levoFLOXacin (LEVAQUIN) 500 mg tablet 0 Sig: Take 1 Tab by mouth daily. Class: Normal  
 Pharmacy: [facility] [address], [place] [address] [facility] Ph #: [phone] Route: Oral  
  
We Performed the Following AMB POC GLUCOSE BLOOD, BY GLUCOSE MONITORING DEVICE [60357 CPT(R)] AMB POC HEMOGLOBIN A1C [70186 CPT(R)] Follow-up Instructions Return in about 2 weeks (around 2018). Patient Instructions MyChart Activation Thank you for requesting access to [employer]. Please follow the instructions below to securely access and download your online medical record. [employer] allows you to send messages to your doctor, view your test results, renew your prescriptions, schedule appointments, and more. How Do I Sign Up? 1. In your internet browser, go to [email or web address] 
2. Click on the First Time User? Click Here link in the Sign In box. You will be redirect to the New Member Sign Up page. 3. Enter your [employer] Access Code exactly as it appears below. You will not need to use this code after youve completed the sign-up process. If you do not sign up before the expiration date, you must request a new code. [employer] Access Code: [email or web address] Expires: 2018  2:46 PM (This is the date your [employer] access code will ) 4. Enter the last four digits of your Social Security Number (xxxx) and Date of Birth (mm/dd/yyyy) as indicated and click Submit. You will be taken to the next sign-up page. 5. Create a [employer] ID. This will be your [employer] login ID and cannot be changed, so think of one that is secure and easy to remember. 6. Create a [employer] password. You can change your password at any time. 7. Enter your Password Reset Question and Answer. This can be used at a later time if you forget your password. 8. Enter your e-mail address. You will receive e-mail notification when new information is available in [address] 9. Click Sign Up. You can now view and download portions of your medical record. 10. Click the Download Summary menu link to download a portable copy of your medical information. Additional Information If you have questions, please visit the Frequently Asked Questions section of the [employer] website at [email or web address] Remember, [employer] is NOT to be used for urgent needs. For medical emergencies, dial 911. Introducing [facility] HE[facility]! [facility] introduces [employer] patient portal. Now you can access parts of your medical record, email your doctor's office, and request medication refills online. 1. In your internet browser, go to [email or web address] 2. Click on the First Time User? Click Here link in the Sign In box. You will see the New Member Sign Up page. 3. Enter your [employer] Access Code exactly as it appears below. You will not need to use this code after youve completed the sign-up process. If you do not sign up before the expiration date, you must request a new code. · [employer] Access Code: [email or web address] Expires: 7/16/2018  2:46 PM 
 
4. Enter the last four digits of your Social Security Number (xxxx) and Date of Birth (mm/dd/yyyy) as indicated and click Submit. You will be taken to the next sign-up page. 5. Create a [employer] ID. This will be your [employer] login ID and cannot be changed, so think of one that is secure and easy to remember. 6. Create a [employer] password. You can change your password at any time. 7. Enter your Password Reset Question and Answer. This can be used at a later time if you forget your password. 8. Enter your e-mail address. You will receive e-mail notification when new information is available in [address] 9. Click Sign Up. You can now view and download portions of your medical record. 10. Click the Download Summary menu link to download a portable copy of your medical information. If you have questions, please visit the Frequently Asked Questions section of the [employer] website. Remember, [employer] is NOT to be used for urgent needs. For medical emergencies, dial 911. Now available from your iPhone and Android! Please provide this summary of care documentation to your next provider. Your primary care clinician is listed as Oz Abreu. If you have any questions after today's visit, please call [phone].

## 2018-06-18 NOTE — PATIENT INSTRUCTIONS
[employer]Note Activation    Thank you for requesting access to [employer]. Please follow the instructions below to securely access and download your online medical record. [employer] allows you to send messages to your doctor, view your test results, renew your prescriptions, schedule appointments, and more. How Do I Sign Up? 1. In your internet browser, go to [email or web address]  2. Click on the First Time User? Click Here link in the Sign In box. You will be redirect to the New Member Sign Up page. 3. Enter your [employer] Access Code exactly as it appears below. You will not need to use this code after youve completed the sign-up process. If you do not sign up before the expiration date, you must request a new code. [employer] Access Code: [email or web address]  Expires: 2018  2:46 PM (This is the date your [employer] access code will )    4. Enter the last four digits of your Social Security Number (xxxx) and Date of Birth (mm/dd/yyyy) as indicated and click Submit. You will be taken to the next sign-up page. 5. Create a FiscalNote ID. This will be your [employer]Note login ID and cannot be changed, so think of one that is secure and easy to remember. 6. Create a [employer] password. You can change your password at any time. 7. Enter your Password Reset Question and Answer. This can be used at a later time if you forget your password. 8. Enter your e-mail address. You will receive e-mail notification when new information is available in [address] 9. Click Sign Up. You can now view and download portions of your medical record. 10. Click the Download Summary menu link to download a portable copy of your medical information. Additional Information    If you have questions, please visit the Frequently Asked Questions section of the [employer] website at [email or web address] Remember, [employer]calNote is NOT to be used for urgent needs. For medical emergencies, dial 911.

## 2018-07-18 NOTE — IP AVS SNAPSHOT
[facility] [address] [place] [address] 
[phone] Patient: Saundra Watkins MRN: [id number] WDU:[birth year] You are allergic to the following Allergen Reactions Ace Inhibitors Cough AND CKD/SD Arb-Angiotensin Receptor Antagonist Other (comments) CKD/SD, Recent Documentation Weight BMI Smoking Status 130 kg 36.78 kg/m2 Never Smoker Emergency Contacts  (Rel.) Home Phone Work Phone Mobile Phone Sanna Cuba (Other Relative) [phone] -- -- About your hospitalization You were admitted on:  July 19, 2018 You last received care in the:  [facility] You were discharged on:  July 20, 2018 Why you were hospitalized Your primary diagnosis was:  Not on File Your diagnoses also included:  Sd (Acute Kidney Injury) (Hcc) Providers Seen During Your Hospitalization Provider Specialty Primary office phone Ilan Jones MD Emergency Medicine [phone] Eve Cortes MD Hospitalist [phone] Dolores Colbert MD Internal Medicine [phone] Max Looney MD Internal Medicine [phone] Your Primary Care Physician (PCP) Primary Care Physician Office Phone Office Fax [address], [address] [place] [phone] Follow-up Information Follow up With Details Comments Contact Info Wing Marsha MD   [facility] [address] [place] [address] 
[phone] Appointments for Next 14 days 8/1/2018  2:45 PM  [facility] - [address] [place] [address]  
  
  
 
  
  
  
My Medications TAKE these medications as instructed Instructions Each Dose to Equal  
 Morning Noon Evening Bedtime ADVAIR DISKUS 250-50 mcg/dose diskus inhaler Generic drug:  fluticasone-salmeterol Your last dose was: Your next dose is: INHALE ONE PUFF BY MOUTH TWICE A DAY  
     
   
   
   
  
 * albuterol 90 mcg/actuation inhaler Commonly known as:  PROVENTIL HFA, VENTOLIN HFA, PROAIR HFA Your last dose was: Your next dose is: Take 2 Puffs by inhalation every four (4) hours as needed for Wheezing. 2 Puff * albuterol 2.5 mg /3 mL (0.083 %) nebulizer solution Commonly known as:  PROVENTIL VENTOLIN Your last dose was: Your next dose is:    
   
   
 2.5 mg by Nebulization route three (3) times daily. 2.5 mg  
    
   
   
   
  
 amitriptyline 50 mg tablet Commonly known as:  ELAVIL Your last dose was: Your next dose is: TAKE ONE TABLET BY MOUTH EVERY EVENING  
     
   
   
   
  
 amLODIPine 10 mg tablet Commonly known as:  Joseph Fernándezer Your last dose was: Your next dose is: TAKE ONE TABLET BY MOUTH DAILY  
     
   
   
   
  
 aspirin 81 mg chewable tablet Your last dose was: Your next dose is: Take 1 Tab by mouth daily. 81 mg  
    
   
   
   
  
 atorvastatin 40 mg tablet Commonly known as:  LIPITOR Your last dose was: Your next dose is: Take 1 Tab by mouth nightly. 40 mg  
    
   
   
   
  
 ferrous sulfate 325 mg (65 mg iron) EC tablet Commonly known as:  IRON Your last dose was: Your next dose is: Take 1 Tab by mouth three (3) times daily (with meals). 325 mg  
    
   
   
   
  
 furosemide 40 mg tablet Commonly known as:  LASIX Your last dose was: Your next dose is: Take 2 Tabs by mouth two (2) times a day. 80 mg  
    
   
   
   
  
 * HumaLOG Mix 75-25(U-100)Insuln 100 unit/mL (75-25) injection Generic drug:  insulin lispro protamine/insulin lispro Your last dose was: Your next dose is:    
   
   
 30 Units by SubCUTAneous route Daily (before breakfast). 30 Units * HumaLOG Mix 75-25(U-100)Insuln 100 unit/mL (75-25) injection Generic drug:  insulin lispro protamine/insulin lispro Your last dose was: Your next dose is:    
   
   
 10 Units by SubCUTAneous route Daily (before dinner). 10 Units  
    
   
   
   
  
 hydrALAZINE 100 mg tablet Commonly known as:  APRESOLINE Your last dose was: Your next dose is: TAKE ONE TABLET BY MOUTH THREE TIMES DAILY  
     
   
   
   
  
 labetalol 300 mg tablet Commonly known as:  Drusilla Starring Your last dose was: Your next dose is: Take 600 mg by mouth every eight (8) hours. 600 mg  
    
   
   
   
  
 LYRICA 75 mg capsule Generic drug:  pregabalin Your last dose was: Your next dose is: Take 75 mg by mouth three (3) times daily. 75 mg  
    
   
   
   
  
 methocarbamol 750 mg tablet Commonly known as:  ROBAXIN Your last dose was: Your next dose is: Take 750 mg by mouth three (3) times daily as needed. 750 mg  
    
   
   
   
  
 multivitamin, tx-iron-ca-min 9 mg iron-400 mcg Tab tablet Commonly known as:  THERA-M w/ IRON Your last dose was: Your next dose is: Take 1 Tab by mouth daily. 1 Tab  
    
   
   
   
  
 omeprazole 40 mg capsule Commonly known as:  PRILOSEC Your last dose was: Your next dose is: Take 1 Cap by mouth daily. 40 mg  
    
   
   
   
  
 ondansetron 8 mg disintegrating tablet Commonly known as:  ZOFRAN ODT Your last dose was: Your next dose is: Take 1 Tab by mouth every eight (8) hours as needed for Nausea. 8 mg  
    
   
   
   
  
 oxyCODONE IR 15 mg immediate release tablet Commonly known as:  OXY-IR Your last dose was: Your next dose is: Take 15 mg by mouth every eight (8) hours as needed for Pain.   
 15 mg  
 RAYALDEE 30 mcg Cs24 Generic drug:  calcifediol Your last dose was: Your next dose is:    
   
   
 1 Cap nightly. 1 Cap  
    
   
   
   
  
 sevelamer carbonate 800 mg Tab tab Commonly known as:  Marlys Prietoinstein Your last dose was: Your next dose is: TAKE ONE TABLET BY MOUTH THREE TIMES DAILY WITH MEALS  
     
   
   
   
  
 sodium bicarbonate 650 mg tablet Your last dose was: Your next dose is: Take 1 Tab by mouth two (2) times a day. 650 mg  
    
   
   
   
  
 * Notice: This list has 4 medication(s) that are the same as other medications prescribed for you. Read the directions carefully, and ask your doctor or other care provider to review them with you. ASK your physician about these medications Instructions Each Dose to Equal  
 Morning Noon Evening Bedtime  
 cloNIDine HCl 0.3 mg tablet Commonly known as:  CATAPRES Your last dose was: Your next dose is: TAKE ONE TABLET BY MOUTH THREE TIMES A DAY  
     
   
   
   
  
 levoFLOXacin 500 mg tablet Commonly known as:  Clary Born Your last dose was: Your next dose is: Take 1 Tab by mouth daily. 500 mg Where to Get Your Medications These medications were sent to [address] [place] [address] [address] [place] [phone] [facility], [address] [place] [address] Phone:  [phone]  
  furosemide 40 mg tablet Information on where to get these meds will be given to you by the nurse or doctor. ! Ask your nurse or doctor about these medications  
  ferrous sulfate 325 mg (65 mg iron) EC tablet Discharge Instructions None Discharge Instructions Attachments/References HEART FAILURE: AVOIDING TRIGGERS (ENGLISH) Discharge Orders None Introducing [facility] & HEALTH SERVICES! [facility] introduces [employer] patient portal. Now you can access parts of your medical record, email your doctor's office, and request medication refills online. 1. In your internet browser, go to [email or web address] 2. Click on the First Time User? Click Here link in the Sign In box. You will see the New Member Sign Up page. 3. Enter your Adzuna Access Code exactly as it appears below. You will not need to use this code after youve completed the sign-up process. If you do not sign up before the expiration date, you must request a new code. · [employer] Access Code: [email or web address] Expires: 10/16/2018  8:59 PM 
 
4. Enter the last four digits of your Social Security Number (xxxx) and Date of Birth (mm/dd/yyyy) as indicated and click Submit. You will be taken to the next sign-up page. 5. Create a Adzuna ID. This will be your Adzuna login ID and cannot be changed, so think of one that is secure and easy to remember. 6. Create a Adzuna password. You can change your password at any time. 7. Enter your Password Reset Question and Answer. This can be used at a later time if you forget your password. 8. Enter your e-mail address. You will receive e-mail notification when new information is available in [address] 9. Click Sign Up. You can now view and download portions of your medical record. 10. Click the Download Summary menu link to download a portable copy of your medical information. If you have questions, please visit the Frequently Asked Questions section of the [employer]una website. Remember, Ad[employer]una is NOT to be used for urgent needs. For medical emergencies, dial 911. Now available from your iPhone and Android! General Information Please provide this summary of care documentation to your next provider. Patient Signature:  ____________________________________________________________ Date:  ____________________________________________________________  
  
[facility] Provider Signature:  ____________________________________________________________ Date:  ____________________________________________________________

## 2018-07-19 PROBLEM — N17.9 AKI (ACUTE KIDNEY INJURY) (HCC): Status: ACTIVE | Noted: 2018-01-01

## 2018-07-19 NOTE — ED PROVIDER NOTES
HPI Comments: 39 y.o. male with past medical history significant for HTN, DM, arrhythmia, heart failure, asthma, hyperlipidemia, positive PPD, CKD, and gout who presents from home via EMS for evaluation s/p fall. Pt states that his prosthetics gave out causing him to fall earlier tonight. Pt reports that \"this is the feeling (he) normally has when (he) needs a blood transfusion. \" He states that he thinks he needs one \"because of (his) kidneys. \" Pt's BS was elevated en route and he reports that he forgot to take his insulin today. When asked if he is lethargic at baseline the pt states \"you would be this tired if you took all of the medications I take. \" Pt takes Clonidine, Lyrica, Oxycodone, and Robaxin daily. 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 Pt denies head injury. There are no other acute medical concerns at this time. Social hx: former tobacco smoker; no EtOH use   PCP: Dez Tierney MD    Note written by Eva Barber, as dictated by Daniel Costello MD 9:15 PM    The history is provided by the patient. No  was used.         Past Medical History:   Diagnosis Date    Arrhythmia     afib    Asthma     3/2013 last attack    CKD (chronic kidney disease), stage III 2/12/2013    Nephrology: Tory Walton MD    Diabetes [facility])     Gout     Heart failure ([facility] [address]     Hyperlipidemia     Hypertension     Ill-defined condition     Positive PPD, treated 2001    treated 9 months       Past Surgical History:   Procedure Laterality Date    HX BELOW KNEE AMPUTATION Left     due to osteomyelitis    HX HEENT      tonsilectomy    HX HERNIA REPAIR      HX OTHER SURGICAL      below the knee right amputation    HX TONSILLECTOMY           Family History:   Problem Relation Age of Onset    Diabetes Mother     Hypertension Mother     Hypertension Brother     Diabetes Brother     Diabetes Maternal Grandmother     Hypertension Maternal Grandmother     Diabetes Other     Hypertension Other     Heart Disease Other        Social History     Social History    Marital status:      Spouse name: N/A    Number of children: N/A    Years of education: N/A     Occupational History    Not on file. Social History Main Topics    Smoking status: Never Smoker    Smokeless tobacco: Never Used    Alcohol use No    Drug use: No    Sexual activity: Not Currently     Partners: Male     Other Topics Concern    Not on file     Social History Narrative         Per conversation 03/26/17    He would like his new Medical  power of  to be his Brother Michael Atkins. (Previously was his Mother Aleksandra Vera)        Never smoker         ALLERGIES: Ace inhibitors and Arb-angiotensin receptor antagonist    Review of Systems   Constitutional: Negative for chills, diaphoresis, fatigue and fever. HENT: Negative for congestion, rhinorrhea, sinus pressure, sore throat, trouble swallowing and voice change. Eyes: Negative for photophobia and visual disturbance. Respiratory: Negative for cough, chest tightness and shortness of breath. Cardiovascular: Negative for chest pain, palpitations and leg swelling. Gastrointestinal: Negative for abdominal pain, blood in stool, constipation, diarrhea, nausea and vomiting. Musculoskeletal: Negative for arthralgias, myalgias and neck pain. Neurological: Negative for dizziness, weakness, light-headedness, numbness and headaches. All other systems reviewed and are negative. Vitals:    07/18/18 2107 07/18/18 2111 07/18/18 2112   BP: 160/80     Pulse: 79     Resp: 23     Temp: 98.1 °F (36.7 °C)     SpO2: (!) 89% 92% 93%            Physical Exam   Constitutional: He is oriented to person, place, and time. He appears well-developed and well-nourished. No distress. HENT:   Head: Normocephalic and atraumatic. Nose: Nose normal.   Mouth/Throat: Oropharynx is clear and moist. Mucous membranes are dry.  No oropharyngeal exudate. Eyes: Conjunctivae and EOM are normal. Right eye exhibits no discharge. Left eye exhibits no discharge. No scleral icterus. Neck: Normal range of motion. Neck supple. No JVD present. No tracheal deviation present. No thyromegaly present. Cardiovascular: Normal rate, regular rhythm, normal heart sounds and intact distal pulses. Exam reveals no gallop and no friction rub. No murmur heard. Pulmonary/Chest: Effort normal and breath sounds normal. No stridor. No respiratory distress. He has no wheezes. He has no rales. He exhibits no tenderness. Abdominal: Soft. Bowel sounds are normal. He exhibits distension. He exhibits no mass. There is no tenderness. There is no rebound. Musculoskeletal: He exhibits no edema or tenderness. Bilateral BKAs. Lymphadenopathy:     He has no cervical adenopathy. Neurological: He is alert and oriented to person, place, and time. No cranial nerve deficit. Coordination normal.   Skin: Skin is warm and dry. No rash noted. He is not diaphoretic. No erythema. No pallor. Psychiatric: His behavior is normal. Judgment and thought content normal.   Somnolent    Nursing note and vitals reviewed. Note written by Eva Harden, as dictated by Koffi Stark MD 9:15 PM    MDM  Number of Diagnoses or Management Options  Diagnosis management comments: GLF without LOC, hyperglycemia. 40 y/o male presenting after sustaining a GLF when his prosthetic legs gave out. Pt with also chronic renal disease. Plan:  Cbc, cmp, etoh, ua, drug screen, 1 L NS, xray right shoulder. Reassessment:  Pt with worsening renal function will likely need admission.        Amount and/or Complexity of Data Reviewed  Clinical lab tests: ordered and reviewed  Tests in the radiology section of CPT®: ordered  Discuss the patient with other providers: yes  Independent visualization of images, tracings, or specimens: yes    Risk of Complications, Morbidity, and/or Mortality  Presenting problems: moderate  Diagnostic procedures: moderate  Management options: moderate    Patient Progress  Patient progress: stable        ED Course       Procedures

## 2018-07-19 NOTE — ED NOTES
Patient unable to urinate at this time. Pt aware sample is needed. Call bell and urinal within reach, IV fluids infusing,  Will retry.

## 2018-07-19 NOTE — ED NOTES
TRANSFER - OUT REPORT:    Verbal report given to Panfilo rn (name) on [place]  being transferred to  (unit) for routine progression of care       Report consisted of patients Situation, Background, Assessment and   Recommendations(SBAR). Information from the following report(s) SBAR, MAR and Recent Results was reviewed with the receiving nurse. Lines:   Peripheral IV 07/18/18 Left Antecubital (Active)   Site Assessment Clean, dry, & intact 7/18/2018  9:16 PM   Phlebitis Assessment 0 7/18/2018  9:16 PM   Infiltration Assessment 0 7/18/2018  9:16 PM   Dressing Status Clean, dry, & intact 7/18/2018  9:16 PM   Dressing Type 4 X 4;Transparent 7/18/2018  9:16 PM   Hub Color/Line Status Pink 7/18/2018  9:16 PM        Opportunity for questions and clarification was provided.       Patient transported with:   O2 @ 2 liters  Tech

## 2018-07-19 NOTE — DIABETES MGMT
[facility] Progress Note Recommendations/ Comments: Chart review for extreme high BG's 346 mg/dL - 402 mg/dL. On renal diet; intake not documented He is on mixed insulin at home and likely needs coverage for meals. Creatinine noted 5.84 If appropriate, please consider Increase Lantus to 25 units daily Add lispro 5 units AC Current hospital DM medication:  
Lantus 20 units daily Lispro correction scale normal senstiviity Chart reviewed on [address] Patient is a 39 y.o. male with known DM on Humalog Mixed 75-25 insulin, 30 units each Am and 10 units each PM. 
Pt is known to [facility] from previous admission and was seen for consult on 3-9-2018. A1c:  
Lab Results Component Value Date/Time Hemoglobin A1c 7.4 (H) 04/24/2018 04:32 PM  
 Hemoglobin A1c 10.6 (H) 03/10/2018 04:29 AM  
 
 
Recent Glucose Results:  
Lab Results Component Value Date/Time  (H) 07/18/2018 09:16 PM  
 GLUCPOC 169 (H) 07/19/2018 11:03 AM  
 GLUCPOC 402 (H) 07/19/2018 06:44 AM  
 GLUCPOC 366 (H) 07/18/2018 09:10 PM  
  
 
Lab Results Component Value Date/Time Creatinine 5.84 (H) 07/18/2018 09:16 PM  
 
CrCl cannot be calculated (Unknown ideal weight. ). Active Orders Diet DIET RENAL WITH OPTIONS 70-70-70 ([place]); Regular PO intake: No data found. Will continue to follow as needed. Thank you Patricia Rosa RN, CDE Pager [phone]

## 2018-07-19 NOTE — WOUND CARE
WOCN Note:     New consult placed by RN for right stump and finger webbing. Chart shows:  Admitted for SD; history of DM, CHF, bilateral BKA, s/p fall    Assessment:   Patient is communicative, continent and mobile - able to turn himself independently. Bed: total care; he weighs 311 lbs and bariatric bed ordered from [facility]  Patient reports generalized pain in right shoulder. Buttocks and sacral skin intact and without erythema. POA, right posterior popliteal resurfaced area. Dry & light pink. Left open to air. POA, left hand on webbing between thumb and index finder with dry small cut. Left open to air. Recommendations:    Minimize layers of linen/pads under patient to optimize support surface. Turn/reposition approximately every 2 hours  Manage incontinence / promote continence; Aloe Vesta cream to buttocks and sacrum daily  Specialty bed: total care bariatric with air mattress ordered via [facility] #[id number]. Use only flat sheet and one incontinence pad. Please call Renny Velazquez if not delivered. Discussed above plan with patient & RN, Yousif Ho.     No current wound care needs - will sign off    DONN RomeroN, RN, [facility]  Certified Wound, Ostomy, Continence Nurse  office [phone]  pager [phone] or call  to page

## 2018-07-19 NOTE — PROGRESS NOTES
Problem: Self Care Deficits Care Plan (Adult)  Goal: *Acute Goals and Plan of Care (Insert Text)  Occupational Therapy Goals  Initiated 7/19/2018  1. Patient will perform grooming with supervision/set-up in unsupported sit within 7 day(s). 2.  Patient will perform lower body dressing including prosthetics with minimal assistance/CGA within 7 day(s). 3.  Patient will perform toilet transfers to [facility] with minimal assistance/contact guard assist within 7 day(s). 4.  Patient will perform all aspects of toileting with minimal assistance/contact guard assist within 7 day(s). 5.  Patient will participate in upper extremity therapeutic exercise/activities with independence for 5 minutes within 7 day(s). 6.  Patient will utilize energy conservation techniques during functional activities with verbal cues within 7 day(s). Occupational Therapy EVALUATION  Patient: Onofre Zamarripa (76 y.o. male)  Date: 7/19/2018  Primary Diagnosis: SD (acute kidney injury) ([facility] [address]        Precautions:   Fall, Contact    ASSESSMENT :  Cleared by RN to see pt for therapy session. Based on the objective data described below, the patient presents with edema and SOB following admission for SD and experiencing GLF after prosthetic \"gave out. \" PMH significant for bilateral BKAs. At baseline pt lives alone and is mod I for ADLs, \"furniture walks\" at home while wearing prosthetics, and uses walker for community mobility. Reports he owns a wheelchair but does not use it, also reports using 2 L supplemental O2 at home although leaves it at home because he has no way to carry it when using walker. Pt received today semisupine in bed, on 4 L O2 NC. C/o edema in LEs and feeling SOB. At rest pt's O2 sats 91% on 4 L. After pt attempted to don prosthetic sleeve (which did not fit due to edema) while long sitting in bed, O2 sats decreased to 86%.  At time of evaluation activity limited due to pt's SOB and inability to don prosthetics due to LE edema. He also reports that L prosthetic is damaged/ill fitting, and the prosthetist from the issuing company is on vacation. Pt in bed at end of session, call bell in reach, spoke with nursing about session events. He will benefit from skilled intervention to address the above impairments. Patients rehabilitation potential is considered to be Good  Factors which may influence rehabilitation potential include:   [x]             None noted  []             Mental ability/status  []             Medical condition  []             Home/family situation and support systems  []             Safety awareness  []             Pain tolerance/management  []             Other:      PLAN :  Recommendations and Planned Interventions:  [x]               Self Care Training                  [x]        Therapeutic Activities  [x]               Functional Mobility Training    []        Cognitive Retraining  [x]               Therapeutic Exercises           [x]        Endurance Activities  [x]               Balance Training                   []        Neuromuscular Re-Education  []               Visual/Perceptual Training     [x]   Home Safety Training  [x]               Patient Education                 [x]        Family Training/Education  []               Other (comment):    Frequency/Duration: Patient will be followed by occupational therapy 5 times a week to address goals. Discharge Recommendations: TBD  Further Equipment Recommendations for Discharge: TBD     SUBJECTIVE:   Patient stated I need to get rid of this fluid.     OBJECTIVE DATA SUMMARY:   HISTORY:   Past Medical History:   Diagnosis Date    Arrhythmia     afib    Asthma     3/2013 last attack    CKD (chronic kidney disease), stage III 2/12/2013    Nephrology: Tato Aguilar MD    Diabetes [facility])     Gout     Heart failure ([facility] [address]     Hyperlipidemia     Hypertension     Ill-defined condition     Positive PPD, treated 2001    treated 9 months     Past Surgical History:   Procedure Laterality Date    HX BELOW KNEE AMPUTATION Left     due to osteomyelitis    HX HEENT      tonsilectomy    HX HERNIA REPAIR      HX OTHER SURGICAL      below the knee right amputation    HX TONSILLECTOMY         Prior Level of Function/Environment/Context: PMH significant for bilateral BKAs. At baseline pt lives alone and is mod I for ADLs, \"furniture walks\" at home while wearing prosthetics, and uses walker for community mobility. Reports he owns a wheelchair but does not use it, also reports using 2 L supplemental O2 at home although leaves it at home because he has no way to carry it when using walker. Home Situation  Home Environment: Apartment  # Steps to Enter: 0  One/Two Story Residence: One story  Living Alone: Yes  Support Systems: Family member(s), Friends \ neighbors  Patient Expects to be Discharged to[de-identified] Apartment  Current DME Used/Available at Home: Susan Arnie, Wheelchair, Oxygen, portable, Tub transfer bench  Tub or Shower Type: Tub/Shower combination    Hand dominance: Right    EXAMINATION OF PERFORMANCE DEFICITS:  Cognitive/Behavioral Status:  Neurologic State: Alert  Orientation Level: Oriented X4  Cognition: Follows commands  Perception: Appears intact  Perseveration: No perseveration noted  Safety/Judgement: Awareness of environment; Fall prevention      Hearing: Auditory  Auditory Impairment: None    Vision/Perceptual:      Acuity: Able to read clock/calendar on wall without difficulty; Able to read employee name badge without difficulty    Corrective Lenses: Glasses    Range of Motion:  AROM: Generally decreased, functional  PROM: Generally decreased, functional     Strength:  Strength: Generally decreased, functional        Coordination:  Coordination: Within functional limits  Fine Motor Skills-Upper: Left Intact; Right Intact    Gross Motor Skills-Upper: Left Intact; Right Intact    Tone & Sensation:  Tone: Normal        Balance:  Sitting: Intact; With support  Standing: (deferred)    Functional Mobility and Transfers for ADLs:  Bed Mobility:       Transfers:   Deferred    ADL Assessment:  Feeding: Setup    Oral Facial Hygiene/Grooming: Setup    Bathing: Moderate assistance    Upper Body Dressing: Setup    Lower Body Dressing: Moderate assistance    Toileting: Moderate assistance          ADL Intervention and task modifications:   Pt attempted to don L prosthetic sleeve but was unable due to edema. Assisted pt with cleaning sleeves. Grooming  Washing Hands: Supervision/set-up (seated)      Cognitive Retraining  Safety/Judgement: Awareness of environment; Fall prevention    Functional Measure:  Barthel Index:    Bathin  Bladder: 10  Bowels: 10  Groomin  Dressin  Feeding: 10  Mobility: 0  Stairs: 0  Toilet Use: 0  Transfer (Bed to Chair and Back): 5  Total: 45       Barthel and G-code impairment scale:  Percentage of impairment CH  0% CI  1-19% CJ  20-39% CK  40-59% CL  60-79% CM  80-99% CN  100%   Barthel Score 0-100 100 99-80 79-60 59-40 20-39 1-19   0   Barthel Score 0-20 20 17-19 13-16 9-12 5-8 1-4 0      The Barthel ADL Index: Guidelines  1. The index should be used as a record of what a patient does, not as a record of what a patient could do. 2. The main aim is to establish degree of independence from any help, physical or verbal, however minor and for whatever reason. 3. The need for supervision renders the patient not independent. 4. A patient's performance should be established using the best available evidence. Asking the patient, friends/relatives and nurses are the usual sources, but direct observation and common sense are also important. However direct testing is not needed. 5. Usually the patient's performance over the preceding 24-48 hours is important, but occasionally longer periods will be relevant. 6. Middle categories imply that the patient supplies over 50 per cent of the effort. 7. Use of aids to be independent is allowed.     David Ruelas., Barthel, DElielW. (1965). Functional evaluation: the Barthel Index. [address] [facility] (14)2. NAHEED Rider, Divya Haley, Justus Wilson., Jonnie Omalley, [address] (1999). Measuring the change indisability after inpatient rehabilitation; comparison of the responsiveness of the Barthel Index and Functional [place] Measure. Journal of Neurology, Neurosurgery, and Psychiatry, 66(4), 233-226. MONROE Borges, HERON Sumner, & Dimple Justice M.A. (2004.) Assessment of post-stroke quality of life in cost-effectiveness studies: The usefulness of the Barthel Index and the EuroQoL-5D. Quality of Life Research, 13, 234-27       G codes: In compliance with CMSs Claims Based Outcome Reporting, the following G-code set was chosen for this patient based on their primary functional limitation being treated: The outcome measure chosen to determine the severity of the functional limitation was the Barthel Index with a score of 45/100 which was correlated with the impairment scale. ? Self Care:     - CURRENT STATUS: CK - 40%-59% impaired, limited or restricted    - GOAL STATUS: CI - 1%-19% impaired, limited or restricted    - D/C STATUS:  ---------------To be determined---------------     Occupational Therapy Evaluation Charge Determination   History Examination Decision-Making   LOW Complexity : Brief history review  MEDIUM Complexity : 3-5 performance deficits relating to physical, cognitive , or psychosocial skils that result in activity limitations and / or participation restrictions MEDIUM Complexity : Patient may present with comorbidities that affect occupational performnce.  Miniml to moderate modification of tasks or assistance (eg, physical or verbal ) with assesment(s) is necessary to enable patient to complete evaluation       Based on the above components, the patient evaluation is determined to be of the following complexity level: LOW   Pain:  Pain Scale 1: Numeric (0 - 10)  Pain Intensity 1: 8  Pain Location 1: Leg;Shoulder  Pain Orientation 1: Left;Right (bilat stumps, and R shoulder)  Pain Description 1: Sore  Pain Intervention(s) 1: Repositioned  Activity Tolerance:   Good  Please refer to the flowsheet for vital signs taken during this treatment. After treatment:   [] Patient left in no apparent distress sitting up in chair  [x] Patient left in no apparent distress in bed  [x] Call bell left within reach  [x] Nursing notified  [] Caregiver present  [] Bed alarm activated    COMMUNICATION/EDUCATION:   The patients plan of care was discussed with: Physical Therapist and Registered Nurse. [x] Home safety education was provided and the patient/caregiver indicated understanding. [x] Patient/family have participated as able in goal setting and plan of care. [x] Patient/family agree to work toward stated goals and plan of care. [] Patient understands intent and goals of therapy, but is neutral about his/her participation. [] Patient is unable to participate in goal setting and plan of care. This patients plan of care is appropriate for delegation to [facility] [place].     Thank you for this referral.  Artem Rausch OT  Time Calculation: 21 mins

## 2018-07-19 NOTE — CDMP QUERY
There is documentation of the pt having Type 2 DM-uncontrolled. Can this be further specified as:     => Type 2 diabetes mellitus with hyperglycemia (POA) in the setting of blood sugars 300-400's with HgA1c 7.4 requiring SSI, point of care testing, and diabetic education  => Other explanation of clinical findings  => Clinically Undetermined (no explanation for clinical findings)    The medical record reflects the following:     Risk Factors:  DM-2    Clinical Indicators:  Blood sugars 300-400's since admission with HgA1c 7.4     Treatment: SSI, point of care testing, and diabetic education    Please clarify and document your clinical opinion in the progress notes and discharge summary including the definitive and/or presumptive diagnosis, (suspected or probable), related to the above clinical findings. Please include clinical findings supporting your diagnosis.     Thank you,    Marianna Harley RN, BSN, [facility], [address]  [phone]

## 2018-07-19 NOTE — PROGRESS NOTES
Primary Nurse Alverto Correa RN and Lanis Bumpers, RN performed a dual skin assessment on this patient No impairment noted  Jacob score is 19  Pt has quarter size area to posterior R stump and in webbing between thumb and index finger on L hand where pt states he cut himself on accident

## 2018-07-19 NOTE — H&P
HISTORY AND PHYSICAL 
 
 
PCP: Nichelle Napoles MD 
History source: the patient, EMR 
 
 
CC: fall HPI: 39 y.o man w/ HTN, DM, diastolic CHF, asthma, CKD 5, s/p b/l BKA's, who presented after a fall. He states his prosthesis gave out and he fell. He is currently drowsy and a poor historian. He offers no other complaints except for right shoulder pain since falling. No dyspnea, n/v/d, no changes in urine output, no dizziness preceding fall, didn't hit his head. His renal function was found to be worse than baseline in the ED so he was referred for admission. PMH/PSH: 
Past Medical History:  
Diagnosis Date  Arrhythmia   
 afib  Asthma 3/2013 last attack  CKD (chronic kidney disease), stage III 2/12/2013 Nephrology: Cliff Jim MD  
 Diabetes [facility])  Gout  Heart failure ([facility] [address]  Hyperlipidemia  Hypertension  Ill-defined condition  Positive PPD, treated 2001  
 treated 9 months Past Surgical History:  
Procedure Laterality Date  HX BELOW KNEE AMPUTATION Left   
 due to osteomyelitis  HX HEENT    
 tonsilectomy  HX HERNIA REPAIR    
 HX OTHER SURGICAL    
 below the knee right amputation  HX TONSILLECTOMY Home meds:  
Prior to Admission medications Medication Sig Start Date End Date Taking? Authorizing Provider ADVAIR DISKUS 250-50 mcg/dose diskus inhaler INHALE ONE PUFF BY MOUTH TWICE A DAY 7/12/18   Nihcelle Napoles MD  
amLODIPine (NORVASC) 10 mg tablet TAKE ONE TABLET BY MOUTH DAILY 7/12/18   Nichelle Napoles MD  
cloNIDine HCl (CATAPRES) 0.3 mg tablet TAKE ONE TABLET BY MOUTH THREE TIMES A DAY 7/12/18   Nichelle Napoles MD  
ondansetron (ZOFRAN ODT) 8 mg disintegrating tablet Take 1 Tab by mouth every eight (8) hours as needed for Nausea. 6/28/18   Nichelle Napoles MD  
levoFLOXacin (LEVAQUIN) 500 mg tablet Take 1 Tab by mouth daily.  6/18/18   Nichelle Napoles MD  
furosemide (LASIX) 40 mg tablet Take 1.5 Tabs by mouth two (2) times a day. 5/2/18   Morgan Noland MD  
methocarbamol (ROBAXIN) 750 mg tablet Take 750 mg by mouth three (3) times daily as needed. Historical Provider  
insulin lispro protamine/insulin lispro (HUMALOG MIX 75-25,U-100,INSULN) 100 unit/mL (75-25) injection 30 Units by SubCUTAneous route Daily (before breakfast). Historical Provider  
insulin lispro protamine/insulin lispro (HUMALOG MIX 75-25,U-100,INSULN) 100 unit/mL (75-25) injection 10 Units by SubCUTAneous route Daily (before dinner). Historical Provider  
albuterol (PROVENTIL HFA, VENTOLIN HFA, PROAIR HFA) 90 mcg/actuation inhaler Take 2 Puffs by inhalation every four (4) hours as needed for Wheezing. Historical Provider  
labetalol (NORMODYNE) 300 mg tablet Take 600 mg by mouth every eight (8) hours. Historical Provider  
albuterol (PROVENTIL VENTOLIN) 2.5 mg /3 mL (0.083 %) nebulizer solution 2.5 mg by Nebulization route three (3) times daily. 3/14/18   Historical Provider RAYALDEE 30 mcg Cs24 1 Cap nightly. 4/10/18   Historical Provider  
pregabalin (LYRICA) 75 mg capsule Take 75 mg by mouth three (3) times daily. Historical Provider  
oxyCODONE IR (OXY-IR) 15 mg immediate release tablet Take 15 mg by mouth every eight (8) hours as needed for Pain. Historical Provider  
amitriptyline (ELAVIL) 50 mg tablet TAKE ONE TABLET BY MOUTH EVERY EVENING 12/15/17   Pauline Hackett MD  
aspirin 81 mg chewable tablet Take 1 Tab by mouth daily. 12/15/17   Pauline Hackett MD  
atorvastatin (LIPITOR) 40 mg tablet Take 1 Tab by mouth nightly. 12/15/17   Pauline Hackett MD  
hydrALAZINE (APRESOLINE) 100 mg tablet TAKE ONE TABLET BY MOUTH THREE TIMES DAILY 12/15/17   Pauline Hackett MD  
multivitamin, tx-iron-ca-min (THERA-M W/ IRON) 9 mg iron-400 mcg tab tablet Take 1 Tab by mouth daily.  12/15/17   Pauline Hackett MD  
sevelamer carbonate (RENVELA) 800 mg tab tab TAKE ONE TABLET BY MOUTH THREE TIMES DAILY WITH MEALS 12/15/17   Pauline Hackett MD sodium bicarbonate 650 mg tablet Take 1 Tab by mouth two (2) times a day. 12/15/17   Reece Stock MD  
omeprazole (PRILOSEC) 40 mg capsule Take 1 Cap by mouth daily. 11/6/17   Cris Martinez MD  
 
 
Allergies: Allergies Allergen Reactions  Ace Inhibitors Cough AND CKD/SD  Arb-Angiotensin Receptor Antagonist Other (comments) CKD/SD, FH: 
Family History Problem Relation Age of Onset  Diabetes Mother  Hypertension Mother  Hypertension Brother  Diabetes Brother  Diabetes Maternal Grandmother  Hypertension Maternal Grandmother  Diabetes Other  Hypertension Other  Heart Disease Other SH: Social History Substance Use Topics  Smoking status: Never Smoker  Smokeless tobacco: Never Used  Alcohol use No  
 
 
ROS: Review of systems not obtained due to patient factors. drowsiness, only answers some questions (see hpi) PHYSICAL EXAM: 
Visit Vitals  BP (!) 179/91  Pulse 69  Temp 98.1 °F (36.7 °C)  Resp 14  SpO2 93% Gen: NAD, non-toxic, obese HEENT: anicteric sclerae, normal conjunctiva, oropharynx clear, MM dry Neck: supple, trachea midline, no adenopathy Heart: RRR, no MRG, no peripheral edema Lungs: CTA b/l, non-labored respirations Abd: soft, NT, ND, BS+, obese Extr: warm, b/l BKA's Skin: dry, no rash Neuro: CN II-XII grossly intact, normal speech, moves all extremities Psych: drowsy Labs/Imaging: 
Recent Results (from the past 24 hour(s)) GLUCOSE, POC Collection Time: 07/18/18  9:10 PM  
Result Value Ref Range Glucose (POC) 366 (H) 65 - 100 mg/dL Performed by KENDY AKHTAR   
CBC WITH AUTOMATED DIFF Collection Time: 07/18/18  9:16 PM  
Result Value Ref Range WBC 8.9 4.1 - 11.1 K/uL  
 RBC 3.26 (L) 4.10 - 5.70 M/uL HGB 8.0 (L) 12.1 - 17.0 g/dL HCT 27.4 (L) 36.6 - 50.3 %  MCV 84.0 80.0 - 99.0 FL  
 MCH 24.5 (L) 26.0 - 34.0 PG  
 MCHC 29.2 (L) 30.0 - 36.5 g/dL  
 RDW 18.6 (H) 11.5 - 14.5 % PLATELET 207 614 - 945 K/uL MPV 10.8 8.9 - 12.9 FL  
 NRBC 0.0 0  WBC ABSOLUTE NRBC 0.00 0.00 - 0.01 K/uL NEUTROPHILS 87 (H) 32 - 75 % LYMPHOCYTES 5 (L) 12 - 49 % MONOCYTES 4 (L) 5 - 13 % EOSINOPHILS 3 0 - 7 % BASOPHILS 0 0 - 1 % IMMATURE GRANULOCYTES 1 (H) 0.0 - 0.5 % ABS. NEUTROPHILS 7.7 1.8 - 8.0 K/UL  
 ABS. LYMPHOCYTES 0.4 (L) 0.8 - 3.5 K/UL  
 ABS. MONOCYTES 0.4 0.0 - 1.0 K/UL  
 ABS. EOSINOPHILS 0.3 0.0 - 0.4 K/UL  
 ABS. BASOPHILS 0.0 0.0 - 0.1 K/UL  
 ABS. IMM. GRANS. 0.1 (H) 0.00 - 0.04 K/UL  
 DF AUTOMATED METABOLIC PANEL, BASIC Collection Time: 07/18/18  9:16 PM  
Result Value Ref Range Sodium 141 136 - 145 mmol/L Potassium 4.5 3.5 - 5.1 mmol/L Chloride 110 (H) 97 - 108 mmol/L  
 CO2 18 (L) 21 - 32 mmol/L Anion gap 13 5 - 15 mmol/L Glucose 346 (H) 65 - 100 mg/dL BUN 82 (H) 6 - 20 MG/DL Creatinine 5.84 (H) 0.70 - 1.30 MG/DL  
 BUN/Creatinine ratio 14 12 - 20 GFR est AA 13 (L) >60 ml/min/1.73m2 GFR est non-AA 11 (L) >60 ml/min/1.73m2 Calcium 6.6 (L) 8.5 - 10.1 MG/DL URINALYSIS W/MICROSCOPIC Collection Time: 07/19/18  1:13 AM  
Result Value Ref Range Color YELLOW/STRAW Appearance CLEAR CLEAR Specific gravity 1.013 1.003 - 1.030    
 pH (UA) 5.5 5.0 - 8.0 Protein 300 (A) NEG mg/dL Glucose 250 (A) NEG mg/dL Ketone NEGATIVE  NEG mg/dL Bilirubin NEGATIVE  NEG Blood NEGATIVE  NEG Urobilinogen 0.2 0.2 - 1.0 EU/dL Nitrites NEGATIVE  NEG Leukocyte Esterase NEGATIVE  NEG    
 WBC 5-10 0 - 4 /hpf  
 RBC 0-5 0 - 5 /hpf Epithelial cells FEW FEW /lpf Bacteria NEGATIVE  NEG /hpf Hyaline cast 2-5 0 - 5 /lpf URINE CULTURE HOLD SAMPLE Collection Time: 07/19/18  1:13 AM  
Result Value Ref Range Urine culture hold URINE ON HOLD IN MICROBIOLOGY DEPT FOR 3 DAYS.  IF UNPRESERVED URINE IS SUBMITTED, IT CANNOT BE USED FOR ADDITIONAL TESTING AFTER 24 HRS, RECOLLECTION WILL BE REQUIRED. Recent Labs  
   07/18/18 2116 WBC  8.9 HGB  8.0*  
HCT  27.4*  
PLT  225 Recent Labs  
   07/18/18 2116 NA  141  
K  4.5  
CL  110* CO2  18* BUN  82* CREA  5.84* GLU  346*  
CA  6.6* No results for input(s): SGOT, GPT, ALT, AP, TBIL, TBILI, TP, ALB, GLOB, GGT, AML, LPSE in the last 72 hours. No lab exists for component: AMYP, HLPSE No results for input(s): CPK, CKNDX, TROIQ in the last 72 hours. No lab exists for component: CPKMB No results for input(s): INR, PTP, APTT in the last 72 hours. No lab exists for component: INREXT No results for input(s): PH, PCO2, PO2 in the last 72 hours. Xr Shoulder Rt Ap/lat Min 2 V Result Date: 7/18/2018 IMPRESSION:  No acute abnormality. Assessment & Plan: 39 y.o man w/ HTN, DM, diastolic CHF, asthma, CKD 5, s/p b/l BKA's, who presented after a fall, he was found to have SD. SD on CKD V: (POA) he appears dry, and also had retention here (straight cath w/ 800 mL out) -renal US 
-IVNS 
-monitor renal function 
-consult nephrology Chronic diastolic CHF: NYHA II, likely due to HTN, appears dry 
-monitor volume state w/ IV fluids HTN: uncontrolled 
-resume home meds and re-assess Drowsiness: he is excessively drowsy, has multiple sedatives at home including oxycodone. I'm concerned of metabolite accumulation with his already advanced renal dysfunction and now with SD. Will hold oxycodone and pregabalin until he is more alert/awake. Type 2 DM: 
-start Lantus at reduced dose, add SSI S/p b/l BKA's Asthma: stable DVT ppx: sq heparin Code status: full Disposition: TBD Signed By: Lionel Day MD   
 July 19, 2018

## 2018-07-19 NOTE — PROGRESS NOTES
Hospitalist Progress Note Sherryle Fire, MD 
Answering service: [phone] OR [phone] from in house phone Date of Service:  2018 NAME:  Damaso Otero :  1975 MRN:  [id number] Admission Summary:  
39 y.o man w/ HTN, DM, diastolic CHF, asthma, CKD 5, s/p b/l BKA's, who presented after a fall, he was found to have SD. Interval history / Subjective:  
  Pt seen in room, he denies neck pain, denies headaches, Denies nausea or vomitting, Reports he didn't have loc when he had the fall. Denies falling on his limbs or hitting his head. Assessment & Plan: SD on CKD V: (POA) -monitor renal function - he has worsening over his basline 
-Catheter for accurate I/o and also due to retention 
-He received IVF and appears a little overloaded at present,  
-consult nephrology, I have asked staff to call in 
-Renal Us- Chronic renal disease, no Hydro 
  
Chronic diastolic CHF: NYHA II on admission 
-Slight Worsening with IVF - stopped fluids. CXR checked and looks wet. One time dose of lasix today and reassess 
  
HTN: uncontrolled 
-resume home meds 
  
Drowsiness - appears to be acute metabolic encephalopathy 
likely related to decreased clearance of his medications Hold Lyrica. Robaxin and Oxycodone for now Tox screen on admission was negative 
  
Type 2 DM - uncontrolled 
-adjusted lantus and ssi 
  
S/p b/l BKA's Anemia of chronic disease Epo per Renal  
  
Asthma: stable 
  
DVT ppx: sq heparin Code status: full Care Plan discussed with: Patient/Family and Nurse Patient has given Verbal permission to discuss medical care with  
persons present in the room and and also with contact as listed on face sheet. Disposition: TBD Hospital Problems  Date Reviewed: 2018 Codes Class Noted POA SD (acute kidney injury) ([facility] [address] ICD-10-CM: N17.9 ICD-9-CM: 584.9  2018 Unknown Review of Systems: A comprehensive review of systems was negative except for that written in the HPI. Vital Signs:  
 Last 24hrs VS reviewed since prior progress note. Most recent are: 
Visit Vitals  /80 (BP 1 Location: Left arm, BP Patient Position: At rest)  Pulse 72  Temp 97.8 °F (36.6 °C)  Resp 18  Wt 141.4 kg (311 lb 11.7 oz)  SpO2 94%  BMI 40.02 kg/m2 Intake/Output Summary (Last 24 hours) at 07/19/18 9008 Last data filed at 07/19/18 3125 Gross per 24 hour Intake                0 ml Output              800 ml Net             -800 ml Physical Examination:  
 
 
     
Constitutional:  No acute distress, cooperative, pleasant   
ENT:  Oral mucous moist, oropharynx benign. Neck supple, Resp:  CTA bilaterally. No wheezing/rhonchi/rales. No accessory muscle use CV:  Regular rhythm, normal rate, no murmurs, gallops, rubs GI:  Soft, non distended, non tender. normoactive bowel sounds, no hepatosplenomegaly Musculoskeletal:  No edema, warm, 2+ pulses throughout, He has B/L BKA - stump clean no s/o gross open sores. Neurologic:  Moves all extremities. AAOx3, CN II-XII reviewed Psych:  Good insight, Not anxious nor agitated. Data Review:  
 Review and/or order of clinical lab test 
Review and/or order of tests in the radiology section of CPT Review and/or order of tests in the medicine section of CPT Labs:  
 
Recent Labs  
   07/18/18 2116 WBC  8.9 HGB  8.0*  
HCT  27.4*  
PLT  225 Recent Labs  
   07/18/18 2116 NA  141  
K  4.5  
CL  110* CO2  18* BUN  82* CREA  5.84* GLU  346*  
CA  6.6* No results for input(s): SGOT, GPT, ALT, AP, TBIL, TBILI, TP, ALB, GLOB, GGT, AML, LPSE in the last 72 hours. No lab exists for component: AMYP, HLPSE No results for input(s): INR, PTP, APTT in the last 72 hours. No lab exists for component: INREXT No results for input(s): FE, TIBC, PSAT, FERR in the last 72 hours. Lab Results Component Value Date/Time Folate 5.9 03/09/2018 10:39 AM  
  
No results for input(s): PH, PCO2, PO2 in the last 72 hours. No results for input(s): CPK, CKNDX, TROIQ in the last 72 hours. No lab exists for component: CPKMB Lab Results Component Value Date/Time Cholesterol, total 88 04/24/2018 10:00 PM  
 HDL Cholesterol 30 04/24/2018 10:00 PM  
 LDL, calculated 41.6 04/24/2018 10:00 PM  
 Triglyceride 82 04/24/2018 10:00 PM  
 CHOL/HDL Ratio 2.9 04/24/2018 10:00 PM  
 
Lab Results Component Value Date/Time Glucose (POC) 402 (H) 07/19/2018 06:44 AM  
 Glucose (POC) 366 (H) 07/18/2018 09:10 PM  
 Glucose (POC) 269 (H) 04/27/2018 11:33 AM  
 Glucose (POC) 194 (H) 04/27/2018 07:55 AM  
 Glucose (POC) 218 (H) 04/26/2018 09:29 PM  
 Glucose POC 79 06/18/2018 03:27 PM  
 
Lab Results Component Value Date/Time Color YELLOW/STRAW 07/19/2018 01:13 AM  
 Appearance CLEAR 07/19/2018 01:13 AM  
 Specific gravity 1.013 07/19/2018 01:13 AM  
 pH (UA) 5.5 07/19/2018 01:13 AM  
 Protein 300 (A) 07/19/2018 01:13 AM  
 Glucose 250 (A) 07/19/2018 01:13 AM  
 Ketone NEGATIVE  07/19/2018 01:13 AM  
 Bilirubin NEGATIVE  07/19/2018 01:13 AM  
 Urobilinogen 0.2 07/19/2018 01:13 AM  
 Nitrites NEGATIVE  07/19/2018 01:13 AM  
 Leukocyte Esterase NEGATIVE  07/19/2018 01:13 AM  
 Epithelial cells FEW 07/19/2018 01:13 AM  
 Bacteria NEGATIVE  07/19/2018 01:13 AM  
 WBC 5-10 07/19/2018 01:13 AM  
 RBC 0-5 07/19/2018 01:13 AM  
 
 
 
Medications Reviewed:  
 
Current Facility-Administered Medications Medication Dose Route Frequency  albuterol (PROVENTIL VENTOLIN) nebulizer solution 2.5 mg  2.5 mg Nebulization TID  [START ON 7/20/2018] amitriptyline (ELAVIL) tablet 50 mg  50 mg Oral QHS  amLODIPine (NORVASC) tablet 10 mg  10 mg Oral DAILY  aspirin chewable tablet 81 mg  81 mg Oral DAILY  atorvastatin (LIPITOR) tablet 40 mg  40 mg Oral QHS  cloNIDine HCl (CATAPRES) tablet 0.3 mg  0.3 mg Oral TID  hydrALAZINE (APRESOLINE) tablet 100 mg  100 mg Oral Q8H  
 labetalol (NORMODYNE) tablet 600 mg  600 mg Oral Q8H  
 sevelamer carbonate (RENVELA) tab 800 mg  800 mg Oral TID WITH MEALS  sodium bicarbonate tablet 650 mg  650 mg Oral BID  sodium chloride (NS) flush 5-10 mL  5-10 mL IntraVENous Q8H  
 sodium chloride (NS) flush 5-10 mL  5-10 mL IntraVENous PRN  
 heparin (porcine) injection 5,000 Units  5,000 Units SubCUTAneous Q8H  
 insulin lispro (HUMALOG) injection   SubCUTAneous AC&HS  
 glucose chewable tablet 16 g  4 Tab Oral PRN  
 dextrose (D50W) injection syrg 12.5-25 g  12.5-25 g IntraVENous PRN  
 glucagon (GLUCAGEN) injection 1 mg  1 mg IntraMUSCular PRN  
 0.9% sodium chloride infusion  75 mL/hr IntraVENous CONTINUOUS  
 acetaminophen (TYLENOL) tablet 650 mg  650 mg Oral Q4H PRN  
 insulin glargine (LANTUS) injection 20 Units  20 Units SubCUTAneous DAILY  
 
______________________________________________________________________ EXPECTED LENGTH OF STAY: - - - 
ACTUAL LENGTH OF STAY:          0 
 
            
Rashaun Magana MD

## 2018-07-19 NOTE — CONSULTS
[address]    Hassan Crigler  MR#: [id number]  : 1975  ACCOUNT #: [de-identified]   DATE OF SERVICE: 2018    REFERRING PHYSICIAN:  Davis Morgan MD    REASON FOR CONSULTATION:  Management of a patient with advanced renal failure. HISTORY OF PRESENT ILLNESS:  The patient is well known to us. A 79-year-old black male who has advanced renal insufficiency due to diabetic nephropathy. Patient has multiple episodes of acute kidney injury, during two of them he was even on temporary dialysis. This current admission is for evaluation of a recent fall. Patient fell on his back and experienced bruises and pain. The fracture survey was negative. Patient was found to have serum creatinine above his baseline and he was admitted for further evaluation. The patient was given IV fluids in the beginning, he was noted to become quite edematous. The IV fluids were discontinued and he has received one dose of IV Lasix. The patient is supposed to receive Procrit injections in our office, but for different reasons he missed many of his doses. PAST MEDICAL HISTORY:  Chronic kidney disease stage V, diabetes mellitus, hyperlipidemia, hypertension, positive PPD test, gout, episodes of acute kidney injury, asthma. PAST SURGICAL HISTORY:  Bilateral below-the-knee amputations, tonsillectomy, hernia repair. ALLERGIES:  ALLERGIC TO ACE INHIBITORS AND ANGIOTENSIN RECEPTOR BLOCKERS. HOME MEDICATIONS:  Includes Advair Diskus, amlodipine 10, clonidine 0.3 three times daily, Zofran, Levaquin, Lasix 60 mg twice a day, Robaxin, insulin, labetalol, Ventolin, oxycodone, amitriptyline, aspirin, hydralazine, multivitamins, Renvela, sodium bicarbonate, omeprazole. Patient is also supposed to receive Procrit in the office. FAMILY HISTORY:  Mother with diabetes and hypertension. Brother with diabetes and hypertension.   Maternal grandmother again with diabetes and hypertension. SOCIAL HISTORY:  The patient is on disability. He denies alcohol, tobacco or illicit drug abuse. REVIEW OF SYSTEMS:  The patient complains of soreness at his back and shoulder after the fall. He has mild shortness of breath and he reports swelling of his amputation stumps. The rest of his 12 point system review is negative. PHYSICAL EXAMINATION:  GENERAL:  Middle-aged black male who is awake, alert, oriented. He appears quite comfortable. He does not seem to be in any distress. He is able to provide adequate history. VITAL SIGNS:  Blood pressure is 146/86, heart rate is 70, temperature is 96.9 Fahrenheit. HEENT:  Normocephalic. Patient wears glasses. Ears and nose without abnormal discharge. NECK:  Supple, with no increased JVD, carotid bruit, thyromegaly. LUNGS:  Fairly clear to auscultation. HEART:  S1 and S2 with regular rate and rhythm. ABDOMEN:  Obese, soft, nontender, nondistended. The patient has a Sellers catheter, which is draining clear yellow urine. EXTREMITIES:  Bilateral below-the-knee amputations noted. The patient's amputation stumps are with significant edema, +3. NEUROLOGIC:  The patient is awake, alert and oriented. He has no motor deficit. LABORATORY DATA:  Sodium is 141, potassium is 4.5, CO2 is 18, BUN is 82, creatinine is 5.8, GFR is 13 mL per minute which places him in stage V CKD. Hemoglobin is 8. White blood count 8.9. Chest x-ray:  Cardiomegaly with increased congestive heart failure. ASSESSMENT AND PLAN:  1. Chronic kidney disease from diabetic nephropathy with worsening GFR. Patient is currently most likely at stage V CKD which is compatible with the natural evolution of his underlying disease. Patient is not clinically uremic. His electrolytes are normal.  2.  Hypervolemia with central congestion and peripheral edema. Patient is known to be poorly compliant with his home medications.   3.  Anemia of end-stage renal disease with hemoglobin below target. 4.  History of secondary hyperparathyroidism. 5.  Status post fall. 6.  Hypertension, currently blood pressure is at target. 7.  Metabolic acidosis of chronic kidney disease. RECOMMENDATIONS:  1. Discontinue all IV fluids and start more aggressive diuresis. The dose of the Lasix will be changed to 60 mg b.i.d. IV for 4 doses. When hypervolemia is resolved the patient will be transitioned to oral diuretics. 2.  Start anemia management. Give a dose of erythropoietin and check iron profile. 3.  Assess PTH and resume phosphorus binders and consider calcitriol. 4.  Avoid nephrotoxic agents at this point. 5.  Patient does not require emergency dialysis. Thank you very much for the opportunity to be part of this patient's care. Our service will follow up with you very closely.       Omar Morgan MD       LTD / HN  D: 07/19/2018 18:23     T: 07/19/2018 19:18  JOB #: [id number]  CC: Amena Mcmanus MD

## 2018-07-19 NOTE — ED TRIAGE NOTES
Triage note : pt arrives via EMS from home. Pt's bi lateral leg prosthetics slipped out of place , pt had GLF no reports of LOC .  Denies hitting head , reports 8/10 right shoulder pain  ; EMS reports Blood sugar of  337 ; a&o x4  ;

## 2018-07-19 NOTE — PROGRESS NOTES
Problem: Mobility Impaired (Adult and Pediatric)  Goal: *Acute Goals and Plan of Care (Insert Text)  Physical Therapy Goals  Initiated 7/19/2018  1. Patient will move from supine to sit and sit to supine  and roll side to side in bed with modified independence within 7 day(s). 2.  Patient will transfer from bed to chair and chair to bed with modified independence using the least restrictive device within 7 day(s). 3.  Patient will perform sit to stand with modified independence within 7 day(s). 4.  Patient will ambulate with modified independence for 150 feet with the least restrictive device within 7 day(s). physical Therapy EVALUATION  Patient: David Ibrahim (39 y.o. male)  Date: 7/19/2018  Primary Diagnosis: SD (acute kidney injury) ([facility] [address]        Precautions:   Fall, Contact    ASSESSMENT :  Based on the objective data described below, the patient presents with significantly impaired mobility today with increased SOB with any activity and requiring 4 liters of oxygen with SpO2 90 % at rest.  He has significant edema of bilateral LE's and unable to don his prosthetic sleeve due to edema. Due to this unable to assess out of bed activity. Most concerned for SOB and 4 liters with SpO2 dropping to 86% just putting on sleeve. Per patient he is independent at home and does not use his walker in apartment but is a \"furniture walker\". He does use RW out of home but doesn't use his oxygen because he cant' carry it but scooter has apparently been ordered. At this evaluation limited by low SpO2 and edema preventing donning of prosthetics. Patient also states left prosthesis not fitting well. This would have to be addressed at the clinic and the prosthetist apparently is on vacation until next week. .    Patient will benefit from skilled intervention to address the above impairments.   Patients rehabilitation potential is considered to be Good  Factors which may influence rehabilitation potential include:   []         None noted  []         Mental ability/status  [x]         Medical condition  []         Home/family situation and support systems  []         Safety awareness  []         Pain tolerance/management  []         Other:      PLAN :  Recommendations and Planned Interventions:  [x]           Bed Mobility Training             []    Neuromuscular Re-Education  [x]           Transfer Training                   []    Orthotic/Prosthetic Training  [x]           Gait Training                         []    Modalities  [x]           Therapeutic Exercises           []    Edema Management/Control  [x]           Therapeutic Activities            [x]    Patient and Family Training/Education  []           Other (comment):    Frequency/Duration: Patient will be followed by physical therapy  5 times a week to address goals. Discharge Recommendations: To Be Determined  Further Equipment Recommendations for Discharge: none     SUBJECTIVE:   Patient stated Kyung Hussein are killing me with these fluids.     OBJECTIVE DATA SUMMARY:   HISTORY:    Past Medical History:   Diagnosis Date    Arrhythmia     afib    Asthma     3/2013 last attack    CKD (chronic kidney disease), stage III 2/12/2013    Nephrology: Ricardo Manuel MD    Diabetes [facility])     Gout     Heart failure ([facility] [address]     Hyperlipidemia     Hypertension     Ill-defined condition     Positive PPD, treated 2001    treated 9 months     Past Surgical History:   Procedure Laterality Date    HX BELOW KNEE AMPUTATION Left     due to osteomyelitis    HX HEENT      tonsilectomy    HX HERNIA REPAIR      HX OTHER SURGICAL      below the knee right amputation    HX TONSILLECTOMY       Prior Level of Function/Home Situation: modified independent  Personal factors and/or comorbidities impacting plan of care: edema, SOB, low SpO2    Home Situation  Home Environment: Apartment  # Steps to Enter: 0  One/Two Story Residence: One story  Living Alone: Yes  Support Systems: Family member(s), Friends \ neighbors  Patient Expects to be Discharged to[de-identified] Apartment  Current DME Used/Available at Home: Walker, Wheelchair, Oxygen, portable, Tub transfer bench  Tub or Shower Type: Tub/Shower combination    EXAMINATION/PRESENTATION/DECISION MAKING:      Hearing: Auditory  Auditory Impairment: None    Edema: bilateral LE edema  Range Of Motion:  AROM: Generally decreased, functional           PROM: Generally decreased, functional           Strength:    Strength: Generally decreased, functional                    Tone & Sensation:   Tone: Normal                              Coordination:     Vision:   Acuity: Able to read clock/calendar on wall without difficulty; Able to read employee name badge without difficulty  Corrective Lenses: Glasses  Functional Mobility:        Balance:   Sitting: Intact; With support  Standing:  (deferred)  Functional Measure:  Timed up and go:    Timed Get Up And Go Test:  (unable at time of eval(can not don prosthesis))     Timed Up and Go and G-code impairment scale:  Percentage of Impairment CH    0%   CI    1-19% CJ    20-39% CK    40-59% CL    60-79% CM    80-99% CN     100%   Timed   Score 0-56 10 11-12 13-14 15-16 17-18 19 20       < than 10 seconds=Normal  Greater then 13.5 seconds (in elderly)=Increased fall risk   Rafy YOON, Marshall Rutledge. Predicting the probability for falls in community dwelling older adults using the Timed Up and Go Test. Phys Ther. 2000;80:896-903. G codes: In compliance with CMSs Claims Based Outcome Reporting, the following G-code set was chosen for this patient based on their primary functional limitation being treated: The outcome measure chosen to determine the severity of the functional limitation was the Timed Up and Go with a score of 0 as unable to complete which was correlated with the impairment scale.     ? Mobility - Walking and Moving Around:     - CURRENT STATUS: CN - 100% impaired, limited or restricted    - GOAL STATUS: CM - 80%-99% impaired, limited or restricted    - D/C STATUS:  ---------------To be determined---------------    Pain:  Pain Scale 1: Numeric (0 - 10)  Pain Intensity 1: 8  Pain Location 1: Leg;Shoulder  Pain Orientation 1: Left;Right (bilat stumps, and R shoulder)  Pain Description 1: Sore  Pain Intervention(s) 1: Repositioned  Activity Tolerance:   Impaired with activity. SpO2 drops while donning prosthetic sleeve  Please refer to the flowsheet for vital signs taken during this treatment. After treatment:   []         Patient left in no apparent distress sitting up in chair  [x]         Patient left in no apparent distress in bed  [x]         Call bell left within reach  [x]         Nursing notified  []         Caregiver present  []         Bed alarm activated    COMMUNICATION/EDUCATION:   The patients plan of care was discussed with: Occupational Therapist and Registered Nurse.  []         Fall prevention education was provided and the patient/caregiver indicated understanding. []         Patient/family have participated as able in goal setting and plan of care. []         Patient/family agree to work toward stated goals and plan of care. []         Patient understands intent and goals of therapy, but is neutral about his/her participation. []         Patient is unable to participate in goal setting and plan of care.     Thank you for this referral.  Lizeth Fang, PT   Time Calculation: 8 mins

## 2018-07-20 NOTE — PROGRESS NOTES
This writer attempted to complete an initial assessment as patient is a B BKA with prosthesis that do not fit per therapy. Per review of chart patient lives alone. Nursing advised to hold on meeting with patient as patient was agitated and threatening to leave AMA. Per nursing, the  for patient's father is tomorrow and patient needs to tend to arrangements. Will follow and attempt to meet with patient another time. Care Management Interventions  PCP Verified by CM:  Yes  Discharge Durable Medical Equipment: No (owns a scooter, home o2 and walker)  Physical Therapy Consult: Yes  Occupational Therapy Consult: Yes  Current Support Network: Lives Alone

## 2018-07-20 NOTE — PROGRESS NOTES
Bedside and Verbal shift change report given to Latonia Rooney (oncoming nurse) by Bibi Walters (offgoing nurse). Report included the following information SBAR, Kardex, Intake/Output and MAR.   1145: Finger stick 385, notified provider order to give max insulin on sliding scale. Will continue observation. 1515: I have reviewed discharge instructions with the patient. The patient verbalized understanding.

## 2018-07-20 NOTE — PROGRESS NOTES
Name: Elia Holt MRN: [id number] : 1975 Hospital: [address] Date: 2018 IMPRESSION:  
· SD - ? Sec to Obstruction · Stage 4 CKD --> sec to DM Nephropathy · HF 
· Anemia in CKD · Iron Deficiency Anemia · Secondary Hyperparathyroidism PLAN:  
· IV Venofer --> 100 mg once daily  X 10 doses. He received one dose today. However, he is being 
           discharged this afternoon. He can go home on oral iron. IV infusions may be provided on an  
           outpatient basis. Continue Rayaldee. This should address the Vitamin D Deficiency and Secondary Hyperparathyroidism. Continue oral diuretics. There is no indication of dialysis therapy at this time. He is being discharged this afternoon. He can be seen in two weeks for a f/u appt (in the office). Subjective/Interval History:  
 
 
 
Felt better today. He was ready to go home. Objective:  
Vital Signs:   
Visit Vitals  /77 (BP 1 Location: Left arm)  Pulse 70  Temp 97.4 °F (36.3 °C)  Resp 20  Wt 130 kg (286 lb 8 oz)  SpO2 91%  BMI 36.78 kg/m2 O2 Device: Nasal cannula O2 Flow Rate (L/min): 4 l/min Temp (24hrs), Av.6 °F (36.4 °C), Min:97.3 °F (36.3 °C), Max:98.1 °F (36.7 °C) Intake/Output:  
Last shift:       07 - 1900 In: -  
Out: Davina [[id number]] Last 3 shifts: 1901 -  07 In: -  
Out: 3581 [[id number]] Intake/Output Summary (Last 24 hours) at 18 1446 Last data filed at 18 1010 Gross per 24 hour Intake                0 ml Output             2775 ml Net            -2775 ml Physical Exam: 
 
Seen in Room 603. His Aunt and Nurse were in attendance. General:    Alert, cooperative, no distress, appears stated age. Head:   Normocephalic Eyes:   No icterus. Lungs:   Clear to auscultation ,  No rales.  
 
 
Heart:   No S3  Gallop , No pericardial rub Ethelene Gauss Extremities: BKAs Psych:  Not anxious or agitated. Neurologic: Alert and oriented . DATA: 
Labs: 
Recent Labs  
   07/20/18 0339 07/18/18 2116 NA  140  141  
K  4.8  4.5  
CL  109*  110* CO2  20*  18* BUN  81*  82* CREA  5.77*  5.84* CA  6.9*  7.0*  6.6* ALB  2.9*   --   
PHOS  5.1*   --   
 
Recent Labs  
   07/20/18 0339 07/18/18 2116 WBC  8.1  8.9 HGB  7.7*  8.0*  
HCT  25.7*  27.4*  
PLT  235  225 No results for input(s): VIOLA, KU, CLU, CREAU in the last 72 hours. No lab exists for component: PROU Total time spent with patient:  35 minutes 
  [] Critical Care Provided Care Plan discussed with: 
 
Mr. Jenn Fields MD

## 2018-07-20 NOTE — PROGRESS NOTES
Problem: Self Care Deficits Care Plan (Adult)  Goal: *Acute Goals and Plan of Care (Insert Text)  Occupational Therapy Goals  Initiated 7/19/2018  1. Patient will perform grooming with supervision/set-up in unsupported sit within 7 day(s). 2.  Patient will perform lower body dressing including prosthetics with minimal assistance/CGA within 7 day(s). 3.  Patient will perform toilet transfers to [facility] with minimal assistance/contact guard assist within 7 day(s). 4.  Patient will perform all aspects of toileting with minimal assistance/contact guard assist within 7 day(s). 5.  Patient will participate in upper extremity therapeutic exercise/activities with independence for 5 minutes within 7 day(s). 6.  Patient will utilize energy conservation techniques during functional activities with verbal cues within 7 day(s). Occupational Therapy TREATMENT  Patient: Cyndy Ybarra (50 y.o. male)  Date: 7/20/2018  Diagnosis: SD (acute kidney injury) ([facility] [address] <principal problem not specified>       Precautions: Fall, Contact  Chart, occupational therapy assessment, plan of care, and goals were reviewed. ASSESSMENT:  Patient received seated toward bottom of bed and on 4 liters of O2. SATs at 95%. Patient reports right shoulder which chart states was painful after his fall, is now OK. He demonstrates full AROM. With mattress seat deflated, he moved to sit on EOB with supervision to Mod I. He donned bilateral LE sleeves for his prosthesis. He then placed his prostheses over his LEs, but did not secure them in place as he states he needs to let then \"settle in\". During bed mobility and while actively moving on EOB, SATs at 83 to 84%. Once settled on EOB, his SATs increased back to 95%. Patient reports that the bed makes his back hurt and states he is going to stay on EOB. Nurse notified and OK with him on EOB. Mattress reinflated to be sure he is still safe in the case it timed out and reinflated.  Patient seated back on mattress. Requested patient not attempt to stand up. He stated he had no intention to do so. Patient left with tray in front of him and call bell at his side. Disposition: TBD. Noted: patient living alone and Mod I at baseline per chart and patient. Progression toward goals:  [x]       Improving appropriately and progressing toward goals  []       Improving slowly and progressing toward goals  []       Not making progress toward goals and plan of care will be adjusted     PLAN:  Patient continues to benefit from skilled intervention to address the above impairments. Continue treatment per established plan of care. Discharge Recommendations: To Be Determined  Further Equipment Recommendations for Discharge:  none     SUBJECTIVE:   Patient stated I won't.  referring to not getting up on his own    OBJECTIVE DATA SUMMARY:   Cognitive/Behavioral Status:  Neurologic State: Alert  Orientation Level: Oriented X4  Cognition: Follows commands; Appropriate for age attention/concentration  Perception: Appears intact  Perseveration: No perseveration noted  Safety/Judgement: Awareness of environment    Functional Mobility and Transfers for ADLs:  Bed Mobility:  Scooting: Modified independent (along EOB)    Transfers:             Balance:  Sitting: Intact    ADL Intervention:       Grooming  Grooming Assistance: Supervision/set up (seated in bed; inferred)    Upper Body Bathing  Bathing Assistance: Supervision/set-up (in simulation)  Position Performed: Long sitting on bed;Seated edge of bed    Lower Body Bathing  Lower Body : Supervision/set-up (in simulation)  Position Performed: Seated edge of bed;Long sitting on bed         Lower Body Dressing Assistance  Dressing Assistance:  (Donned prosthetic sleeves with Mod I seated EOB)  Leg Crossed Method Used: No  Position Performed: Seated edge of bed    Toileting  Bladder Hygiene: Modified independent (urinal)    Cognitive Retraining  Safety/Judgement: Awareness of environment          Activity Tolerance:   Fair  Please refer to the flowsheet for vital signs taken during this treatment.   After treatment:   [x] Patient left in no apparent distress sitting up on EOB  [] Patient left in no apparent distress in bed  [] Call bell left within reach  [] Nursing notified  [] Caregiver present  [] Bed alarm activated    COMMUNICATION/COLLABORATION:   The patients plan of care was discussed with: Registered Nurse    PETE Medeiros  Time Calculation: 55 mins  2 visits

## 2018-07-20 NOTE — NURSE NAVIGATOR
Chart reviewed by Heart Failure Nurse Navigator. Heart Failure database completed. EF:  60/65% (echo in 4/2018)     ACEi/ARB: no    BB: no    Aldosterone Antagonist: no    CRT no    NYHA Functional Class II. Heart Failure Teach Back in Patient Education. Heart Failure Avoiding Triggers on Discharge Instructions. Cardiologist: not consulted      Post discharge follow up phone call to be made within 48-72 hours of discharge.

## 2018-07-20 NOTE — DIABETES MGMT
DTC Progress Note Recommendations/ Comments: Chart review for hyperglycemia - improving but ranging 165 - 254 mg/dL On renal diet; intake not documented He is on mixed insulin at home and likely needs coverage for meals. Creatinine noted 5.84 If appropriate, please consider Increase Lantus to 25 units daily Add lispro 5 units AC Current hospital DM medication:  
Lantus 20 units daily Lispro correction scale normal senstiviity Chart reviewed on [facility]. Patient is a 39 y.o. male with known DM on Humalog Mixed 75-25 insulin, 30 units each Am and 10 units each PM. 
Pt is known to DTC from previous admission and was seen for consult on 3-9-2018. A1c:  
Lab Results Component Value Date/Time Hemoglobin A1c 7.4 (H) 04/24/2018 04:32 PM  
 Hemoglobin A1c 10.6 (H) 03/10/2018 04:29 AM  
 
 
Recent Glucose Results:  
Lab Results Component Value Date/Time  (H) 07/20/2018 03:39 AM  
 GLUCPOC 254 (H) 07/20/2018 06:42 AM  
 GLUCPOC 222 (H) 07/19/2018 09:21 PM  
 GLUCPOC 165 (H) 07/19/2018 03:49 PM  
  
 
Lab Results Component Value Date/Time Creatinine 5.77 (H) 07/20/2018 03:39 AM  
 
CrCl cannot be calculated (Unknown ideal weight. ). Active Orders Diet DIET RENAL WITH OPTIONS 70-70-70 ([place]); Regular PO intake: No data found. Will continue to follow as needed. Thank you Arcelia Reynolds RN, CDE Pager [phone]

## 2018-07-20 NOTE — DISCHARGE SUMMARY
[address]    Betty Bedoya  MR#: [id number]  : 1975  ACCOUNT #: [de-identified]   ADMIT DATE: 2018  DISCHARGE DATE: 2018    DIAGNOSES ON ADMISSION:  Fall and chronic kidney disease. DIAGNOSES ON DISCHARGE:   Fall and chronic kidney disease. HISTORY OF PRESENT ILLNESS:  The patient is a 77-year-old gentleman who has previous history significant for chronic kidney disease stage V, status post bilateral BKA, presented to the emergency room due to fall. The patient told the ER physician that his prosthesis gave out and he fell down. The patient was evaluated and was found to have urinary retention. Straight catheterization showed 800 mL. HOSPITAL COURSE:  The patient was admitted for further evaluation. He was initially given IV fluids; however, later on his fluids were discontinued as he was in fluid overload. Patient remained stable, was seen by nephrology. The patient is not compliant. Renal ultrasound did not show any hydronephrosis. He was restarted on Lasix. His drowsiness resolved. Patient wants to go home as his father  to attend the .  He was found to be anemic, was given one dose of iron. He will be discharged home on iron supplement per nephrology and will need HERMINIA. Dr. Anthony Chu tells me that he is noncompliant. The patient's Sellers catheter was removed and he had no problem with voiding. DISPOSITION:  Patient is discharged home in a stable condition. DISCHARGE MEDICATIONS:  Include the following: The patient is discharged on Advair Diskus, albuterol, amitriptyline which is to be given only on a p.r.n. basis, amlodipine, aspirin, Lipitor, hydralazine, insulin, labetalol and omeprazole along with iron, Rayaldee, Lyrica, Renvela, sodium bicarbonate. The patient is to follow up with nephrology in a week and primary care physician in 1 week as well.       Louis Valentin MD       SFTRINITY/BELKIS  D: 07/20/2018 14:54     T: 07/20/2018 17:46  JOB #: [id number]

## 2018-07-20 NOTE — PROGRESS NOTES
Hospitalist Progress Note Agusto Laureano MD 
Answering service: [phone] from in house phone Date of Service:  2018 NAME:  Elia Holt :  1975 MRN:  [id number] Admission Summary:  
39 y.o man w/ HTN, DM, diastolic CHF, asthma, CKD 5, s/p b/l BKA's, who presented after a fall, he was found to have SD. Interval history / Subjective:  
  Patient wants to go home to attend fathers .  
He still has duron denies problem with urinary retention at home Assessment & Plan: SD on CKD V: (POA) -monitor renal function - he has worsening over his basline 
-DC Catheter and monitor to make sure he is not retaining 
-On IV Lasix 
-Renal Us- Chronic renal disease, no Hydro 
  
Chronic diastolic CHF: NYHA II on admission 
-Slight Worsening with IVF - stopped fluids. CXR checked and looks wet. One time dose of lasix today and reassess 
  
HTN: uncontrolled 
-resume home meds 
  
Drowsiness - appears to be acute metabolic encephalopathy 
likely related to decreased clearance of his medications Hold Lyrica. Robaxin and Oxycodone for now Tox screen on admission was negative 
  
Type 2 DM - uncontrolled 
-adjusted lantus and ssi 
  
S/p b/l BKA's Anemia of chronic disease Epo per Renal  
  
Asthma: stable 
  
DVT ppx: sq heparin Code status: full Care Plan discussed with: Patient/Family and Nurse Patient has given Verbal permission to discuss medical care with  
persons present in the room and and also with contact as listed on face sheet. Disposition: TBD Hospital Problems  Date Reviewed: 2018 Codes Class Noted POA SD (acute kidney injury) ([facility] ICD-10-CM: N17.9 ICD-9-CM: 584.9  2018 Unknown Review of Systems: A comprehensive review of systems was negative except for that written in the HPI.   
 
 
Vital Signs:  
 Last 24hrs VS reviewed since prior progress note. Most recent are: 
Visit Vitals  /60 (BP 1 Location: Left arm, BP Patient Position: At rest)  Pulse 70  Temp 98.1 °F (36.7 °C)  Resp 20  Wt 130 kg (286 lb 8 oz)  SpO2 100%  BMI 36.78 kg/m2 Intake/Output Summary (Last 24 hours) at 07/20/18 8097 Last data filed at 07/20/18 9201 Gross per 24 hour Intake                0 ml Output             3425 ml Net            -3425 ml Physical Examination:  
 
 
     
Constitutional:  No acute distress, cooperative, pleasant   
ENT:  Oral mucous moist, oropharynx benign. Neck supple, Resp:  CTA bilaterally. No wheezing/rhonchi/rales. No accessory muscle use CV:  Regular rhythm, normal rate, no murmurs, gallops, rubs GI:  Soft, non distended, non tender. normoactive bowel sounds, no hepatosplenomegaly Musculoskeletal:  No edema, warm, 2+ pulses throughout, He has B/L BKA - stump clean no s/o gross open sores. Neurologic:  Moves all extremities. AAOx3, CN II-XII reviewed Psych:  Good insight, Not anxious nor agitated. Data Review:  
 Review and/or order of clinical lab test 
Review and/or order of tests in the radiology section of CPT Review and/or order of tests in the medicine section of CPT Labs:  
 
Recent Labs  
   07/20/18 
 1224  07/18/18 
 2116 WBC  8.1  8.9 HGB  7.7*  8.0*  
HCT  25.7*  27.4*  
PLT  235  225 Recent Labs  
   07/20/18 
 0339  07/18/18 
 2116 NA  140  141  
K  4.8  4.5  
CL  109*  110* CO2  20*  18* BUN  81*  82* CREA  5.77*  5.84* GLU  161*  346*  
CA  6.9*  7.0*  6.6*  
PHOS  5.1*   --   
 
Recent Labs  
   07/20/18 
 6085 ALB  2.9* No results for input(s): INR, PTP, APTT in the last 72 hours. No lab exists for component: INREXT, INREXT Recent Labs  
   07/20/18 
 5905 TIBC  188* PSAT  9* Lab Results Component Value Date/Time  Folate 5.9 03/09/2018 10:39 AM  
  
No results for input(s): PH, PCO2, PO2 in the last 72 hours. No results for input(s): CPK, CKNDX, TROIQ in the last 72 hours. No lab exists for component: CPKMB Lab Results Component Value Date/Time Cholesterol, total 88 04/24/2018 10:00 PM  
 HDL Cholesterol 30 04/24/2018 10:00 PM  
 LDL, calculated 41.6 04/24/2018 10:00 PM  
 Triglyceride 82 04/24/2018 10:00 PM  
 CHOL/HDL Ratio 2.9 04/24/2018 10:00 PM  
 
Lab Results Component Value Date/Time Glucose (POC) 222 (H) 07/19/2018 09:21 PM  
 Glucose (POC) 165 (H) 07/19/2018 03:49 PM  
 Glucose (POC) 169 (H) 07/19/2018 11:03 AM  
 Glucose (POC) 402 (H) 07/19/2018 06:44 AM  
 Glucose (POC) 366 (H) 07/18/2018 09:10 PM  
 
Lab Results Component Value Date/Time Color YELLOW/STRAW 07/19/2018 01:13 AM  
 Appearance CLEAR 07/19/2018 01:13 AM  
 Specific gravity 1.013 07/19/2018 01:13 AM  
 pH (UA) 5.5 07/19/2018 01:13 AM  
 Protein 300 (A) 07/19/2018 01:13 AM  
 Glucose 250 (A) 07/19/2018 01:13 AM  
 Ketone NEGATIVE  07/19/2018 01:13 AM  
 Bilirubin NEGATIVE  07/19/2018 01:13 AM  
 Urobilinogen 0.2 07/19/2018 01:13 AM  
 Nitrites NEGATIVE  07/19/2018 01:13 AM  
 Leukocyte Esterase NEGATIVE  07/19/2018 01:13 AM  
 Epithelial cells FEW 07/19/2018 01:13 AM  
 Bacteria NEGATIVE  07/19/2018 01:13 AM  
 WBC 5-10 07/19/2018 01:13 AM  
 RBC 0-5 07/19/2018 01:13 AM  
 
 
 
Medications Reviewed:  
 
Current Facility-Administered Medications Medication Dose Route Frequency  albuterol (PROVENTIL VENTOLIN) nebulizer solution 2.5 mg  2.5 mg Nebulization TID  amitriptyline (ELAVIL) tablet 50 mg  50 mg Oral QHS  amLODIPine (NORVASC) tablet 10 mg  10 mg Oral DAILY  aspirin chewable tablet 81 mg  81 mg Oral DAILY  atorvastatin (LIPITOR) tablet 40 mg  40 mg Oral QHS  cloNIDine HCl (CATAPRES) tablet 0.3 mg  0.3 mg Oral TID  hydrALAZINE (APRESOLINE) tablet 100 mg  100 mg Oral Q8H  
 labetalol (NORMODYNE) tablet 600 mg  600 mg Oral Q8H  
 sevelamer carbonate (RENVELA) tab 800 mg  800 mg Oral TID WITH MEALS  sodium bicarbonate tablet 650 mg  650 mg Oral BID  sodium chloride (NS) flush 5-10 mL  5-10 mL IntraVENous Q8H  
 sodium chloride (NS) flush 5-10 mL  5-10 mL IntraVENous PRN  
 heparin (porcine) injection 5,000 Units  5,000 Units SubCUTAneous Q8H  
 insulin lispro (HUMALOG) injection   SubCUTAneous AC&HS  
 glucose chewable tablet 16 g  4 Tab Oral PRN  
 dextrose (D50W) injection syrg 12.5-25 g  12.5-25 g IntraVENous PRN  
 glucagon (GLUCAGEN) injection 1 mg  1 mg IntraMUSCular PRN  
 acetaminophen (TYLENOL) tablet 650 mg  650 mg Oral Q4H PRN  
 insulin glargine (LANTUS) injection 20 Units  20 Units SubCUTAneous DAILY  furosemide (LASIX) injection 60 mg  60 mg IntraVENous BID  
 
______________________________________________________________________ EXPECTED LENGTH OF STAY: 3d 7h 
ACTUAL LENGTH OF STAY:          1 Mackenzie Wilkinson MD

## 2018-07-20 NOTE — NURSE NAVIGATOR
Called unit due to patient having a discharge order and not having a follow up appointment within 5 days. Spoke with Gualberto on the unit. Heart Failure Nurse Navigator attempting to scheduled follow up appointment. Care Management Specialist contacted to request appointment.

## 2018-07-20 NOTE — PROGRESS NOTES
Occupational Therapy: Called to assist with donning prosthesis for discharge. Patient received seated EOB with left LE prosthesis donned. Right sleeve on, but patient reports he has been unable to get prosthesis on. Sleeve with a nail insertion for prosthesis. Attempted x 3 to align nail and don. Unsuccessful. Patient Mod I with this at baseline. Patient used his RW to stand with CGA x 2 for safety, pulled up pants with CGA, and sat back on bed. Patient performed a lateral transfer to W/C (volunteer waiting to tqke patient to discharge) with right armrest removed with CGA. Aunt (per patient report to ID) waiting to take patient. Notified nurse that volunteer is unable to transport patient and patient will need assist to transfer to car.    PETE Purcell/CANDACE

## 2018-07-20 NOTE — DISCHARGE INSTRUCTIONS
Patient Discharge Instructions    Mei Campbell / [id number] : 1975    Admitted 2018 Discharged: 2018 2:50 PM     ACUTE DIAGNOSES:  SD (acute kidney injury) [facility]    CHRONIC MEDICAL DIAGNOSES:  Problem List as of 2018  Date Reviewed: 2018          Codes Class Noted - Resolved    SD (acute kidney injury) ([facility].) ICD-10-CM: N17.9  ICD-9-CM: 584.9  2018 - Present        CHF exacerbation ([facility].) ICD-10-CM: I50.9  ICD-9-CM: 428.0  2018 - Present        Type 2 diabetes mellitus with diabetic neuropathy ([facility].) ICD-10-CM: E11.40  ICD-9-CM: 250.60, 357.2  2018 - Present        Type 2 diabetes mellitus with nephropathy ([facility].) ICD-10-CM: E11.21  ICD-9-CM: 250.40, 583.81  2018 - Present        Hypoxia ICD-10-CM: R09.02  ICD-9-CM: 799.02  10/21/2017 - Present        Acute pulmonary edema ([facility] ICD-10-CM: J81.0  ICD-9-CM: 518.4  2017 - Present        Anemia ICD-10-CM: D64.9  ICD-9-CM: 285.9  2017 - Present        Acute on chronic diastolic CHF (congestive heart failure) ([facility] ICD-10-CM: I50.33  ICD-9-CM: 428.33, 428.0  2017 - Present        Chronic renal failure, stage 4 (severe) ([facility] ICD-10-CM: N18.4  ICD-9-CM: 585.4  2017 - Present        Hypertensive urgency ICD-10-CM: I16.0  ICD-9-CM: 401.9  3/26/2017 - Present        Long term current use of anticoagulant ICD-10-CM: Z79.01  ICD-9-CM: V58.61  2016 - Present        CKD (chronic kidney disease) stage 3, GFR 30-59 ml/min ICD-10-CM: N18.3  ICD-9-CM: 585.3  2016 - Present        Accelerated hypertension with diastolic congestive heart failure, NYHA class 3 (HCC) (Chronic) ICD-10-CM: I11.0, I50.30  ICD-9-CM: 402.01, 428.30, 428.0  2016 - Present        Pulmonary edema ICD-10-CM: J81.1  ICD-9-CM: 012  2016 - Present        Heart failure ([facility] ICD-10-CM: I50.9  ICD-9-CM: 428.9  2016 - Present        Diabetic foot ulcer ([facility] ICD-10-CM: E11.621, L97.509  ICD-9-CM: 250.80, 707.15  3/15/2016 - Present        Septic arthritis of foot ([facility].) ICD-10-CM: M00.9  ICD-9-CM: 711.07  3/5/2016 - Present        Osteomyelitis ([facility] ICD-10-CM: M86.9  ICD-9-CM: 730.20  3/4/2016 - Present        Acute diastolic CHF (congestive heart failure) (HCC) ICD-10-CM: I50.31  ICD-9-CM: 428.31, 428.0  3/2/2016 - Present        Pneumonia ICD-10-CM: J18.9  ICD-9-CM: 171  3/2/2016 - Present        Cellulitis and abscess of leg ICD-10-CM: L03.119, L02.419  ICD-9-CM: 682.6  3/2/2016 - Present        Atrial fibrillation ([place] [address] ICD-10-CM: I48.91  ICD-9-CM: 427.31  3/2/2016 - Present        HTN (hypertension) ICD-10-CM: I10  ICD-9-CM: 401.9  3/2/2016 - Present        Diabetic foot ulcer with osteomyelitis ([place] [address] ICD-10-CM: E11.621, E11.69, L97.509, M86.9  ICD-9-CM: 250.80, 707.15, 730.27, 731.8  2/20/2016 - Present        Symptomatic anemia ICD-10-CM: D64.9  ICD-9-CM: 285.9  1/9/2016 - Present        Elevated serum alkaline phosphatase level ICD-10-CM: R74.8  ICD-9-CM: 790.5  11/8/2015 - Present        Hyponatremia ICD-10-CM: E87.1  ICD-9-CM: 276.1  11/8/2015 - Present        Acute renal failure (ARF) ([facility] ICD-10-CM: N17.9  ICD-9-CM: 584.9  11/8/2015 - Present        Type 2 diabetes mellitus with right diabetic foot ulcer ([facility] ICD-10-CM: E11.621, L97.519  ICD-9-CM: 250.80, 707.15  11/8/2015 - Present        Iron deficiency anemia ICD-10-CM: D50.9  ICD-9-CM: 280.9  11/8/2015 - Present        Diabetic foot ulcer ([place] [address] ICD-10-CM: E11.621, L97.509  ICD-9-CM: 250.80, 707.15  7/19/2015 - Present        Acute diastolic heart failure ([facility] ICD-10-CM: I50.31  ICD-9-CM: 428.31  6/25/2015 - Present        Atrial fibrillation and flutter ([facility] ICD-10-CM: I48.91, I48.92  ICD-9-CM: 427.31, 427.32  6/24/2015 - Present        Hx of BKA ([place] [address] ICD-10-CM: Z89.519  ICD-9-CM: V49.75  10/23/2014 - Present        Streptococcal infection(041.00) ICD-10-CM: A49.1  ICD-9-CM: 041.00  10/22/2014 - Present        Proteus infection ICD-10-CM: A49.8  ICD-9-CM: 041.6 10/22/2014 - Present        SIRS with acute organ dysfunction due to infectious process [facility]) ICD-10-CM: A41.9, R65.20  ICD-9-CM: 038.9, 995.92 Present on Admission 10/21/2014 - Present        Open wound of toe ICD-10-CM: S91.109A  ICD-9-CM: 893.0 Present on Admission 10/21/2014 - Present        Aortic regurgitation ICD-10-CM: I35.1  ICD-9-CM: 424.1 Chronic 10/21/2014 - Present    Overview Signed 10/21/2014 11:30 AM by Olive Ty MD     Echo 2013              Renal failure, acute [facility]) ICD-10-CM: N17.9  ICD-9-CM: 584.9 Present on Admission 10/21/2014 - Present        Gram-positive cocci bacteremia ICD-10-CM: R78.81  ICD-9-CM: 790.7, 041.89  10/21/2014 - Present        Cellulitis and abscess of leg ICD-10-CM: L03.119, L02.419  ICD-9-CM: 682.6  10/21/2014 - Present        History of positive PPD ICD-10-CM: R76.11  ICD-9-CM: 795.51  2/17/2013 - Present        Acute hypoxemic respiratory failure (HCC) ICD-10-CM: J96.01  ICD-9-CM: 518.81  2/16/2013 - Present        Malignant hypertension ICD-10-CM: I10  ICD-9-CM: 401.0  2/16/2013 - Present        Uncontrolled type II diabetes mellitus ([facility] ICD-10-CM: E11.65  ICD-9-CM: 250.02  2/16/2013 - Present        HTN (hypertension) ICD-10-CM: I10  ICD-9-CM: 401.9  1/19/2012 - Present        Morbid obesity with BMI of 40.0-44.9, adult [facility]) ICD-10-CM: E66.01, Z68.41  ICD-9-CM: 278.01, V85.41 Present on Admission 1/19/2012 - Present    Overview Addendum 10/21/2014 11:29 AM by Olive Ty MD     Body mass index is 40.04 kg/(m^2).               RESOLVED: Fluid overload ICD-10-CM: E87.70  ICD-9-CM: 276.69  3/9/2018 - 3/11/2018        RESOLVED: CHF (congestive heart failure) ([facility] ICD-10-CM: I50.9  ICD-9-CM: 428.0  3/26/2017 - 12/15/2017        RESOLVED: Leg ulcer ([facility].) ICD-10-CM: L97.909  ICD-9-CM: 707.10  3/5/2016 - 4/19/2016        RESOLVED: Respiratory failure with hypoxia ([facility] ICD-10-CM: J96.91  ICD-9-CM: 518.81  2/14/2016 - 2/26/2016        RESOLVED: Acute respiratory failure with hypoxemia [facility]) ICD-10-CM: J96.01  ICD-9-CM: 518.81  11/8/2015 - 11/18/2015        RESOLVED: Severe sepsis ([facility] ICD-10-CM: A41.9, R65.20  ICD-9-CM: 038.9, 995.92  11/8/2015 - 11/18/2015        RESOLVED: Obstructive uropathy ICD-10-CM: N13.9  ICD-9-CM: 599.60  11/8/2015 - 11/18/2015        RESOLVED: Pneumonia ICD-10-CM: J18.9  ICD-9-CM: 993  11/5/2015 - 11/18/2015        RESOLVED: A-fib ([facility] ICD-10-CM: I48.91  ICD-9-CM: 427.31  6/25/2015 - 4/19/2016        RESOLVED: Gangrene ([facility] ICD-10-CM: M60  ICD-9-CM: 785.4  10/23/2014 - 4/19/2016        RESOLVED: Sepsis ([facility] ICD-10-CM: A41.9  ICD-9-CM: 038.9, 995.91  10/23/2014 - 4/19/2016        RESOLVED: Osteomyelitis of foot ([facility] ICD-10-CM: M86.9  ICD-9-CM: 730.27  10/22/2014 - 4/19/2016    Overview Signed 10/22/2014  1:29 PM by Ana Self MD     IMPRESSION:   1. Osteomyelitis of the first and second distal phalanges. 2. Cellulitis and myositis. 3. Phlegmon deep to the dorsal skin breakdown superficial to the distal   second metatarsal. No drainable abscess. RESOLVED: Type 2 diabetes mellitus with left diabetic foot ulcer ([facility] ICD-10-CM: N69.670, L97.529  ICD-9-CM: 250.80, 707.15 Present on Admission 10/21/2014 - 4/19/2016        RESOLVED: Sepsis ([facility] ICD-10-CM: A41.9  ICD-9-CM: 038.9, 995.91  10/21/2014 - 10/22/2014        RESOLVED: DKA (diabetic ketoacidoses) ([facility] ICD-10-CM: E13.10  ICD-9-CM: 250.10  10/20/2014 - 4/19/2016        RESOLVED: Pneumonia ICD-10-CM: J18.9  ICD-9-CM: 486  8/7/2013 - 10/21/2014        RESOLVED: Sepsis ICD-10-CM: A41.9  ICD-9-CM: 995.91  2/17/2013 - 2/26/2016        RESOLVED: Rhabdomyolysis ICD-10-CM: M62.82  ICD-9-CM: 728.88  2/17/2013 - 10/21/2014        RESOLVED: Pneumonia, organism unspecified(486) ICD-10-CM: J18.9  ICD-9-CM: 139  2/16/2013 - 10/21/2014              DISCHARGE MEDICATIONS:           · It is important that you take the medication exactly as they are prescribed.    · Keep your medication in the bottles provided by the pharmacist and keep a list of the medication names, dosages, and times to be taken in your wallet. · Do not take other medications without consulting your doctor. DIET:  Renal Diet    ACTIVITY: Activity as tolerated    ADDITIONAL INFORMATION: If you experience any of the following symptoms then please call your primary care physician or return to the emergency room if you cannot get hold of your doctor: Fever, chills, nausea, vomiting, diarrhea, change in mentation, falling, bleeding, shortness of breath. FOLLOW UP CARE:  Dr. Ryder Escobar MD.  Please call and set up an appointment to see them in 2 weeks. Call pcp at the number below and set up an appointment to see them in 1 week: Information obtained by :  I understand that if any problems occur once I am at home I am to contact my physician. I understand and acknowledge receipt of the instructions indicated above.                                                                                                                                            Physician's or R.N.'s Signature                                                                  Date/Time                                                                                                                                              Patient or Representative Signature                                                          Date/Time

## 2019-01-01 NOTE — PROGRESS NOTES
Bedside shift change report given to [facility] (oncoming nurse) by Katlyn Warren (offgoing nurse). Report included the following information SBAR. no

## 2020-09-10 NOTE — PROGRESS NOTES
CM received consult to arrange getting an oxygen tank from his home oxygen company for discharge travel to residence. CM met with patient who clarified that that he had home oxygen with [facility] but that was discontinued last year by [facility] with patient stating that [facility] no longer delivered to his address. CM noted that a new referral for home oxygen will be needed. Patient did not have a preference of provider. CM obtained physician order for home oxygen. Referral was sent to [facility] via [employer], and CM spoke with Trinity Torres, who accepted referral for home oxygen delivery as well as delivery of an oxygen tank to patient in the hospital for purpose of discharge transport. CM gave update given to patient and staff nurse. Received request via: Pharmacy    Was the patient seen in the last year in this department? Yes    Does the patient have an active prescription (recently filled or refills available) for medication(s) requested? No

## 2021-06-14 NOTE — DISCHARGE INSTRUCTIONS
Discharge Instructions       PATIENT ID: Amanda Staley  MRN: [id number]   YOB: 1975    DATE OF ADMISSION: 3/9/2018  3:18 AM    DATE OF DISCHARGE: 3/11/2018    PRIMARY CARE PROVIDER: Holly Garcia MD     ATTENDING PHYSICIAN: Kyleigh Ocampo MD  DISCHARGING PROVIDER: Kyleigh Ocampo MD    To contact this individual call [phone] and ask the  to page. If unavailable ask to be transferred the Adult Hospitalist Department. DISCHARGE DIAGNOSES acute on chronic heart failure, chronic kidney disease    CONSULTATIONS: IP CONSULT TO CARDIOLOGY  IP CONSULT TO NEPHROLOGY    PROCEDURES/SURGERIES: * No surgery found *    PENDING TEST RESULTS:   At the time of discharge the following test results are still pending: n/a    FOLLOW UP APPOINTMENTS:   Follow-up Information     Follow up With Details Comments [address]  Referred for one time home health nursing visit. [facility] [phone] [facility]    Holly Garcia MD Schedule an appointment as soon as possible for a visit in 1 week for hospital discharge follow-up [address]  [address] [facility]      Maura Saldaña MD Schedule an appointment as soon as possible for a visit in 1 week for follow-up of congestive heart failure [address] [address] [phone]      Aleta Nassar MD Schedule an appointment as soon as possible for a visit in 2 weeks for follow-up of kidney function [address]:   You were admitted with fluid overload due to the heart and kidneys. You need to minimize your salt and fluid intake. Follow-up with your doctors as instructed.     DIET: Cardiac Diet low sodium (less than 2 grams daily)    ACTIVITY: Activity as tolerated      DISCHARGE MEDICATIONS:   See Medication Reconciliation Form    · It is What Type Of Note Output Would You Prefer (Optional)?: Bullet Format Hpi Title: Evaluation of Skin Lesions important that you take the medication exactly as they are prescribed. · Keep your medication in the bottles provided by the pharmacist and keep a list of the medication names, dosages, and times to be taken in your wallet. · Do not take other medications without consulting your doctor. NOTIFY YOUR PHYSICIAN FOR ANY OF THE FOLLOWING:   Fever over 101 degrees for 24 hours. Chest pain, shortness of breath, fever, chills, nausea, vomiting, diarrhea, change in mentation, falling, weakness, bleeding. Severe pain or pain not relieved by medications. Or, any other signs or symptoms that you may have questions about.       DISPOSITION:   x Home With:   OT  PT  HH  RN       SNF/Inpatient Rehab/LTAC    Independent/assisted living    Hospice    Other:     CDMP Checked:   Yes x     PROBLEM LIST Updated:  Yes x       Signed:   Herschel Mcardle, MD  3/11/2018  10:25 AM How Severe Are Your Spot(S)?: mild Have Your Spot(S) Been Treated In The Past?: has not been treated

## 2021-12-08 NOTE — Clinical Note
You have been evaluated and treated tonight by Dr Domenico Clark from cardiology. Follow up with Dr Georgia Marquis this week. Keep your appointments with primary care and nephrology. Return to ER for shortness of breath or other worsening.
Yes

## 2022-01-23 NOTE — IP AVS SNAPSHOT
1111 [facility] [address] [place] [address] 
[phone] Patient: Rony Anna MRN: [id number] XKI:[id number] About your hospitalization You were admitted on:  April 24, 2018 You last received care in the:  [facility] 6S NEURO-SCI TELE You were discharged on:  April 27, 2018 Why you were hospitalized Your primary diagnosis was:  Chf Exacerbation (Hcc) Follow-up Information Follow up With Details Comments Contact Info Helen Quigley MD Go on 5/2/2018 at 10 am on 5/2/18  [place] [address] [address] [place] [address] 
[phone] 2712 [facility]  One time hospital to home visit. [facility]007 [facility] [address] 
[phone] Your Scheduled Appointments Wednesday May 02, 2018 10:00 AM EDT TRANSITIONAL CARE MANAGEMENT with Helen Quigley MD  
[facility] - [address] [place] [address] ([address] [place] [address]) [address] [place] [address]  
[phone] Thursday May 17, 2018  2:00 PM EDT ROUTINE CARE with Helen Quigley MD  
[facility] - [address] [place] [address] ([address] [place] [address]) [address] [place] [address]  
[phone] Discharge Orders None A check damon indicates which time of day the medication should be taken. My Medications CHANGE how you take these medications Instructions Each Dose to Equal  
 Morning Noon Evening Bedtime * albuterol 90 mcg/actuation inhaler Commonly known as:  PROVENTIL HFA, VENTOLIN HFA, PROAIR HFA What changed:  Another medication with the same name was removed. Continue taking this medication, and follow the directions you see here. Your last dose was: Your next dose is: Take 2 Puffs by inhalation every four (4) hours as needed for Wheezing. 2 Puff * albuterol 2.5 mg /3 mL (0.083 %) nebulizer solution Commonly known as:  PROVENTIL VENTOLIN What changed:  Another medication with the same name was removed. Continue taking this medication, and follow the directions you see here. Your last dose was: Your next dose is:    
   
   
 2.5 mg by Nebulization route three (3) times daily. 2.5 mg  
    
   
   
   
  
 cloNIDine HCl 0.3 mg tablet Commonly known as:  CATAPRES What changed:  Another medication with the same name was removed. Continue taking this medication, and follow the directions you see here. Your last dose was: Your next dose is: Take 1 Tab by mouth three (3) times daily. 0.3 mg  
    
   
   
   
  
 * HumaLOG Mix 75-25(U-100)Insuln 100 unit/mL (75-25) injection Generic drug:  insulin lispro protamine/insulin lispro What changed:  Another medication with the same name was removed. Continue taking this medication, and follow the directions you see here. Your last dose was: Your next dose is:    
   
   
 30 Units by SubCUTAneous route Daily (before breakfast). 30 Units * HumaLOG Mix 75-25(U-100)Insuln 100 unit/mL (75-25) injection Generic drug:  insulin lispro protamine/insulin lispro What changed:  Another medication with the same name was removed. Continue taking this medication, and follow the directions you see here. Your last dose was: Your next dose is:    
   
   
 10 Units by SubCUTAneous route Daily (before dinner). 10 Units  
    
   
   
   
  
 labetalol 300 mg tablet Commonly known as:  Jess Coyordyer What changed:  Another medication with the same name was removed. Continue taking this medication, and follow the directions you see here. Your last dose was: Your next dose is: Take 600 mg by mouth every eight (8) hours. 600 mg  
    
   
   
   
  
 LASIX 40 mg tablet Generic drug:  furosemide What changed:  Another medication with the same name was removed. Continue taking this medication, and follow the directions you see here. Your last dose was: Your next dose is: Take 60 mg by mouth two (2) times a day. 60 mg  
    
   
   
   
  
 methocarbamol 750 mg tablet Commonly known as:  ROBAXIN What changed:  Another medication with the same name was removed. Continue taking this medication, and follow the directions you see here. Your last dose was: Your next dose is: Take 750 mg by mouth three (3) times daily as needed. 750 mg  
    
   
   
   
  
 ondansetron 8 mg disintegrating tablet Commonly known as:  ZOFRAN ODT What changed:  Another medication with the same name was removed. Continue taking this medication, and follow the directions you see here. Your last dose was: Your next dose is: Take 8 mg by mouth every eight (8) hours as needed for Nausea. 8 mg * Notice: This list has 4 medication(s) that are the same as other medications prescribed for you. Read the directions carefully, and ask your doctor or other care provider to review them with you. CONTINUE taking these medications Instructions Each Dose to Equal  
 Morning Noon Evening Bedtime  
 amitriptyline 50 mg tablet Commonly known as:  ELAVIL Your last dose was: Your next dose is: TAKE ONE TABLET BY MOUTH EVERY EVENING  
     
   
   
   
  
 amLODIPine 10 mg tablet Commonly known as:  Mahsa Grebe Your last dose was: Your next dose is: Take 1 Tab by mouth daily. 10 mg  
    
   
   
   
  
 aspirin 81 mg chewable tablet Your last dose was: Your next dose is: Take 1 Tab by mouth daily. 81 mg  
    
   
   
   
  
 atorvastatin 40 mg tablet Commonly known as:  LIPITOR Your last dose was: Your next dose is: Bilgen Take 1 Tab by mouth nightly. 40 mg  
    
   
   
   
  
 fluticasone-salmeterol 250-50 mcg/dose diskus inhaler Commonly known as:  ADVAIR Your last dose was: Your next dose is: Take 1 Puff by inhalation daily. 1 Puff  
    
   
   
   
  
 hydrALAZINE 100 mg tablet Commonly known as:  APRESOLINE Your last dose was: Your next dose is: TAKE ONE TABLET BY MOUTH THREE TIMES DAILY  
     
   
   
   
  
 LYRICA 75 mg capsule Generic drug:  pregabalin Your last dose was: Your next dose is: Take 75 mg by mouth three (3) times daily. 75 mg  
    
   
   
   
  
 multivitamin, tx-iron-ca-min 9 mg iron-400 mcg Tab tablet Commonly known as:  THERA-M w/ IRON Your last dose was: Your next dose is: Take 1 Tab by mouth daily. 1 Tab  
    
   
   
   
  
 omeprazole 40 mg capsule Commonly known as:  PRILOSEC Your last dose was: Your next dose is: Take 1 Cap by mouth daily. 40 mg  
    
   
   
   
  
 oxyCODONE IR 15 mg immediate release tablet Commonly known as:  OXY-IR Your last dose was: Your next dose is: Take 15 mg by mouth every eight (8) hours as needed for Pain. 15 mg RAYALDEE 30 mcg Cs24 Generic drug:  calcifediol Your last dose was: Your next dose is:    
   
   
 1 Cap nightly. 1 Cap  
    
   
   
   
  
 sevelamer carbonate 800 mg Tab tab Commonly known as:  Viridiana Bauman Your last dose was: Your next dose is: TAKE ONE TABLET BY MOUTH THREE TIMES DAILY WITH MEALS  
     
   
   
   
  
 sodium bicarbonate 650 mg tablet Your last dose was: Your next dose is: Take 1 Tab by mouth two (2) times a day. 650 mg Opioid Education Prescription Opioids: What You Need to Know: Prescription opioids can be used to help relieve moderate-to-severe pain and are often prescribed following a surgery or injury, or for certain health conditions. These medications can be an important part of treatment but also come with serious risks. Opioids are strong pain medicines. Examples include hydrocodone, oxycodone, fentanyl, and morphine. Heroin is an example of an illegal opioid. It is important to work with your health care provider to make sure you are getting the safest, most effective care. WHAT ARE THE RISKS AND SIDE EFFECTS OF OPIOID USE? Prescription opioids carry serious risks of addiction and overdose, especially with prolonged use. An opioid overdose, often marked by slow breathing, can cause sudden death. The use of prescription opioids can have a number of side effects as well, even when taken as directed. · Tolerance-meaning you might need to take more of a medication for the same pain relief · Physical dependence-meaning you have symptoms of withdrawal when the medication is stopped. Withdrawal symptoms can include nausea, sweating, chills, diarrhea, stomach cramps, and muscle aches. Withdrawal can last up to several weeks, depending on which drug you took and how long you took it. · Increased sensitivity to pain · Constipation · Nausea, vomiting, and dry mouth · Sleepiness and dizziness · Confusion · Depression · Low levels of testosterone that can result in lower sex drive, energy, and strength · Itching and sweating RISKS ARE GREATER WITH:      
· History of drug misuse, substance use disorder, or overdose · Mental health conditions (such as depression or anxiety) · Sleep apnea · Older age (72 years or older) · Pregnancy Avoid alcohol while taking prescription opioids. Also, unless specifically advised by your health care provider, medications to avoid include: · Benzodiazepines (such as Xanax or Valium) · Muscle relaxants (such as Soma or Flexeril) · Hypnotics (such as Ambien or Lunesta) · Other prescription opioids KNOW YOUR OPTIONS Talk to your health care provider about ways to manage your pain that don't involve prescription opioids. Some of these options may actually work better and have fewer risks and side effects. Options may include: 
· Pain relievers such as acetaminophen, ibuprofen, and naproxen · Some medications that are also used for depression or seizures · Physical therapy and exercise · Counseling to help patients learn how to cope better with triggers of pain and stress. · Application of heat or cold compress · Massage therapy · Relaxation techniques Be Informed Make sure you know the name of your medication, how much and how often to take it, and its potential risks & side effects. IF YOU ARE PRESCRIBED OPIOIDS FOR PAIN: 
· Never take opioids in greater amounts or more often than prescribed. Remember the goal is not to be pain-free but to manage your pain at a tolerable level. · Follow up with your primary care provider to: · Work together to create a plan on how to manage your pain. · Talk about ways to help manage your pain that don't involve prescription opioids. · Talk about any and all concerns and side effects. · Help prevent misuse and abuse. · Never sell or share prescription opioids · Help prevent misuse and abuse. · Store prescription opioids in a secure place and out of reach of others (this may include visitors, children, friends, and family). · Safely dispose of unused/unwanted prescription opioids: Find your community drug take-back program or your pharmacy mail-back program, or flush them down the toilet, following guidance from the Food and Drug Administration (www.fda.gov/Drugs/ResourcesForYou). · Visit www.cdc.gov/drugoverdose to learn about the risks of opioid abuse and overdose.  
· If you believe you may be struggling with addiction, tell your health care provider and ask for guidance or call [address] [place] [address] at [phone]. Discharge Instructions Discharge Instructions PATIENT ID: Anca Duenas MRN: [id number] YOB: 1975 DATE OF ADMISSION: 4/24/2018  3:43 PM   
DATE OF DISCHARGE: 4/27/2018 PRIMARY CARE PROVIDER: Padmini Hannon MD  
 
ATTENDING PHYSICIAN: Hailee Fernandez MD* DISCHARGING PROVIDER: Hailee Fernandez MD   
To contact this individual call [phone] and ask the  to page. If unavailable ask to be transferred the Adult Hospitalist Department. DISCHARGE DIAGNOSES:  
1. Acute hypoxic respiratory failure  
2. Acute on chronic CHF, NYHA, Class 3. 
3. CKD V  
4. Anemia  
5. DM2  
6.  Hyperlipidemia  
7.  Hypertension  
8. Morbid obesity CONSULTATIONS: IP CONSULT TO HOSPITALIST 
IP CONSULT TO NEPHROLOGY 
IP CONSULT TO CARDIOLOGY PROCEDURES/SURGERIES: * No surgery found * PENDING TEST RESULTS:  
At the time of discharge the following test results are still pending: None FOLLOW UP APPOINTMENTS:  
Follow-up Information Follow up With Details Comments Contact Info Padmini Hannon MD Schedule an appointment as soon as possible for a visit in 1 week  [address] [phone] [place] [address] 
[phone] ADDITIONAL CARE RECOMMENDATIONS: None DIET: Cardiac Diet and Diabetic Diet ACTIVITY: Activity as tolerated WOUND CARE: None EQUIPMENT needed: None DISCHARGE MEDICATIONS: 
 See Medication Reconciliation Form · It is important that you take the medication exactly as they are prescribed. · Keep your medication in the bottles provided by the pharmacist and keep a list of the medication names, dosages, and times to be taken in your wallet. · Do not take other medications without consulting your doctor.   
 
 
NOTIFY YOUR PHYSICIAN FOR ANY OF THE FOLLOWING:  
 Fever over 101 degrees for 24 hours. Chest pain, shortness of breath, fever, chills, nausea, vomiting, diarrhea, change in mentation, falling, weakness, bleeding. Severe pain or pain not relieved by medications. Or, any other signs or symptoms that you may have questions about. DISPOSITION: 
X  Home With: Family OT  PT  [facility]  RN  
  
 SNF/Inpatient Rehab/LTAC Independent/assisted living Hospice Other: CDMP Checked: Yes X PROBLEM LIST Updated: Yes X Signed:  
Talha Monday MD Rebecca 
4/27/2018 
11:02 AM 
 
  
  
  
New Travelcoo Announcement We are excited to announce that we are making your provider's discharge notes available to you in New Travelcoo. You will see these notes when they are completed and signed by the physician that discharged you from your recent hospital stay. If you have any questions or concerns about any information you see in New Travelcoo, please call the Health Information Department where you were seen or reach out to your Primary Care Provider for more information about your plan of care. Introducing [facility]! Rolo Segundo introduces New Travelcoo patient portal. Now you can access parts of your medical record, email your doctor's office, and request medication refills online. 1. In your internet browser, go to [email or web address] 2. Click on the First Time User? Click Here link in the Sign In box. You will see the New Member Sign Up page. 3. Enter your New Travelcoo Access Code exactly as it appears below. You will not need to use this code after youve completed the sign-up process. If you do not sign up before the expiration date, you must request a new code. · New Travelcoo Access Code: [email or web address] Expires: 7/16/2018  2:46 PM 
 
4. Enter the last four digits of your Social Security Number (xxxx) and Date of Birth (mm/dd/yyyy) as indicated and click Submit. You will be taken to the next sign-up page. 5. Create a bookjam ID. This will be your bookjam login ID and cannot be changed, so think of one that is secure and easy to remember. 6. Create a bookjam password. You can change your password at any time. 7. Enter your Password Reset Question and Answer. This can be used at a later time if you forget your password. 8. Enter your e-mail address. You will receive e-mail notification when new information is available in [address] 9. Click Sign Up. You can now view and download portions of your medical record. 10. Click the Download Summary menu link to download a portable copy of your medical information. If you have questions, please visit the Frequently Asked Questions section of the bookjam website. Remember, bookjam is NOT to be used for urgent needs. For medical emergencies, dial 911. Now available from your iPhone and Android! Introducing Michael Aguilar As a [facility] patient, I wanted to make you aware of our electronic visit tool called Michael Luisrembertogus. Amira Melendez 24/7 allows you to connect within minutes with a medical provider 24 hours a day, seven days a week via a mobile device or tablet or logging into a secure website from your computer. You can access Michael Aguilar from anywhere in the [place]. A virtual visit might be right for you when you have a simple condition and feel like you just dont want to get out of bed, or cant get away from work for an appointment, when your [facility]  provider is not available (evenings, weekends or holidays), or when youre out of town and need minor care. Electronic visits cost only $49 and if the [facility]  24/7 provider determines a prescription is needed to treat your condition, one can be electronically transmitted to a nearby pharmacy*. Please take a moment to enroll today if you have not already done so. The enrollment process is free and takes just a few minutes.   To enroll, please download the [email or web address] 24/7 mariluz to your tablet or phone, or visit [email or web address] to enroll on your computer. And, as an [address] [place] [address] patient with a [employer] account, the results of your visits will be scanned into your electronic medical record and your primary care provider will be able to view the scanned results. We urge you to continue to see your regular [email or web address] provider for your ongoing medical care. And while your primary care provider may not be the one available when you seek a [employer] virtual visit, the peace of mind you get from getting a real diagnosis real time can be priceless. For more information on [employer], view our Frequently Asked Questions (FAQs) at [email or web address]. Sincerely, 
 
Brandy Contreras MD 
Chief Medical Officer [facility] Brooke Ramos *:  certain medications cannot be prescribed via Pretio Interactive Providers Seen During Your Hospitalization Provider Specialty Primary office phone Opal Hawkins MD Emergency Medicine [phone] Jina Fernandez MD Internal Medicine [phone] Willa Storm MD Hospitalist [phone] Your Primary Care Physician (PCP) Primary Care Physician Office Phone Office Fax [address] [facility], [address] [place] [address] [phone] You are allergic to the following Allergen Reactions Ace Inhibitors Cough AND CKD/SD Arb-Angiotensin Receptor Antagonist Other (comments) CKD/SD, Recent Documentation Weight BMI Smoking Status 141 kg 39.91 kg/m2 Never Smoker Emergency Contacts Name Discharge Info Relation Home Work Mobile Sanna Cuba CAREGIVER [3] Other Relative [6] [phone] Patient Belongings The following personal items are in your possession at time of discharge: 
  Dental Appliances: None  Visual Aid:  At bedside      Home Medications: None   Jewelry: None  Clothing: Pants, Shirt    Other Valuables: Prosthesis, Cell Phone Discharge Instructions Attachments/References HEART FAILURE: AVOIDING TRIGGERS (ENGLISH) Patient Handouts Avoiding Triggers With Heart Failure: Care Instructions Your Care Instructions Triggers are anything that make your heart failure flare up. A flare-up is also called \"sudden heart failure\" or \"acute heart failure. \" When you have a flare-up, fluid builds up in your lungs, and you have problems breathing. You might need to go to the hospital. By watching for changes in your condition and avoiding triggers, you can prevent heart failure flare-ups. Follow-up care is a key part of your treatment and safety. Be sure to make and go to all appointments, and call your doctor if you are having problems. It's also a good idea to know your test results and keep a list of the medicines you take. How can you care for yourself at home? Watch for changes in your weight and condition · Weigh yourself without clothing at the same time each day. Record your weight. Call your doctor if you have sudden weight gain, such as more than 2 to 3 pounds in a day or 5 pounds in a week. (Your doctor may suggest a different range of weight gain.) A sudden weight gain may mean that your heart failure is getting worse. · Keep a daily record of your symptoms. Write down any changes in how you feel, such as new shortness of breath, cough, or problems eating. Also record if your ankles are more swollen than usual and if you feel more tired than usual. Note anything that you ate or did that could have triggered these changes. Limit sodium Sodium causes your body to hold on to extra water. This may cause your heart failure symptoms to get worse. People get most of their sodium from processed foods. Fast food and restaurant meals also tend to be very high in sodium. · Your doctor may suggest that you limit sodium to 2,000 milligrams (mg) a day or less. That is less than 1 teaspoon of salt a day, including all the salt you eat in cooking or in packaged foods. · Read food labels on cans and food packages. They tell you how much sodium you get in one serving. Check the serving size. If you eat more than one serving, you are getting more sodium. · Be aware that sodium can come in forms other than salt, including monosodium glutamate (MSG), sodium citrate, and sodium bicarbonate (baking soda). MSG is often added to Asian food. You can sometimes ask for food without MSG or salt. · Slowly reducing salt will help you adjust to the taste. Take the salt shaker off the table. · Flavor your food with garlic, lemon juice, onion, vinegar, herbs, and spices instead of salt. Do not use soy sauce, steak sauce, onion salt, garlic salt, mustard, or ketchup on your food, unless it is labeled \"low-sodium\" or \"low-salt. \" 
· Make your own salad dressings, sauces, and ketchup without adding salt. · Use fresh or frozen ingredients, instead of canned ones, whenever you can. Choose low-sodium canned goods. · Eat less processed food and food from restaurants, including fast food. Exercise as directed Moderate, regular exercise is very good for your heart. It improves your blood flow and helps control your weight. But too much exercise can stress your heart and cause a heart failure flare-up. · Check with your doctor before you start an exercise program. 
· Walking is an easy way to get exercise. Start out slowly. Gradually increase the length and pace of your walk. Swimming, riding a bike, and using a treadmill are also good forms of exercise. · When you exercise, watch for signs that your heart is working too hard. You are pushing yourself too hard if you cannot talk while you are exercising.  If you become short of breath or dizzy or have chest pain, stop, sit down, and rest. 
 · Do not exercise when you do not feel well. Take medicines correctly · Take your medicines exactly as prescribed. Call your doctor if you think you are having a problem with your medicine. · Make a list of all the medicines you take. Include those prescribed to you by other doctors and any over-the-counter medicines, vitamins, or supplements you take. Take this list with you when you go to any doctor. · Take your medicines at the same time every day. It may help you to post a list of all the medicines you take every day and what time of day you take them. · Make taking your medicine as simple as you can. Plan times to take your medicines when you are doing other things, such as eating a meal or getting ready for bed. This will make it easier to remember to take your medicines. · Get organized. Use helpful tools, such as daily or weekly pill containers. When should you call for help? Call 911 if you have symptoms of sudden heart failure such as: 
? · You have severe trouble breathing. ? · You cough up pink, foamy mucus. ? · You have a new irregular or rapid heartbeat. ?Call your doctor now or seek immediate medical care if: 
? · You have new or increased shortness of breath. ? · You are dizzy or lightheaded, or you feel like you may faint. ? · You have sudden weight gain, such as more than 2 to 3 pounds in a day or 5 pounds in a week. (Your doctor may suggest a different range of weight gain.) ? · You have increased swelling in your legs, ankles, or feet. ? · You are suddenly so tired or weak that you cannot do your usual activities. ? Watch closely for changes in your health, and be sure to contact your doctor if you develop new symptoms. Where can you learn more? Go to http://amparo-ramon.info/. Enter F900 in the search box to learn more about \"Avoiding Triggers With Heart Failure: Care Instructions. \" Current as of: September 21, 2016 Content Version: 11.4 © 8125-1577 [employer]. Care instructions adapted under license by [employer] (which disclaims liability or warranty for this information). If you have questions about a medical condition or this instruction, always ask your healthcare professional. [address] any warranty or liability for your use of this information. Please provide this summary of care documentation to your next provider. Signatures-by signing, you are acknowledging that this After Visit Summary has been reviewed with you and you have received a copy. Patient Signature:  ____________________________________________________________ Date:  ____________________________________________________________  
  
[facility] Provider Signature:  ____________________________________________________________ Date:  ____________________________________________________________

## 2022-06-10 NOTE — PATIENT INSTRUCTIONS
[employer] Activation    Thank you for requesting access to [employer]. Please follow the instructions below to securely access and download your online medical record. [employer] allows you to send messages to your doctor, view your test results, renew your prescriptions, schedule appointments, and more. How Do I Sign Up? 1. In your internet browser, go to [email or web address]  2. Click on the First Time User? Click Here link in the Sign In box. You will be redirect to the New Member Sign Up page. 3. Enter your [employer] Access Code exactly as it appears below. You will not need to use this code after youve completed the sign-up process. If you do not sign up before the expiration date, you must request a new code. [employer] Access Code: [email or web address]  Expires: 2018 12:15 PM (This is the date your [employer] access code will )    4. Enter the last four digits of your Social Security Number (xxxx) and Date of Birth (mm/dd/yyyy) as indicated and click Submit. You will be taken to the next sign-up page. 5. Create a [employer] ID. This will be your [employer] login ID and cannot be changed, so think of one that is secure and easy to remember. 6. Create a [employer] password. You can change your password at any time. 7. Enter your Password Reset Question and Answer. This can be used at a later time if you forget your password. 8. Enter your e-mail address. You will receive e-mail notification when new information is available in [address]. 9. Click Sign Up. You can now view and download portions of your medical record. 10. Click the Download Summary menu link to download a portable copy of your medical information. Additional Information    If you have questions, please visit the Frequently Asked Questions section of the [employer] website at [email or web address] Remember, [employer] is NOT to be used for urgent needs. For medical emergencies, dial 911. 10-Alvaro-2022 14:17

## 2024-11-05 NOTE — PROGRESS NOTES
Mr. Tawanna Harper had been admitted with dyspnea associated with exacerbated anemia. He was  told he had lymphedema but he has no trouble getting the prostheses on. I was asked to consult while he was in [place] but I was unable because of coverage needs in [facility].  Echocardiogram was done, read by Dr. Carmela Cadena, reported as follows:    Echo in [facility]:  Left ventricle: Systolic function was vigorous. Ejection fraction was  estimated in the range of 70 % to 75 %. There were no regional wall motion  abnormalities. Doppler parameters were consistent with restrictive  physiology, indicative of decreased left ventricular diastolic compliance  and/or increased left atrial pressure. Left atrium: The atrium was mildly dilated. Pericardium: A trivial pericardial effusion was identified. There was no  evidence of hemodynamic compromise. Confusion re: calcellations, calls, office attendance etc.   I reviewed the issue of numerous missed office visits in the last few months. He claims that he has called to cancel each time but that is mike often diverted to the [place] office that they do not give us the message. Our staff here find this to be very doubtful. On examination, he is an obese adult male. Older than his stated age. Abdominal girth is tremendous and has increased since the last time he was examined. He does not have palpable edema in his thighs. He has bilateral BKA with prostheses. He states that he was told he has lymphedema in his lower extremities but this is not palpable. I did not ask him to remove his prostheses and slacks for this examination. Cardiac auscultation shows  Moveable narow S2 split, soft TRACI, RR, nl S1 and S2. No audible gallop 134/69   radial pulses are intact, carotids are silent, lungs are clear to auscultation with no rales and no wheezing. Jugular veins are flat in the seated position.     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 Eloisa Saldivar for management of nephrogenic anemia    Hematology consultation if he continues to respond sluggishly to standard treatment on a regular schedule    Patient was urged to call his office with greater than 2 pound one day fluctuations in weight and to call to cancel if he is unable to keep an appointment    Next appointment 3 months or as needed Post-Care Instructions: I reviewed with the patient in detail post-care instructions. Patient is to wear sunprotection, and avoid picking at any of the treated lesions. Pt may apply Vaseline to crusted or scabbing areas. Render Post-Care Instructions In Note?: yes Aperture Size (Optional): C Application Tool (Optional): Liquid Nitrogen Sprayer Number Of Freeze-Thaw Cycles: 2 freeze-thaw cycles Consent: The patient's consent was obtained including but not limited to risks of crusting, scabbing, blistering, scarring, darker or lighter pigmentary change, recurrence, incomplete removal and infection. Render Note In Bullet Format When Appropriate: No Duration Of Freeze Thaw-Cycle (Seconds): 3 Medical Necessity Clause: This procedure was medically necessary because the lesions that were treated were: Spray Paint Text: The liquid nitrogen was applied to the skin utilizing a spray paint frosting technique. Aperture Size (Optional): B Detail Level: Detailed Medical Necessity Information: It is in your best interest to select a reason for this procedure from the list below. All of these items fulfill various CMS LCD requirements except the new and changing color options.